# Patient Record
Sex: MALE | Race: BLACK OR AFRICAN AMERICAN | NOT HISPANIC OR LATINO | Employment: OTHER | URBAN - METROPOLITAN AREA
[De-identification: names, ages, dates, MRNs, and addresses within clinical notes are randomized per-mention and may not be internally consistent; named-entity substitution may affect disease eponyms.]

---

## 2017-07-17 ENCOUNTER — OFFICE VISIT (OUTPATIENT)
Dept: NEUROLOGY | Facility: CLINIC | Age: 67
End: 2017-07-17
Payer: MEDICARE

## 2017-07-17 VITALS — DIASTOLIC BLOOD PRESSURE: 85 MMHG | SYSTOLIC BLOOD PRESSURE: 146 MMHG | WEIGHT: 186.06 LBS | HEART RATE: 51 BPM

## 2017-07-17 DIAGNOSIS — I69.951 HEMIPLEGIA AFFECTING RIGHT SIDE IN RIGHT-DOMINANT PATIENT AS LATE EFFECT OF CEREBROVASCULAR DISEASE: ICD-10-CM

## 2017-07-17 DIAGNOSIS — I10 ESSENTIAL HYPERTENSION: ICD-10-CM

## 2017-07-17 DIAGNOSIS — E78.5 HYPERLIPIDEMIA, UNSPECIFIED HYPERLIPIDEMIA TYPE: ICD-10-CM

## 2017-07-17 DIAGNOSIS — I69.320 APHASIA AS LATE EFFECT OF CEREBROVASCULAR ACCIDENT: ICD-10-CM

## 2017-07-17 PROCEDURE — 1126F AMNT PAIN NOTED NONE PRSNT: CPT | Mod: ,,, | Performed by: PSYCHIATRY & NEUROLOGY

## 2017-07-17 PROCEDURE — 99204 OFFICE O/P NEW MOD 45 MIN: CPT | Mod: PBBFAC | Performed by: PSYCHIATRY & NEUROLOGY

## 2017-07-17 PROCEDURE — 99204 OFFICE O/P NEW MOD 45 MIN: CPT | Mod: S$PBB,,, | Performed by: PSYCHIATRY & NEUROLOGY

## 2017-07-17 PROCEDURE — 1159F MED LIST DOCD IN RCRD: CPT | Mod: ,,, | Performed by: PSYCHIATRY & NEUROLOGY

## 2017-07-17 PROCEDURE — 99999 PR PBB SHADOW E&M-NEW PATIENT-LVL IV: CPT | Mod: PBBFAC,,, | Performed by: PSYCHIATRY & NEUROLOGY

## 2017-07-17 RX ORDER — ROSUVASTATIN CALCIUM 20 MG/1
20 TABLET, COATED ORAL NIGHTLY
Status: ON HOLD | COMMUNITY
Start: 2017-04-22 | End: 2019-08-07 | Stop reason: SDUPTHER

## 2017-07-17 RX ORDER — METOPROLOL TARTRATE 50 MG/1
25 TABLET ORAL DAILY
Status: ON HOLD | COMMUNITY
Start: 2017-05-04 | End: 2019-08-07 | Stop reason: SDUPTHER

## 2017-07-17 RX ORDER — FINASTERIDE 5 MG/1
5 TABLET, FILM COATED ORAL DAILY
COMMUNITY
Start: 2017-05-30

## 2017-07-17 NOTE — PROGRESS NOTES
Subjective:       Patient ID: Alexsander Tafoya is a 66 y.o. male.    Chief Complaint:  Stroke      History of Present Illness  HPI   This is a 66-year-old -American male who is self-referred for evaluation of stroke related symptoms.  He is accompanied by his family who collaborated with the history, as the patient has an expressive aphasia with limited speech output.  He had a stroke in 2007 at which time he had profound right-sided weakness and speech difficulty.  He has residual right spastic hemiparesis and an expressive aphasia and has difficulty ambulating.  He uses a walker.  He subsequently had another stroke in 2011 with worsening of his prior deficits and increase in clumsiness.  The patient has been stable since then.  He lives alone in his house however has been having increasing difficulty with taking care off his day-to-day needs and rarely gets out of the house.  His family now helps with transportation, keeping appointments and meals.  He is able to take his medications on his own and is able to take care of his personal hygiene.  He has had no recent falls.  Vascular risk factors include hypertension and hyperlipidemia.  The family reports that he has been otherwise healthy in the past and prior to the stroke was quite athletic taking part in races.       Review of Systems  Review of Systems   HENT: Negative.  Negative for hearing loss.    Eyes: Negative.  Negative for visual disturbance.   Respiratory: Negative.  Negative for shortness of breath.    Cardiovascular: Negative.  Negative for chest pain and palpitations.   Gastrointestinal: Negative.    Endocrine: Negative.    Genitourinary: Negative.    Musculoskeletal: Positive for gait problem. Negative for back pain.   Skin: Negative.    Allergic/Immunologic: Negative.    Neurological: Positive for speech difficulty and weakness. Negative for dizziness, tremors, seizures, syncope, numbness and headaches.   Hematological: Negative.     Psychiatric/Behavioral: Negative.  Negative for decreased concentration and sleep disturbance.       Objective:      Neurologic Exam     Mental Status   Oriented to person, place, and time.   Oriented to person.   Oriented to time. Oriented to year, month, day and season.   Registration: recalls 3 of 3 objects. Follows 3 step commands.   Attention: normal. Concentration: normal.   Speech: slurred   Level of consciousness: alert  Knowledge: good. Able to perform simple calculations.   Able to name object. Able to repeat. Unable to write. Normal comprehension.   Patient has a significant expressive aphasia noted difficulty with comprehension.     Cranial Nerves     CN II   Visual fields full to confrontation.     CN III, IV, VI   Pupils are equal, round, and reactive to light.  Extraocular motions are normal.     CN V   Facial sensation intact.   Right corneal reflex: normal  Left corneal reflex: normal    CN VII   Right facial weakness: central (Minimal weakness right face)  Left facial weakness: none  Right taste: normal  Left taste: normal    CN VIII   CN VIII normal.     CN IX, X   CN IX normal.   CN X normal.     CN XI   CN XI normal.     CN XII   CN XII normal.     Motor Exam   Muscle bulk: normal  Overall muscle tone: increased  Right arm tone: spastic  Left arm tone: normal  Right leg tone: spastic  Left leg tone: normal    Strength   Strength 5/5 throughout. Right spastic hemiparesis with power being 2-3/5 in the right upper extremity and 3-4//5 in the right lower extremity.  Normal power on the left.  Grossly symmetrical     Sensory Exam   Light touch normal.   Proprioception normal.   Pinprick normal.   Grossly symmetrical primary sensations     Gait, Coordination, and Reflexes     Gait  Gait: spastic    Coordination   Finger to nose coordination: normal    Tremor   Resting tremor: absent  Intention tremor: absent  Action tremor: absent    Reflexes   Right brachioradialis: 1+  Left brachioradialis:  1+  Right biceps: 1+  Left biceps: 1+  Right triceps: 1+  Left triceps: 1+  Right patellar: 1+  Left patellar: 1+  Right achilles: 1+  Left achilles: 1+  Right plantar: normal  Left plantar: normalDTRs 1+ but slightly more prominent on the right.       Physical Exam   Constitutional: He is oriented to person, place, and time. He appears well-developed and well-nourished.   HENT:   Head: Normocephalic and atraumatic.   Eyes: EOM are normal. Pupils are equal, round, and reactive to light.   Neck: Normal range of motion. Neck supple. Carotid bruit is not present.   Neurological: He is oriented to person, place, and time. He has normal strength. He has a normal Finger-Nose-Finger Test.   Reflex Scores:       Tricep reflexes are 1+ on the right side and 1+ on the left side.       Bicep reflexes are 1+ on the right side and 1+ on the left side.       Brachioradialis reflexes are 1+ on the right side and 1+ on the left side.       Patellar reflexes are 1+ on the right side and 1+ on the left side.       Achilles reflexes are 1+ on the right side and 1+ on the left side.  Psychiatric: His speech is slurred.   Vitals reviewed.        Assessment:        1. Hemiplegia affecting right side in right-dominant patient as late effect of cerebrovascular disease    2. Aphasia as late effect of cerebrovascular accident    3. Essential hypertension    4. Hyperlipidemia, unspecified hyperlipidemia type           Plan:       Discussed with family and patient.  Because of increasing difficulty with ambulation we will get an MRI scan of the brain and a carotid ultrasound done to rule out the possibility of any new ischemic infarcts.  In addition will get home health services to initiate PT/OT and speech therapy.  Discussed with family regarding possibility of getting him into a more supervised environment such as assisted caret facility.  He does have a fall risk.  They will discuss with home health regarding need for any other equipment  including a wheelchair.  We will contact him with results of the testing and will follow-up in 6 months if stable.

## 2017-07-17 NOTE — PATIENT INSTRUCTIONS
Discussed with family and patient.  Because of increasing difficulty with ambulation we will get an MRI scan of the brain and a carotid ultrasound done to rule out the possibility of any new ischemic infarcts.  In addition will get home health services to initiate PT/OT and speech therapy.  Discussed with family regarding possibility of getting him into a more supervised environment such as assisted caret facility.  He does have a fall risk.  They will discuss with home health regarding need for any other equipment including a wheelchair.  We will contact him with results of the testing and will follow-up in 6 months if stable.

## 2017-07-18 ENCOUNTER — HOSPITAL ENCOUNTER (OUTPATIENT)
Dept: RADIOLOGY | Facility: OTHER | Age: 67
Discharge: HOME | End: 2017-07-18
Attending: PSYCHIATRY & NEUROLOGY
Payer: MEDICARE

## 2017-07-18 DIAGNOSIS — I10 ESSENTIAL HYPERTENSION: ICD-10-CM

## 2017-07-18 DIAGNOSIS — I69.320 APHASIA AS LATE EFFECT OF CEREBROVASCULAR ACCIDENT: ICD-10-CM

## 2017-07-18 DIAGNOSIS — I69.951 HEMIPLEGIA AFFECTING RIGHT SIDE IN RIGHT-DOMINANT PATIENT AS LATE EFFECT OF CEREBROVASCULAR DISEASE: ICD-10-CM

## 2017-07-18 DIAGNOSIS — E78.5 HYPERLIPIDEMIA, UNSPECIFIED HYPERLIPIDEMIA TYPE: ICD-10-CM

## 2017-07-18 PROCEDURE — 93880 EXTRACRANIAL BILAT STUDY: CPT | Mod: TC

## 2017-07-18 PROCEDURE — 70551 MRI BRAIN STEM W/O DYE: CPT | Mod: TC

## 2017-07-18 PROCEDURE — 70551 MRI BRAIN STEM W/O DYE: CPT | Mod: 26,,, | Performed by: RADIOLOGY

## 2017-07-18 PROCEDURE — 93880 EXTRACRANIAL BILAT STUDY: CPT | Mod: 26,,, | Performed by: RADIOLOGY

## 2017-07-20 ENCOUNTER — TELEPHONE (OUTPATIENT)
Dept: NEUROLOGY | Facility: CLINIC | Age: 67
End: 2017-07-20

## 2017-07-20 NOTE — TELEPHONE ENCOUNTER
----- Message from Addie Mtz sent at 7/20/2017  8:46 AM CDT -----  Contact: Smitha from [facility]   x_  1st Request  _  2nd Request  _  3rd Request        Who: Smitha from [facility]     Why: Smitha would like verbal order for  for the pt . Please call    What Number to Call Back: call Jasmin [phone]    When to Expect a call back: (With in 24 hours)

## 2017-07-21 ENCOUNTER — TELEPHONE (OUTPATIENT)
Dept: ADMINISTRATIVE | Facility: CLINIC | Age: 67
End: 2017-07-21

## 2017-08-07 ENCOUNTER — TELEPHONE (OUTPATIENT)
Dept: NEUROLOGY | Facility: CLINIC | Age: 67
End: 2017-08-07

## 2017-08-07 NOTE — TELEPHONE ENCOUNTER
----- Message from Clara Mac sent at 8/7/2017  1:32 PM CDT -----  Contact: Kelsea, [facility]  Smitha is calling to inform Dr Ramsey that she will be discharging pt from home health nursing.  Home health OT and PT will continue.    Smitha can be reached at [phone] if you have further questions.

## 2017-12-28 ENCOUNTER — OFFICE VISIT (OUTPATIENT)
Dept: NEUROLOGY | Facility: CLINIC | Age: 67
End: 2017-12-28
Payer: MEDICARE

## 2017-12-28 VITALS
BODY MASS INDEX: 29.57 KG/M2 | DIASTOLIC BLOOD PRESSURE: 66 MMHG | SYSTOLIC BLOOD PRESSURE: 108 MMHG | HEIGHT: 66 IN | HEART RATE: 57 BPM | WEIGHT: 184 LBS

## 2017-12-28 DIAGNOSIS — I69.951 HEMIPLEGIA AFFECTING RIGHT SIDE IN RIGHT-DOMINANT PATIENT AS LATE EFFECT OF CEREBROVASCULAR DISEASE: ICD-10-CM

## 2017-12-28 DIAGNOSIS — I10 ESSENTIAL HYPERTENSION: ICD-10-CM

## 2017-12-28 DIAGNOSIS — I69.320 APHASIA AS LATE EFFECT OF CEREBROVASCULAR ACCIDENT: Primary | ICD-10-CM

## 2017-12-28 DIAGNOSIS — E78.5 HYPERLIPIDEMIA, UNSPECIFIED HYPERLIPIDEMIA TYPE: ICD-10-CM

## 2017-12-28 PROCEDURE — 99214 OFFICE O/P EST MOD 30 MIN: CPT | Mod: S$PBB,,, | Performed by: PSYCHIATRY & NEUROLOGY

## 2017-12-28 PROCEDURE — 99999 PR PBB SHADOW E&M-EST. PATIENT-LVL III: CPT | Mod: PBBFAC,,, | Performed by: PSYCHIATRY & NEUROLOGY

## 2017-12-28 PROCEDURE — 99213 OFFICE O/P EST LOW 20 MIN: CPT | Mod: PBBFAC | Performed by: PSYCHIATRY & NEUROLOGY

## 2017-12-28 NOTE — PATIENT INSTRUCTIONS
Discussed with family and patient.  Continue with present management.  We will consider restarting speech therapy once patient is moved into the assisted living facility.  His daughter was asking about getting a scooter for mobility.  Advised her to wait until he is in the assisted living facility to find out if it is okay for him to have a scooter in that facility and I will initiate the request.

## 2017-12-28 NOTE — PROGRESS NOTES
Subjective:       Patient ID: Alexsander Tafoya is a 67 y.o. male.    Chief Complaint:  Stroke      History of Present Illness  HPI  This is a 67-year-old -American male who is self-referred for evaluation of stroke related symptoms.  He is accompanied by his family who collaborated with the history, as the patient has an expressive aphasia with limited speech output.  He had a stroke in 2007 at which time he had profound right-sided weakness and speech difficulty.  He has residual right spastic hemiparesis and an expressive aphasia and has difficulty ambulating.  He uses a walker.  He subsequently had another stroke in 2011 with worsening of his prior deficits and increase in clumsiness.  The patient has been stable since then.      He lives alone in his house however his family has planned placement into an assisted living center.  His family now helps with transportation, keeping appointments and meals.  He is able to take his medications on his own and is able to take care of his personal hygiene.  He has had no recent falls.  Vascular risk factors include hypertension and hyperlipidemia.  He is on aspirin 325 mg daily.       Review of Systems  Review of Systems   HENT: Negative.  Negative for hearing loss.    Eyes: Negative.  Negative for visual disturbance.   Respiratory: Negative.  Negative for shortness of breath.    Cardiovascular: Negative.  Negative for chest pain and palpitations.   Gastrointestinal: Negative.    Endocrine: Negative.    Genitourinary: Negative.    Musculoskeletal: Positive for gait problem. Negative for back pain.   Skin: Negative.    Allergic/Immunologic: Negative.    Neurological: Positive for speech difficulty and weakness. Negative for dizziness, tremors, seizures, syncope, numbness and headaches.   Hematological: Negative.    Psychiatric/Behavioral: Negative.  Negative for decreased concentration and sleep disturbance.       Objective:      Neurologic Exam     Mental Status    Oriented to person, place, and time.   Oriented to person.   Oriented to time. Oriented to year, month, day and season.   Registration: recalls 3 of 3 objects. Follows 3 step commands.   Attention: normal. Concentration: normal.   Speech: slurred   Level of consciousness: alert  Knowledge: good. Able to perform simple calculations.   Able to name object. Able to repeat. Unable to write. Normal comprehension.   Patient has a significant expressive aphasia noted difficulty with comprehension.     Cranial Nerves     CN II   Visual fields full to confrontation.     CN III, IV, VI   Pupils are equal, round, and reactive to light.  Extraocular motions are normal.     CN V   Facial sensation intact.   Right corneal reflex: normal  Left corneal reflex: normal    CN VII   Right facial weakness: central (Minimal weakness right face)  Left facial weakness: none  Right taste: normal  Left taste: normal    CN VIII   CN VIII normal.     CN IX, X   CN IX normal.   CN X normal.     CN XI   CN XI normal.     CN XII   CN XII normal.     Motor Exam   Muscle bulk: normal  Overall muscle tone: increased  Right arm tone: spastic  Left arm tone: normal  Right leg tone: spastic  Left leg tone: normal    Strength   Strength 5/5 throughout. Right spastic hemiparesis with power being 2-3/5 in the right upper extremity and 3-4//5 in the right lower extremity.  Normal power on the left.  Grossly symmetrical     Sensory Exam   Light touch normal.   Proprioception normal.   Pinprick normal.   Grossly symmetrical primary sensations     Gait, Coordination, and Reflexes     Gait  Gait: spastic    Coordination   Finger to nose coordination: normal    Tremor   Resting tremor: absent  Intention tremor: absent  Action tremor: absent    Reflexes   Right brachioradialis: 1+  Left brachioradialis: 1+  Right biceps: 1+  Left biceps: 1+  Right triceps: 1+  Left triceps: 1+  Right patellar: 1+  Left patellar: 1+  Right achilles: 1+  Left achilles: 1+  Right  plantar: normal  Left plantar: normalDTRs 1+ but slightly more prominent on the right.       Physical Exam   Constitutional: He is oriented to person, place, and time. He appears well-developed and well-nourished.   HENT:   Head: Normocephalic and atraumatic.   Eyes: EOM are normal. Pupils are equal, round, and reactive to light.   Neck: Normal range of motion. Neck supple. Carotid bruit is not present.   Neurological: He is oriented to person, place, and time. He has normal strength. He has a normal Finger-Nose-Finger Test.   Reflex Scores:       Tricep reflexes are 1+ on the right side and 1+ on the left side.       Bicep reflexes are 1+ on the right side and 1+ on the left side.       Brachioradialis reflexes are 1+ on the right side and 1+ on the left side.       Patellar reflexes are 1+ on the right side and 1+ on the left side.       Achilles reflexes are 1+ on the right side and 1+ on the left side.  Psychiatric: His speech is slurred.   Vitals reviewed.        Assessment:        1. Aphasia as late effect of cerebrovascular accident    2. Hemiplegia affecting right side in right-dominant patient as late effect of cerebrovascular disease    3. Essential hypertension    4. Hyperlipidemia, unspecified hyperlipidemia type           Plan:       Discussed with family and patient.  Continue with present management.  We will consider restarting speech therapy once patient is moved into the assisted living facility.  His daughter was asking about getting a scooter for mobility.  Advised her to wait until he is in the assisted living facility to find out if it is okay for him to have a scooter in that facility and I will initiate the request.  If stable he will follow-up in 6 months.

## 2018-03-06 ENCOUNTER — TELEPHONE (OUTPATIENT)
Dept: NEUROLOGY | Facility: CLINIC | Age: 68
End: 2018-03-06

## 2018-03-06 NOTE — TELEPHONE ENCOUNTER
----- Message from Kamar Chanel sent at 3/6/2018 11:04 AM CST -----  Contact:   _marlee  1st Request  _  2nd Request  _  3rd Request        Who: Idalia    Why: Requesting a call back in regards to patient getting prescription for power scooter for coverage purposes.  Please return the call at earliest convenience. Thanks!    What Number to Call Back: [phone]      When to Expect a call back: (Within 24 hours)

## 2018-03-14 ENCOUNTER — OFFICE VISIT (OUTPATIENT)
Dept: NEUROLOGY | Facility: CLINIC | Age: 68
End: 2018-03-14
Payer: MEDICARE

## 2018-03-14 VITALS
HEIGHT: 66 IN | DIASTOLIC BLOOD PRESSURE: 71 MMHG | WEIGHT: 188.5 LBS | HEART RATE: 56 BPM | SYSTOLIC BLOOD PRESSURE: 117 MMHG | BODY MASS INDEX: 30.29 KG/M2

## 2018-03-14 DIAGNOSIS — E78.5 HYPERLIPIDEMIA, UNSPECIFIED HYPERLIPIDEMIA TYPE: ICD-10-CM

## 2018-03-14 DIAGNOSIS — I10 ESSENTIAL HYPERTENSION: ICD-10-CM

## 2018-03-14 DIAGNOSIS — I69.951 HEMIPLEGIA AFFECTING RIGHT SIDE IN RIGHT-DOMINANT PATIENT AS LATE EFFECT OF CEREBROVASCULAR DISEASE: Primary | ICD-10-CM

## 2018-03-14 DIAGNOSIS — I69.320 APHASIA AS LATE EFFECT OF CEREBROVASCULAR ACCIDENT: ICD-10-CM

## 2018-03-14 PROCEDURE — 99214 OFFICE O/P EST MOD 30 MIN: CPT | Mod: S$PBB,,, | Performed by: PSYCHIATRY & NEUROLOGY

## 2018-03-14 PROCEDURE — 99213 OFFICE O/P EST LOW 20 MIN: CPT | Mod: PBBFAC | Performed by: PSYCHIATRY & NEUROLOGY

## 2018-03-14 PROCEDURE — 99999 PR PBB SHADOW E&M-EST. PATIENT-LVL III: CPT | Mod: PBBFAC,,, | Performed by: PSYCHIATRY & NEUROLOGY

## 2018-03-14 NOTE — PROGRESS NOTES
Subjective:       Patient ID: Alexsander Tafoya is a 67 y.o. male.    Chief Complaint:  Stroke      History of Present Illness  HPI  This is a 67-year-old -American male who had been seen by me for evaluation of stroke related symptoms.  He is accompanied by his family who collaborated with the history, as the patient has an expressive aphasia with limited speech output.  He had a stroke in 2007 at which time he had profound right-sided weakness and speech difficulty.  He has residual right spastic hemiparesis and an expressive aphasia and has difficulty ambulating.  He uses a walker.  He subsequently had another stroke in 2011 with worsening of his prior deficits and increase in clumsiness.  The patient has been stable since then.      The patient was last seen by me in December 2017 and subsequently has been placed into a supervised environment/assisted living center.  He is slowly getting adjusted but would like to have a scooter so that he would have increased stability within the assisted living center as he has been using a manual wheelchair on his own.  Vascular risk factors include hypertension and hyperlipidemia.  He is on aspirin 325 mg daily.       Review of Systems  Review of Systems   HENT: Negative.  Negative for hearing loss.    Eyes: Negative.  Negative for visual disturbance.   Respiratory: Negative.  Negative for shortness of breath.    Cardiovascular: Negative.  Negative for chest pain and palpitations.   Gastrointestinal: Negative.    Endocrine: Negative.    Genitourinary: Negative.    Musculoskeletal: Positive for gait problem. Negative for back pain.   Skin: Negative.    Allergic/Immunologic: Negative.    Neurological: Positive for speech difficulty and weakness. Negative for dizziness, tremors, seizures, syncope, numbness and headaches.   Hematological: Negative.    Psychiatric/Behavioral: Negative.  Negative for decreased concentration and sleep disturbance.       Objective:       Neurologic Exam     Mental Status   Oriented to person, place, and time.   Oriented to person.   Oriented to time. Oriented to year, month, day and season.   Registration: recalls 3 of 3 objects. Follows 3 step commands.   Attention: normal. Concentration: normal.   Speech: slurred   Level of consciousness: alert  Knowledge: good. Able to perform simple calculations.   Able to name object. Able to repeat. Unable to write. Normal comprehension.   Patient has a significant expressive aphasia noted difficulty with comprehension.     Cranial Nerves     CN II   Visual fields full to confrontation.     CN III, IV, VI   Pupils are equal, round, and reactive to light.  Extraocular motions are normal.     CN V   Facial sensation intact.   Right corneal reflex: normal  Left corneal reflex: normal    CN VII   Right facial weakness: central (Minimal weakness right face)  Left facial weakness: none  Right taste: normal  Left taste: normal    CN VIII   CN VIII normal.     CN IX, X   CN IX normal.   CN X normal.     CN XI   CN XI normal.     CN XII   CN XII normal.     Motor Exam   Muscle bulk: normal  Overall muscle tone: increased  Right arm tone: spastic  Left arm tone: normal  Right leg tone: spastic  Left leg tone: normal    Strength   Strength 5/5 throughout. Right spastic hemiparesis with power being 2-3/5 in the right upper extremity and 3-4//5 in the right lower extremity.  Normal power on the left.  Grossly symmetrical     Sensory Exam   Light touch normal.   Proprioception normal.   Pinprick normal.   Grossly symmetrical primary sensations     Gait, Coordination, and Reflexes     Gait  Gait: spastic    Coordination   Finger to nose coordination: normal    Tremor   Resting tremor: absent  Intention tremor: absent  Action tremor: absent    Reflexes   Right brachioradialis: 1+  Left brachioradialis: 1+  Right biceps: 1+  Left biceps: 1+  Right triceps: 1+  Left triceps: 1+  Right patellar: 1+  Left patellar: 1+  Right  achilles: 1+  Left achilles: 1+  Right plantar: normal  Left plantar: normalDTRs 1+ but slightly more prominent on the right.       Physical Exam   Constitutional: He is oriented to person, place, and time. He appears well-developed and well-nourished.   HENT:   Head: Normocephalic and atraumatic.   Eyes: EOM are normal. Pupils are equal, round, and reactive to light.   Neck: Normal range of motion. Neck supple. Carotid bruit is not present.   Neurological: He is oriented to person, place, and time. He has normal strength. He has a normal Finger-Nose-Finger Test.   Reflex Scores:       Tricep reflexes are 1+ on the right side and 1+ on the left side.       Bicep reflexes are 1+ on the right side and 1+ on the left side.       Brachioradialis reflexes are 1+ on the right side and 1+ on the left side.       Patellar reflexes are 1+ on the right side and 1+ on the left side.       Achilles reflexes are 1+ on the right side and 1+ on the left side.  Psychiatric: His speech is slurred.   Vitals reviewed.        Assessment:        1. Hemiplegia affecting right side in right-dominant patient as late effect of cerebrovascular disease    2. Aphasia as late effect of cerebrovascular accident    3. Hyperlipidemia, unspecified hyperlipidemia type    4. Essential hypertension           Plan:       Discussed with family and patient.  Continue with present management.   have given them a prescription for a scooter for mobility.   If stable will follow-up in one year.

## 2018-03-14 NOTE — PATIENT INSTRUCTIONS
Discussed with family and patient.  Continue with present management.   have given them a prescription for a scooter for mobility.   If stable will follow-up in one year.

## 2018-05-21 ENCOUNTER — TELEPHONE (OUTPATIENT)
Dept: NEUROLOGY | Facility: CLINIC | Age: 68
End: 2018-05-21

## 2018-05-21 NOTE — TELEPHONE ENCOUNTER
----- Message from Valery Khanna sent at 5/21/2018 12:25 PM CDT -----            Name of Who is Calling: Idalia      What is the request in detail: Patient's sister called she is requesting a call from the nurse regarding the request for the motorized scooter.      Can the clinic reply by [facility]: No      What Number to Call Back if not in [facility]: [phone]

## 2018-05-23 ENCOUNTER — TELEPHONE (OUTPATIENT)
Dept: NEUROLOGY | Facility: CLINIC | Age: 68
End: 2018-05-23

## 2018-05-23 NOTE — TELEPHONE ENCOUNTER
Discussed with sister.  Patient needed some forms to be signed and filled out for the motorized scooter/ wheelchair.  She is advised to fax it to me and I have fill it and send it to the DME company

## 2018-05-30 ENCOUNTER — TELEPHONE (OUTPATIENT)
Dept: NEUROLOGY | Facility: CLINIC | Age: 68
End: 2018-05-30

## 2018-05-30 NOTE — TELEPHONE ENCOUNTER
----- Message from Hannah Clarke sent at 5/30/2018 12:26 PM CDT -----  Contact: Idalia Kraig (Sister)            Name of Who is Calling: Idalia Kraig (Sister)      What is the request in detail: Pt's sister faxed paper on last Thursday and wants to know if the forms have been received.    Can the clinic reply by [facility]: N      What Number to Call Back if not in [facility]: [phone]

## 2018-06-19 ENCOUNTER — TELEPHONE (OUTPATIENT)
Dept: SLEEP MEDICINE | Facility: CLINIC | Age: 68
End: 2018-06-19

## 2018-06-19 NOTE — TELEPHONE ENCOUNTER
----- Message from Rhea Gongora sent at 6/19/2018  2:26 PM CDT -----  Contact: Kraig,Idalia Armstrong            Name of Who is Calling: Idalia Cornell       What is the request in detail:Pt is calling to check the status of pt getting home health and therapy set up as discussed during pt last visit.  Please contact pt sister to further discuss and advise.      Can the clinic reply by [facility]: No      What Number to Call Back if not in [facility]: [phone]

## 2018-06-22 ENCOUNTER — TELEPHONE (OUTPATIENT)
Dept: NEUROLOGY | Facility: CLINIC | Age: 68
End: 2018-06-22

## 2018-06-28 ENCOUNTER — TELEPHONE (OUTPATIENT)
Dept: NEUROLOGY | Facility: CLINIC | Age: 68
End: 2018-06-28

## 2018-06-28 DIAGNOSIS — I69.951 HEMIPLEGIA AFFECTING RIGHT SIDE IN RIGHT-DOMINANT PATIENT AS LATE EFFECT OF CEREBROVASCULAR DISEASE: Primary | ICD-10-CM

## 2018-06-28 DIAGNOSIS — I10 ESSENTIAL HYPERTENSION: ICD-10-CM

## 2018-06-28 DIAGNOSIS — I69.320 APHASIA AS LATE EFFECT OF CEREBROVASCULAR ACCIDENT: ICD-10-CM

## 2018-06-28 NOTE — TELEPHONE ENCOUNTER
----- Message from Rhea Gongora sent at 6/28/2018 12:57 PM CDT -----  Contact: Idalia Cornell             Name of Who is Calling: Idalia Cornell       What is the request in detail: Pt sister is calling to check the status of pt getting physical and occupational therapy set up as discussed during pt last visit.  Please contact pt sister to further discuss and advise.      Can the clinic reply by [facility]: No      What Number to Call Back if not in [facility]: [phone]

## 2018-07-11 ENCOUNTER — TELEPHONE (OUTPATIENT)
Dept: NEUROLOGY | Facility: CLINIC | Age: 68
End: 2018-07-11

## 2018-07-11 NOTE — TELEPHONE ENCOUNTER
Spoke to sister who states that [facility] did not receive office notes from . Spoke with Cecilia Mathis who confirmed this, and advised her that I will take care of this. Fax # [phone] [facility]. Rehab Dept.

## 2018-07-11 NOTE — TELEPHONE ENCOUNTER
----- Message from Randee Church sent at 7/11/2018 10:25 AM CDT -----  Contact: sister fabiola [phone]            Name of Who is Calling: sister      What is the request in detail: needs to speak to nurse regarding power wheel chair. Call sister      Can the clinic reply by [facility]: no      [facility]: [phone] or [phone]

## 2018-07-12 ENCOUNTER — TELEPHONE (OUTPATIENT)
Dept: NEUROLOGY | Facility: CLINIC | Age: 68
End: 2018-07-12

## 2018-07-12 NOTE — TELEPHONE ENCOUNTER
----- Message from Warren Real sent at 7/12/2018  9:58 AM CDT -----  Contact: Idalia, patient's sister            Name of Who is Calling: Idalia, patient's sister    What is the request in detail: Patient's sister called and stated patient needs to be seen before end of July. The reason is the patient will be discharged from seeing physical therapy if he is not seen before the end of July    Can the clinic reply by [facility]: No      What Number to Call Back if not in [facility]: [phone]

## 2018-07-13 ENCOUNTER — TELEPHONE (OUTPATIENT)
Dept: ADMINISTRATIVE | Facility: CLINIC | Age: 68
End: 2018-07-13

## 2018-11-14 ENCOUNTER — OFFICE VISIT (OUTPATIENT)
Dept: PHYSICAL MEDICINE AND REHAB | Facility: CLINIC | Age: 68
End: 2018-11-14
Payer: MEDICARE

## 2018-11-14 VITALS
HEIGHT: 66 IN | HEART RATE: 52 BPM | SYSTOLIC BLOOD PRESSURE: 134 MMHG | DIASTOLIC BLOOD PRESSURE: 91 MMHG | WEIGHT: 188 LBS | BODY MASS INDEX: 30.22 KG/M2

## 2018-11-14 DIAGNOSIS — R25.2 SPASTICITY: ICD-10-CM

## 2018-11-14 DIAGNOSIS — I69.320 APHASIA AS LATE EFFECT OF CEREBROVASCULAR ACCIDENT: ICD-10-CM

## 2018-11-14 DIAGNOSIS — Z78.9 IMPAIRED MOBILITY AND ADLS: ICD-10-CM

## 2018-11-14 DIAGNOSIS — R26.9 GAIT DISORDER: Primary | ICD-10-CM

## 2018-11-14 DIAGNOSIS — I69.951 HEMIPLEGIA AFFECTING RIGHT SIDE IN RIGHT-DOMINANT PATIENT AS LATE EFFECT OF CEREBROVASCULAR DISEASE: ICD-10-CM

## 2018-11-14 DIAGNOSIS — R29.6 FALLS FREQUENTLY: ICD-10-CM

## 2018-11-14 DIAGNOSIS — Z74.09 IMPAIRED MOBILITY AND ADLS: ICD-10-CM

## 2018-11-14 PROCEDURE — 99999 PR PBB SHADOW E&M-EST. PATIENT-LVL II: CPT | Mod: PBBFAC,,, | Performed by: PHYSICAL MEDICINE & REHABILITATION

## 2018-11-14 PROCEDURE — 99204 OFFICE O/P NEW MOD 45 MIN: CPT | Mod: S$PBB,,, | Performed by: PHYSICAL MEDICINE & REHABILITATION

## 2018-11-14 PROCEDURE — 99212 OFFICE O/P EST SF 10 MIN: CPT | Mod: PBBFAC | Performed by: PHYSICAL MEDICINE & REHABILITATION

## 2018-11-14 NOTE — PROGRESS NOTES
Subjective:       Patient ID: Alexsander Tafoya is a 68 y.o. male.    Chief Complaint: No chief complaint on file.    HPI     HISTORY OF PRESENT ILLNESS:  Mr. Tafoya is a 68-year-old -American male,   right-handed, who is presenting to the Physical Medicine Clinic for evaluation   for a power mobility device.  His past medical history is significant for a   stroke with right-sided weakness and aphasia.    The patient currently lives at [facility]   [email or web address]  He resides in a handicapped accessible apartment.  He is independent   with feeding himself after setup.  He requires assistance for dressing and   toileting.  He requires assistance for bathing.  He can ambulate a few steps   with a walker or holding on to walls and furniture.  He is restricted by   right-sided weakness and impaired balance.  He and his family report history of   occasional falls, the last one about month ago, but so far without any serious   sequelae.  The patient has a manual wheelchair, but can only propel it for 20-25   feet, mostly using his left upper and left lower extremity.  He is restricted by   weakness on the right side and by fatigue.      MS/HN  dd: 11/14/2018 14:56:16 (CST)  td: 11/15/2018 09:03:17 (CST)  Doc ID   #[id number]  Job ID #[id number]          Review of Systems   Constitutional: Negative for fatigue.   Eyes: Negative for visual disturbance.   Respiratory: Positive for shortness of breath.    Cardiovascular: Negative for chest pain.   Gastrointestinal: Negative for nausea and vomiting.   Genitourinary: Positive for frequency. Negative for difficulty urinating.   Musculoskeletal: Positive for arthralgias and gait problem. Negative for back pain and neck pain.   Neurological: Negative for dizziness and headaches.   Psychiatric/Behavioral: Negative for behavioral problems.       Objective:      Physical Exam   Constitutional: He appears well-developed and well-nourished. No distress.   Coming to  the clinic in a manual wheelchair propelled by family.     HENT:   Head: Normocephalic and atraumatic.   Eyes: EOM are normal.   Neck:   Decreased ROM.  -ve tenderness.   Cardiovascular: Normal rate, regular rhythm and normal heart sounds.   Pulmonary/Chest: Breath sounds normal.   Abdominal: Soft.   Musculoskeletal:   BUE:  ROM:   RUE: decreased ROM at shoulder. Increased tone.   LUE: full.  Strength:    RUE: 3-/5 at shoulder abduction, 4 elbow flexion, 4 elbow extension, 4 hand .   LUE: 5/5 at shoulder abduction, 5 elbow flexion, 5 elbow extension, 5 hand .  Sensation to pinprick:   RUE: intact.   LUE: intact.  DTR:    RUE: +2 biceps, +2 triceps.   LUE:  +1 biceps, +1 triceps.      BLE:  ROM:   RLE: decreased at knee, increased tone.   LLE: full.  Strength:    RLE: 3+/5 at hip flexion, 4 knee extension, 4 ankle DF, 4 ankle PF.   LLE: 5/5 at hip flexion, 5 knee extension, 5 ankle DF, 5 ankle PF.  Sensation to pinprick:     RLE: intact.      LLE: intact.   DTR:     RLE: +1 knee, +1 ankle.    LLE: +1 knee, +1 ankle.    -ve tenderness over lumbar spine.     Neurological: He is alert. He exhibits abnormal muscle tone.   Severe dysarthria.  Some word finding difficulty.  Follows simple commands well.  Oriented to name, , year, current residence.   Skin: No rash noted.   Psychiatric: He has a normal mood and affect. His behavior is normal.   Vitals reviewed.      Assessment:       1. Gait disorder    2. Hemiplegia affecting right side in right-dominant patient as late effect of cerebrovascular disease    3. Aphasia as late effect of cerebrovascular accident        Summary/Plan:       - The patient was seen today for mobility evaluation for a power mobility device due to significant impairment at home.  - The patient has multifactorial gait impairment.  - The patient is not able to ambulate safely to the kitchen or living room.  - The patient is unable to use a walker functional distances due to Rt hemiparesis,  impaired balance.  - The patient is unable to use an optimally-configured manual wheelchair in the home in order to perform Mobility Related Activities of Daily Living, due to Rt hemiparesis, increase RUE tone, fatigue.  - The patient has history of falls, which could be detrimental to is health due to stroke and antiplatelet therapy.  - The patient has functional cognition. He is oriented to time, place, situation. He should be able to use a power mobility device well at home.  - The patient was given a prescription for a power wheelchair.  - A scooter would not be appropriate due the patient's trouble clearing the ledge, difficulty controlling the scooter tiller due to RUE weakness and to maneuverability restrictions at his apartment.   - This will allow the patient to go safely to the kitchen, common dining room or living room at the assisted living facility for feeding & socialization.  - The patient is to return the Physical Medicine/Mobility clinic prn.

## 2019-01-29 ENCOUNTER — TELEPHONE (OUTPATIENT)
Dept: NEUROLOGY | Facility: CLINIC | Age: 69
End: 2019-01-29

## 2019-01-29 NOTE — TELEPHONE ENCOUNTER
----- Message from Merna Tam sent at 1/29/2019  3:36 PM CST -----  Contact: Pt's Sister Idalia  Name of Who is Calling: Pt's Sister Idalia    What is the request in detail: Pt's Sister Idalia called to schedule Mr. Beltre for a follow up. Pt's sister states he is a stroke patient and he keeps leaning towards his side and she is unsure what is wrong. Advised first available is 2/27 and she states that is too far out. Please call to further discuss and advise.     Can the clinic reply by [facility]:     What Number to Call Back if not in [facility]: Pt's Sister Idalia [phone]

## 2019-01-29 NOTE — TELEPHONE ENCOUNTER
Idalia advised to bring patient to ER, but would rather have patient seen in office by you. Please advise.

## 2019-01-30 ENCOUNTER — OFFICE VISIT (OUTPATIENT)
Dept: NEUROLOGY | Facility: CLINIC | Age: 69
End: 2019-01-30
Payer: MEDICARE

## 2019-01-30 VITALS
HEART RATE: 64 BPM | DIASTOLIC BLOOD PRESSURE: 85 MMHG | BODY MASS INDEX: 30.34 KG/M2 | HEIGHT: 66 IN | SYSTOLIC BLOOD PRESSURE: 132 MMHG

## 2019-01-30 DIAGNOSIS — I69.320 APHASIA AS LATE EFFECT OF CEREBROVASCULAR ACCIDENT: Primary | ICD-10-CM

## 2019-01-30 DIAGNOSIS — E78.5 HYPERLIPIDEMIA, UNSPECIFIED HYPERLIPIDEMIA TYPE: ICD-10-CM

## 2019-01-30 DIAGNOSIS — I10 ESSENTIAL HYPERTENSION: ICD-10-CM

## 2019-01-30 DIAGNOSIS — I69.951 HEMIPLEGIA AFFECTING RIGHT SIDE IN RIGHT-DOMINANT PATIENT AS LATE EFFECT OF CEREBROVASCULAR DISEASE: ICD-10-CM

## 2019-01-30 PROCEDURE — 99999 PR PBB SHADOW E&M-EST. PATIENT-LVL III: ICD-10-PCS | Mod: PBBFAC,,, | Performed by: PSYCHIATRY & NEUROLOGY

## 2019-01-30 PROCEDURE — 99214 OFFICE O/P EST MOD 30 MIN: CPT | Mod: S$PBB,,, | Performed by: PSYCHIATRY & NEUROLOGY

## 2019-01-30 PROCEDURE — 99213 OFFICE O/P EST LOW 20 MIN: CPT | Mod: PBBFAC | Performed by: PSYCHIATRY & NEUROLOGY

## 2019-01-30 PROCEDURE — 99999 PR PBB SHADOW E&M-EST. PATIENT-LVL III: CPT | Mod: PBBFAC,,, | Performed by: PSYCHIATRY & NEUROLOGY

## 2019-01-30 PROCEDURE — 99214 PR OFFICE/OUTPT VISIT, EST, LEVL IV, 30-39 MIN: ICD-10-PCS | Mod: S$PBB,,, | Performed by: PSYCHIATRY & NEUROLOGY

## 2019-01-30 RX ORDER — AMOXICILLIN 500 MG
1 CAPSULE ORAL DAILY
COMMUNITY

## 2019-01-30 RX ORDER — ASPIRIN 325 MG
325 TABLET ORAL DAILY
Status: ON HOLD | COMMUNITY
End: 2019-09-02 | Stop reason: HOSPADM

## 2019-01-30 RX ORDER — DOXAZOSIN 2 MG/1
2 TABLET ORAL NIGHTLY
Status: ON HOLD | COMMUNITY
End: 2019-08-07 | Stop reason: SDUPTHER

## 2019-01-30 NOTE — PROGRESS NOTES
Subjective:       Patient ID: Alexsander Tafoya is a 68 y.o. male.    Chief Complaint:  Stroke      History of Present Illness  HPI  This is a 68-year-old -American male who had been seen by me for evaluation of stroke related symptoms.  He is accompanied by his family who collaborated with the history, as the patient has an expressive aphasia with limited speech output.  He had a stroke in 2007 at which time he had profound right-sided weakness and speech difficulty.  He has residual right spastic hemiparesis and an expressive aphasia and has difficulty ambulating.  He uses a walker.  He subsequently had another stroke in 2011 with worsening of his prior deficits and increase in clumsiness.  The patient has been stable since then.      The patient was last seen by me in December 2017 and subsequently has been placed into a supervised environment/assisted living center.  He did get adjusted to his new living environment and has been using a motorized scooter there.  However over the past couple of weeks family noted that was somewhat more confused and leaning to 1 side and had a couple of falls when he tried to get onto his bed which has never happened before.  The patient cannot give any adequate history because of his aphasia and some cognitive difficulties.  Vascular risk factors include hypertension and hyperlipidemia.  He is on aspirin 325 mg daily.       Review of Systems  Review of Systems   HENT: Negative.  Negative for hearing loss.    Eyes: Negative.  Negative for visual disturbance.   Respiratory: Negative.  Negative for shortness of breath.    Cardiovascular: Negative.  Negative for chest pain and palpitations.   Gastrointestinal: Negative.    Endocrine: Negative.    Genitourinary: Negative.    Musculoskeletal: Positive for gait problem. Negative for back pain.   Skin: Negative.    Allergic/Immunologic: Negative.    Neurological: Positive for speech difficulty and weakness. Negative for dizziness,  tremors, seizures, syncope, numbness and headaches.   Hematological: Negative.    Psychiatric/Behavioral: Negative.  Negative for decreased concentration and sleep disturbance.       Objective:      Neurologic Exam     Mental Status   Oriented to person, place, and time.   Oriented to person.   Oriented to time. Oriented to year, month, day and season.   Registration: recalls 3 of 3 objects. Follows 3 step commands.   Attention: normal. Concentration: normal.   Speech: slurred   Level of consciousness: alert  Knowledge: good. Able to perform simple calculations.   Able to name object. Able to repeat. Unable to write. Normal comprehension.   Patient has a significant expressive aphasia noted difficulty with comprehension.     Cranial Nerves     CN II   Visual fields full to confrontation.     CN III, IV, VI   Pupils are equal, round, and reactive to light.  Extraocular motions are normal.     CN V   Facial sensation intact.   Right corneal reflex: normal  Left corneal reflex: normal    CN VII   Right facial weakness: central (Minimal weakness right face)  Left facial weakness: none  Right taste: normal  Left taste: normal    CN VIII   CN VIII normal.     CN IX, X   CN IX normal.   CN X normal.     CN XI   CN XI normal.     CN XII   CN XII normal.     Motor Exam   Muscle bulk: normal  Overall muscle tone: increased  Right arm tone: spastic  Left arm tone: normal  Right leg tone: spastic  Left leg tone: normal    Strength   Strength 5/5 throughout. Right spastic hemiparesis with power being 2-3/5 in the right upper extremity and 3-4//5 in the right lower extremity.  Normal power on the left.  Grossly symmetrical     Sensory Exam   Light touch normal.   Proprioception normal.   Pinprick normal.   Grossly symmetrical primary sensations     Gait, Coordination, and Reflexes     Gait  Gait: spastic    Coordination   Finger to nose coordination: normal    Tremor   Resting tremor: absent  Intention tremor: absent  Action  tremor: absent    Reflexes   Right brachioradialis: 1+  Left brachioradialis: 1+  Right biceps: 1+  Left biceps: 1+  Right triceps: 1+  Left triceps: 1+  Right patellar: 1+  Left patellar: 1+  Right achilles: 1+  Left achilles: 1+  Right plantar: normal  Left plantar: normalDTRs 1+ but slightly more prominent on the right.       Physical Exam   Constitutional: He is oriented to person, place, and time. He appears well-developed and well-nourished.   HENT:   Head: Normocephalic and atraumatic.   Eyes: EOM are normal. Pupils are equal, round, and reactive to light.   Neck: Normal range of motion. Neck supple. Carotid bruit is not present.   Neurological: He is oriented to person, place, and time. He has normal strength. He has a normal Finger-Nose-Finger Test.   Reflex Scores:       Tricep reflexes are 1+ on the right side and 1+ on the left side.       Bicep reflexes are 1+ on the right side and 1+ on the left side.       Brachioradialis reflexes are 1+ on the right side and 1+ on the left side.       Patellar reflexes are 1+ on the right side and 1+ on the left side.       Achilles reflexes are 1+ on the right side and 1+ on the left side.  Psychiatric: His speech is slurred.   Vitals reviewed.        Assessment:        1. Aphasia as late effect of cerebrovascular accident    2. Hemiplegia affecting right side in right-dominant patient as late effect of cerebrovascular disease    3. Essential hypertension    4. Hyperlipidemia, unspecified hyperlipidemia type           Plan:       Discussed with family and patient.  His slight increase in his motor dysfunction may be related to his progressive decline related to his age.  However the possibility of a new ischemic event needs to be considered.  Will get a CT scan of the head, noncontrast and a carotid ultrasound done.  Will contact them with results.  If no new abnormality to continue his present medical management and supervised care and follow-up in 1 year.

## 2019-01-30 NOTE — PATIENT INSTRUCTIONS
Discussed with family and patient.  His slight increase in his motor dysfunction may be related to his progressive decline related to his age.  However the possibility of a new ischemic event needs to be considered.  Will get a CT scan of the head, noncontrast and a carotid ultrasound done.  Will contact them with results.  If no new abnormality to continue his present medical management and supervised care and follow-up in 1 year.

## 2019-02-05 ENCOUNTER — HOSPITAL ENCOUNTER (OUTPATIENT)
Dept: RADIOLOGY | Facility: HOSPITAL | Age: 69
Discharge: HOME | End: 2019-02-05
Attending: PSYCHIATRY & NEUROLOGY
Payer: MEDICARE

## 2019-02-05 DIAGNOSIS — E78.5 HYPERLIPIDEMIA, UNSPECIFIED HYPERLIPIDEMIA TYPE: ICD-10-CM

## 2019-02-05 DIAGNOSIS — I69.320 APHASIA AS LATE EFFECT OF CEREBROVASCULAR ACCIDENT: ICD-10-CM

## 2019-02-05 DIAGNOSIS — I69.951 HEMIPLEGIA AFFECTING RIGHT SIDE IN RIGHT-DOMINANT PATIENT AS LATE EFFECT OF CEREBROVASCULAR DISEASE: ICD-10-CM

## 2019-02-05 DIAGNOSIS — I10 ESSENTIAL HYPERTENSION: ICD-10-CM

## 2019-02-05 PROCEDURE — 70450 CT HEAD/BRAIN W/O DYE: CPT | Mod: 26,,, | Performed by: RADIOLOGY

## 2019-02-05 PROCEDURE — 93880 EXTRACRANIAL BILAT STUDY: CPT | Mod: TC

## 2019-02-05 PROCEDURE — 93880 US CAROTID BILATERAL: ICD-10-PCS | Mod: 26,,, | Performed by: RADIOLOGY

## 2019-02-05 PROCEDURE — 93880 EXTRACRANIAL BILAT STUDY: CPT | Mod: 26,,, | Performed by: RADIOLOGY

## 2019-02-05 PROCEDURE — 70450 CT HEAD WITHOUT CONTRAST: ICD-10-PCS | Mod: 26,,, | Performed by: RADIOLOGY

## 2019-02-05 PROCEDURE — 70450 CT HEAD/BRAIN W/O DYE: CPT | Mod: TC

## 2019-08-05 ENCOUNTER — HOSPITAL ENCOUNTER (INPATIENT)
Facility: HOSPITAL | Age: 69
LOS: 2 days | Discharge: HOME HEALTH | DRG: 178 | End: 2019-08-07
Attending: EMERGENCY MEDICINE | Admitting: EMERGENCY MEDICINE
Payer: MEDICARE

## 2019-08-05 DIAGNOSIS — R74.01 TRANSAMINITIS: ICD-10-CM

## 2019-08-05 DIAGNOSIS — M25.511 RIGHT SHOULDER PAIN: ICD-10-CM

## 2019-08-05 DIAGNOSIS — R50.9 FEVER: ICD-10-CM

## 2019-08-05 PROBLEM — D64.9 ANEMIA: Status: ACTIVE | Noted: 2019-08-05

## 2019-08-05 PROBLEM — R31.21 ASYMPTOMATIC MICROSCOPIC HEMATURIA: Status: ACTIVE | Noted: 2019-08-05

## 2019-08-05 PROBLEM — N40.0 BPH (BENIGN PROSTATIC HYPERPLASIA): Status: ACTIVE | Noted: 2019-08-05

## 2019-08-05 PROBLEM — Z91.81 HISTORY OF RECENT FALL: Status: ACTIVE | Noted: 2019-08-05

## 2019-08-05 PROBLEM — E83.42 HYPOMAGNESEMIA: Status: ACTIVE | Noted: 2019-08-05

## 2019-08-05 LAB
ALBUMIN SERPL BCP-MCNC: 3.2 G/DL (ref 3.5–5.2)
ALP SERPL-CCNC: 75 U/L (ref 55–135)
ALT SERPL W/O P-5'-P-CCNC: 61 U/L (ref 10–44)
ANION GAP SERPL CALC-SCNC: 11 MMOL/L (ref 8–16)
AST SERPL-CCNC: 163 U/L (ref 10–40)
BACTERIA #/AREA URNS AUTO: ABNORMAL /HPF
BASOPHILS # BLD AUTO: 0.03 K/UL (ref 0–0.2)
BASOPHILS NFR BLD: 0.4 % (ref 0–1.9)
BILIRUB SERPL-MCNC: 0.7 MG/DL (ref 0.1–1)
BILIRUB UR QL STRIP: NEGATIVE
BUN SERPL-MCNC: 19 MG/DL (ref 8–23)
CALCIUM SERPL-MCNC: 8.5 MG/DL (ref 8.7–10.5)
CHLORIDE SERPL-SCNC: 110 MMOL/L (ref 95–110)
CLARITY UR REFRACT.AUTO: CLEAR
CO2 SERPL-SCNC: 18 MMOL/L (ref 23–29)
COLOR UR AUTO: YELLOW
CREAT SERPL-MCNC: 1.3 MG/DL (ref 0.5–1.4)
DIFFERENTIAL METHOD: ABNORMAL
EOSINOPHIL # BLD AUTO: 0 K/UL (ref 0–0.5)
EOSINOPHIL NFR BLD: 0 % (ref 0–8)
ERYTHROCYTE [DISTWIDTH] IN BLOOD BY AUTOMATED COUNT: 15.6 % (ref 11.5–14.5)
EST. GFR  (AFRICAN AMERICAN): >60 ML/MIN/1.73 M^2
EST. GFR  (NON AFRICAN AMERICAN): 56.1 ML/MIN/1.73 M^2
FERRITIN SERPL-MCNC: 91 NG/ML (ref 20–300)
GGT SERPL-CCNC: 42 U/L (ref 8–55)
GLUCOSE SERPL-MCNC: 114 MG/DL (ref 70–110)
GLUCOSE UR QL STRIP: NEGATIVE
HCT VFR BLD AUTO: 30.2 % (ref 40–54)
HGB BLD-MCNC: 9.3 G/DL (ref 14–18)
HGB UR QL STRIP: ABNORMAL
HYALINE CASTS UR QL AUTO: 0 /LPF
IMM GRANULOCYTES # BLD AUTO: 0.06 K/UL (ref 0–0.04)
IMM GRANULOCYTES NFR BLD AUTO: 0.7 % (ref 0–0.5)
INR PPP: 1.1 (ref 0.8–1.2)
IRON SERPL-MCNC: 14 UG/DL (ref 45–160)
KETONES UR QL STRIP: NEGATIVE
LACTATE SERPL-SCNC: 1 MMOL/L (ref 0.5–2.2)
LACTATE SERPL-SCNC: 1.7 MMOL/L (ref 0.5–2.2)
LEUKOCYTE ESTERASE UR QL STRIP: NEGATIVE
LYMPHOCYTES # BLD AUTO: 0.6 K/UL (ref 1–4.8)
LYMPHOCYTES NFR BLD: 7.5 % (ref 18–48)
MAGNESIUM SERPL-MCNC: 1.4 MG/DL (ref 1.6–2.6)
MAGNESIUM SERPL-MCNC: 1.9 MG/DL (ref 1.6–2.6)
MCH RBC QN AUTO: 23.9 PG (ref 27–31)
MCHC RBC AUTO-ENTMCNC: 30.8 G/DL (ref 32–36)
MCV RBC AUTO: 78 FL (ref 82–98)
MICROSCOPIC COMMENT: ABNORMAL
MONOCYTES # BLD AUTO: 0.2 K/UL (ref 0.3–1)
MONOCYTES NFR BLD: 2.8 % (ref 4–15)
NEUTROPHILS # BLD AUTO: 7.2 K/UL (ref 1.8–7.7)
NEUTROPHILS NFR BLD: 88.6 % (ref 38–73)
NITRITE UR QL STRIP: NEGATIVE
NRBC BLD-RTO: 0 /100 WBC
PH UR STRIP: 5 [PH] (ref 5–8)
PHOSPHATE SERPL-MCNC: 2.8 MG/DL (ref 2.7–4.5)
PLATELET # BLD AUTO: 160 K/UL (ref 150–350)
PMV BLD AUTO: 10.8 FL (ref 9.2–12.9)
POTASSIUM SERPL-SCNC: 4 MMOL/L (ref 3.5–5.1)
PROCALCITONIN SERPL IA-MCNC: 69.06 NG/ML
PROT SERPL-MCNC: 6.6 G/DL (ref 6–8.4)
PROT UR QL STRIP: ABNORMAL
PROTHROMBIN TIME: 11.3 SEC (ref 9–12.5)
RBC # BLD AUTO: 3.89 M/UL (ref 4.6–6.2)
RBC #/AREA URNS AUTO: 3 /HPF (ref 0–4)
SATURATED IRON: 5 % (ref 20–50)
SODIUM SERPL-SCNC: 139 MMOL/L (ref 136–145)
SP GR UR STRIP: 1.02 (ref 1–1.03)
SQUAMOUS #/AREA URNS AUTO: 0 /HPF
TOTAL IRON BINDING CAPACITY: 309 UG/DL (ref 250–450)
TRANSFERRIN SERPL-MCNC: 209 MG/DL (ref 200–375)
URN SPEC COLLECT METH UR: ABNORMAL
WBC # BLD AUTO: 8.16 K/UL (ref 3.9–12.7)
WBC #/AREA URNS AUTO: 1 /HPF (ref 0–5)

## 2019-08-05 PROCEDURE — 96366 THER/PROPH/DIAG IV INF ADDON: CPT

## 2019-08-05 PROCEDURE — 81001 URINALYSIS AUTO W/SCOPE: CPT

## 2019-08-05 PROCEDURE — 83540 ASSAY OF IRON: CPT

## 2019-08-05 PROCEDURE — 83605 ASSAY OF LACTIC ACID: CPT

## 2019-08-05 PROCEDURE — 93005 ELECTROCARDIOGRAM TRACING: CPT

## 2019-08-05 PROCEDURE — 63600175 PHARM REV CODE 636 W HCPCS: Performed by: EMERGENCY MEDICINE

## 2019-08-05 PROCEDURE — 87040 BLOOD CULTURE FOR BACTERIA: CPT | Mod: 59

## 2019-08-05 PROCEDURE — 99285 PR EMERGENCY DEPT VISIT,LEVEL V: ICD-10-PCS | Mod: ,,, | Performed by: EMERGENCY MEDICINE

## 2019-08-05 PROCEDURE — 96365 THER/PROPH/DIAG IV INF INIT: CPT

## 2019-08-05 PROCEDURE — 85025 COMPLETE CBC W/AUTO DIFF WBC: CPT

## 2019-08-05 PROCEDURE — 96367 TX/PROPH/DG ADDL SEQ IV INF: CPT

## 2019-08-05 PROCEDURE — 25000003 PHARM REV CODE 250: Performed by: EMERGENCY MEDICINE

## 2019-08-05 PROCEDURE — 84145 PROCALCITONIN (PCT): CPT

## 2019-08-05 PROCEDURE — 83735 ASSAY OF MAGNESIUM: CPT | Mod: 91

## 2019-08-05 PROCEDURE — 83735 ASSAY OF MAGNESIUM: CPT

## 2019-08-05 PROCEDURE — 82728 ASSAY OF FERRITIN: CPT

## 2019-08-05 PROCEDURE — 80053 COMPREHEN METABOLIC PANEL: CPT

## 2019-08-05 PROCEDURE — 82977 ASSAY OF GGT: CPT

## 2019-08-05 PROCEDURE — 99285 EMERGENCY DEPT VISIT HI MDM: CPT | Mod: 25

## 2019-08-05 PROCEDURE — 96361 HYDRATE IV INFUSION ADD-ON: CPT

## 2019-08-05 PROCEDURE — 11000001 HC ACUTE MED/SURG PRIVATE ROOM

## 2019-08-05 PROCEDURE — 93010 ELECTROCARDIOGRAM REPORT: CPT | Mod: ,,, | Performed by: INTERNAL MEDICINE

## 2019-08-05 PROCEDURE — 93010 EKG 12-LEAD: ICD-10-PCS | Mod: ,,, | Performed by: INTERNAL MEDICINE

## 2019-08-05 PROCEDURE — 85610 PROTHROMBIN TIME: CPT

## 2019-08-05 PROCEDURE — 99285 EMERGENCY DEPT VISIT HI MDM: CPT | Mod: ,,, | Performed by: EMERGENCY MEDICINE

## 2019-08-05 PROCEDURE — 84100 ASSAY OF PHOSPHORUS: CPT

## 2019-08-05 RX ORDER — SODIUM CHLORIDE 0.9 % (FLUSH) 0.9 %
10 SYRINGE (ML) INJECTION
Status: DISCONTINUED | OUTPATIENT
Start: 2019-08-05 | End: 2019-08-07 | Stop reason: HOSPADM

## 2019-08-05 RX ORDER — MAGNESIUM SULFATE HEPTAHYDRATE 40 MG/ML
2 INJECTION, SOLUTION INTRAVENOUS
Status: COMPLETED | OUTPATIENT
Start: 2019-08-05 | End: 2019-08-05

## 2019-08-05 RX ORDER — ASPIRIN 325 MG
325 TABLET ORAL DAILY
Status: DISCONTINUED | OUTPATIENT
Start: 2019-08-06 | End: 2019-08-07 | Stop reason: HOSPADM

## 2019-08-05 RX ORDER — IBUPROFEN 200 MG
600 TABLET ORAL EVERY 6 HOURS PRN
Status: DISCONTINUED | OUTPATIENT
Start: 2019-08-05 | End: 2019-08-07 | Stop reason: HOSPADM

## 2019-08-05 RX ORDER — VANCOMYCIN 1.75 GRAM/500 ML IN 0.9 % SODIUM CHLORIDE INTRAVENOUS
20
Status: DISCONTINUED | OUTPATIENT
Start: 2019-08-06 | End: 2019-08-06

## 2019-08-05 RX ORDER — VANCOMYCIN 1.75 GRAM/500 ML IN 0.9 % SODIUM CHLORIDE INTRAVENOUS
20
Status: DISCONTINUED | OUTPATIENT
Start: 2019-08-05 | End: 2019-08-05

## 2019-08-05 RX ORDER — VANCOMYCIN 2 GRAM/500 ML IN 0.9 % SODIUM CHLORIDE INTRAVENOUS
2000
Status: COMPLETED | OUTPATIENT
Start: 2019-08-05 | End: 2019-08-05

## 2019-08-05 RX ORDER — ACETAMINOPHEN 500 MG
1000 TABLET ORAL
Status: COMPLETED | OUTPATIENT
Start: 2019-08-05 | End: 2019-08-05

## 2019-08-05 RX ORDER — IBUPROFEN 200 MG
400 TABLET ORAL EVERY 6 HOURS PRN
Status: DISCONTINUED | OUTPATIENT
Start: 2019-08-05 | End: 2019-08-07 | Stop reason: HOSPADM

## 2019-08-05 RX ADMIN — ACETAMINOPHEN 1000 MG: 500 TABLET ORAL at 11:08

## 2019-08-05 RX ADMIN — VANCOMYCIN HYDROCHLORIDE 2000 MG: 100 INJECTION, POWDER, LYOPHILIZED, FOR SOLUTION INTRAVENOUS at 03:08

## 2019-08-05 RX ADMIN — PIPERACILLIN AND TAZOBACTAM 4.5 G: 4; .5 INJECTION, POWDER, LYOPHILIZED, FOR SOLUTION INTRAVENOUS; PARENTERAL at 01:08

## 2019-08-05 RX ADMIN — MAGNESIUM SULFATE IN WATER 2 G: 40 INJECTION, SOLUTION INTRAVENOUS at 06:08

## 2019-08-05 RX ADMIN — SODIUM CHLORIDE 2448 ML: 0.9 INJECTION, SOLUTION INTRAVENOUS at 11:08

## 2019-08-05 NOTE — ED TRIAGE NOTES
Pt with Hx of a stroke in 2017 with right sided residual arrived to ED with CC of left sided weakness last know normal was yesterday 12pm per nurse from [facility] and today around 7am nurse reports pt had a fever of 101.9.   Patient identifiers verified and correct for Alexsander Tafoya.    LOC: The patient is awake, alert and oriented x 3. Pt is has slurred speech from a previous stroke.  APPEARANCE: Patient resting comfortably and in no acute distress. Pt is clean and well groomed. No JVD visible. Pt reports pain level of 4/10.  SKIN: Skin is warm dry and intact, and color is consistent with ethnicity. No tenting observed and capillary refill <3 seconds. No clubbing noted to nail beds. No breakdown or brusing visible and mucus membranes moist and acyanotic.  MUSCULOSKELETAL: generalized weakness  RESPIRATORY: Airway is open and patent. Respirations-unlabored, regular rate, equal bilaterally on inspiration and expiration. No accessory muscle use noted. Lungs clear to auscultation in all fields bilaterally anterior and posterior.   CARDIAC: Patient has regular heart rate and rhythm.  No peripheral edema noted, and patient has no c/o chest pain.  ABDOMEN: Soft and non-tender to palpation with no distention noted. Normoactive bowel sounds X4 quadrants. Pt has no complaints of abnormal bowel movements. Pt reports normal appetite.   NEUROLOGIC: Eyes open spontaneously and facial expression symmetrical. Pt behavior appropriate to situation, and pt follows commands.  Pt reports sensation present in all extremities when touched with a finger. PERRLA  : pt is incontinence, diaper clean dry and intact upon arrival.No complaints of frequency, burning, urgency or blood in the urine.

## 2019-08-05 NOTE — ED PROVIDER NOTES
Encounter Date: 8/5/2019       History     Chief Complaint   Patient presents with    Weakness     Pt started having weakness around 12 pm yesterday. Pt has right sided residual weakness and aphasia from previous stroke.      Mr. Tafoya is a 68 M hx of CVA 2007 with right sided residual weakness, hypertension, BPH here today with EMS for evaluation generalized weakness.  Patient resides in nursing facility, family at bedside states they visited yesterday and noted that he appeared short of breath and was complaining of weakness.  He reports continued right-sided weakness from previous stroke, denies new neuro changes or worsening aphasia.  He does complain of shortness of breath, denies cough.  No urinary symptoms, no skin break down, no vomiting/diarrhea.         Review of patient's allergies indicates:  No Known Allergies  Past Medical History:   Diagnosis Date    BPH (benign prostatic hyperplasia)     Hypertension     Other and unspecified hyperlipidemia     Stroke      Past Surgical History:   Procedure Laterality Date    CATARACT EXTRACTION W/ INTRAOCULAR LENS  IMPLANT, BILATERAL       Family History   Problem Relation Age of Onset    Hypertension Mother     Seizures Mother     Stroke Mother     Hypertension Father     Dementia Father      Social History     Tobacco Use    Smoking status: Former Smoker    Smokeless tobacco: Never Used   Substance Use Topics    Alcohol use: Yes     Comment: rare, once a week or less    Drug use: No     Review of Systems   Constitutional: Positive for chills and fever.   HENT: Negative for sore throat and trouble swallowing.    Cardiovascular: Negative for chest pain, palpitations and leg swelling.   Gastrointestinal: Negative for constipation, diarrhea, nausea and vomiting.   Genitourinary: Negative for dysuria, frequency and urgency.   Musculoskeletal: Positive for gait problem (baseline right sided weakness). Negative for neck pain and neck stiffness.    Neurological: Positive for speech difficulty (basline aphasia) and weakness (general). Negative for dizziness and facial asymmetry.       Physical Exam     Initial Vitals [08/05/19 1105]   BP Pulse Resp Temp SpO2   117/74 95 18 (!) 101.2 °F (38.4 °C) 99 %      MAP       --         Physical Exam    Nursing note and vitals reviewed.  Constitutional: He appears well-developed. No distress.   HENT:   Dry MM   Eyes: Conjunctivae and EOM are normal. Pupils are equal, round, and reactive to light. No scleral icterus.   Cardiovascular: Normal rate, regular rhythm and intact distal pulses.   Pulmonary/Chest: Breath sounds normal. No stridor. He has no wheezes. He has no rales.   Abdominal: Soft. Bowel sounds are normal. There is no tenderness. There is no rebound and no guarding.   Musculoskeletal: He exhibits no edema or tenderness.   Lymphadenopathy:     He has no cervical adenopathy.   Neurological: He is alert and oriented to person, place, and time. No cranial nerve deficit. GCS eye subscore is 4. GCS verbal subscore is 5. GCS motor subscore is 6.   4/5 strength of RUE, RLE. + RU/LE drift  5/5 LUE, LLE  Visual fields intact     Skin: Skin is warm. Capillary refill takes 2 to 3 seconds. No rash noted.   superficial abrasion to left forehead, bilateral knees   Psychiatric:   tearful         ED Course   Procedures  Labs Reviewed   CBC W/ AUTO DIFFERENTIAL - Abnormal; Notable for the following components:       Result Value    RBC 3.89 (*)     Hemoglobin 9.3 (*)     Hematocrit 30.2 (*)     Mean Corpuscular Volume 78 (*)     Mean Corpuscular Hemoglobin 23.9 (*)     Mean Corpuscular Hemoglobin Conc 30.8 (*)     RDW 15.6 (*)     Immature Granulocytes 0.7 (*)     Immature Grans (Abs) 0.06 (*)     Lymph # 0.6 (*)     Mono # 0.2 (*)     Gran% 88.6 (*)     Lymph% 7.5 (*)     Mono% 2.8 (*)     All other components within normal limits   COMPREHENSIVE METABOLIC PANEL - Abnormal; Notable for the following components:    CO2 18 (*)      Glucose 114 (*)     Calcium 8.5 (*)     Albumin 3.2 (*)      (*)     ALT 61 (*)     eGFR if non  56.1 (*)     All other components within normal limits   PROCALCITONIN - Abnormal; Notable for the following components:    Procalcitonin 69.06 (*)     All other components within normal limits   MAGNESIUM - Abnormal; Notable for the following components:    Magnesium 1.4 (*)     All other components within normal limits   CULTURE, BLOOD   CULTURE, BLOOD   LACTIC ACID, PLASMA   PROTIME-INR   PHOSPHORUS   LACTIC ACID, PLASMA   URINALYSIS, REFLEX TO URINE CULTURE          Imaging Results          X-Ray Chest AP Portable (Final result)  Result time 08/05/19 12:07:11    Final result by Alexsander Keith MD (08/05/19 12:07:11)                 Impression:      Apparent metallic artifacts projected over the right lower thorax/upper abdomen.  Mild accentuation of the interstitial markings bilaterally.      Electronically signed by: Alexsander Keith MD  Date:    08/05/2019  Time:    12:07             Narrative:    EXAMINATION:  XR CHEST AP PORTABLE    CLINICAL HISTORY:  Sepsis;    TECHNIQUE:  Single frontal portable view of the chest was performed.    COMPARISON:  None    FINDINGS:  Metallic artifacts are projected over the right side of the cardiac silhouette and right upper quadrant of the abdomen.  These may represent external artifacts.  Cardiac silhouette is magnified by portable AP technique but does not appear enlarged.  Tortuous thoracic aorta and brachiocephalic vessels.  Pulmonary vascularity is mildly prominent.  Lungs are satisfactorily expanded.  Mild accentuation of the interstitial markings bilaterally.  No airspace consolidation or pleural fluid.  No pneumothorax.  Skeletal structures appear intact.                              X-Rays:   Independently Interpreted Readings:   Head CT: CT head:  No intracranial hemorrhage or skull fracture.  There is soft tissue swelling of the left frontal scalp    Other Readings:  Right shoulder x-ray:  No acute fracture dislocation    Medical Decision Making:   History:   I obtained history from: someone other than patient and EMS provider.       <> Summary of History: Sister at bedside  Old Medical Records: I decided to obtain old medical records.  Old Records Summarized: records from previous admission(s).  Initial Assessment:   Emergent evaluation 68-year-old male transferred from nursing for weakness, noted to be febrile 101.2 on arrival.  Differential Diagnosis:   Sepsis, UTI, pneumonia, bronchitis, URI, intra-abdominal infection  Independently Interpreted Test(s):   I have ordered and independently interpreted X-rays - see prior notes.  I have ordered and independently interpreted EKG Reading(s) - see prior notes  Clinical Tests:   Lab Tests: Ordered and Reviewed  Radiological Study: Ordered and Reviewed  Medical Tests: Reviewed and Ordered  ED Management:  - labs  - IVF  - tylenol  - CXR  - EKG    Labs reviewed with no leukocytosis.  Hemoglobin 9.3.  CMP reviewed, concerning for transaminitis.  Chest x-ray with interstitial markings bilaterally, no focal consolidation.  UA currently pending.    procalcitonin is elevated to 69- I have concerns for early sepsis ( 2/4 SIRS criteria), will give broad-spectrum antibiotics vanc and Zosyn a source is unclear at this time.    Patient re-evaluated, complains of right shoulder pain- reports fall yesterday.  There is superficial abrasion on the left forehead and bilateral knees.  Head CT ordered for intracranial hemorrhage, right shoulder x-ray to evaluate for acute fracture.    2:00 PM  Patient signed out to Dr. Bell in stable condition pending imaging and final disposition.  I anticipate admission      Other:   I have discussed this case with another health care provider.       <> Summary of the Discussion:                       Clinical Impression:       ICD-10-CM ICD-9-CM   1. Fever R50.9 780.60   2. Transaminitis  R74.0 790.4   3. Right shoulder pain M25.511 719.41         Disposition:   Disposition: Admitted  Condition: Kush Barnard MD  08/06/19 0755

## 2019-08-05 NOTE — PROVIDER PROGRESS NOTES - EMERGENCY DEPT.
Encounter Date: 8/5/2019    ED Physician Progress Notes        Physician Note:   S/o Dr. Barnard: 68-year-old male with past medical history of CVA with residual  right-sided weakness and aphasia, presenting with fever to 101.2, acknowledges falling 2 days ago as well, treated with broad-spectrum antibiotics.  Head CT and right upper quadrant ultrasound pending, planned admission to Medicine.

## 2019-08-06 LAB
ALBUMIN SERPL BCP-MCNC: 3 G/DL (ref 3.5–5.2)
ALP SERPL-CCNC: 82 U/L (ref 55–135)
ALT SERPL W/O P-5'-P-CCNC: 76 U/L (ref 10–44)
ANION GAP SERPL CALC-SCNC: 8 MMOL/L (ref 8–16)
APTT BLDCRRT: 25.2 SEC (ref 21–32)
AST SERPL-CCNC: 171 U/L (ref 10–40)
BASOPHILS # BLD AUTO: 0.01 K/UL (ref 0–0.2)
BASOPHILS NFR BLD: 0.2 % (ref 0–1.9)
BILIRUB SERPL-MCNC: 0.8 MG/DL (ref 0.1–1)
BUN SERPL-MCNC: 12 MG/DL (ref 8–23)
CALCIUM SERPL-MCNC: 8.5 MG/DL (ref 8.7–10.5)
CHLORIDE SERPL-SCNC: 108 MMOL/L (ref 95–110)
CO2 SERPL-SCNC: 20 MMOL/L (ref 23–29)
CREAT SERPL-MCNC: 1.1 MG/DL (ref 0.5–1.4)
DIFFERENTIAL METHOD: ABNORMAL
EOSINOPHIL # BLD AUTO: 0 K/UL (ref 0–0.5)
EOSINOPHIL NFR BLD: 0.5 % (ref 0–8)
ERYTHROCYTE [DISTWIDTH] IN BLOOD BY AUTOMATED COUNT: 15.4 % (ref 11.5–14.5)
EST. GFR  (AFRICAN AMERICAN): >60 ML/MIN/1.73 M^2
EST. GFR  (NON AFRICAN AMERICAN): >60 ML/MIN/1.73 M^2
GLUCOSE SERPL-MCNC: 108 MG/DL (ref 70–110)
HCT VFR BLD AUTO: 33 % (ref 40–54)
HGB BLD-MCNC: 9.9 G/DL (ref 14–18)
IMM GRANULOCYTES # BLD AUTO: 0.01 K/UL (ref 0–0.04)
IMM GRANULOCYTES NFR BLD AUTO: 0.2 % (ref 0–0.5)
INR PPP: 1 (ref 0.8–1.2)
LYMPHOCYTES # BLD AUTO: 0.6 K/UL (ref 1–4.8)
LYMPHOCYTES NFR BLD: 13.7 % (ref 18–48)
MAGNESIUM SERPL-MCNC: 1.9 MG/DL (ref 1.6–2.6)
MCH RBC QN AUTO: 24 PG (ref 27–31)
MCHC RBC AUTO-ENTMCNC: 30 G/DL (ref 32–36)
MCV RBC AUTO: 80 FL (ref 82–98)
MONOCYTES # BLD AUTO: 0.3 K/UL (ref 0.3–1)
MONOCYTES NFR BLD: 7 % (ref 4–15)
NEUTROPHILS # BLD AUTO: 3.5 K/UL (ref 1.8–7.7)
NEUTROPHILS NFR BLD: 78.4 % (ref 38–73)
NRBC BLD-RTO: 0 /100 WBC
PHOSPHATE SERPL-MCNC: 2.6 MG/DL (ref 2.7–4.5)
PLATELET # BLD AUTO: 156 K/UL (ref 150–350)
PMV BLD AUTO: 10.9 FL (ref 9.2–12.9)
POTASSIUM SERPL-SCNC: 3.7 MMOL/L (ref 3.5–5.1)
PROT SERPL-MCNC: 6.2 G/DL (ref 6–8.4)
PROTHROMBIN TIME: 10.2 SEC (ref 9–12.5)
RBC # BLD AUTO: 4.13 M/UL (ref 4.6–6.2)
SODIUM SERPL-SCNC: 136 MMOL/L (ref 136–145)
WBC # BLD AUTO: 4.44 K/UL (ref 3.9–12.7)

## 2019-08-06 PROCEDURE — 25000003 PHARM REV CODE 250: Performed by: STUDENT IN AN ORGANIZED HEALTH CARE EDUCATION/TRAINING PROGRAM

## 2019-08-06 PROCEDURE — 92610 EVALUATE SWALLOWING FUNCTION: CPT

## 2019-08-06 PROCEDURE — 80053 COMPREHEN METABOLIC PANEL: CPT

## 2019-08-06 PROCEDURE — 85610 PROTHROMBIN TIME: CPT

## 2019-08-06 PROCEDURE — 99223 PR INITIAL HOSPITAL CARE,LEVL III: ICD-10-PCS | Mod: AI,GC,, | Performed by: HOSPITALIST

## 2019-08-06 PROCEDURE — 85730 THROMBOPLASTIN TIME PARTIAL: CPT

## 2019-08-06 PROCEDURE — 83735 ASSAY OF MAGNESIUM: CPT

## 2019-08-06 PROCEDURE — 36415 COLL VENOUS BLD VENIPUNCTURE: CPT

## 2019-08-06 PROCEDURE — 63600175 PHARM REV CODE 636 W HCPCS: Performed by: STUDENT IN AN ORGANIZED HEALTH CARE EDUCATION/TRAINING PROGRAM

## 2019-08-06 PROCEDURE — 84100 ASSAY OF PHOSPHORUS: CPT

## 2019-08-06 PROCEDURE — 85025 COMPLETE CBC W/AUTO DIFF WBC: CPT

## 2019-08-06 PROCEDURE — 99223 1ST HOSP IP/OBS HIGH 75: CPT | Mod: AI,GC,, | Performed by: HOSPITALIST

## 2019-08-06 PROCEDURE — 97166 OT EVAL MOD COMPLEX 45 MIN: CPT

## 2019-08-06 PROCEDURE — 11000001 HC ACUTE MED/SURG PRIVATE ROOM

## 2019-08-06 RX ORDER — ENOXAPARIN SODIUM 100 MG/ML
40 INJECTION SUBCUTANEOUS EVERY 24 HOURS
Status: DISCONTINUED | OUTPATIENT
Start: 2019-08-06 | End: 2019-08-07 | Stop reason: HOSPADM

## 2019-08-06 RX ORDER — AMOXICILLIN AND CLAVULANATE POTASSIUM 875; 125 MG/1; MG/1
1 TABLET, FILM COATED ORAL EVERY 12 HOURS
Status: DISCONTINUED | OUTPATIENT
Start: 2019-08-06 | End: 2019-08-07 | Stop reason: HOSPADM

## 2019-08-06 RX ORDER — MULTIVITAMIN
1 TABLET ORAL DAILY
COMMUNITY

## 2019-08-06 RX ADMIN — PIPERACILLIN AND TAZOBACTAM 4.5 G: 4; .5 INJECTION, POWDER, LYOPHILIZED, FOR SOLUTION INTRAVENOUS; PARENTERAL at 02:08

## 2019-08-06 RX ADMIN — ASPIRIN 325 MG ORAL TABLET 325 MG: 325 PILL ORAL at 09:08

## 2019-08-06 RX ADMIN — AMOXICILLIN AND CLAVULANATE POTASSIUM 1 TABLET: 875; 125 TABLET, FILM COATED ORAL at 09:08

## 2019-08-06 RX ADMIN — ENOXAPARIN SODIUM 40 MG: 100 INJECTION SUBCUTANEOUS at 06:08

## 2019-08-06 NOTE — CONSULTS
"  [facility]  Adult Nutrition  Consult Note    SUMMARY     Recommendations    Recommendation:   1. Continue cardiac diet as tolerated. Suggest dysphagia mechanical soft texture - meats and large vegetables will be chopped/fork-ready. Pt's family member reports that pt cannot cut meat on his own 2/2 can only use one hand s/p stroke.     If regular consistency is continued, please provide assistance with cutting meat during meals as necessary.    2. RD to monitor & follow-up.    Goals: PO intake > 50%  Nutrition Goal Status: new  Communication of RD Recs: other (comment)(POC)    Reason for Assessment    Reason For Assessment: consult  Diagnosis: (fever)  Relevant Medical History: stroke, HTN, HLD  Interdisciplinary Rounds: did not attend  General Information Comments: Pt reports good appetite and good PO intake PTA. Pt denies wt changes. Family member states pt's UBW is 195 lbs. Per chart, pt's wt has been stable between 180-188 lbs x 9 months. Current wt appears to be an error - indicates a 53 lbs wt gain from previous wt of 180 lbs taken on the same day. Pt appears nourished, NFPE not indicated at this time. Pt's family member requests for meats to be chopped - pt can only use 1 hand s/p stroke and cannot cut meats on his own.   Nutrition Discharge Planning: Adequate PO intake to meet needs    Nutrition Risk Screen    Nutrition Risk Screen: dysphagia or difficulty swallowing    Nutrition/Diet History    Spiritual, Cultural Beliefs, Mosque Practices, Values that Affect Care: no    Anthropometrics    Temp: 97.1 °F (36.2 °C)  Height Method: Stated  Height: 5' 9" (175.3 cm)  Height (inches): 69 in  Weight Method: Bed Scale  Weight: 105.7 kg (233 lb 0.4 oz)  Weight (lb): 233.03 lb  Ideal Body Weight (IBW), Male: 160 lb  % Ideal Body Weight, Male (lb): 145.64 lb  BMI (Calculated): 34.5       Lab/Procedures/Meds    Pertinent Labs Reviewed: reviewed  Pertinent Labs Comments: Ca 8.5, phos 2.6, Alb 3.0, " , ALT 76  Pertinent Medications Reviewed: reviewed  Pertinent Medications Comments: noted    Estimated/Assessed Needs    Weight Used For Calorie Calculations: 81.6 kg (179 lb 14.3 oz)  Energy Calorie Requirements (kcal): 1892 kcal/day  Energy Need Method: [place]-St Jeor  Protein Requirements: 82-98 kg/day  Weight Used For Protein Calculations: 81.6 kg (179 lb 14.3 oz)  Fluid Requirements (mL): per MD or 1 mL/kcal     RDA Method (mL): 1892       Nutrition Prescription Ordered    Current Diet Order: Cardiac  Nutrition Order Comments: Thin liquids    Evaluation of Received Nutrient/Fluid Intake    I/O: +2.6L since admit  Comments: LBM 8/3  % Intake of Estimated Energy Needs: 75 - 100 %  % Meal Intake: 75 - 100 %    Nutrition Risk    Level of Risk/Frequency of Follow-up: low     Assessment and Plan    Nutrition Problem  Self-feeding difficulty    Related to (etiology):   Unable to use 1 hand s/p stroke    Signs and Symptoms (as evidenced by):   Needs meats to be pre-chopped    Interventions (treatment strategy):  Collaboration of care with other providers    Nutrition Diagnosis Status:   New     Monitor and Evaluation    Food and Nutrient Intake: energy intake, food and beverage intake  Food and Nutrient Adminstration: diet order  Physical Activity and Function: nutrition-related ADLs and IADLs  Anthropometric Measurements: weight, weight change, body mass index  Biochemical Data, Medical Tests and Procedures: electrolyte and renal panel, gastrointestinal profile, glucose/endocrine profile, inflammatory profile, lipid profile  Nutrition-Focused Physical Findings: overall appearance     Malnutrition Assessment  Pt does not meet criteria for malnutrition at this time.    Nutrition Follow-Up    RD Follow-up?: Yes

## 2019-08-06 NOTE — PLAN OF CARE
Problem: Adult Inpatient Plan of Care  Goal: Plan of Care Review  PT AOX4, residual right sided weakness from previous CVA, speech appropriate but delayed and slightly garbled. Avasys at beside due to recent HX of fall. Multiple abrasions to BL Elbows, BL Knees and left head skin free from pressure injury protective dressing in place on sacrum from previous facility. Sister and Brother in law present at bedside for historian. Pt remained afebrile this shift. Bed low and locked alarm in place and audible. Q2 hour turns for pressure prevention.  Pt and family oriented to room, bedside Ipad activated and visi in place. Will continue to monitor reamined free from acute incidents this shift.

## 2019-08-06 NOTE — PLAN OF CARE
Problem: Adult Inpatient Plan of Care  Goal: Plan of Care Review  Outcome: Ongoing (interventions implemented as appropriate)  Pt free of fall this shift. Pain denied pain. Waffle mattress applied on for pt; pt was turned q2h. Antibiotic administered for infection. Pt aaox4, follow commands. Afebrile. Vss. Will continue to monitor pt.

## 2019-08-06 NOTE — ASSESSMENT & PLAN NOTE
Increase in baseline weakness s/p fall on Sunday (8/4)  Per Pt and family, seems returned to baseline in ED  Continue to monitor  PT/OT

## 2019-08-06 NOTE — ASSESSMENT & PLAN NOTE
Technically, as 3 RBCs on UA w/o significant findings of UTI  Potentially related to acute infection, potentially glomerular in origin given 1+ protein   Can repeat to check for resolution, no prior UAs on file  If remains s/p acute infection, can consider further work-up I.e. CT urography

## 2019-08-06 NOTE — PT/OT/SLP EVAL
Occupational Therapy   Evaluation    Name: Alexsander Tafoya  MRN: [id number]  Admitting Diagnosis:  Fever      Recommendations:     Discharge Recommendations: assisted living facility, home health OT  Discharge Equipment Recommendations:  none  Barriers to discharge:  None    Assessment:     Alexsander Tafoya is a 68 y.o. male with a medical diagnosis of Fever.  He presents with the following performance deficits affecting function: weakness, impaired endurance, decreased upper extremity function, decreased lower extremity function, decreased safety awareness, decreased ROM, impaired balance, impaired self care skills, decreased coordination, impaired fine motor.      OT POC implemented. Pt functioning near baseline with weakness being his main limiting factor this date. Pt with residual R sided weakness from a reported CVA several years ago. PTA, pt required assistance for all ADLs/IADLs. Due to pt's increased level assistance for bed mobility and sitting balance along with his decreased BUE strength, OT will continue to follow up with patient to reduce caregiver burden and increase self care independence. OT recommending pt return to assisted living where 24 hr care will provided with additional home health OT services to maximize his functional independence.       Rehab Prognosis: Fair; patient would benefit from acute skilled OT services to address these deficits and reach maximum level of function.       Plan:     Patient to be seen 3 x/week to address the above listed problems via therapeutic activities, therapeutic exercises, self-care/home management, neuromuscular re-education  · Plan of Care Expires: 09/04/19  · Plan of Care Reviewed with: patient, sibling    Subjective     Chief Complaint: Buttock and R shoulder soreness from recent fall  Patient/Family Comments/goals: To return to assisted living facility; pt and family agreeable to OT POC.     Occupational Profile:  Living Environment: Pt lives in an Assisted  Living Facility with 24 hr assistance available if needed. Bathroom set up: Walk in shower with grab bars and built in seat   - Pt has been in assisted living since 2018.   Previous level of function: PTA, pt required assistance for ADLs/IADLs. Pt was using power chair as his main source of mobility. Pt reported that he was standing to transfer to bedside chair with staff.    Falls: 1 recently resulting in current admission   Roles and Routines: Not active   Equipment Used at Home:  bath bench, feeding device, grab bar, hospital bed, power chair, lift device  Assistance upon Discharge: Pt will have assistance from family and staff upon discharge.     Pain/Comfort:  · Pain Rating 1: 0/10(just c/o tailbone and R shoulder soreness)  · Pain Addressed 1: Distraction, Reposition  · Pain Rating Post-Intervention 1: (remained)  · Pain Addressed 2: Nurse notified, Distraction    Patients cultural, spiritual, Gnosticist conflicts given the current situation: no    Objective:     Communicated with: RN prior to session.  Patient found HOB elevated with Condom Catheter, bed alarm, telemetry, peripheral IV(telesitter) upon OT entry to room.    General Precautions: Standard, aphasia, fall, seizure   Orthopedic Precautions:N/A   Braces: N/A     Occupational Performance:    Bed Mobility:    · Patient completed Rolling/Turning to Left with  moderate assistance  · Patient completed Rolling/Turning to Right with moderate assistance  · Patient completed Scooting/Bridging with moderate assistance for posterior positioning in bed   · Patient completed Supine to Sit with minimum assistance for trunk control to reach upright sitting   · Patient completed Sit to Supine with minimum assistance for BLE guidance into bed    Functional Mobility/Transfers:  · NT this date due to weakness and increased level of assistance required for static sitting    Activities of Daily Living:  · None completed this date     Cognitive/Visual  Perceptual:  Cognitive/Psychosocial Skills:     -       Oriented to: Person, Place, Time and Situation   -       Follows Commands/attention:Follows one-step commands  -       Communication: expressive aphasia  -       Memory: No Deficits noted  -       Safety awareness/insight to disability: impaired   -       Mood/Affect/Coping skills/emotional control: Labile, Tearful and Cooperative  Visual/Perceptual:      -Intact no defs noted    Physical Exam:  Balance:    -       Sitting: Max A  Postural examination/scapula alignment:    -       Rounded shoulders  -       Forward head  Skin integrity: Visible skin intact  Edema:  Mild RUE  Sensation:    -       Intact  Upper Extremity Range of Motion:     -       Right Upper Extremity: Deficits: In all ranges 2/2 prior CVA  -       Left Upper Extremity: WFL  Upper Extremity Strength:    -       Right Upper Extremity: WFL 2+/5  -       Left Upper Extremity: WFL   Strength:    -       Right Upper Extremity: 2+/5  -       Left Upper Extremity: WFL  Fine Motor Coordination:    -       Impaired  Right hand thumb/finger opposition skills     AMPAC 6 Click ADL:  AMPAC Total Score: 9    Treatment & Education:  - Role of OT/ OT POC  - Self care safety/ independence  - Functional transfer/ mobility safety  - Bed mobility safety  - Importance of sitting UIC  - Importance of positioning for pressure relief  Education:    Patient left right sidelying with all lines intact, call button in reach, bed alarm on, RN notified and Family present    GOALS:   Multidisciplinary Problems     Occupational Therapy Goals        Problem: Occupational Therapy Goal    Goal Priority Disciplines Outcome Interventions   Occupational Therapy Goal     OT, PT/OT Ongoing (interventions implemented as appropriate)    Description:  Goals to be met by: 8/19/19    Patient will increase functional independence with ADLs by performing:    Grooming while EOB with Minimal Assistance.  Sitting at edge of bed x10  minutes with Minimal Assistance while completing a functional activity.  Rolling to Bilateral with Contact Guard Assistance for increased bed mobility independence.   Supine to sit with Contact Guard Assistance to prepare for EOB activities.  Squat pivot transfers with Minimal Assistance to reach stable surface.  Toilet transfer to bedside commode with Minimal Assistance.                      History:     Past Medical History:   Diagnosis Date    BPH (benign prostatic hyperplasia)     HLD (hyperlipidemia)     Hypertension     Other and unspecified hyperlipidemia     Stroke        Past Surgical History:   Procedure Laterality Date    CATARACT EXTRACTION W/ INTRAOCULAR LENS  IMPLANT, BILATERAL         Time Tracking:     OT Date of Treatment: 08/06/19  OT Start Time: 1036  OT Stop Time: 1056  OT Total Time (min): 20 min    Billable Minutes:Evaluation 20    Jenna Roque, OT  8/6/2019

## 2019-08-06 NOTE — ASSESSMENT & PLAN NOTE
Upon admit, Pt w/ fever (101.2F) and tachycardia   Procal 69.06, WCC wnl  S/p 1x vanc, 1x zosyn, and IVF 30cc/kg resuscitation in ED  No immediately apparent source of infection  CXR w/o acute process, UA w/ few bacterial no notable increase in WBCs and negative nitrite  Skin w/ superficial scrapes - healing  Mentation at baseline, no abdominal pain, no dysuria, no low back pain  Recent fall, fever and procal potentially reactive/inflammatory?    F/u blood cultures  Consider further sources, etiology (?DVT, prostatitis)   Aspiration PNA vs pneumonitis concern ?dyspahgia -  Nursing swallow and SLP eval   Aspiration precautions   Will continue BSABx (vanc, zosyn, latter w/ anaerobic coverage)  If cultures remain negative and pulm asympotmatic, can consider abx discontinuation or narrowing accordingly

## 2019-08-06 NOTE — PT/OT/SLP EVAL
"Speech Language Pathology Evaluation  Bedside Swallow/ Discharge Summary    Patient Name:  Alexsander Tafoya   MRN:  [id number]   1111/1111 A    Admitting Diagnosis: Fever    Recommendations:                 General Recommendations:  Follow-up not indicated  Diet recommendations:  Regular, Thin   Aspiration Precautions:   · Fully awake and alert for PO intake  · Fully upright position for PO intake  · Small bites/ sips  · Slow rate of eating/ drinking  · 1 bite/ sip @ a time  · Refrain from talking prior to swallow completion   · Discontinue PO upon: coughing, throat clearing, choking, wet vocal quality, wet breath sounds, watery eyes, reddened facial features  General Precautions: Standard, fall  Communication strategies:  go to room if call light pushed    History:     Past Medical History:   Diagnosis Date    BPH (benign prostatic hyperplasia)     HLD (hyperlipidemia)     Hypertension     Other and unspecified hyperlipidemia     Stroke      Past Surgical History:   Procedure Laterality Date    CATARACT EXTRACTION W/ INTRAOCULAR LENS  IMPLANT, BILATERAL       Chest X-Rays: Per review of pt's medical chart, results of chest xray taken yesterday unremarkable for concern for aspiration-related illness.     Prior diet: Per pt's sister, regular consistency solids and thin liquids. Review of pt's medical chart remarkable for regular consistency solid diet and thin liquids ordered by medical prior to initiation of this evaluation. Per NSG prior to initiation of this session, he received report from pt's night shift NSG that pt on thickened liquids prior to transfer for this admit. This clinician reviewed NSG's report with pt's sister who reported pt has never been on thickened liquids.     Subjective     "All at once with a straw sip." Pt reported taking multiple PO meds at once with a straw sip thin liquid prior to admit.      Pain/Comfort:  · Pain Rating 1: 0/10  · Pain Rating Post-Intervention 1: 0/10    Objective: "     Oral Musculature Evaluation  · Oral Musculature: WFL  · Dentition: present and adequate  · Secretion Management: adequate  · Mucosal Quality: good  · Mandibular Strength and Mobility: WFL  · Oral Labial Strength and Mobility: WFL  · Lingual Strength and Mobility: WFL  · Buccal Strength and Mobility: WFL  · Volitional Cough: WFL  · Volitional Swallow: MULU appearing 2/2 dec'd comprehension   · Voice Prior to PO Intake: Clear, dry    Bedside Swallow Eval:   Consistencies Assessed:  · Thin water- ~6oz via cup sip x1 in isolation and multiple straw sips in isolation and as regular consistency solid liquid wash  · Regular consistency solid alfred cracker- cracker x1 via bite x4    Oral Phase:   · Dec'd labial seal around cup edge characterized by mild anterior loss of thin liquid  · Appeared WFL with thin liquid via straw sips and with regular consistency solid liquid wash    Pharyngeal Phase:   · Overt clinical signs aspiration unappreciated    Treatment:   Pt asleep upon entry with sister and brother-in-law present at bedside. Easily awakened with gentle verbal stimulation. HOB raised. 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 Education provided re: role of SLP, strict and consistent implementation of all aspiration precautions listed above, and should pt wish to address hx of dysarthria of speech s/p CVA incurred in 2011, he may benefit from HH SLP consult to determine candidacy for therapy. However additional acute SLP services unwarranted at this time. Encouragement provided to request SLP re-consult should pt experience swallowing and/ or cognitive-linguistic changes. Pt and family verbalized understanding of education provided and agreement with SLP POC. No further questions.     Results discussed with NSG, including pt's sister's report that pt never on thickened liquids. NSG verbalized understanding of all information provided.     Assessment:     Alexsander  SHARRI Tafoya is a 68 y.o. male who appears to have met max potential with acute SLP services at this time. Please re-consult should changes occur.     Goals:   Multidisciplinary Problems     SLP Goals     Not on file          Multidisciplinary Problems (Resolved)        Problem: SLP Goal    Goal Priority Disciplines Outcome   SLP Goal   (Resolved)     SLP Outcome(s) achieved                   Plan:     · Plan of Care reviewed with:  patient, sibling, family   · SLP Follow-Up:  No       Discharge recommendations:  assisted living facility, home health speech therapy     Time Tracking:     SLP Treatment Date:   08/06/19  Speech Start Time:  1216  Speech Stop Time:  1233     Speech Total Time (min):  17 min    Billable Minutes: Eval Swallow and Oral Function 17    ODILIA Nam, CCC-SLP  [phone]  8/6/2019

## 2019-08-06 NOTE — PLAN OF CARE
Problem: Occupational Therapy Goal  Goal: Occupational Therapy Goal  Goals to be met by: 8/19/19    Patient will increase functional independence with ADLs by performing:    Grooming while EOB with Minimal Assistance.  Sitting at edge of bed x10 minutes with Minimal Assistance while completing a functional activity.  Rolling to Bilateral with Contact Guard Assistance for increased bed mobility independence.   Supine to sit with Contact Guard Assistance to prepare for EOB activities.  Squat pivot transfers with Minimal Assistance to reach stable surface.  Toilet transfer to bedside commode with Minimal Assistance.    Outcome: Ongoing (interventions implemented as appropriate)  OT POC implemented. Pt functioning near baseline with weakness being his main limiting factor this date. Pt with residual R sided weakness from a reported CVA several years ago. PTA, pt required assistance for all ADLs/IADLs. Due to pt's increased level assistance for bed mobility and sitting balanc along with his decreased BUE strength, OT will continue to follow up with patient to reduce caregiver burden and increase self care independence. OT recommending pt return to assisted living where 24 hr care will provided with additional home health OT services to maximize his functional independence.     Comments: Jenna Roque OTR/L

## 2019-08-06 NOTE — ASSESSMENT & PLAN NOTE
Per history, seems due mechanical 2/2 weakness  Imaging w/o fracture, hemorrhage (+localized soft tissue swelling/contusion)  PT/OT  Fall precautions

## 2019-08-06 NOTE — PROGRESS NOTES
Pharmacokinetic Initial Assessment: IV Vancomycin    Assessment/Plan:    Vancomycin 2000 mg IVPB x 1 given in ED.  Continue with vancomycin 1750 mg IVPB every 24 hours.  Desired empiric serum trough concentration is 10 to 20 mcg/mL.  Draw vancomycin trough level prior to 3rd dose on 08/07/2019 at 1500.  Pharmacy will continue to follow and monitor vancomycin.      Please contact pharmacy at extension [phone] with any questions regarding this assessment.    Thank you for the consult,   Ronda Lopez     Patient brief summary:  Alexsander Tafoya is a 68 y.o. male initiated on antimicrobial therapy with IV Vancomycin for treatment of suspected bacteremia.     Drug Allergies:   Review of patient's allergies indicates:  No Known Allergies    Actual Body Weight:   81.6 kg    Renal Function:   Estimated Creatinine Clearance: 54.4 mL/min (based on SCr of 1.3 mg/dL).    CBC (last 72 hours):  Recent Labs   Lab Result Units 08/05/19  1132   WBC K/uL 8.16   Hemoglobin g/dL 9.3*   Hematocrit % 30.2*   Platelets K/uL 160   Gran% % 88.6*   Lymph% % 7.5*   Mono% % 2.8*   Eosinophil% % 0.0   Basophil% % 0.4   Differential Method  Automated       Metabolic Panel (last 72 hours):  Recent Labs   Lab Result Units 08/05/19  1132 08/05/19  1247   Sodium mmol/L 139  --    Potassium mmol/L 4.0  --    Chloride mmol/L 110  --    CO2 mmol/L 18*  --    Glucose mg/dL 114*  --    Glucose, UA   --  Negative   BUN, Bld mg/dL 19  --    Creatinine mg/dL 1.3  --    Albumin g/dL 3.2*  --    Total Bilirubin mg/dL 0.7  --    Alkaline Phosphatase U/L 75  --    AST U/L 163*  --    ALT U/L 61*  --    Magnesium mg/dL 1.4*  --    Phosphorus mg/dL 2.8  --        Drug levels (last 3 results):  No results for input(s): VANCOMYCINRA, VANCOMYCINPE, VANCOMYCINTR in the last 72 hours.    Microbiologic Results:  Microbiology Results (last 7 days)     Procedure Component Value Units Date/Time    Blood culture x two cultures. Draw prior to antibiotics. [[id number]]  Collected:  08/05/19 1158    Order Status:  Completed Specimen:  Blood from Peripheral, Hand, Right Updated:  08/05/19 1945     Blood Culture, Routine No Growth to date    Narrative:       Aerobic and anaerobic    Blood culture x two cultures. Draw prior to antibiotics. [[id number]] Collected:  08/05/19 1143    Order Status:  Completed Specimen:  Blood from Peripheral, Antecubital, Right Updated:  08/05/19 1945     Blood Culture, Routine No Growth to date    Narrative:       Aerobic and anaerobic         Primary  section  2017    Active  NADER

## 2019-08-06 NOTE — PLAN OF CARE
Problem: Adult Inpatient Plan of Care  Goal: Plan of Care Review  Recommendations     Recommendation:   1. Continue cardiac diet as tolerated. Suggest dysphagia mechanical soft texture - meats and large vegetables will be chopped/fork-ready. Pt's family member reports that pt cannot cut meat on his own 2/2 can only use one hand s/p stroke.      If regular consistency is continued, please provide assistance with cutting meat during meals as necessary.     2. RD to monitor & follow-up.     Goals: PO intake > 50%  Nutrition Goal Status: new  Communication of RD Recs: other (comment)(POC)    Assessment and Plan     Nutrition Problem  Self-feeding difficulty     Related to (etiology):   Unable to use 1 hand s/p stroke     Signs and Symptoms (as evidenced by):   Needs meats to be pre-chopped     Interventions (treatment strategy):  Collaboration of care with other providers     Nutrition Diagnosis Status:   New

## 2019-08-06 NOTE — CONSULTS
Therapy with IV Vancomycin complete and/or consult discontinued by provider.  Pharmacy will sign off, please re-consult as needed.    Nini Tracy, PharmD, BCPS  [id number]

## 2019-08-06 NOTE — ASSESSMENT & PLAN NOTE
1.4 on admission, unclear timeline, no prior Mg on file  Unclear etiology, no apparent GI or renal losses  EKG NSR  Pt w/ weakness, spasticity at baseline; per Pt and family at baseline; mentation at baseline  Corrected Ca, K, Phos, Na wnl  Cr 1.3, anticipate decrease s/p IVF resuscitation  S/p repletion in ED  Continue to monitor and replete prn

## 2019-08-06 NOTE — ED NOTES
Pt care assumed. Report received from Mary ACOSTA. Family members X2 at bedside. Pt on continuous cardiac monitoring, continuous pulse oximetry, and automatic BP cuff. Side rails up X2. Bed low and locked. Call light within reach. Pt turned to right side on wedge. Pt denies any needs at this time. Per report, pt had stroke in 2017 with residual right sided weakness and slurred speech.

## 2019-08-06 NOTE — PLAN OF CARE
Problem: SLP Goal  Goal: SLP Goal  Outcome: Outcome(s) achieved Date Met: 08/06/19    Clinical evaluation of swallow complete. Recommend ongoing regular consistency solid diet and thin liquids. Should pt wish to address hx of dysarthria of speech s/p CVA incurred in 2011, per review of his medical chart, he may benefit from  SLP consult to determine candidacy for therapy. However additional acute SLP services unwarranted at this time. Please re-consult should changes occur.     ODILIA Nam, CCC-SLP  [phone]  8/6/2019

## 2019-08-06 NOTE — ASSESSMENT & PLAN NOTE
9.3, microcytic, asymptomatic  11.3 in June  Recent fall, imaging thus far not notable for hematoma  Family notes recent c-scope w/o findings; not on file  Fe studies pending  Continue to monitor

## 2019-08-06 NOTE — CARE UPDATE
Rapid Response Respiratory Therapy Proactive Rounding Note      Time of visit: 1136    Code Status: Full Code   : 1950  Age: 68 y.o.  Weight:   Wt Readings from Last 1 Encounters:   19 105.7 kg (233 lb 0.4 oz)     Sex: male  Race: Black or    Bed: 1111/1111 A:   MRN: [id number]    SITUATION     Evaluated patient for: MEWS Score    BACKGROUND     Patient has a past medical history of BPH (benign prostatic hyperplasia), HLD (hyperlipidemia), Hypertension, Other and unspecified hyperlipidemia, and Stroke.  Pulmonary Hx: None  Clinically Significant Surgical Hx: None    ASSESSMENT     Pulse: Pulse: 88 Respiratory rate: Resp: (!) 25 Temperature: Temp: 97.8 °F (36.6 °C) BP: BP: (!) 141/90 SpO2:SpO2: 99 %   Level of Consciousness: Level of Consciousness (AVPU): alert  Respiratory Effort: Respiratory Effort: Normal, Unlabored  Expansion/Accessory Muscle Usage: Expansion/Accessory Muscles/Retractions: expansion symmetric  All Lung Field Breath Sounds: All Lung Fields Breath Sounds: Anterior:, Lateral:, diminished  Mobility at time of assessment: General Mobility: significantly impaired  O2 Device/Concentration: O2 Device (Oxygen Therapy): room air        Most recent blood gas: No results for input(s): PH, PCO2, PO2, HCO3, POCSATURATED, BE in the last 72 hours.  Current Respiratory Care Orders:   19  Pulse Oximetry Q4H Every 4 hours (8 of 37 released)    Release          NIPPV: No  Surgical airway: No  Ambu at bedside: Ambu bag with the patient?: Yes, Adult Ambu    INTERVENTIONS/RECOMMENDATIONS   ?Pt assessed due to MEWS Score. Pt found on room air with an SpO2 of 99% and a pulse of 88. Although pt is tachypneic at this time, the pt does not present signs of respiratory distress. Will continue to monitor.    Discussed plan of care with primary RTJonn.     FOLLOW-UP     Please call back the Rapid Response RT, Jessica Chanel RRT at x [phone] for any questions or concerns.

## 2019-08-06 NOTE — ED NOTES
Telemetry Verification   Patient placed on Telemetry Box  Verified with War Room  Box # [id number]   Monitor Tech Yudy   Rate 107   Rhythm Sinus Tach

## 2019-08-06 NOTE — HPI
Mr. Tafoya is a 69yo M w/ PMH of HTN, HLD, BPH, stroke (2007, 2011) w/ residual right-sided, spastic weakness and expressive aphasia who presents from an assisted living facility s/p recent fall with complaint of weakness and found to be febrile upon arrival to ED. Sister and brother-in-law at bedside to help provide history. Per sister, Pt was doing well up until Sunday morning, when he attempted to walk by himself and had a fall. She notes he was SOB, tachycardic, and had a low BP at the time, but improved by noon and was fed lunch w/o issues. He was also fine that night. On Monday, family got a call that Pt seemed weaker than usual and Pt was brought to the ED. Pt denies any lightheadedness, dizziness, CP, palpitations, SOB prior to the fall. He further denies any recent cough, sore throat, wheezing, myalgias, dysuria, HA, abdominal pain, N/V, diarrhea, visual changes. He denies changes in weight, night sweats, changes in appetite, chills. He denies choking, coughing w/ meal intake. He has L shoulder pain from fall and felt weaker than baseline earlier but feels that he is at his baseline now. He has scraped and bruised knees and L forehead but denies any other skin wounds. Family feels he is at baseline in terms of mental status; Pt denies recent confusion.Pt denies recent changes in medications, herbal supplement use, recreational drug use. Denies recent alcohol use. Previous smoker. No recent sick contacts.

## 2019-08-06 NOTE — PLAN OF CARE
CM met with patient and sister for discharge planning.  Patient's sister states he lives alone at the [facility].  Patient is wheelchair bound in his power chair during the day.  She states that [facility] was supposed to be starting PT with him but then he was admitted to the hospital.  Plan is to discharge home with [facility].    Pharmacy:    [id number] - [place], [place] - [address] AT [facility] [address]  [address]  [place] [address]  Phone: [phone] Fax: [phone]    PCP:  Helio Farr MD    Payor: MEDICARE / Plan: MEDICARE PART A & B / Product Type: [facility] /      08/06/19 1413   Discharge Assessment   Assessment Type Discharge Planning Assessment   Confirmed/corrected address and phone number on facesheet? Yes   Assessment information obtained from? Patient;Caregiver   Expected Length of Stay (days) 3   Communicated expected length of stay with patient/caregiver yes   Prior to hospitilization cognitive status: Alert/Oriented   Prior to hospitalization functional status: Wheelchair Bound   Current cognitive status: Alert/Oriented   Current Functional Status: Wheelchair Bound   Facility Arrived From: emergency room   Lives With alone   Able to Return to Prior Arrangements yes   Is patient able to care for self after discharge? Unable to determine at this time (comments)   Who are your caregiver(s) and their phone number(s)? Idalia Cornell - sister [phone]   Patient's perception of discharge disposition home health   Readmission Within the Last 30 Days no previous admission in last 30 days   Patient currently being followed by outpatient case management? No   Patient currently receives any other outside agency services? No   Equipment Currently Used at Home power chair;walker, rolling;lift device;hospital bed;bath bench;grab bar   Do you have any problems affording any of your prescribed medications?  No   Is the patient taking medications as prescribed? yes   Does the patient have transportation home? Yes   Transportation Anticipated family or friend will provide   Does the patient receive services at the [facility]? No   Discharge Plan A Home Health   Discharge Plan B Skilled Nursing Facility   DME Needed Upon Discharge  none   Patient/Family in Agreement with Plan yes

## 2019-08-06 NOTE — ASSESSMENT & PLAN NOTE
New, last labs in June transaminases wnl   ALT 61, GGT wnl, T bili wnl, Alk Phos wnl  Pt denies recent med changes including abx use, herbal supplements, rec drug use, etoh use  Potentially DILI, culprits include: statin, finasteride, doxazosin, fish oil    Though usually ALT predominant injury  U/S w/ cholelithiasis without evidence of acute cholecystitis  Will hold medications and monitor

## 2019-08-06 NOTE — H&P
Staff Attestation    I have examined the patient and concur with the resident's history, physical, assessment and plan    68 y o M w/ PMH of HTN, HLD, BPH, stroke (2007, 2011) w/ residual right-sided, spastic weakness and expressive aphasia who presents from an assisted living facility s/p recent fall with complaint of weakness and found to be febrile (101.2) upon presentation.  Wbc / U/A / CXR unremarkable. US > cholelithiasis w/o  Cholecystitis. Started on vanc / zosyn. Also, hypomagnesemia (1.4), transaminitis ( ALT 61 GGT wnl T bili wnl ) possibly DILI (statin / finasteride  doxazosin/ fish oil). Improved overnight w Tmax 101 > 98 and wbc 8 > 4. Likely discharge in AM    Parker Ramos MD, Staff Physician, Hospital Medicine,       [facility] Hospital Medicine  History & Physical    Patient Name: Alexsander Tafoya  MRN: [id number]  Admission Date: 8/5/2019  Attending Physician: Parker Ramos MD   Primary Care Provider: Helio Farr MD    [facility] Medicine Team: [facility] HOSP MED 1 Eliana Bui MD     Patient information was obtained from patient, caregiver / friend, past medical records and ER records.     Subjective:     Principal Problem:Fever    Chief Complaint:   Chief Complaint   Patient presents with    Weakness     Pt started having weakness around 12 pm yesterday. Pt has right sided residual weakness and aphasia from previous stroke.         HPI: Mr. Tafoya is a 67yo M w/ PMH of HTN, HLD, BPH, stroke (2007, 2011) w/ residual right-sided, spastic weakness and expressive aphasia who presents from an assisted living facility s/p recent fall with complaint of weakness and found to be febrile upon arrival to ED. Sister and brother-in-law at bedside to help provide history. Per sister, Pt was doing well up until Sunday morning, when he attempted to walk by himself and had a fall. She notes he was SOB, tachycardic, and had a low BP at the time, but improved by noon and was  fed lunch w/o issues. He was also fine that night. On Monday, family got a call that Pt seemed weaker than usual and Pt was brought to the ED. Pt denies any lightheadedness, dizziness, CP, palpitations, SOB prior to the fall. He further denies any recent cough, sore throat, wheezing, myalgias, dysuria, HA, abdominal pain, N/V, diarrhea, visual changes. He denies changes in weight, night sweats, changes in appetite, chills. He denies choking, coughing w/ meal intake. He has L shoulder pain from fall and felt weaker than baseline earlier but feels that he is at his baseline now. He has scraped and bruised knees and L forehead but denies any other skin wounds. Family feels he is at baseline in terms of mental status; Pt denies recent confusion.Pt denies recent changes in medications, herbal supplement use, recreational drug use. Denies recent alcohol use. Previous smoker. No recent sick contacts.     Past Medical History:   Diagnosis Date    BPH (benign prostatic hyperplasia)     HLD (hyperlipidemia)     Hypertension     Other and unspecified hyperlipidemia     Stroke        Past Surgical History:   Procedure Laterality Date    CATARACT EXTRACTION W/ INTRAOCULAR LENS  IMPLANT, BILATERAL         Review of patient's allergies indicates:  No Known Allergies    No current facility-administered medications on file prior to encounter.      Current Outpatient Medications on File Prior to Encounter   Medication Sig    aspirin 325 MG tablet Take 325 mg by mouth once daily.    doxazosin (CARDURA) 2 MG tablet Take 2 mg by mouth every evening.    finasteride (PROSCAR) 5 mg tablet     fish oil-omega-3 fatty acids 300-1,000 mg capsule Take 1 capsule by mouth once daily.    metoprolol tartrate (LOPRESSOR) 50 MG tablet     rosuvastatin (CRESTOR) 20 MG tablet      Family History     Problem Relation (Age of Onset)    Dementia Father    Hypertension Mother, Father    Seizures Mother    Stroke Mother        Tobacco Use     Smoking status: Former Smoker    Smokeless tobacco: Never Used   Substance and Sexual Activity    Alcohol use: Yes     Comment: rare, once a week or less    Drug use: No    Sexual activity: Never     Review of Systems   Constitutional: Negative for appetite change, chills, diaphoresis, fatigue, fever and unexpected weight change.   HENT: Negative for congestion, rhinorrhea, sneezing, sore throat and trouble swallowing.         Dysarthria Hx   Eyes: Negative for visual disturbance.   Respiratory: Negative for cough, choking, chest tightness, shortness of breath and wheezing.    Cardiovascular: Negative for chest pain, palpitations and leg swelling.   Gastrointestinal: Negative for abdominal pain, blood in stool, constipation, diarrhea, nausea and vomiting.   Genitourinary: Negative for difficulty urinating, dysuria, frequency, hematuria and urgency.   Musculoskeletal: Negative for myalgias.        L shoulder pain s/p fall   Skin: Positive for wound (s/p fall; BL knees, head).   Neurological: Positive for weakness (increased more than ususal this AM, on history reports back to baseline). Negative for dizziness, light-headedness and headaches.     Objective:     Vital Signs (Most Recent):  Temp: 99.2 °F (37.3 °C) (08/05/19 1800)  Pulse: 95 (08/05/19 1815)  Resp: 18 (08/05/19 1815)  BP: (!) 140/61 (08/05/19 1815)  SpO2: 100 % (08/05/19 1815) Vital Signs (24h Range):  Temp:  [99.2 °F (37.3 °C)-101.2 °F (38.4 °C)] 99.2 °F (37.3 °C)  Pulse:  [] 95  Resp:  [14-23] 18  SpO2:  [96 %-100 %] 100 %  BP: (117-151)/(60-93) 140/61     Weight: 81.6 kg (180 lb)  Body mass index is 26.58 kg/m².    Physical Exam   Constitutional: He is oriented to person, place, and time. He appears well-developed and well-nourished. No distress.   HENT:   Head: Normocephalic.   Right Ear: External ear normal.   Left Ear: External ear normal.   Mouth/Throat: Oropharynx is clear and moist. No oropharyngeal exudate.   Eyes: Conjunctivae are  normal. No scleral icterus.   Neck: Neck supple. No JVD present.   Cardiovascular: Regular rhythm, S1 normal, S2 normal, normal heart sounds and intact distal pulses. Tachycardia present. Exam reveals no gallop and no friction rub.   No murmur heard.  Pulmonary/Chest: Effort normal and breath sounds normal. No respiratory distress. He has no wheezes. He has no rales. He exhibits no tenderness.   Abdominal: Soft. Bowel sounds are normal. There is no tenderness. There is no guarding.   Musculoskeletal: Normal range of motion. He exhibits no edema.   Lymphadenopathy:     He has no cervical adenopathy.   Neurological: He is alert and oriented to person, place, and time.   4/5 strength of RUE, RLE. + RU/LE drift  5/5 LUE, LLE   Skin: Skin is warm and dry. He is not diaphoretic.   superficial abrasion to left forehead, bilateral knees    Nursing note and vitals reviewed.          Significant Labs:     Recent Results (from the past 24 hour(s))   CBC auto differential    Collection Time: 08/05/19 11:32 AM   Result Value Ref Range    WBC 8.16 3.90 - 12.70 K/uL    RBC 3.89 (L) 4.60 - 6.20 M/uL    Hemoglobin 9.3 (L) 14.0 - 18.0 g/dL    Hematocrit 30.2 (L) 40.0 - 54.0 %    Mean Corpuscular Volume 78 (L) 82 - 98 fL    Mean Corpuscular Hemoglobin 23.9 (L) 27.0 - 31.0 pg    Mean Corpuscular Hemoglobin Conc 30.8 (L) 32.0 - 36.0 g/dL    RDW 15.6 (H) 11.5 - 14.5 %    Platelets 160 150 - 350 K/uL    MPV 10.8 9.2 - 12.9 fL    Immature Granulocytes 0.7 (H) 0.0 - 0.5 %    Gran # (ANC) 7.2 1.8 - 7.7 K/uL    Immature Grans (Abs) 0.06 (H) 0.00 - 0.04 K/uL    Lymph # 0.6 (L) 1.0 - 4.8 K/uL    Mono # 0.2 (L) 0.3 - 1.0 K/uL    Eos # 0.0 0.0 - 0.5 K/uL    Baso # 0.03 0.00 - 0.20 K/uL    nRBC 0 0 /100 WBC    Gran% 88.6 (H) 38.0 - 73.0 %    Lymph% 7.5 (L) 18.0 - 48.0 %    Mono% 2.8 (L) 4.0 - 15.0 %    Eosinophil% 0.0 0.0 - 8.0 %    Basophil% 0.4 0.0 - 1.9 %    Differential Method Automated    Comprehensive metabolic panel    Collection Time:  08/05/19 11:32 AM   Result Value Ref Range    Sodium 139 136 - 145 mmol/L    Potassium 4.0 3.5 - 5.1 mmol/L    Chloride 110 95 - 110 mmol/L    CO2 18 (L) 23 - 29 mmol/L    Glucose 114 (H) 70 - 110 mg/dL    BUN, Bld 19 8 - 23 mg/dL    Creatinine 1.3 0.5 - 1.4 mg/dL    Calcium 8.5 (L) 8.7 - 10.5 mg/dL    Total Protein 6.6 6.0 - 8.4 g/dL    Albumin 3.2 (L) 3.5 - 5.2 g/dL    Total Bilirubin 0.7 0.1 - 1.0 mg/dL    Alkaline Phosphatase 75 55 - 135 U/L     (H) 10 - 40 U/L    ALT 61 (H) 10 - 44 U/L    Anion Gap 11 8 - 16 mmol/L    eGFR if African American >60.0 >60 mL/min/1.73 m^2    eGFR if non  56.1 (A) >60 mL/min/1.73 m^2   Lactic acid, plasma #1    Collection Time: 08/05/19 11:32 AM   Result Value Ref Range    Lactate (Lactic Acid) 1.7 0.5 - 2.2 mmol/L   Procalcitonin    Collection Time: 08/05/19 11:32 AM   Result Value Ref Range    Procalcitonin 69.06 (H) <0.25 ng/mL   Protime-INR    Collection Time: 08/05/19 11:32 AM   Result Value Ref Range    Prothrombin Time 11.3 9.0 - 12.5 sec    INR 1.1 0.8 - 1.2   Magnesium    Collection Time: 08/05/19 11:32 AM   Result Value Ref Range    Magnesium 1.4 (L) 1.6 - 2.6 mg/dL   Phosphorus    Collection Time: 08/05/19 11:32 AM   Result Value Ref Range    Phosphorus 2.8 2.7 - 4.5 mg/dL   Blood culture x two cultures. Draw prior to antibiotics.    Collection Time: 08/05/19 11:43 AM   Result Value Ref Range    Blood Culture, Routine No Growth to date    Blood culture x two cultures. Draw prior to antibiotics.    Collection Time: 08/05/19 11:58 AM   Result Value Ref Range    Blood Culture, Routine No Growth to date    Urinalysis, Reflex to Urine Culture Urine, Clean Catch    Collection Time: 08/05/19 12:47 PM   Result Value Ref Range    Specimen UA Urine, Catheterized     Color, UA Yellow Yellow, Straw, Lynda    Appearance, UA Clear Clear    pH, UA 5.0 5.0 - 8.0    Specific Gravity, UA 1.020 1.005 - 1.030    Protein, UA 1+ (A) Negative    Glucose, UA Negative  Negative    Ketones, UA Negative Negative    Bilirubin (UA) Negative Negative    Occult Blood UA 3+ (A) Negative    Nitrite, UA Negative Negative    Leukocytes, UA Negative Negative   Urinalysis Microscopic    Collection Time: 08/05/19 12:47 PM   Result Value Ref Range    RBC, UA 3 0 - 4 /hpf    WBC, UA 1 0 - 5 /hpf    Bacteria Few (A) None-Occ /hpf    Squam Epithel, UA 0 /hpf    Hyaline Casts, UA 0 0-1/lpf /lpf    Microscopic Comment SEE COMMENT    Lactic acid, plasma #2    Collection Time: 08/05/19  6:14 PM   Result Value Ref Range    Lactate (Lactic Acid) 1.0 0.5 - 2.2 mmol/L     All pertinent labs within the past 24 hours have been reviewed.    Significant Imaging: I have reviewed all pertinent imaging results/findings within the past 24 hours.   Imaging Results          CT Head Without Contrast (Final result)  Result time 08/05/19 17:43:27    Final result by Lc Martin MD (08/05/19 17:43:27)                 Impression:      Right frontal scalp suspected localized soft tissue swelling/contusion, without displaced skull fracture or acute large vascular territory infarct or intracranial hemorrhage identified.    Generalized cerebral volume loss and grossly stable distribution periventricular white suspected chronic small vessel ischemic change.    Chronic confluent calcification in distribution as described above, which is nonspecific but could be seen with fahr disease or alternative toxic or metabolic processes or sequela of prior hypoxic injury.      Electronically signed by: Lc Martin MD  Date:    08/05/2019  Time:    17:43             Narrative:    EXAMINATION:  CT HEAD WITHOUT CONTRAST    CLINICAL HISTORY:  Head trauma, minor, GCS>=13, NOC/NEXUS/CCR positive, first study;    TECHNIQUE:  Low dose axial CT images obtained throughout the head without intravenous contrast. Sagittal and coronal reconstructions were performed.    COMPARISON:  Head CT 02/05/2019    FINDINGS:  Intracranial  compartment:    Generalized cerebral volume loss.  Ventricles are midline and stable in size configuration without distortion by mass effect or acute hydrocephalus.  No extra-axial blood or fluid collections.    Brain parenchyma appears stable noting relatively symmetric dense calcification involving the bilateral centrum semiovale, corona radiata, thalami, basal ganglia and dentate nuclei.  Grossly stable distribution periventricular white matter hypoattenuation.  No definite new focal areas of abnormal parenchymal attenuation.  No parenchymal mass, hemorrhage, edema or major vascular distribution infarct.    Skull/extracranial contents (limited evaluation): Right frontal scalp suspected localized soft tissue swelling/contusion.  No fracture. Mastoid air cells and paranasal sinuses are essentially clear.                               US Abdomen Limited (Gallbladder) (Final result)  Result time 08/05/19 16:54:03    Final result by Eloy Phillips MD (08/05/19 16:54:03)                 Impression:      1. Cholelithiasis without evidence of acute cholecystitis.    Electronically signed by resident: Pierre Grajeda  Date:    08/05/2019  Time:    15:06    Electronically signed by: Eloy Phillips  Date:    08/05/2019  Time:    16:54             Narrative:    EXAMINATION:  US ABDOMEN LIMITED    CLINICAL HISTORY:  Nonspecific elevation of levels of transaminase and lactic acid dehydrogenase (ldh)    TECHNIQUE:  Limited ultrasound of the right upper quadrant of the abdomen (including pancreas, liver, gallbladder, common bile duct, and spleen) was performed.    COMPARISON:  None.    FINDINGS:  Liver: Homogeneous echotexture. No focal hepatic lesions.    Gallbladder: 2.1 cm gallstone as well as biliary sludge.  However, limited exam as gallbladder is contracted, likely secondary to patient eating a meal shortly before examination.  Prominent gallbladder wall measuring 3 mm, no pericholecystic fluid or hyperemia.  No sonographic  Mtz's sign.    Biliary system: The common duct is not dilated, measuring 5 mm.  No intrahepatic ductal dilatation.    Miscellaneous: No upper abdominal ascites.                               X-Ray Shoulder Trauma Right (Final result)  Result time 08/05/19 14:01:15    Final result by Mayco Thomas III, MD (08/05/19 14:01:15)                 Narrative:    EXAMINATION:  XR SHOULDER TRAUMA 3 VIEW RIGHT    CLINICAL HISTORY:  Pain in right shoulder    FINDINGS:  No fracture dislocation bone destruction seen.  Three views.      Electronically signed by: Mayco Thomas MD  Date:    08/05/2019  Time:    14:01                             X-Ray Chest AP Portable (Final result)  Result time 08/05/19 12:07:11    Final result by Alexsander Keith MD (08/05/19 12:07:11)                 Impression:      Apparent metallic artifacts projected over the right lower thorax/upper abdomen.  Mild accentuation of the interstitial markings bilaterally.      Electronically signed by: Alexsander Keith MD  Date:    08/05/2019  Time:    12:07             Narrative:    EXAMINATION:  XR CHEST AP PORTABLE    CLINICAL HISTORY:  Sepsis;    TECHNIQUE:  Single frontal portable view of the chest was performed.    COMPARISON:  None    FINDINGS:  Metallic artifacts are projected over the right side of the cardiac silhouette and right upper quadrant of the abdomen.  These may represent external artifacts.  Cardiac silhouette is magnified by portable AP technique but does not appear enlarged.  Tortuous thoracic aorta and brachiocephalic vessels.  Pulmonary vascularity is mildly prominent.  Lungs are satisfactorily expanded.  Mild accentuation of the interstitial markings bilaterally.  No airspace consolidation or pleural fluid.  No pneumothorax.  Skeletal structures appear intact.                            \    Assessment/Plan:     * Fever  Upon admit, Pt w/ fever (101.2F) and tachycardia   Procal 69.06, WCC wnl  S/p 1x vanc, 1x zosyn, and IVF 30cc/kg  resuscitation in ED  No immediately apparent source of infection  CXR w/o acute process, UA w/ few bacterial no notable increase in WBCs and negative nitrite  Skin w/ superficial scrapes - healing  Mentation at baseline, no abdominal pain, no dysuria, no low back pain  Recent fall, fever and procal potentially reactive/inflammatory?    F/u blood cultures  Consider further sources, etiology (?DVT, prostatitis)   Aspiration PNA vs pneumonitis concern ?dyspahgia -  Nursing swallow and SLP eval   Aspiration precautions   Will continue BSABx (vanc, zosyn, latter w/ anaerobic coverage)  If cultures remain negative and pulm asympotmatic, can consider abx discontinuation or narrowing accordingly     Hypomagnesemia  1.4 on admission, unclear timeline, no prior Mg on file  Unclear etiology, no apparent GI or renal losses  EKG NSR  Pt w/ weakness, spasticity at baseline; per Pt and family at baseline; mentation at baseline  Corrected Ca, K, Phos, Na wnl  Cr 1.3, anticipate decrease s/p IVF resuscitation  S/p repletion in ED  Continue to monitor and replete prn      Transaminitis  New, last labs in June transaminases wnl   ALT 61, GGT wnl, T bili wnl, Alk Phos wnl  Pt denies recent med changes including abx use, herbal supplements, rec drug use, etoh use  Potentially DILI, culprits include: statin, finasteride, doxazosin, fish oil    Though usually ALT predominant injury  U/S w/ cholelithiasis without evidence of acute cholecystitis  Will hold medications and monitor        Asymptomatic microscopic hematuria  Technically, as 3 RBCs on UA w/o significant findings of UTI  Potentially related to acute infection, potentially glomerular in origin given 1+ protein   Can repeat to check for resolution, no prior UAs on file  If remains s/p acute infection, can consider further work-up I.e. CT urography      Anemia  9.3, microcytic, asymptomatic  11.3 in June  Recent fall, imaging thus far not notable for hematoma  Family notes  recent c-scope w/o findings; not on file  Fe studies pending  Continue to monitor    Aphasia as late effect of cerebrovascular accident  Pt w/ expressive aphasia s/p stroke  Eval for concurrent dysphagia       History of recent fall  Per history, seems due mechanical 2/2 weakness  Imaging w/o fracture, hemorrhage (+localized soft tissue swelling/contusion)  PT/OT  Fall precautions       Hemiplegia affecting right side in right-dominant patient as late effect of cerebrovascular disease  Increase in baseline weakness s/p fall on Sunday (8/4)  Per Pt and family, seems returned to baseline in ED  Continue to monitor  PT/OT      BPH (benign prostatic hyperplasia)  Hold finasteride and doxazosin w/ transaminitis   Resume as appropriate       Hyperlipidemia  Hold home statin w/ transaminitis   Resume as appropriate (vascular risk factor)      Essential hypertension  Hold home metoprolol (25mg daily) given concern for infection  Resume as appropriate       VTE Risk Mitigation (From admission, onward)        Ordered     Place MAGGIE hose  Until discontinued      08/05/19 1851     IP VTE LOW RISK PATIENT  Once      08/05/19 1851             Eliana Bui MD  Department of Hospital Medicine   [facility]

## 2019-08-07 VITALS
HEIGHT: 69 IN | WEIGHT: 233 LBS | BODY MASS INDEX: 34.51 KG/M2 | OXYGEN SATURATION: 97 % | DIASTOLIC BLOOD PRESSURE: 72 MMHG | SYSTOLIC BLOOD PRESSURE: 106 MMHG | HEART RATE: 89 BPM | RESPIRATION RATE: 28 BRPM | TEMPERATURE: 98 F

## 2019-08-07 LAB
ALBUMIN SERPL BCP-MCNC: 2.8 G/DL (ref 3.5–5.2)
ALP SERPL-CCNC: 106 U/L (ref 55–135)
ALT SERPL W/O P-5'-P-CCNC: 68 U/L (ref 10–44)
ANION GAP SERPL CALC-SCNC: 10 MMOL/L (ref 8–16)
APTT BLDCRRT: 22.8 SEC (ref 21–32)
AST SERPL-CCNC: 100 U/L (ref 10–40)
BASOPHILS # BLD AUTO: 0.02 K/UL (ref 0–0.2)
BASOPHILS NFR BLD: 0.6 % (ref 0–1.9)
BILIRUB SERPL-MCNC: 0.5 MG/DL (ref 0.1–1)
BUN SERPL-MCNC: 9 MG/DL (ref 8–23)
CALCIUM SERPL-MCNC: 8.9 MG/DL (ref 8.7–10.5)
CHLORIDE SERPL-SCNC: 111 MMOL/L (ref 95–110)
CO2 SERPL-SCNC: 21 MMOL/L (ref 23–29)
CREAT SERPL-MCNC: 0.9 MG/DL (ref 0.5–1.4)
DIFFERENTIAL METHOD: ABNORMAL
EOSINOPHIL # BLD AUTO: 0 K/UL (ref 0–0.5)
EOSINOPHIL NFR BLD: 0.6 % (ref 0–8)
ERYTHROCYTE [DISTWIDTH] IN BLOOD BY AUTOMATED COUNT: 15.3 % (ref 11.5–14.5)
EST. GFR  (AFRICAN AMERICAN): >60 ML/MIN/1.73 M^2
EST. GFR  (NON AFRICAN AMERICAN): >60 ML/MIN/1.73 M^2
GLUCOSE SERPL-MCNC: 102 MG/DL (ref 70–110)
HCT VFR BLD AUTO: 33.9 % (ref 40–54)
HGB BLD-MCNC: 10.1 G/DL (ref 14–18)
IMM GRANULOCYTES # BLD AUTO: 0.01 K/UL (ref 0–0.04)
IMM GRANULOCYTES NFR BLD AUTO: 0.3 % (ref 0–0.5)
INR PPP: 0.9 (ref 0.8–1.2)
LYMPHOCYTES # BLD AUTO: 0.8 K/UL (ref 1–4.8)
LYMPHOCYTES NFR BLD: 23.1 % (ref 18–48)
MAGNESIUM SERPL-MCNC: 1.8 MG/DL (ref 1.6–2.6)
MCH RBC QN AUTO: 23.8 PG (ref 27–31)
MCHC RBC AUTO-ENTMCNC: 29.8 G/DL (ref 32–36)
MCV RBC AUTO: 80 FL (ref 82–98)
MONOCYTES # BLD AUTO: 0.4 K/UL (ref 0.3–1)
MONOCYTES NFR BLD: 10.2 % (ref 4–15)
NEUTROPHILS # BLD AUTO: 2.4 K/UL (ref 1.8–7.7)
NEUTROPHILS NFR BLD: 65.2 % (ref 38–73)
NRBC BLD-RTO: 0 /100 WBC
PHOSPHATE SERPL-MCNC: 2.8 MG/DL (ref 2.7–4.5)
PLATELET # BLD AUTO: 154 K/UL (ref 150–350)
PMV BLD AUTO: 10.8 FL (ref 9.2–12.9)
POTASSIUM SERPL-SCNC: 3.6 MMOL/L (ref 3.5–5.1)
PROT SERPL-MCNC: 6.1 G/DL (ref 6–8.4)
PROTHROMBIN TIME: 9.8 SEC (ref 9–12.5)
RBC # BLD AUTO: 4.25 M/UL (ref 4.6–6.2)
SODIUM SERPL-SCNC: 142 MMOL/L (ref 136–145)
WBC # BLD AUTO: 3.63 K/UL (ref 3.9–12.7)

## 2019-08-07 PROCEDURE — 25000003 PHARM REV CODE 250: Performed by: STUDENT IN AN ORGANIZED HEALTH CARE EDUCATION/TRAINING PROGRAM

## 2019-08-07 PROCEDURE — 83735 ASSAY OF MAGNESIUM: CPT

## 2019-08-07 PROCEDURE — 85025 COMPLETE CBC W/AUTO DIFF WBC: CPT

## 2019-08-07 PROCEDURE — 36415 COLL VENOUS BLD VENIPUNCTURE: CPT

## 2019-08-07 PROCEDURE — 84100 ASSAY OF PHOSPHORUS: CPT

## 2019-08-07 PROCEDURE — 99238 PR HOSPITAL DISCHARGE DAY,<30 MIN: ICD-10-PCS | Mod: GC,,, | Performed by: HOSPITALIST

## 2019-08-07 PROCEDURE — 85730 THROMBOPLASTIN TIME PARTIAL: CPT

## 2019-08-07 PROCEDURE — 97530 THERAPEUTIC ACTIVITIES: CPT

## 2019-08-07 PROCEDURE — 99238 HOSP IP/OBS DSCHRG MGMT 30/<: CPT | Mod: GC,,, | Performed by: HOSPITALIST

## 2019-08-07 PROCEDURE — 85610 PROTHROMBIN TIME: CPT

## 2019-08-07 PROCEDURE — 97161 PT EVAL LOW COMPLEX 20 MIN: CPT

## 2019-08-07 PROCEDURE — 80053 COMPREHEN METABOLIC PANEL: CPT

## 2019-08-07 RX ORDER — AMOXICILLIN AND CLAVULANATE POTASSIUM 875; 125 MG/1; MG/1
1 TABLET, FILM COATED ORAL EVERY 12 HOURS
Qty: 10 TABLET | Refills: 0 | Status: SHIPPED | OUTPATIENT
Start: 2019-08-07 | End: 2019-08-12

## 2019-08-07 RX ORDER — METOPROLOL TARTRATE 50 MG/1
25 TABLET ORAL DAILY
Status: ON HOLD
Start: 2019-08-07 | End: 2024-01-02 | Stop reason: HOSPADM

## 2019-08-07 RX ORDER — DOXAZOSIN 2 MG/1
2 TABLET ORAL NIGHTLY
Start: 2019-08-07 | End: 2022-05-12

## 2019-08-07 RX ORDER — ROSUVASTATIN CALCIUM 20 MG/1
20 TABLET, COATED ORAL NIGHTLY
Status: ON HOLD
Start: 2019-08-07 | End: 2024-01-02 | Stop reason: HOSPADM

## 2019-08-07 RX ADMIN — AMOXICILLIN AND CLAVULANATE POTASSIUM 1 TABLET: 875; 125 TABLET, FILM COATED ORAL at 09:08

## 2019-08-07 RX ADMIN — ASPIRIN 325 MG ORAL TABLET 325 MG: 325 PILL ORAL at 09:08

## 2019-08-07 NOTE — HOSPITAL COURSE
Admitted with fever, septic work up was unremarkable, started empirically on broad spectrum antibiotic and de-escalated to Augmentin after 24 hours of fever free, BCx negatve for 48 hrs, patient has been doing well through out his admission, fever presumed due to possible aspiration PNA, discharged with oral abx for 5 days and home PT/OT, SLP cleared him for regular diet.

## 2019-08-07 NOTE — PLAN OF CARE
[facility]    HOME HEALTH ORDERS  FACE TO FACE ENCOUNTER    Patient Name: Alexsander Tafoya  YOB: 1950    PCP: Helio Farr MD   PCP Address: [address] [place] [address] [address] / [place] [address]  PCP Phone Number: [phone]  PCP Fax: [phone]    Encounter Date: 08/07/2019    Admit to Home Health    Diagnoses:  Active Hospital Problems    Diagnosis  POA    *Fever [R50.9]  Yes    BPH (benign prostatic hyperplasia) [N40.0]  Yes    Anemia [D64.9]  Yes    Transaminitis [R74.0]  Yes    Hypomagnesemia [E83.42]  Yes    Asymptomatic microscopic hematuria [R31.21]  Unknown    History of recent fall [Z91.81]  Not Applicable    Hemiplegia affecting right side in right-dominant patient as late effect of cerebrovascular disease [I69.951]  Not Applicable    Aphasia as late effect of cerebrovascular accident [I69.320]  Not Applicable    Essential hypertension [I10]  Yes    Hyperlipidemia [E78.5]  Yes      Resolved Hospital Problems   No resolved problems to display.       No future appointments.  Follow-up Information     Helio Farr MD On 8/12/2019.    Specialty:  Internal Medicine  Why:  at 1:15 PM, please arrive at 12:45 PM.  Contact information:  [address] [place] [address]  [address]  [place] [address]  [phone]                     I have seen and examined this patient face to face today. My clinical findings that support the need for the home health skilled services and home bound status are the following:  Weakness/numbness causing balance and gait disturbance due to Stroke making it taxing to leave home.    Allergies:Review of patient's allergies indicates:  No Known Allergies    Diet: 2 gram sodium diet    Activities: As tolerated    Nursing:   SN to complete comprehensive assessment including routine vital signs. Instruct on disease process and s/s of complications to report to MD. Review/verify medication list sent home with the patient at time of discharge   and instruct patient/caregiver as needed. Frequency may be adjusted depending on start of care date.    Notify MD if SBP > 160 or < 90; DBP > 90 or < 50; HR > 120 or < 50; Temp > 101;       CONSULTS:    Physical Therapy to evaluate and treat. Evaluate for home safety and equipment needs; Establish/upgrade home exercise program. Perform / instruct on therapeutic exercises, gait training, transfer training, and Range of Motion.  Occupational Therapy to evaluate and treat. Evaluate home environment for safety and equipment needs. Perform/Instruct on transfers, ADL training, ROM, and therapeutic exercises.          Medications: Review discharge medications with patient and family and provide education.      Current Discharge Medication List      START taking these medications    Details   amoxicillin-clavulanate 875-125mg (AUGMENTIN) 875-125 mg per tablet Take 1 tablet by mouth every 12 (twelve) hours. for 5 days  Qty: 10 tablet, Refills: 0         CONTINUE these medications which have CHANGED    Details   doxazosin (CARDURA) 2 MG tablet Take 1 tablet (2 mg total) by mouth every evening. HOLD UNTIL TOLD TO RESUME BY PHYSICIAN      metoprolol tartrate (LOPRESSOR) 50 MG tablet Take 0.5 tablets (25 mg total) by mouth once daily. HOLD UNTIL TOLD TO RESUME BY PHYSICIAN      rosuvastatin (CRESTOR) 20 MG tablet Take 1 tablet (20 mg total) by mouth every evening. HOLD UNTIL TOLD TO RESUME BY PHYSICIAN         CONTINUE these medications which have NOT CHANGED    Details   aspirin 325 MG tablet Take 325 mg by mouth once daily.      finasteride (PROSCAR) 5 mg tablet Take 5 mg by mouth once daily.       fish oil-omega-3 fatty acids 300-1,000 mg capsule Take 1 capsule by mouth once daily.      multivitamin (THERAGRAN) per tablet Take 1 tablet by mouth once daily.             I certify that this patient is confined to his home and needs physical therapy and occupational therapy.

## 2019-08-07 NOTE — ASSESSMENT & PLAN NOTE
Upon admit, Pt w/ fever (101.2F) and tachycardia   Procal 69.06, WCC wnl  S/p 1x vanc, 1x zosyn, and IVF 30cc/kg resuscitation in ED  No immediately apparent source of infection  CXR w/o acute process, UA w/ few bacterial no notable increase in WBCs and negative nitrite  Skin w/ superficial scrapes - healing  Mentation at baseline, no abdominal pain, no dysuria, no low back pain  Recent fall, fever and procal potentially reactive/inflammatory?    BCx -ve  Consider further sources, etiology (?DVT, prostatitis)   Aspiration PNA vs pneumonitis concern ?dyspahgia -     Aspiration precautions   - continue Augmentin to complete 5 days of Abx

## 2019-08-07 NOTE — PLAN OF CARE
Problem: Physical Therapy Goal  Goal: Physical Therapy Goal  Outcome: Outcome(s) achieved Date Met: 08/07/19  No goals set, pt does not require additional acute PT services at this time.     Pt educated on asking medical staff for PT consult if changes in functional status occurs.     - Bull Snigh, PT, DPT  8/7/2019

## 2019-08-07 NOTE — PLAN OF CARE
Patient discharged home with [facility].    Follow up appointment made with Dr. Farr for 8/12/19 at 1:15 PM.  Arrive at 12:45 PM.       08/07/19 1348   Final Note   Assessment Type Final Discharge Note   Anticipated Discharge Disposition Home-Health   Right Care Referral Info   Post Acute Recommendation Home-care   Referral Type Home Health   Facility Name [facility]

## 2019-08-07 NOTE — PROGRESS NOTES
Pt is getting discharge. D/c instruction explained and given to pt's sister and she verbalized understanding of d/c instruction. Pt denied pain or discomfort. Vss. Escort is ordered for pt.

## 2019-08-07 NOTE — PLAN OF CARE
Problem: Adult Inpatient Plan of Care  Goal: Plan of Care Review  Outcome: Ongoing (interventions implemented as appropriate)  Patient A+O x4, speech slurred with right sided weakness s/p CVA. Turned and repositioned q2h and PRN. Continues with nonblanchable redness to coccyx, protective cream applied. Scattered abrasions to bilateral knees, elbows, hands, and left forehead LOTA. Patient pain and safety monitored q 1-2 hrs this shift. Bedrest maintained. Bed locked and in lowest position. Bed side rails elevated x 3. Call light and personal belongings in reach. Continues on ABT, no a/e noted. Afebrile this shift. Will cont. to monitor.   08/07/19 0650   Plan of Care Review   Plan of Care Reviewed With patient

## 2019-08-07 NOTE — DISCHARGE SUMMARY
[facility] Hospital Medicine  Discharge Summary      Patient Name: Alexsander Tafoya  MRN: [id number]  Admission Date: 8/5/2019  Hospital Length of Stay: 2 days  Discharge Date and Time:  08/07/2019 12:49 PM  Attending Physician: No att. providers found   Discharging Provider: NATALIIA Holden  Primary Care Provider: Helio Farr MD  [facility] Medicine Team: [facility] 1 NATALIIA Holden    HPI:   Mr. Tafoya is a 69yo M w/ PMH of HTN, HLD, BPH, stroke (2007, 2011) w/ residual right-sided, spastic weakness and expressive aphasia who presents from an assisted living facility s/p recent fall with complaint of weakness and found to be febrile upon arrival to ED. Sister and brother-in-law at bedside to help provide history. Per sister, Pt was doing well up until Sunday morning, when he attempted to walk by himself and had a fall. She notes he was SOB, tachycardic, and had a low BP at the time, but improved by noon and was fed lunch w/o issues. He was also fine that night. On Monday, family got a call that Pt seemed weaker than usual and Pt was brought to the ED. Pt denies any lightheadedness, dizziness, CP, palpitations, SOB prior to the fall. He further denies any recent cough, sore throat, wheezing, myalgias, dysuria, HA, abdominal pain, N/V, diarrhea, visual changes. He denies changes in weight, night sweats, changes in appetite, chills. He denies choking, coughing w/ meal intake. He has L shoulder pain from fall and felt weaker than baseline earlier but feels that he is at his baseline now. He has scraped and bruised knees and L forehead but denies any other skin wounds. Family feels he is at baseline in terms of mental status; Pt denies recent confusion.Pt denies recent changes in medications, herbal supplement use, recreational drug use. Denies recent alcohol use. Previous smoker. No recent sick contacts.     * No surgery found *      Hospital Course:   Admitted with fever, septic work up was  unremarkable, started empirically on broad spectrum antibiotic and de-escalated to Augmentin after 24 hours of fever free, BCx negatve for 48 hrs, patient has been doing well through out his admission, fever presumed due to possible aspiration PNA, discharged with oral abx for 5 days and home PT/OT, SLP cleared him for regular diet.    Physical Exam   Constitutional: He is oriented to person, place, and time. He appears well-developed and well-nourished. No distress.   HENT:   Head: Normocephalic.   Right Ear: External ear normal.   Left Ear: External ear normal.   Mouth/Throat: Oropharynx is clear and moist. No oropharyngeal exudate.   Eyes: Conjunctivae are normal. No scleral icterus.   Neck: Neck supple. No JVD present.   Cardiovascular: Regular rhythm, S1 normal, S2 normal, normal heart sounds and intact distal pulses. Tachycardia present. Exam reveals no gallop and no friction rub.   No murmur heard.  Pulmonary/Chest: Effort normal and breath sounds normal. No respiratory distress. He has no wheezes. He has no rales. He exhibits no tenderness.   Abdominal: Soft. Bowel sounds are normal. There is no tenderness. There is no guarding.   Musculoskeletal: Normal range of motion. He exhibits no edema.   Lymphadenopathy:     He has no cervical adenopathy.   Neurological: He is alert and oriented to person, place, and time.   4/5 strength of RUE, RLE. + RU/LE drift  5/5 LUE, LLE   Skin: Skin is warm and dry. He is not diaphoretic.   superficial abrasion to left forehead, bilateral knees    Nursing note and vitals reviewed.  Consults:   Consults (From admission, onward)        Status Ordering Provider     Inpatient consult to Registered Dietitian/Nutritionist  Once     Provider:  (Not yet assigned)    Completed JETT WILLIAMSON     IP consult to case management  Once     Provider:  (Not yet assigned)    Acknowledged JETT WILLIAMSON     Pharmacy to dose Vancomycin consult  Once     Provider:  (Not yet assigned)     Completed DEEPA BRICEÑO          * Fever  Upon admit, Pt w/ fever (101.2F) and tachycardia   Procal 69.06, WCC wnl  S/p 1x vanc, 1x zosyn, and IVF 30cc/kg resuscitation in ED  No immediately apparent source of infection  CXR w/o acute process, UA w/ few bacterial no notable increase in WBCs and negative nitrite  Skin w/ superficial scrapes - healing  Mentation at baseline, no abdominal pain, no dysuria, no low back pain  Recent fall, fever and procal potentially reactive/inflammatory?    BCx -ve  Consider further sources, etiology (?DVT, prostatitis)   Aspiration PNA vs pneumonitis concern ?dyspahgia -     Aspiration precautions   - continue Augmentin to complete 5 days of Abx        Final Active Diagnoses:    Diagnosis Date Noted POA    PRINCIPAL PROBLEM:  Fever [R50.9] 08/05/2019 Yes    BPH (benign prostatic hyperplasia) [N40.0] 08/05/2019 Yes    Anemia [D64.9] 08/05/2019 Yes    Transaminitis [R74.0] 08/05/2019 Yes    Hypomagnesemia [E83.42] 08/05/2019 Yes    Asymptomatic microscopic hematuria [R31.21] 08/05/2019 Unknown    History of recent fall [Z91.81] 08/05/2019 Not Applicable    Hemiplegia affecting right side in right-dominant patient as late effect of cerebrovascular disease [I69.951] 07/17/2017 Not Applicable    Aphasia as late effect of cerebrovascular accident [I69.320] 07/17/2017 Not Applicable    Essential hypertension [I10] 07/17/2017 Yes    Hyperlipidemia [E78.5] 07/17/2017 Yes      Problems Resolved During this Admission:       Discharged Condition: good    [place] [facility]    Follow Up:  Follow-up Information     Helio Farr MD On 8/12/2019.    Specialty:  Internal Medicine  Why:  at 1:15 PM, please arrive at 12:45 PM.  Contact information:  [address] [place] [address]  [id number]  [place] [address] [phone]                 Patient Instructions:   No discharge procedures on file.    Significant Diagnostic Studies: Labs:   [facility]   Recent Labs   Lab 08/06/19  0713  08/07/19  0702    142   K 3.7 3.6    111*   CO2 20* 21*    102   BUN 12 9   CREATININE 1.1 0.9   CALCIUM 8.5* 8.9   PROT 6.2 6.1   ALBUMIN 3.0* 2.8*   BILITOT 0.8 0.5   ALKPHOS 82 106   * 100*   ALT 76* 68*   ANIONGAP 8 10   ESTGFRAFRICA >60.0 >60.0   EGFRNONAA >60.0 >60.0    and CBC   Recent Labs   Lab 08/06/19  0713 08/07/19  0701   WBC 4.44 3.63*   HGB 9.9* 10.1*   HCT 33.0* 33.9*    154     Microbiology:   Blood Culture   Lab Results   Component Value Date    LABBLOO No Growth to date 08/05/2019    LABBLOO No Growth to date 08/05/2019       Pending Diagnostic Studies:     None         Medications:  Reconciled Home Medications:      Medication List      START taking these medications    amoxicillin-clavulanate 875-125mg 875-125 mg per tablet  Commonly known as:  AUGMENTIN  Take 1 tablet by mouth every 12 (twelve) hours. for 5 days        CHANGE how you take these medications    doxazosin 2 MG tablet  Commonly known as:  CARDURA  Take 1 tablet (2 mg total) by mouth every evening. HOLD UNTIL TOLD TO RESUME BY PHYSICIAN  What changed:  additional instructions     metoprolol tartrate 50 MG tablet  Commonly known as:  LOPRESSOR  Take 0.5 tablets (25 mg total) by mouth once daily. HOLD UNTIL TOLD TO RESUME BY PHYSICIAN  What changed:  additional instructions     rosuvastatin 20 MG tablet  Commonly known as:  CRESTOR  Take 1 tablet (20 mg total) by mouth every evening. HOLD UNTIL TOLD TO RESUME BY PHYSICIAN  What changed:  additional instructions        CONTINUE taking these medications    aspirin 325 MG tablet  Take 325 mg by mouth once daily.     finasteride 5 mg tablet  Commonly known as:  PROSCAR  Take 5 mg by mouth once daily.     fish oil-omega-3 fatty acids 300-1,000 mg capsule  Take 1 capsule by mouth once daily.     multivitamin per tablet  Commonly known as:  THERAGRAN  Take 1 tablet by mouth once daily.            Indwelling Lines/Drains at time of discharge:    Lines/Drains/Airways          None          Time spent on the discharge of patient: 30 minutes  Patient was seen and examined on the date of discharge and determined to be suitable for discharge.         NATALIIA Holden  Department of Hospital Medicine  [facility]

## 2019-08-07 NOTE — PT/OT/SLP EVAL
Physical Therapy Evaluation and Discharge Note    Patient Name:  Alexsander Tafoya   MRN:  [id number]    Recommendations:     Discharge Recommendations:  assisted living facility, home health PT   Discharge Equipment Recommendations: none   Barriers to discharge: None    Assessment:     Alexsander Tafoya is a 68 y.o. male admitted with a medical diagnosis of Fever. At this time, patient is functioning at their prior level of function and does not require further acute PT services. Family present able to provide accurate PLOF and living environment with assist from pt. pt resides in [facility] with 24/7 care. Pt requires assist for all ADLs, is primarily w/c bound with all mobility performed using his power w/c, and required Mod/Max A standing pivot transfers. Pt very close to baseline PLOF but desires to have HHPT following d/c for gait training.      Recent Surgery: * No surgery found *      Plan:     During this hospitalization, patient does not require further acute PT services.  Please re-consult if situation changes.      Subjective     Chief Complaint: none noted   Patient/Family Comments/goals: Pt pleasant and willing to participate with therapy on this date.    Pain/Comfort:  · Pain Rating 1: 0/10    Patients cultural, spiritual, Protestant conflicts given the current situation: no    Living Environment:  pt resides in [facility] with 24/7 care. Pt requires assist for all ADLs, is primarily w/c bound with all mobility performed using his power w/c, and required Mod/Max A standing pivot transfers.  Equipment used at home: bath bench, grab bar, hospital bed, power chair, lift device.  DME owned (not currently used): none.  Upon discharge, patient will have assistance from [facility] staff.    Objective:     Communicated with RN prior to session.  Patient found HOB elevated with Condom Catheter, bed alarm, telemetry, peripheral IV upon PT entry to room.    General Precautions: Standard, fall   Orthopedic Precautions:N/A   Braces: N/A      Exams:  · RLE ROM: WFL  · RLE Strength: 3+/5  · LLE ROM: WFL  · LLE Strength: WFL    Functional Mobility:  · Bed Mobility:     · Supine to Sit: moderate assistance  · Sit to Supine: moderate assistance  · Transfers:     · Sit to Stand:  minimum assistance with no AD  · Gait: ~5 side steps Mod A demonstrating ataxia and difficulty clearing BLE with steps  · Balance: sitting: CGA    Standing: Mod A    AM-PAC 6 CLICK MOBILITY  Total Score:11       Therapeutic Activities and Exercises:   - Pt educated on:   -PT roles, expectations, and POC    -Safety with mobility   -Benefits of OOB activities to increase strength and functional mobility    -Performing ther ex for increasing LE ROM and strength   -Discharge recommendations     AM-PAC 6 CLICK MOBILITY  Total Score:11     Patient left HOB elevated with call button in reach.    GOALS:   Multidisciplinary Problems     Physical Therapy Goals     Not on file          Multidisciplinary Problems (Resolved)        Problem: Physical Therapy Goal    Goal Priority Disciplines Outcome Goal Variances Interventions   Physical Therapy Goal   (Resolved)     PT, PT/OT Outcome(s) achieved                     History:     Past Medical History:   Diagnosis Date    BPH (benign prostatic hyperplasia)     HLD (hyperlipidemia)     Hypertension     Other and unspecified hyperlipidemia     Stroke        Past Surgical History:   Procedure Laterality Date    CATARACT EXTRACTION W/ INTRAOCULAR LENS  IMPLANT, BILATERAL         Time Tracking:     PT Received On: 08/07/19  PT Start Time: 1025     PT Stop Time: 1045  PT Total Time (min): 20 min     Billable Minutes: Evaluation 10 and Therapeutic Activity 10      Alexsander Singh, PT  08/07/2019

## 2019-08-07 NOTE — CONSULTS
Wound care consult received from nursing for multiple abrasions and redness to coccyx.  Pt with history of HTN, HLD, BPH, stroke (2007, 2011) w/ residual right-sided, spastic weakness and expressive aphasia who presents from an assisted living facility s/p recent fall with complaint of weakness admitted with fever.    Pt with family at bedside during skin assessment noting healing abrasions over UE / LE and forehead with blanchable redness to sacrum present on admit.  HAPI bundle in place.  Family reports pt plans for discharge today.  Provided family with chair cushion and [employer] for continued use for prevention protocol.   Wound care team to sign off.  [id number]    Forehead    L elbow    L knee    R knee    Blanchable redness to sacrum

## 2019-08-09 ENCOUNTER — PATIENT OUTREACH (OUTPATIENT)
Dept: ADMINISTRATIVE | Facility: CLINIC | Age: 69
End: 2019-08-09

## 2019-08-09 LAB
BACTERIA BLD CULT: ABNORMAL

## 2019-08-09 NOTE — PROGRESS NOTES
Called pt's son Saurabh back in 30 mins as requested, Son states that he handles more of the billing side for patient and his aunt, Idalia Jordan handles the hands on things like medicines. Msg was left for Ms Jordan @ 1091. Saurabh states he will also give aunt the message to return call.

## 2019-08-09 NOTE — PATIENT INSTRUCTIONS
Fever Control (Adult)  A fever is a normal reaction of your body to an illness. The temperature itself usually isnt harmful.  It actually helps your body fight infections. You dont need to treat a fever unless you feel very uncomfortable.   Home care  Follow these tips to take care of yourself at home:  · If you feel warm, check your temperature.  · Dress in light clothing. This will help you lose extra body heat through your skin. The fever will go up if you wear extra layers or wrap in blankets.  · Fever causes your body to lose water through evaporation. Drink plenty of fluids. These include water, juice, clear sodas, ginger ale, or lemonade.  Fever medicines  You can take acetaminophen every 4 to 6 hours if:  · You feel very uncomfortable  · Your oral temperature is 100.4ºF (38ºC) or higher  If you cant take or keep down oral medicine, ask your pharmacist for acetaminophen suppositories. You dont need a prescription for these.  If the fever doesnt get better within 1 hour after you take acetaminophen, take ibuprofen. If this works, keep taking the ibuprofen every 6 to 8 hours.  Note: If you have chronic liver or kidney disease, talk with your healthcare provider before taking these medicines. Also talk with your provider if you ever had a stomach ulcer or GI (gastrointestinal) bleeding.  If either medicine alone doesnt keep the fever down, you may switch off between the 2 medicines every 3 to 4 hours. But do this only if your healthcare provider has told you to. For example, take ibuprofen. Wait 3 hours. Then take acetaminophen. Wait 3 hours. Take ibuprofen, and so on. Follow your providers instructions exactly.  Note: Do not give aspirin to anyone younger than age 19 who is ill with a fever. Aspirin can cause serious side effects such as liver damage and Reye syndrome. Although rare, Reye syndrome is a very serious illness usually found in children younger than age 15. The syndrome is closely linked to  the use of aspirin or aspirin-containing medicine during viral infection.  Follow-up care  Follow up with your healthcare provider if you don't get better after 48 hours.  When to seek medical advice  Call your healthcare provider right away if any of these occur:  · Fever, as directed by your healthcare provider, or:  ¨ Fever of 100.4°F (38°C) or above lasting for 24 to 48 hours  ¨ Fever lasting more than 3 days, even without other symptoms  ¨ Fever that happens after visiting a foreign country  ¨ Fever that happens within a month after visiting a country with malaria. Malaria is a serious illness. A fever can still be malaria even if you took medicine to prevent it. The medicine does not work in all cases.  · Confusion or trouble thinking  · Headache or stiff neck  · Flat, small, purplish red spots on your skin  · Low blood pressure  · Fast heart rate  · Fast (rapid) breathing  · You are pregnant  · You just had surgery, another medical procedure, or were just discharged from the hospital  · Use of medicines that suppress the immune system (immunosuppressants). These include Prednisone, cancer medicines, and organ transplant rejection medicines. If you are not sure about whether your medicines suppress your immune system, ask your healthcare provider.  Call 911  Someone should call 911 if you:  · Are having trouble breathing or shortness of breath  · Are unresponsive  Important reminder  Call your healthcare provider if you get a fever after visiting a place where infectious diseases are common. Many people  a cold or other virus while traveling. This usually goes away without a problem. But, some places have more serious diseases. Fever with certain other symptoms may mean you have a serious illness. Symptoms to watch for include diarrhea, skin rashes, insect bites, and skin boils, or infections. Your provider may ask you:  · What you did on your trip  · How long you were there  · Where you stayed (hotel,  native house, tent)  · What you ate and drank  · If you were bitten by insects or other bugs  · If you swam in freshwater  · If you had sex or got a tattoo or piercing while you were there  Check the Centers for Disease Control and Prevention to get more information about specific infectious diseases in the areas you have traveled.  Date Last Reviewed: 1/1/2017  © 7264-7871 [employer]. [address] [place] [address], [place] [id number]. All rights reserved. This information is not intended as a substitute for professional medical care. Always follow your healthcare professional's instructions.

## 2019-08-09 NOTE — PROGRESS NOTES
C3 nurse attempted to contact patient. The following occurred:   C3 nurse attempted to contact Alexsander Tafoya for a TCC post hospital discharge follow up call. The patient is unable to conduct the call @ this time. The patient's son, Saurabh, requested a callback.    The patient does not have a scheduled HOSFU appointment within 7-14 days post hospital discharge date 08/07/19.Non-[facility] PCP.

## 2019-08-10 LAB — BACTERIA BLD CULT: NORMAL

## 2019-08-31 ENCOUNTER — HOSPITAL ENCOUNTER (INPATIENT)
Facility: HOSPITAL | Age: 69
LOS: 1 days | Discharge: HOME | DRG: 872 | End: 2019-09-02
Attending: EMERGENCY MEDICINE | Admitting: HOSPITALIST
Payer: MEDICARE

## 2019-08-31 DIAGNOSIS — D64.9 ANEMIA, UNSPECIFIED TYPE: ICD-10-CM

## 2019-08-31 DIAGNOSIS — A41.9 SEPSIS: Primary | ICD-10-CM

## 2019-08-31 DIAGNOSIS — R79.89 ELEVATED TROPONIN: ICD-10-CM

## 2019-08-31 DIAGNOSIS — E83.42 HYPOMAGNESEMIA: ICD-10-CM

## 2019-08-31 DIAGNOSIS — N30.01 ACUTE CYSTITIS WITH HEMATURIA: ICD-10-CM

## 2019-08-31 DIAGNOSIS — I21.4 NSTEMI (NON-ST ELEVATED MYOCARDIAL INFARCTION): ICD-10-CM

## 2019-08-31 LAB
ALBUMIN SERPL BCP-MCNC: 3.3 G/DL (ref 3.5–5.2)
ALP SERPL-CCNC: 79 U/L (ref 55–135)
ALT SERPL W/O P-5'-P-CCNC: 18 U/L (ref 10–44)
ANION GAP SERPL CALC-SCNC: 9 MMOL/L (ref 8–16)
AST SERPL-CCNC: 22 U/L (ref 10–40)
BACTERIA #/AREA URNS HPF: ABNORMAL /HPF
BASOPHILS # BLD AUTO: 0.02 K/UL (ref 0–0.2)
BASOPHILS NFR BLD: 0.3 % (ref 0–1.9)
BILIRUB SERPL-MCNC: 0.6 MG/DL (ref 0.1–1)
BILIRUB UR QL STRIP: NEGATIVE
BNP SERPL-MCNC: 92 PG/ML (ref 0–99)
BUN SERPL-MCNC: 15 MG/DL (ref 8–23)
CALCIUM SERPL-MCNC: 9 MG/DL (ref 8.7–10.5)
CHLORIDE SERPL-SCNC: 104 MMOL/L (ref 95–110)
CLARITY UR: ABNORMAL
CO2 SERPL-SCNC: 23 MMOL/L (ref 23–29)
COLOR UR: YELLOW
CREAT SERPL-MCNC: 1.5 MG/DL (ref 0.5–1.4)
DIFFERENTIAL METHOD: ABNORMAL
EOSINOPHIL # BLD AUTO: 0 K/UL (ref 0–0.5)
EOSINOPHIL NFR BLD: 0.2 % (ref 0–8)
ERYTHROCYTE [DISTWIDTH] IN BLOOD BY AUTOMATED COUNT: 15.3 % (ref 11.5–14.5)
EST. GFR  (AFRICAN AMERICAN): 54 ML/MIN/1.73 M^2
EST. GFR  (NON AFRICAN AMERICAN): 47 ML/MIN/1.73 M^2
GLUCOSE SERPL-MCNC: 139 MG/DL (ref 70–110)
GLUCOSE UR QL STRIP: NEGATIVE
HCT VFR BLD AUTO: 31.6 % (ref 40–54)
HGB BLD-MCNC: 9.9 G/DL (ref 14–18)
HGB UR QL STRIP: ABNORMAL
HYALINE CASTS #/AREA URNS LPF: ABNORMAL /LPF
KETONES UR QL STRIP: NEGATIVE
LACTATE SERPL-SCNC: 0.9 MMOL/L (ref 0.5–2.2)
LEUKOCYTE ESTERASE UR QL STRIP: ABNORMAL
LYMPHOCYTES # BLD AUTO: 1.2 K/UL (ref 1–4.8)
LYMPHOCYTES NFR BLD: 19 % (ref 18–48)
MAGNESIUM SERPL-MCNC: 1.5 MG/DL (ref 1.6–2.6)
MCH RBC QN AUTO: 24.1 PG (ref 27–31)
MCHC RBC AUTO-ENTMCNC: 31.3 G/DL (ref 32–36)
MCV RBC AUTO: 77 FL (ref 82–98)
MICROSCOPIC COMMENT: ABNORMAL
MONOCYTES # BLD AUTO: 0.8 K/UL (ref 0.3–1)
MONOCYTES NFR BLD: 12.8 % (ref 4–15)
NEUTROPHILS # BLD AUTO: 4.3 K/UL (ref 1.8–7.7)
NEUTROPHILS NFR BLD: 68 % (ref 38–73)
NITRITE UR QL STRIP: POSITIVE
PH UR STRIP: 5 [PH] (ref 5–8)
PHOSPHATE SERPL-MCNC: 3.2 MG/DL (ref 2.7–4.5)
PLATELET # BLD AUTO: 213 K/UL (ref 150–350)
PMV BLD AUTO: 9.9 FL (ref 9.2–12.9)
POTASSIUM SERPL-SCNC: 3.7 MMOL/L (ref 3.5–5.1)
PROT SERPL-MCNC: 7.4 G/DL (ref 6–8.4)
PROT UR QL STRIP: ABNORMAL
RBC # BLD AUTO: 4.11 M/UL (ref 4.6–6.2)
RBC #/AREA URNS HPF: 2 /HPF (ref 0–4)
SODIUM SERPL-SCNC: 136 MMOL/L (ref 136–145)
SP GR UR STRIP: 1.01 (ref 1–1.03)
SQUAMOUS #/AREA URNS HPF: 2 /HPF
TROPONIN I SERPL DL<=0.01 NG/ML-MCNC: 0.32 NG/ML (ref 0–0.03)
URN SPEC COLLECT METH UR: ABNORMAL
UROBILINOGEN UR STRIP-ACNC: NEGATIVE EU/DL
WBC # BLD AUTO: 6.27 K/UL (ref 3.9–12.7)
WBC #/AREA URNS HPF: >100 /HPF (ref 0–5)
WBC CLUMPS URNS QL MICRO: ABNORMAL

## 2019-08-31 PROCEDURE — 83605 ASSAY OF LACTIC ACID: CPT

## 2019-08-31 PROCEDURE — 93010 ELECTROCARDIOGRAM REPORT: CPT | Mod: ,,, | Performed by: INTERNAL MEDICINE

## 2019-08-31 PROCEDURE — 81000 URINALYSIS NONAUTO W/SCOPE: CPT

## 2019-08-31 PROCEDURE — 93010 EKG 12-LEAD: ICD-10-PCS | Mod: ,,, | Performed by: INTERNAL MEDICINE

## 2019-08-31 PROCEDURE — 93005 ELECTROCARDIOGRAM TRACING: CPT

## 2019-08-31 PROCEDURE — 96361 HYDRATE IV INFUSION ADD-ON: CPT

## 2019-08-31 PROCEDURE — 96375 TX/PRO/DX INJ NEW DRUG ADDON: CPT

## 2019-08-31 PROCEDURE — 80053 COMPREHEN METABOLIC PANEL: CPT

## 2019-08-31 PROCEDURE — 87086 URINE CULTURE/COLONY COUNT: CPT

## 2019-08-31 PROCEDURE — 99285 EMERGENCY DEPT VISIT HI MDM: CPT | Mod: 25

## 2019-08-31 PROCEDURE — 83880 ASSAY OF NATRIURETIC PEPTIDE: CPT

## 2019-08-31 PROCEDURE — 83735 ASSAY OF MAGNESIUM: CPT

## 2019-08-31 PROCEDURE — 84484 ASSAY OF TROPONIN QUANT: CPT

## 2019-08-31 PROCEDURE — 87088 URINE BACTERIA CULTURE: CPT

## 2019-08-31 PROCEDURE — 96365 THER/PROPH/DIAG IV INF INIT: CPT

## 2019-08-31 PROCEDURE — 96372 THER/PROPH/DIAG INJ SC/IM: CPT

## 2019-08-31 PROCEDURE — 87040 BLOOD CULTURE FOR BACTERIA: CPT

## 2019-08-31 PROCEDURE — 87077 CULTURE AEROBIC IDENTIFY: CPT

## 2019-08-31 PROCEDURE — 63600175 PHARM REV CODE 636 W HCPCS: Performed by: EMERGENCY MEDICINE

## 2019-08-31 PROCEDURE — 87502 INFLUENZA DNA AMP PROBE: CPT

## 2019-08-31 PROCEDURE — 87186 SC STD MICRODIL/AGAR DIL: CPT

## 2019-08-31 PROCEDURE — 85025 COMPLETE CBC W/AUTO DIFF WBC: CPT

## 2019-08-31 PROCEDURE — 84100 ASSAY OF PHOSPHORUS: CPT

## 2019-08-31 RX ORDER — MAGNESIUM SULFATE HEPTAHYDRATE 40 MG/ML
2 INJECTION, SOLUTION INTRAVENOUS
Status: COMPLETED | OUTPATIENT
Start: 2019-08-31 | End: 2019-09-01

## 2019-08-31 RX ADMIN — SODIUM CHLORIDE 2625 ML: 0.9 INJECTION, SOLUTION INTRAVENOUS at 10:08

## 2019-09-01 PROBLEM — G93.49 INFECTIOUS ENCEPHALOPATHY: Status: ACTIVE | Noted: 2019-09-01

## 2019-09-01 PROBLEM — N39.0 SEPSIS SECONDARY TO UTI: Status: ACTIVE | Noted: 2019-09-01

## 2019-09-01 PROBLEM — B99.9 INFECTIOUS ENCEPHALOPATHY: Status: ACTIVE | Noted: 2019-09-01

## 2019-09-01 PROBLEM — R79.89 ELEVATED TROPONIN: Status: ACTIVE | Noted: 2019-09-01

## 2019-09-01 PROBLEM — A41.9 SEPSIS SECONDARY TO UTI: Status: ACTIVE | Noted: 2019-09-01

## 2019-09-01 LAB
ANION GAP SERPL CALC-SCNC: 11 MMOL/L (ref 8–16)
AORTIC ROOT ANNULUS: 3.25 CM
AORTIC VALVE CUSP SEPERATION: 1.88 CM
ASCENDING AORTA: 3.05 CM
AV INDEX (PROSTH): 0.88
AV MEAN GRADIENT: 6 MMHG
AV PEAK GRADIENT: 10 MMHG
AV VALVE AREA: 3.02 CM2
AV VELOCITY RATIO: 0.85
BASOPHILS # BLD AUTO: 0.01 K/UL (ref 0–0.2)
BASOPHILS NFR BLD: 0.2 % (ref 0–1.9)
BSA FOR ECHO PROCEDURE: 2.04 M2
BUN SERPL-MCNC: 14 MG/DL (ref 8–23)
CALCIUM SERPL-MCNC: 8.7 MG/DL (ref 8.7–10.5)
CHLORIDE SERPL-SCNC: 108 MMOL/L (ref 95–110)
CO2 SERPL-SCNC: 20 MMOL/L (ref 23–29)
CREAT SERPL-MCNC: 1.2 MG/DL (ref 0.5–1.4)
CTP QC/QA: YES
CV ECHO LV RWT: 0.69 CM
DIFFERENTIAL METHOD: ABNORMAL
DOP CALC AO PEAK VEL: 1.55 M/S
DOP CALC AO VTI: 29.67 CM
DOP CALC LVOT AREA: 3.4 CM2
DOP CALC LVOT DIAMETER: 2.09 CM
DOP CALC LVOT PEAK VEL: 1.31 M/S
DOP CALC LVOT STROKE VOLUME: 89.53 CM3
DOP CALCLVOT PEAK VEL VTI: 26.11 CM
E WAVE DECELERATION TIME: 126.03 MSEC
E/A RATIO: 0.92
E/E' RATIO: 14.18 M/S
ECHO LV POSTERIOR WALL: 1.67 CM (ref 0.6–1.1)
EOSINOPHIL # BLD AUTO: 0 K/UL (ref 0–0.5)
EOSINOPHIL NFR BLD: 0.5 % (ref 0–8)
ERYTHROCYTE [DISTWIDTH] IN BLOOD BY AUTOMATED COUNT: 15.4 % (ref 11.5–14.5)
EST. GFR  (AFRICAN AMERICAN): >60 ML/MIN/1.73 M^2
EST. GFR  (NON AFRICAN AMERICAN): >60 ML/MIN/1.73 M^2
FRACTIONAL SHORTENING: 33 % (ref 28–44)
GLUCOSE SERPL-MCNC: 123 MG/DL (ref 70–110)
HCT VFR BLD AUTO: 32.9 % (ref 40–54)
HGB BLD-MCNC: 10 G/DL (ref 14–18)
INTERVENTRICULAR SEPTUM: 1.66 CM (ref 0.6–1.1)
IVRT: 0.1 MSEC
LA MAJOR: 6.4 CM
LA MINOR: 4.83 CM
LA WIDTH: 4.58 CM
LACTATE SERPL-SCNC: 0.9 MMOL/L (ref 0.5–2.2)
LEFT ATRIUM SIZE: 3.4 CM
LEFT ATRIUM VOLUME INDEX: 36.7 ML/M2
LEFT ATRIUM VOLUME: 72.87 CM3
LEFT INTERNAL DIMENSION IN SYSTOLE: 3.25 CM (ref 2.1–4)
LEFT VENTRICLE DIASTOLIC VOLUME INDEX: 55.18 ML/M2
LEFT VENTRICLE DIASTOLIC VOLUME: 109.66 ML
LEFT VENTRICLE MASS INDEX: 181 G/M2
LEFT VENTRICLE SYSTOLIC VOLUME INDEX: 21.4 ML/M2
LEFT VENTRICLE SYSTOLIC VOLUME: 42.56 ML
LEFT VENTRICULAR INTERNAL DIMENSION IN DIASTOLE: 4.84 CM (ref 3.5–6)
LEFT VENTRICULAR MASS: 359.99 G
LV LATERAL E/E' RATIO: 11.14 M/S
LV SEPTAL E/E' RATIO: 19.5 M/S
LYMPHOCYTES # BLD AUTO: 1.1 K/UL (ref 1–4.8)
LYMPHOCYTES NFR BLD: 19.3 % (ref 18–48)
MAGNESIUM SERPL-MCNC: 1.9 MG/DL (ref 1.6–2.6)
MCH RBC QN AUTO: 23.6 PG (ref 27–31)
MCHC RBC AUTO-ENTMCNC: 30.4 G/DL (ref 32–36)
MCV RBC AUTO: 78 FL (ref 82–98)
MONOCYTES # BLD AUTO: 1.2 K/UL (ref 0.3–1)
MONOCYTES NFR BLD: 20.3 % (ref 4–15)
MV PEAK A VEL: 0.85 M/S
MV PEAK E VEL: 0.78 M/S
NEUTROPHILS # BLD AUTO: 3.4 K/UL (ref 1.8–7.7)
NEUTROPHILS NFR BLD: 59.9 % (ref 38–73)
PHOSPHATE SERPL-MCNC: 2.7 MG/DL (ref 2.7–4.5)
PLATELET # BLD AUTO: 205 K/UL (ref 150–350)
PMV BLD AUTO: 10.7 FL (ref 9.2–12.9)
POC MOLECULAR INFLUENZA A AGN: NEGATIVE
POC MOLECULAR INFLUENZA B AGN: NEGATIVE
POTASSIUM SERPL-SCNC: 3.8 MMOL/L (ref 3.5–5.1)
PULM VEIN S/D RATIO: 1.52
PV PEAK D VEL: 0.48 M/S
PV PEAK S VEL: 0.73 M/S
PV PEAK VELOCITY: 0.76 CM/S
RA MAJOR: 5.55 CM
RA PRESSURE: 3 MMHG
RA WIDTH: 4.09 CM
RBC # BLD AUTO: 4.23 M/UL (ref 4.6–6.2)
RIGHT VENTRICULAR END-DIASTOLIC DIMENSION: 4.25 CM
RV TISSUE DOPPLER FREE WALL SYSTOLIC VELOCITY 1 (APICAL 4 CHAMBER VIEW): 10.93 CM/S
SINUS: 3.35 CM
SODIUM SERPL-SCNC: 139 MMOL/L (ref 136–145)
STJ: 3.12 CM
TDI LATERAL: 0.07 M/S
TDI SEPTAL: 0.04 M/S
TDI: 0.06 M/S
TRICUSPID ANNULAR PLANE SYSTOLIC EXCURSION: 1.81 CM
TROPONIN I SERPL DL<=0.01 NG/ML-MCNC: 0.19 NG/ML (ref 0–0.03)
TROPONIN I SERPL DL<=0.01 NG/ML-MCNC: 0.23 NG/ML (ref 0–0.03)
WBC # BLD AUTO: 5.71 K/UL (ref 3.9–12.7)

## 2019-09-01 PROCEDURE — 80048 BASIC METABOLIC PNL TOTAL CA: CPT

## 2019-09-01 PROCEDURE — 25000003 PHARM REV CODE 250: Performed by: INTERNAL MEDICINE

## 2019-09-01 PROCEDURE — 25000003 PHARM REV CODE 250: Performed by: EMERGENCY MEDICINE

## 2019-09-01 PROCEDURE — 63600175 PHARM REV CODE 636 W HCPCS: Performed by: EMERGENCY MEDICINE

## 2019-09-01 PROCEDURE — 84484 ASSAY OF TROPONIN QUANT: CPT | Mod: 91

## 2019-09-01 PROCEDURE — 99223 PR INITIAL HOSPITAL CARE,LEVL III: ICD-10-PCS | Mod: 25,,, | Performed by: INTERNAL MEDICINE

## 2019-09-01 PROCEDURE — 36415 COLL VENOUS BLD VENIPUNCTURE: CPT

## 2019-09-01 PROCEDURE — 99223 1ST HOSP IP/OBS HIGH 75: CPT | Mod: 25,,, | Performed by: INTERNAL MEDICINE

## 2019-09-01 PROCEDURE — 63600175 PHARM REV CODE 636 W HCPCS: Performed by: HOSPITALIST

## 2019-09-01 PROCEDURE — 85025 COMPLETE CBC W/AUTO DIFF WBC: CPT

## 2019-09-01 PROCEDURE — 25000003 PHARM REV CODE 250: Performed by: HOSPITALIST

## 2019-09-01 PROCEDURE — 21400001 HC TELEMETRY ROOM

## 2019-09-01 PROCEDURE — 83735 ASSAY OF MAGNESIUM: CPT

## 2019-09-01 PROCEDURE — 84100 ASSAY OF PHOSPHORUS: CPT

## 2019-09-01 PROCEDURE — 83605 ASSAY OF LACTIC ACID: CPT

## 2019-09-01 RX ORDER — ENOXAPARIN SODIUM 100 MG/ML
1 INJECTION SUBCUTANEOUS
Status: COMPLETED | OUTPATIENT
Start: 2019-09-01 | End: 2019-09-01

## 2019-09-01 RX ORDER — SODIUM CHLORIDE 0.9 % (FLUSH) 0.9 %
10 SYRINGE (ML) INJECTION
Status: DISCONTINUED | OUTPATIENT
Start: 2019-09-01 | End: 2019-09-02 | Stop reason: HOSPADM

## 2019-09-01 RX ORDER — ACETAMINOPHEN 325 MG/1
650 TABLET ORAL EVERY 8 HOURS PRN
Status: DISCONTINUED | OUTPATIENT
Start: 2019-09-01 | End: 2019-09-02 | Stop reason: HOSPADM

## 2019-09-01 RX ORDER — FAMOTIDINE 20 MG/1
20 TABLET, FILM COATED ORAL DAILY
Status: DISCONTINUED | OUTPATIENT
Start: 2019-09-01 | End: 2019-09-02 | Stop reason: HOSPADM

## 2019-09-01 RX ORDER — ASPIRIN 325 MG
325 TABLET ORAL DAILY
Status: DISCONTINUED | OUTPATIENT
Start: 2019-09-01 | End: 2019-09-01

## 2019-09-01 RX ORDER — SODIUM CHLORIDE 9 MG/ML
INJECTION, SOLUTION INTRAVENOUS CONTINUOUS
Status: DISCONTINUED | OUTPATIENT
Start: 2019-09-01 | End: 2019-09-02 | Stop reason: HOSPADM

## 2019-09-01 RX ORDER — AMOXICILLIN 250 MG
1 CAPSULE ORAL 2 TIMES DAILY
Status: DISCONTINUED | OUTPATIENT
Start: 2019-09-01 | End: 2019-09-02 | Stop reason: HOSPADM

## 2019-09-01 RX ORDER — METOPROLOL TARTRATE 25 MG/1
25 TABLET, FILM COATED ORAL 2 TIMES DAILY
Status: DISCONTINUED | OUTPATIENT
Start: 2019-09-01 | End: 2019-09-02 | Stop reason: HOSPADM

## 2019-09-01 RX ORDER — CLOPIDOGREL 300 MG/1
300 TABLET, FILM COATED ORAL
Status: COMPLETED | OUTPATIENT
Start: 2019-09-01 | End: 2019-09-01

## 2019-09-01 RX ORDER — ONDANSETRON 2 MG/ML
4 INJECTION INTRAMUSCULAR; INTRAVENOUS EVERY 4 HOURS PRN
Status: DISCONTINUED | OUTPATIENT
Start: 2019-09-01 | End: 2019-09-01

## 2019-09-01 RX ORDER — ENOXAPARIN SODIUM 100 MG/ML
40 INJECTION SUBCUTANEOUS EVERY 24 HOURS
Status: DISCONTINUED | OUTPATIENT
Start: 2019-09-02 | End: 2019-09-02 | Stop reason: HOSPADM

## 2019-09-01 RX ORDER — FINASTERIDE 5 MG/1
5 TABLET, FILM COATED ORAL DAILY
Status: DISCONTINUED | OUTPATIENT
Start: 2019-09-01 | End: 2019-09-02 | Stop reason: HOSPADM

## 2019-09-01 RX ORDER — POLYETHYLENE GLYCOL 3350 17 G/17G
17 POWDER, FOR SOLUTION ORAL DAILY
Status: DISCONTINUED | OUTPATIENT
Start: 2019-09-01 | End: 2019-09-02 | Stop reason: HOSPADM

## 2019-09-01 RX ORDER — CLOPIDOGREL BISULFATE 75 MG/1
75 TABLET ORAL DAILY
Status: DISCONTINUED | OUTPATIENT
Start: 2019-09-01 | End: 2019-09-02 | Stop reason: HOSPADM

## 2019-09-01 RX ORDER — ONDANSETRON 2 MG/ML
8 INJECTION INTRAMUSCULAR; INTRAVENOUS EVERY 8 HOURS PRN
Status: DISCONTINUED | OUTPATIENT
Start: 2019-09-01 | End: 2019-09-02 | Stop reason: HOSPADM

## 2019-09-01 RX ORDER — ASPIRIN 325 MG
325 TABLET ORAL
Status: COMPLETED | OUTPATIENT
Start: 2019-09-01 | End: 2019-09-01

## 2019-09-01 RX ORDER — ROSUVASTATIN CALCIUM 10 MG/1
20 TABLET, COATED ORAL NIGHTLY
Status: DISCONTINUED | OUTPATIENT
Start: 2019-09-01 | End: 2019-09-02 | Stop reason: HOSPADM

## 2019-09-01 RX ORDER — NAPROXEN SODIUM 220 MG/1
81 TABLET, FILM COATED ORAL DAILY
Status: DISCONTINUED | OUTPATIENT
Start: 2019-09-02 | End: 2019-09-02 | Stop reason: HOSPADM

## 2019-09-01 RX ORDER — ENOXAPARIN SODIUM 100 MG/ML
1 INJECTION SUBCUTANEOUS
Status: DISCONTINUED | OUTPATIENT
Start: 2019-09-01 | End: 2019-09-01

## 2019-09-01 RX ADMIN — ROSUVASTATIN CALCIUM 20 MG: 10 TABLET, COATED ORAL at 09:09

## 2019-09-01 RX ADMIN — CLOPIDOGREL BISULFATE 75 MG: 75 TABLET ORAL at 10:09

## 2019-09-01 RX ADMIN — METOPROLOL TARTRATE 25 MG: 25 TABLET ORAL at 09:09

## 2019-09-01 RX ADMIN — THERA TABS 1 TABLET: TAB at 10:09

## 2019-09-01 RX ADMIN — METOPROLOL TARTRATE 25 MG: 25 TABLET ORAL at 12:09

## 2019-09-01 RX ADMIN — CEFTRIAXONE 2 G: 2 INJECTION, SOLUTION INTRAVENOUS at 12:09

## 2019-09-01 RX ADMIN — SENNOSIDES, DOCUSATE SODIUM 1 TABLET: 50; 8.6 TABLET, FILM COATED ORAL at 10:09

## 2019-09-01 RX ADMIN — CEFTRIAXONE 1 G: 1 INJECTION, SOLUTION INTRAVENOUS at 12:09

## 2019-09-01 RX ADMIN — MAGNESIUM SULFATE 2 G: 2 INJECTION INTRAVENOUS at 01:09

## 2019-09-01 RX ADMIN — POLYETHYLENE GLYCOL 3350 17 G: 17 POWDER, FOR SOLUTION ORAL at 10:09

## 2019-09-01 RX ADMIN — FAMOTIDINE 20 MG: 20 TABLET ORAL at 10:09

## 2019-09-01 RX ADMIN — ASPIRIN 325 MG ORAL TABLET 325 MG: 325 PILL ORAL at 01:09

## 2019-09-01 RX ADMIN — FINASTERIDE 5 MG: 5 TABLET, FILM COATED ORAL at 10:09

## 2019-09-01 RX ADMIN — SODIUM CHLORIDE: 0.9 INJECTION, SOLUTION INTRAVENOUS at 03:09

## 2019-09-01 RX ADMIN — ENOXAPARIN SODIUM 90 MG: 100 INJECTION SUBCUTANEOUS at 01:09

## 2019-09-01 RX ADMIN — ASPIRIN 325 MG ORAL TABLET 325 MG: 325 PILL ORAL at 10:09

## 2019-09-01 RX ADMIN — CLOPIDOGREL BISULFATE 300 MG: 300 TABLET, FILM COATED ORAL at 01:09

## 2019-09-01 RX ADMIN — ROSUVASTATIN CALCIUM 20 MG: 10 TABLET, COATED ORAL at 03:09

## 2019-09-01 NOTE — ASSESSMENT & PLAN NOTE
Troponin 0.3 trending down. Echo with good EF. Unclear if this is demand ischemia versus small NSTEMI. Discussed with family at length - they request conservative medical Rx only for possible CAD. Would continue with plavix for 6 months if tolerated

## 2019-09-01 NOTE — H&P
[facility] Hospital Medicine  History & Physical    Patient Name: Alexsander Tafoya  MRN: [id number]  Admission Date: 09/01/2019  Attending Physician: Nj Tompkins MD, MPH      PCP:     Helio Farr MD    CC:     Chief Complaint   Patient presents with    Fever     + fever 101.8, weakness, respiratory infection last week from [facility] denies n/v/d       HISTORY OF PRESENT ILLNESS:     Alexsander Tafoya is a 69 y.o. male that (in part)  has a past medical history of BPH (benign prostatic hyperplasia), Dysarthria, HLD (hyperlipidemia), Hypertension, Other and unspecified hyperlipidemia, and Stroke.  has a past surgical history that includes Cataract extraction w/ intraocular lens  implant, bilateral. Presents to [facility] Emergency Department by the EMS from nursing home with report of fever 101.8° F.  Patient has history of previous stroke and as aphasia.  History primarily taken from EMS notes, triage, and ED physician.  No report of open wounds, skin breakdown, cough, cold, congestion, abdominal pain, nausea, vomiting, or diarrhea.  History is limited due to the current condition of the patient.    In the emergency department routine laboratory studies, chest x-ray, EKG, cardiac enzymes obtained.  Urinalysis revealed evidence of a urinary tract infection.  Vital signs consistent with sepsis.  Also had elevated troponin level without evidence of acute ischemia on EKG.  Treated for UTI and infectious encephalopathy he is in ceftriaxone after cultures were obtained.  Sepsis protocol initiated.    Hospital medicine has been asked to admit for further evaluation and treatment.       REVIEW OF SYSTEMS:     Limited history due to  the current condition of the patient.    PAST MEDICAL / SURGICAL HISTORY:     Past Medical History:   Diagnosis Date    BPH (benign prostatic hyperplasia)     Dysarthria     HLD (hyperlipidemia)     Hypertension     Other and  unspecified hyperlipidemia     Stroke      Past Surgical History:   Procedure Laterality Date    CATARACT EXTRACTION W/ INTRAOCULAR LENS  IMPLANT, BILATERAL           FAMILY HISTORY:     Family History   Problem Relation Age of Onset    Hypertension Mother     Seizures Mother     Stroke Mother     Hypertension Father     Dementia Father          SOCIAL HISTORY:     Social History     Socioeconomic History    Marital status:      Spouse name: Not on file    Number of children: Not on file    Years of education: Not on file    Highest education level: Not on file   Occupational History    Not on file   Social Needs    Financial resource strain: Not on file    Food insecurity:     Worry: Not on file     Inability: Not on file    Transportation needs:     Medical: Not on file     Non-medical: Not on file   Tobacco Use    Smoking status: Former Smoker    Smokeless tobacco: Never Used   Substance and Sexual Activity    Alcohol use: Yes     Comment: rare, once a week or less    Drug use: No    Sexual activity: Never   Lifestyle    Physical activity:     Days per week: Not on file     Minutes per session: Not on file    Stress: Not on file   Relationships    Social connections:     Talks on phone: Not on file     Gets together: Not on file     Attends Zoroastrian service: Not on file     Active member of club or organization: Not on file     Attends meetings of clubs or organizations: Not on file     Relationship status: Not on file   Other Topics Concern    Not on file   Social History Narrative    Not on file         ALLERGIES:       Review of patient's allergies indicates:  No Known Allergies        HOME MEDICATIONS:     Prior to Admission medications    Medication Sig Start Date End Date Taking? Authorizing Provider   aspirin 325 MG tablet Take 325 mg by mouth once daily.    Historical Provider, MD   doxazosin (CARDURA) 2 MG tablet Take 1 tablet (2 mg total) by mouth every evening. HOLD  UNTIL TOLD TO RESUME BY PHYSICIAN 8/7/19   NATALIIA Bowen   finasteride (PROSCAR) 5 mg tablet Take 5 mg by mouth once daily.  5/30/17   Historical Provider, MD   fish oil-omega-3 fatty acids 300-1,000 mg capsule Take 1 capsule by mouth once daily.    Historical Provider, MD   metoprolol tartrate (LOPRESSOR) 50 MG tablet Take 0.5 tablets (25 mg total) by mouth once daily. HOLD UNTIL TOLD TO RESUME BY PHYSICIAN 8/7/19   NATALIIA Bowen   multivitamin (THERAGRAN) per tablet Take 1 tablet by mouth once daily.    Historical Provider, MD   rosuvastatin (CRESTOR) 20 MG tablet Take 1 tablet (20 mg total) by mouth every evening. HOLD UNTIL TOLD TO RESUME BY PHYSICIAN 8/7/19   NATALIIA Bowen          HOSPITAL MEDICATIONS:     Scheduled Meds:    aspirin  325 mg Oral Daily    cefTRIAXone (ROCEPHIN) IVPB  1 g Intravenous Q12H    clopidogrel  75 mg Oral Daily    enoxaparin  1 mg/kg Subcutaneous Q12H    famotidine  20 mg Oral Daily    finasteride  5 mg Oral Daily    multivitamin  1 tablet Oral Daily    polyethylene glycol  17 g Oral Daily    rosuvastatin  20 mg Oral QHS    senna-docusate 8.6-50 mg  1 tablet Oral BID     Continuous Infusions:    sodium chloride 0.9% 125 mL/hr at 09/01/19 0304     PRN Meds: acetaminophen, acetaminophen, ondansetron, promethazine (PHENERGAN) IVPB, sodium chloride 0.9%      PHYSICAL EXAM:     Wt Readings from Last 1 Encounters:   09/01/19 0252 89.4 kg (197 lb 1.5 oz)   08/31/19 2156 87.5 kg (193 lb)     Body mass index is 31.81 kg/m².  Vitals:    09/01/19 0102 09/01/19 0131 09/01/19 0137 09/01/19 0252   BP:  108/68  136/85   Pulse: 85 89 86 110   Resp: 19  20 20   Temp:    98.6 °F (37 °C)   TempSrc:       SpO2: (!) 94% 96% 96% 97%   Weight:    89.4 kg (197 lb 1.5 oz)   Height:              -- General appearance: well developed. appears stated age   -- Head: normocephalic, atraumatic   -- Eyes: conjunctivae clear. Extraocular muscles intact  -- Nose: Nares normal. Septum  midline.   -- Mouth/Throat:  FH a.  lips, mucosa, and tongue normal. no throat erythema.   -- Neck: supple, symmetrical, trachea midline, no JVD and thyroid not grossly enlarged, appears symmetric  -- Lungs: clear to auscultation bilaterally. normal respiratory effort. No use of accessory muscles.   -- Chest wall: no tenderness. equal bilateral chest rise   -- Heart:  Rapid rate and regular rhythm. S1, S2 normal.  no click, rub or gallop   -- Abdomen: soft, non-tender, non-distended, non-tympanic; bowel sounds normal; no masses  -- Extremities:  Hemiplegia.  no cyanosis, clubbing or edema.   -- Pulses: 2+ and symmetric   -- Skin:  Decreased skin turgor. Color normal. Texture normal. No rashes or lesions.   -- Neurologic:  Chronic Unilateral weakness.  Aphasic.      LABORATORY STUDIES:     Recent Results (from the past 36 hour(s))   CBC auto differential    Collection Time: 08/31/19 10:05 PM   Result Value Ref Range    WBC 6.27 3.90 - 12.70 K/uL    RBC 4.11 (L) 4.60 - 6.20 M/uL    Hemoglobin 9.9 (L) 14.0 - 18.0 g/dL    Hematocrit 31.6 (L) 40.0 - 54.0 %    Mean Corpuscular Volume 77 (L) 82 - 98 fL    Mean Corpuscular Hemoglobin 24.1 (L) 27.0 - 31.0 pg    Mean Corpuscular Hemoglobin Conc 31.3 (L) 32.0 - 36.0 g/dL    RDW 15.3 (H) 11.5 - 14.5 %    Platelets 213 150 - 350 K/uL    MPV 9.9 9.2 - 12.9 fL    Gran # (ANC) 4.3 1.8 - 7.7 K/uL    Lymph # 1.2 1.0 - 4.8 K/uL    Mono # 0.8 0.3 - 1.0 K/uL    Eos # 0.0 0.0 - 0.5 K/uL    Baso # 0.02 0.00 - 0.20 K/uL    Gran% 68.0 38.0 - 73.0 %    Lymph% 19.0 18.0 - 48.0 %    Mono% 12.8 4.0 - 15.0 %    Eosinophil% 0.2 0.0 - 8.0 %    Basophil% 0.3 0.0 - 1.9 %    Differential Method Automated    Comprehensive metabolic panel    Collection Time: 08/31/19 10:05 PM   Result Value Ref Range    Sodium 136 136 - 145 mmol/L    Potassium 3.7 3.5 - 5.1 mmol/L    Chloride 104 95 - 110 mmol/L    CO2 23 23 - 29 mmol/L    Glucose 139 (H) 70 - 110 mg/dL    BUN, Bld 15 8 - 23 mg/dL    Creatinine 1.5 (H)  0.5 - 1.4 mg/dL    Calcium 9.0 8.7 - 10.5 mg/dL    Total Protein 7.4 6.0 - 8.4 g/dL    Albumin 3.3 (L) 3.5 - 5.2 g/dL    Total Bilirubin 0.6 0.1 - 1.0 mg/dL    Alkaline Phosphatase 79 55 - 135 U/L    AST 22 10 - 40 U/L    ALT 18 10 - 44 U/L    Anion Gap 9 8 - 16 mmol/L    eGFR if African American 54 (A) >60 mL/min/1.73 m^2    eGFR if non African American 47 (A) >60 mL/min/1.73 m^2   Lactic acid, plasma #1    Collection Time: 08/31/19 10:05 PM   Result Value Ref Range    Lactate (Lactic Acid) 0.9 0.5 - 2.2 mmol/L   Magnesium    Collection Time: 08/31/19 10:05 PM   Result Value Ref Range    Magnesium 1.5 (L) 1.6 - 2.6 mg/dL   Phosphorus    Collection Time: 08/31/19 10:05 PM   Result Value Ref Range    Phosphorus 3.2 2.7 - 4.5 mg/dL   Brain natriuretic peptide    Collection Time: 08/31/19 10:05 PM   Result Value Ref Range    BNP 92 0 - 99 pg/mL   Troponin I    Collection Time: 08/31/19 10:05 PM   Result Value Ref Range    Troponin I 0.317 (H) 0.000 - 0.026 ng/mL   Urinalysis, Reflex to Urine Culture Urine, Catheterized    Collection Time: 08/31/19 10:13 PM   Result Value Ref Range    Specimen UA Urine, Catheterized     Color, UA Yellow Yellow, Straw, Lynda    Appearance, UA Hazy (A) Clear    pH, UA 5.0 5.0 - 8.0    Specific Gravity, UA 1.010 1.005 - 1.030    Protein, UA 1+ (A) Negative    Glucose, UA Negative Negative    Ketones, UA Negative Negative    Bilirubin (UA) Negative Negative    Occult Blood UA 1+ (A) Negative    Nitrite, UA Positive (A) Negative    Urobilinogen, UA Negative <2.0 EU/dL    Leukocytes, UA 3+ (A) Negative   Urinalysis Microscopic    Collection Time: 08/31/19 10:13 PM   Result Value Ref Range    RBC, UA 2 0 - 4 /hpf    WBC, UA >100 (H) 0 - 5 /hpf    WBC Clumps, UA Few (A) None-Rare    Bacteria Many (A) None-Occ /hpf    Squam Epithel, UA 2 /hpf    Hyaline Casts, UA none 0-1/lpf /lpf    Microscopic Comment SEE COMMENT    Blood culture x two cultures. Draw prior to antibiotics.    Collection Time:  08/31/19 10:30 PM   Result Value Ref Range    Blood Culture, Routine No Growth to date    Blood culture x two cultures. Draw prior to antibiotics.    Collection Time: 08/31/19 10:36 PM   Result Value Ref Range    Blood Culture, Routine No Growth to date    POCT Influenza A/B Molecular    Collection Time: 09/01/19 12:42 AM   Result Value Ref Range    POC Molecular Influenza A Ag Negative Negative, Not Reported    POC Molecular Influenza B Ag Negative Negative, Not Reported     Acceptable Yes    Lactic acid, plasma #2    Collection Time: 09/01/19  1:58 AM   Result Value Ref Range    Lactate (Lactic Acid) 0.9 0.5 - 2.2 mmol/L   Troponin I    Collection Time: 09/01/19  4:11 AM   Result Value Ref Range    Troponin I 0.234 (H) 0.000 - 0.026 ng/mL       Lab Results   Component Value Date    INR 0.9 08/07/2019    INR 1.0 08/06/2019    INR 1.1 08/05/2019     No results found for: HGBA1C  No results for input(s): POCTGLUCOSE in the last 72 hours.        MICROBIOLOGY DATA:     No results found for: LABGENI, LABREFE, LABUPPE, LABURIN, LABAFB    Microbiology x 7d:   Microbiology Results (last 7 days)     Procedure Component Value Units Date/Time    Blood culture x two cultures. Draw prior to antibiotics. [[id number]] Collected:  08/31/19 2230    Order Status:  Completed Specimen:  Blood from Peripheral, Hand, Left Updated:  09/01/19 0512     Blood Culture, Routine No Growth to date    Narrative:       Aerobic and anaerobic    Blood culture x two cultures. Draw prior to antibiotics. [[id number]] Collected:  08/31/19 2236    Order Status:  Completed Specimen:  Blood from Peripheral, Antecubital, Right Updated:  09/01/19 0512     Blood Culture, Routine No Growth to date    Narrative:       Aerobic and anaerobic    Urine Culture - High Risk **CANNOT BE ORDERED STAT** [[id number]] Collected:  09/01/19 0128    Order Status:  Sent Specimen:  Urine, Catheterized Updated:  09/01/19 0129    Urine culture [[id number]] Collected:   08/31/19 2213    Order Status:  No result Specimen:  Urine Updated:  08/31/19 2239            IMAGING:     Imaging Results          X-Ray Chest AP Portable (Final result)  Result time 08/31/19 22:30:04    Final result by Carson Ortega MD (08/31/19 22:30:04)                 Impression:      Minimal atelectasis.  No edema or pneumothorax.      Electronically signed by: Carson Ortega  Date:    08/31/2019  Time:    22:30             Narrative:    EXAMINATION:  XR CHEST AP PORTABLE    CLINICAL HISTORY:  Sepsis;    TECHNIQUE:  Single frontal view of the chest was performed.    COMPARISON:  08/05/2019 chest    FINDINGS:  Single AP portable view at 22:20.    Small inspiratory exam.  The heart is mildly enlarged unchanged.  There is minimal streaky opacities at the bases likely subsegmental atelectasis.  No pneumothorax or pleural effusion or interstitial edema or lobar consolidation or nodule.  No abdominal free air.  There is overlying leads.                                  CONSULTS:     IP CONSULT TO CARDIOLOGY       ASSESSMENT & PLAN:     Primary Diagnosis:  Sepsis secondary to UTI    Active Hospital Problems    Diagnosis  POA    *Sepsis secondary to UTI [A41.9, N39.0]  Yes     Priority: 1 - High    Infectious encephalopathy [G93.49, B99.9]  Yes     Priority: 2     Elevated troponin [R74.8]  Yes    BPH (benign prostatic hyperplasia) [N40.0]  Yes    Aphasia as late effect of cerebrovascular accident [I69.320]  Not Applicable    Hemiplegia affecting right side in right-dominant patient as late effect of cerebrovascular disease [I69.951]  Not Applicable    Essential hypertension [I10]  Yes    Hyperlipidemia [E78.5]  Yes      Resolved Hospital Problems   No resolved problems to display.       Sepsis secondary to Urinary tract infection, bacterial  · As evidenced by History, physical exam, and urinalysis  · Obtain Urine culture and Gram stain prior to antibiotics  · Given empiric antibiotics w/  Ceftriaxone  · Will tailor antibiotic regimen according to culture & sensitivity results  · Maintain euvolemic status with IV fluids    Infectious encephalopathy  · Secondary to acute infectious process as outlined above  · Treatment as above  · Supportive care  · Fall precautions    Elevated troponin; NSTEMI?  · No evidence of acute ST elevation MI   · Monitoring on telemetry  · Likely due to cardiac strain  · Aspirin  and Plavix  · Lovenox  · Supplemental oxygen  · Trend troponins    Hypomagnesemia  · Due to GI losses or poor oral intake  · Replace magnesium IV  · Check serial magnesium    Cerebral vascular disease with aphasia and hemiplegia as late effect of CVA  · No acute issues.  Supportive care.  · No evidence of acute stroke at this time  · Maintain adequate blood pressure control  · Heart healthy diet  · Aspirin  · Statin    Hypertension  · Goal while inpatient is a systolic blood pressure less than 160mmHg  · BP in acceptable range at this time  · avoid beta-blockers in sepsis.  Otherwise Continue current home regimen with hold parameters  · PRN antihypertensives available      Hyperlipidemia   · Lipid panel - as an outpatient  · Cardiac diet  · Continue statin    Benign Prostate Hypertrophy  · Without acute issues  · Continue home medications          VTE Risk Mitigation (From admission, onward)        Ordered     enoxaparin injection 90 mg  Every 12 hours (non-standard times)      09/01/19 0252            Adult PRN medications available   DVT prophylaxis given       DISPOSITION:     Will admit to the Hospital Medicine service for further evaluation and treatment.    Chart reviewed and updated where applicable.    High Risk Conditions:  Patient has a condition that poses threat to life and bodily function:  Sepsis UTI      ===============================================================    Nj Tompkins MD, MPH  Department of Hospital Medicine   [facility]  [phone]  (7pm  - 6am)          This note is dictated using [employer] voice recognition software.  There are word recognition mistakes that are occasionally missed on review.

## 2019-09-01 NOTE — ED NOTES
Idalia Cornell- [phone] or [phone], PLEASE call reguarding PO meds and feeding when he gets to room

## 2019-09-01 NOTE — SUBJECTIVE & OBJECTIVE
Past Medical History:   Diagnosis Date    BPH (benign prostatic hyperplasia)     Dysarthria     HLD (hyperlipidemia)     Hypertension     Other and unspecified hyperlipidemia     Stroke        Past Surgical History:   Procedure Laterality Date    CATARACT EXTRACTION W/ INTRAOCULAR LENS  IMPLANT, BILATERAL         Review of patient's allergies indicates:  No Known Allergies    No current facility-administered medications on file prior to encounter.      Current Outpatient Medications on File Prior to Encounter   Medication Sig    aspirin 325 MG tablet Take 325 mg by mouth once daily.    doxazosin (CARDURA) 2 MG tablet Take 1 tablet (2 mg total) by mouth every evening. HOLD UNTIL TOLD TO RESUME BY PHYSICIAN    finasteride (PROSCAR) 5 mg tablet Take 5 mg by mouth once daily.     fish oil-omega-3 fatty acids 300-1,000 mg capsule Take 1 capsule by mouth once daily.    metoprolol tartrate (LOPRESSOR) 50 MG tablet Take 0.5 tablets (25 mg total) by mouth once daily. HOLD UNTIL TOLD TO RESUME BY PHYSICIAN    multivitamin (THERAGRAN) per tablet Take 1 tablet by mouth once daily.    rosuvastatin (CRESTOR) 20 MG tablet Take 1 tablet (20 mg total) by mouth every evening. HOLD UNTIL TOLD TO RESUME BY PHYSICIAN     Family History     Problem Relation (Age of Onset)    Dementia Father    Hypertension Mother, Father    Seizures Mother    Stroke Mother        Tobacco Use    Smoking status: Former Smoker    Smokeless tobacco: Never Used   Substance and Sexual Activity    Alcohol use: Yes     Comment: rare, once a week or less    Drug use: No    Sexual activity: Never     Review of Systems   Unable to perform ROS: patient nonverbal     Objective:     Vital Signs (Most Recent):  Temp: 98.7 °F (37.1 °C) (09/01/19 0724)  Pulse: 97 (09/01/19 0724)  Resp: 17 (09/01/19 0724)  BP: 130/76 (09/01/19 0724)  SpO2: 95 % (09/01/19 0724) Vital Signs (24h Range):  Temp:  [98.6 °F (37 °C)-101.8 °F (38.8 °C)] 98.7 °F (37.1  °C)  Pulse:  [] 97  Resp:  [17-28] 17  SpO2:  [93 %-97 %] 95 %  BP: (108-136)/(65-85) 130/76     Weight: 89.4 kg (197 lb)  Body mass index is 31.8 kg/m².    SpO2: 95 %  O2 Device (Oxygen Therapy): room air      Intake/Output Summary (Last 24 hours) at 9/1/2019 1126  Last data filed at 9/1/2019 0252  Gross per 24 hour   Intake 1161 ml   Output --   Net 1161 ml       Lines/Drains/Airways     Peripheral Intravenous Line                 Peripheral IV - Single Lumen 08/31/19 20 G Left Antecubital 1 day                Physical Exam   Constitutional: He is oriented to person, place, and time. He appears well-developed and well-nourished.   HENT:   Head: Normocephalic and atraumatic.   Eyes: Pupils are equal, round, and reactive to light. Conjunctivae are normal.   Neck: Normal range of motion. Neck supple.   Cardiovascular: Normal rate, normal heart sounds and intact distal pulses.   Pulmonary/Chest: Effort normal and breath sounds normal.   Abdominal: Soft. Bowel sounds are normal.   Musculoskeletal: Normal range of motion.   Neurological: He is alert and oriented to person, place, and time.   Skin: Skin is warm and dry.       Significant Labs: All pertinent lab results from the last 24 hours have been reviewed.    Significant Imaging: Echocardiogram: 2D echo with color flow doppler: No results found for this or any previous visit.

## 2019-09-01 NOTE — PLAN OF CARE
09/01/19 1108   Discharge Assessment   Assessment Type Discharge Planning Assessment   Confirmed/corrected address and phone number on facesheet? Yes   Assessment information obtained from? Patient  (Idalia-sister confirmed information)   Communicated expected length of stay with patient/caregiver yes   Prior to hospitilization cognitive status: Alert/Oriented   Prior to hospitalization functional status: Assistive Equipment;Needs Assistance  (wheelchair use; NH staff assist)   Current Functional Status: Assistive Equipment;Needs Assistance  (wheelchair use; NH staff assists)   Facility Arrived From: [facility]    Lives With facility resident   Able to Return to Prior Arrangements yes   Is patient able to care for self after discharge? No   Who are your caregiver(s) and their phone number(s)? NH Staff; Idalia-sister   Patient's perception of discharge disposition nursing home  (Return to Suites at [facility])   Readmission Within the Last 30 Days no previous admission in last 30 days   Patient currently being followed by outpatient case management? No   Patient currently receives any other outside agency services? No   Equipment Currently Used at Home wheelchair   Do you have any problems affording any of your prescribed medications? No   Is the patient taking medications as prescribed? yes   Does the patient have transportation home? Yes   Transportation Anticipated agency   Does the patient receive services at the [facility]? No   Discharge Plan A Return to nursing home   Discharge Plan B Other  (TBD)   DME Needed Upon Discharge  other (see comments)  (TBD)   Patient/Family in Agreement with Plan yes   SW Role explained to patient; two patient identifiers recognized; SW contact information placed on Communication board. Discussed patient managing health care at home; determined who would be helping patient at home with recovery: NH staff will help at NH    PCP:  Helio Farr MD   Extended Emergency Contact Information  Primary Emergency Contact: Idalia Cornell  Address: [address] [place] [address] [facility]  Home Phone: [phone]  Mobile Phone: [phone]  Relation: Sister  Secondary Emergency Contact: Saurabh Tafoya [place] [address] [facility]  Home Phone: [phone]  Work Phone: [phone]  Mobile Phone: [phone]  Relation: [facility]     [id number] - [place] - [address] AT [facility] [address]  [address]  [place] [address]  Phone: [phone] Fax: [phone]    Payor: MEDICARE / Plan: MEDICARE PART A & B / Product Type: Government /

## 2019-09-01 NOTE — CARE UPDATE
Pt seen and examined by Dr. Tompkins this am. No further intervention for elevated troponin. Conservative management. Urine culture growing presumptive E.coli. Continue rocephin until final cultures result. Blood cultures negative to date.

## 2019-09-01 NOTE — ED PROVIDER NOTES
Encounter Date: 8/31/2019       History     Chief Complaint   Patient presents with    Fever     + fever 101.8, weakness, respiratory infection last week from [facility] denies n/v/d     70yo male presents via EMS from nursing home with fever and tachycardia. Sister reports she last saw patient 2 days ago (Thursday 8/29/19) and he was in his usual state of health. Today she was visiting and patient was found to be febrile and tachycardic. Sister gave patient APAP 1000mg around 7pm but fever continued so EMS transported to ED. Patient denies pain.    Vitals per NH report: T101.5, , /77.     Patient was admitted to [facility] about 2.5 weeks ago (8/5/19-8/7/19) for fever of unknown origin. He was initially tx'ed with broad-spectrum abx but this was deescalated as cultures were negative. He was d/c'ed home on Augmentin.     Nursing home: [facility].     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         Review of patient's allergies indicates:  No Known Allergies  Past Medical History:   Diagnosis Date    BPH (benign prostatic hyperplasia)     Dysarthria     HLD (hyperlipidemia)     Hypertension     Other and unspecified hyperlipidemia     Stroke      Past Surgical History:   Procedure Laterality Date    CATARACT EXTRACTION W/ INTRAOCULAR LENS  IMPLANT, BILATERAL       Family History   Problem Relation Age of Onset    Hypertension Mother     Seizures Mother     Stroke Mother     Hypertension Father     Dementia Father      Social History     Tobacco Use    Smoking status: Former Smoker    Smokeless tobacco: Never Used   Substance Use Topics    Alcohol use: Yes     Comment: rare, once a week or less    Drug use: No     Review of Systems   Unable  to perform ROS: Other (expressive aphasia)   Constitutional: Positive for fever.   Eyes: Negative for discharge.   Respiratory: Negative for cough.    Gastrointestinal: Negative for vomiting.   Genitourinary: Positive for difficulty urinating (diapered / wears depends).   Musculoskeletal: Positive for gait problem (chronic due to prior CVA).   Skin: Negative for rash.   Neurological: Positive for speech difficulty (chronic) and weakness (chronic, R sided).   Hematological: Does not bruise/bleed easily.       Physical Exam     Initial Vitals [08/31/19 2137]   BP Pulse Resp Temp SpO2   120/82 105 (!) 25 (!) 101.8 °F (38.8 °C) 96 %      MAP       --         Physical Exam    Nursing note and vitals reviewed.  Constitutional: He appears well-developed and well-nourished. He is not diaphoretic.   Patient is lying quietly on ED stretcher. He rouses to exam and answers some questions. No acute distress.   HENT:   Head: Normocephalic and atraumatic.   Eyes: Conjunctivae and EOM are normal. Pupils are equal, round, and reactive to light.   Neck: Normal range of motion. Neck supple.   Cardiovascular: Regular rhythm and intact distal pulses.   Borderline tachycardic.   Pulmonary/Chest: Breath sounds normal. No respiratory distress. He has no wheezes. He has no rhonchi. He has no rales.   Abdominal: Soft. There is no tenderness. There is no rebound and no guarding.   Genitourinary: Rectal exam shows guaiac negative stool. Guaiac negative stool.   Genitourinary Comments: Light brown soft stool, hemeoccult negative. Chaperoned by DAVE Queen and DAVE Madden.   Musculoskeletal: He exhibits edema (1+ bilat LE). He exhibits no tenderness.   Neurological: He is alert.   RUE / RLE strength 4/5 vs 5/5 on LUE/LLE. Expressive aphasia.    Skin: Skin is warm and dry. There is pallor.   Psychiatric: His behavior is normal.         ED Course   Procedures  Labs Reviewed   CBC W/ AUTO DIFFERENTIAL - Abnormal; Notable for the following  components:       Result Value    RBC 4.11 (*)     Hemoglobin 9.9 (*)     Hematocrit 31.6 (*)     Mean Corpuscular Volume 77 (*)     Mean Corpuscular Hemoglobin 24.1 (*)     Mean Corpuscular Hemoglobin Conc 31.3 (*)     RDW 15.3 (*)     All other components within normal limits   COMPREHENSIVE METABOLIC PANEL - Abnormal; Notable for the following components:    Glucose 139 (*)     Creatinine 1.5 (*)     Albumin 3.3 (*)     eGFR if  54 (*)     eGFR if non  47 (*)     All other components within normal limits   URINALYSIS, REFLEX TO URINE CULTURE - Abnormal; Notable for the following components:    Appearance, UA Hazy (*)     Protein, UA 1+ (*)     Occult Blood UA 1+ (*)     Nitrite, UA Positive (*)     Leukocytes, UA 3+ (*)     All other components within normal limits    Narrative:     Preferred Collection Type->Urine, Catheterized   MAGNESIUM - Abnormal; Notable for the following components:    Magnesium 1.5 (*)     All other components within normal limits   TROPONIN I - Abnormal; Notable for the following components:    Troponin I 0.317 (*)     All other components within normal limits   URINALYSIS MICROSCOPIC - Abnormal; Notable for the following components:    WBC, UA >100 (*)     WBC Clumps, UA Few (*)     Bacteria Many (*)     All other components within normal limits    Narrative:     Preferred Collection Type->Urine, Catheterized   POCT INFLUENZA A/B MOLECULAR - Normal   LACTIC ACID, PLASMA   PHOSPHORUS   B-TYPE NATRIURETIC PEPTIDE   LACTIC ACID, PLASMA     EKG Readings: (Independently Interpreted)   EKG 22:01: Sinus tach, . Normal axis. No STEMI. Morphology c/w 8/5/19.       Imaging Results          X-Ray Chest AP Portable (Final result)  Result time 08/31/19 22:30:04    Final result by Carson Ortega MD (08/31/19 22:30:04)                 Impression:      Minimal atelectasis.  No edema or pneumothorax.      Electronically signed by: Carson  Jordan  Date:    08/31/2019  Time:    22:30             Narrative:    EXAMINATION:  XR CHEST AP PORTABLE    CLINICAL HISTORY:  Sepsis;    TECHNIQUE:  Single frontal view of the chest was performed.    COMPARISON:  08/05/2019 chest    FINDINGS:  Single AP portable view at 22:20.    Small inspiratory exam.  The heart is mildly enlarged unchanged.  There is minimal streaky opacities at the bases likely subsegmental atelectasis.  No pneumothorax or pleural effusion or interstitial edema or lobar consolidation or nodule.  No abdominal free air.  There is overlying leads.                              X-Rays:   Independently Interpreted Readings:   Other Readings:  CXR NAD    Medical Decision Making:   History:   Old Medical Records: I decided to obtain old medical records.  Old Records Summarized: records from previous admission(s).  Initial Assessment:   69 y.o. Male nursing home resident with fever.  Differential Diagnosis:   Ddx includes sepsis, SHAYY, dehydration, UTI, PNA, other.  Independently Interpreted Test(s):   I have ordered and independently interpreted X-rays - see prior notes.  I have ordered and independently interpreted EKG Reading(s) - see prior notes  Clinical Tests:   Lab Tests: Ordered and Reviewed  Radiological Study: Ordered and Reviewed  Medical Tests: Ordered and Reviewed  Sepsis Perfusion Assessment: I attest, a sepsis perfusion exam was performed within 6 hours of Septic Shock presentation, following fluid resuscitation.  ED Management:  EKG no STEMI. Morphology c/w prior.     CXR NAD.    Labs: Anemia c/w prior. BUN/creatinine stable. Troponin elevated. BNP normal. UA with leuks, nitrites.    Heme negative per rectum so okay for anticoagulation at this time. Tx'ing as NSTEMI with lovenox, plavix, ASA.    Tx'ing for UTI / sepsis with fluids, rocephin. I did not do sepsis dose fluids given elevated troponin -- patient received about NS 1100mL bolus in total before I d/c'ed.     Medicine admit.  Discussed with nocturnist Dr. Tompkins. I have written courtesy orders.   Other:   I have discussed this case with another health care provider.                      Clinical Impression:       ICD-10-CM ICD-9-CM   1. Sepsis A41.9 038.9     995.91   2. Elevated troponin R74.8 790.6   3. Acute cystitis with hematuria N30.01 595.0   4. Anemia, unspecified type D64.9 285.9   5. Hypomagnesemia E83.42 275.2   6. NSTEMI (non-ST elevated myocardial infarction) I21.4 410.70                                Petra De Souza MD  09/01/19 1214

## 2019-09-01 NOTE — HPI
Alexsander Tafoya is a 69 y.o. male that (in part)  has a past medical history of BPH (benign prostatic hyperplasia), Dysarthria, HLD (hyperlipidemia), Hypertension, Other and unspecified hyperlipidemia, and Stroke.  has a past surgical history that includes Cataract extraction w/ intraocular lens  implant, bilateral. Presents to [facility] Emergency Department by the EMS from nursing home with report of fever 101.8° F.  Patient has history of previous stroke and as aphasia.  History primarily taken from EMS notes, triage, and ED physician.  No report of open wounds, skin breakdown, cough, cold, congestion, abdominal pain, nausea, vomiting, or diarrhea.  History is limited due to the current condition of the patient.    In the emergency department routine laboratory studies, chest x-ray, EKG, cardiac enzymes obtained.  Urinalysis revealed evidence of a urinary tract infection.  Vital signs consistent with sepsis.  Also had elevated troponin level without evidence of acute ischemia on EKG.  Treated for UTI and infectious encephalopathy he is in ceftriaxone after cultures were obtained.  Sepsis protocol initiated.    Hospital medicine has been asked to admit for further evaluation and treatment.

## 2019-09-01 NOTE — CONSULTS
[facility]  Cardiology  Consult Note    Patient Name: Alexsander Tafoya  MRN: [id number]  Admission Date: 8/31/2019  Hospital Length of Stay: 0 days  Code Status: Full Code   Attending Provider: Breanna Gann MD   Consulting Provider: Hilario Morgan MD  Primary Care Physician: Helio Farr MD  Principal Problem:Sepsis secondary to UTI    Patient information was obtained from patient and ER records.     Consults  Subjective:     Chief Complaint:  Elevated troponin     HPI:   Alexsander Tafoya is a 69 y.o. male that (in part)  has a past medical history of BPH (benign prostatic hyperplasia), Dysarthria, HLD (hyperlipidemia), Hypertension, Other and unspecified hyperlipidemia, and Stroke.  has a past surgical history that includes Cataract extraction w/ intraocular lens  implant, bilateral. Presents to [facility] Emergency Department by the EMS from nursing home with report of fever 101.8° F.  Patient has history of previous stroke and as aphasia.  History primarily taken from EMS notes, triage, and ED physician.  No report of open wounds, skin breakdown, cough, cold, congestion, abdominal pain, nausea, vomiting, or diarrhea.  History is limited due to the current condition of the patient.     In the emergency department routine laboratory studies, chest x-ray, EKG, cardiac enzymes obtained.  Urinalysis revealed evidence of a urinary tract infection.  Vital signs consistent with sepsis.  Also had elevated troponin level without evidence of acute ischemia on EKG.  Treated for UTI and infectious encephalopathy he is in ceftriaxone after cultures were obtained.  Sepsis protocol initiated.    Aphasic from CVA  EKG sinus tachycardia NSSTT changes  Troponin 0.3 trending down    Echo 9/1/19  · Normal left ventricular systolic function. The estimated ejection fraction is 55%  · Concentric left ventricular hypertrophy.  · Grade II (moderate) left ventricular diastolic dysfunction  consistent with pseudonormalization.  · Mild left atrial enlargement.  Normal right ventricular systolic function.    Past Medical History:   Diagnosis Date    BPH (benign prostatic hyperplasia)     Dysarthria     HLD (hyperlipidemia)     Hypertension     Other and unspecified hyperlipidemia     Stroke        Past Surgical History:   Procedure Laterality Date    CATARACT EXTRACTION W/ INTRAOCULAR LENS  IMPLANT, BILATERAL         Review of patient's allergies indicates:  No Known Allergies    No current facility-administered medications on file prior to encounter.      Current Outpatient Medications on File Prior to Encounter   Medication Sig    aspirin 325 MG tablet Take 325 mg by mouth once daily.    doxazosin (CARDURA) 2 MG tablet Take 1 tablet (2 mg total) by mouth every evening. HOLD UNTIL TOLD TO RESUME BY PHYSICIAN    finasteride (PROSCAR) 5 mg tablet Take 5 mg by mouth once daily.     fish oil-omega-3 fatty acids 300-1,000 mg capsule Take 1 capsule by mouth once daily.    metoprolol tartrate (LOPRESSOR) 50 MG tablet Take 0.5 tablets (25 mg total) by mouth once daily. HOLD UNTIL TOLD TO RESUME BY PHYSICIAN    multivitamin (THERAGRAN) per tablet Take 1 tablet by mouth once daily.    rosuvastatin (CRESTOR) 20 MG tablet Take 1 tablet (20 mg total) by mouth every evening. HOLD UNTIL TOLD TO RESUME BY PHYSICIAN     Family History     Problem Relation (Age of Onset)    Dementia Father    Hypertension Mother, Father    Seizures Mother    Stroke Mother        Tobacco Use    Smoking status: Former Smoker    Smokeless tobacco: Never Used   Substance and Sexual Activity    Alcohol use: Yes     Comment: rare, once a week or less    Drug use: No    Sexual activity: Never     Review of Systems   Unable to perform ROS: patient nonverbal     Objective:     Vital Signs (Most Recent):  Temp: 98.7 °F (37.1 °C) (09/01/19 0724)  Pulse: 97 (09/01/19 0724)  Resp: 17 (09/01/19 0724)  BP: 130/76 (09/01/19  0724)  SpO2: 95 % (09/01/19 0724) Vital Signs (24h Range):  Temp:  [98.6 °F (37 °C)-101.8 °F (38.8 °C)] 98.7 °F (37.1 °C)  Pulse:  [] 97  Resp:  [17-28] 17  SpO2:  [93 %-97 %] 95 %  BP: (108-136)/(65-85) 130/76     Weight: 89.4 kg (197 lb)  Body mass index is 31.8 kg/m².    SpO2: 95 %  O2 Device (Oxygen Therapy): room air      Intake/Output Summary (Last 24 hours) at 9/1/2019 1126  Last data filed at 9/1/2019 0252  Gross per 24 hour   Intake 1161 ml   Output --   Net 1161 ml       Lines/Drains/Airways     Peripheral Intravenous Line                 Peripheral IV - Single Lumen 08/31/19 20 G Left Antecubital 1 day                Physical Exam   Constitutional: He is oriented to person, place, and time. He appears well-developed and well-nourished.   HENT:   Head: Normocephalic and atraumatic.   Eyes: Pupils are equal, round, and reactive to light. Conjunctivae are normal.   Neck: Normal range of motion. Neck supple.   Cardiovascular: Normal rate, normal heart sounds and intact distal pulses.   Pulmonary/Chest: Effort normal and breath sounds normal.   Abdominal: Soft. Bowel sounds are normal.   Musculoskeletal: Normal range of motion.   Neurological: He is alert and oriented to person, place, and time.   Skin: Skin is warm and dry.       Significant Labs: All pertinent lab results from the last 24 hours have been reviewed.    Significant Imaging: Echocardiogram: 2D echo with color flow doppler: No results found for this or any previous visit.    Assessment and Plan:     * Sepsis secondary to UTI  Per primary    Elevated troponin  Troponin 0.3 trending down. Echo with good EF. Unclear if this is demand ischemia versus small NSTEMI. Discussed with family at length - they request conservative medical Rx only for possible CAD. Would continue with plavix for 6 months if tolerated    Hyperlipidemia  On statin    Aphasia as late effect of cerebrovascular accident  Per primary        VTE Risk Mitigation (From  admission, onward)        Ordered     enoxaparin injection 90 mg  Every 12 hours (non-standard times)      09/01/19 0252          Thank you for your consult. I will sign off. Please contact us if you have any additional questions.    Hilario Morgan MD  Cardiology   [facility]

## 2019-09-01 NOTE — HPI
Alexsander Tafoya is a 69 y.o. male that (in part)  has a past medical history of BPH (benign prostatic hyperplasia), Dysarthria, HLD (hyperlipidemia), Hypertension, Other and unspecified hyperlipidemia, and Stroke.  has a past surgical history that includes Cataract extraction w/ intraocular lens  implant, bilateral. Presents to [facility] Emergency Department by the EMS from nursing home with report of fever 101.8° F.  Patient has history of previous stroke and as aphasia.  History primarily taken from EMS notes, triage, and ED physician.  No report of open wounds, skin breakdown, cough, cold, congestion, abdominal pain, nausea, vomiting, or diarrhea.  History is limited due to the current condition of the patient.     In the emergency department routine laboratory studies, chest x-ray, EKG, cardiac enzymes obtained.  Urinalysis revealed evidence of a urinary tract infection.  Vital signs consistent with sepsis.  Also had elevated troponin level without evidence of acute ischemia on EKG.  Treated for UTI and infectious encephalopathy he is in ceftriaxone after cultures were obtained.  Sepsis protocol initiated.    Aphasic from CVA  EKG sinus tachycardia NSSTT changes  Troponin 0.3 trending down    Echo 9/1/19  · Normal left ventricular systolic function. The estimated ejection fraction is 55%  · Concentric left ventricular hypertrophy.  · Grade II (moderate) left ventricular diastolic dysfunction consistent with pseudonormalization.  · Mild left atrial enlargement.  · Normal right ventricular systolic function.

## 2019-09-02 VITALS
TEMPERATURE: 98 F | RESPIRATION RATE: 19 BRPM | SYSTOLIC BLOOD PRESSURE: 107 MMHG | OXYGEN SATURATION: 97 % | HEART RATE: 74 BPM | HEIGHT: 66 IN | DIASTOLIC BLOOD PRESSURE: 68 MMHG | WEIGHT: 197 LBS | BODY MASS INDEX: 31.66 KG/M2

## 2019-09-02 PROBLEM — N39.0 SEPSIS SECONDARY TO UTI: Status: RESOLVED | Noted: 2019-09-01 | Resolved: 2019-09-02

## 2019-09-02 PROBLEM — G93.49 INFECTIOUS ENCEPHALOPATHY: Status: RESOLVED | Noted: 2019-09-01 | Resolved: 2019-09-02

## 2019-09-02 PROBLEM — B99.9 INFECTIOUS ENCEPHALOPATHY: Status: RESOLVED | Noted: 2019-09-01 | Resolved: 2019-09-02

## 2019-09-02 PROBLEM — A41.9 SEPSIS SECONDARY TO UTI: Status: RESOLVED | Noted: 2019-09-01 | Resolved: 2019-09-02

## 2019-09-02 LAB
ANION GAP SERPL CALC-SCNC: 8 MMOL/L (ref 8–16)
BACTERIA UR CULT: ABNORMAL
BUN SERPL-MCNC: 10 MG/DL (ref 8–23)
CALCIUM SERPL-MCNC: 8.1 MG/DL (ref 8.7–10.5)
CHLORIDE SERPL-SCNC: 108 MMOL/L (ref 95–110)
CO2 SERPL-SCNC: 20 MMOL/L (ref 23–29)
CREAT SERPL-MCNC: 1.1 MG/DL (ref 0.5–1.4)
EST. GFR  (AFRICAN AMERICAN): >60 ML/MIN/1.73 M^2
EST. GFR  (NON AFRICAN AMERICAN): >60 ML/MIN/1.73 M^2
GLUCOSE SERPL-MCNC: 107 MG/DL (ref 70–110)
POTASSIUM SERPL-SCNC: 3.7 MMOL/L (ref 3.5–5.1)
SODIUM SERPL-SCNC: 136 MMOL/L (ref 136–145)

## 2019-09-02 PROCEDURE — G0009 ADMIN PNEUMOCOCCAL VACCINE: HCPCS | Performed by: HOSPITALIST

## 2019-09-02 PROCEDURE — 25000003 PHARM REV CODE 250: Performed by: INTERNAL MEDICINE

## 2019-09-02 PROCEDURE — 80048 BASIC METABOLIC PNL TOTAL CA: CPT

## 2019-09-02 PROCEDURE — 63600175 PHARM REV CODE 636 W HCPCS: Performed by: HOSPITALIST

## 2019-09-02 PROCEDURE — 90670 PCV13 VACCINE IM: CPT | Performed by: HOSPITALIST

## 2019-09-02 PROCEDURE — G0008 ADMIN INFLUENZA VIRUS VAC: HCPCS | Performed by: HOSPITALIST

## 2019-09-02 PROCEDURE — 90472 IMMUNIZATION ADMIN EACH ADD: CPT | Performed by: HOSPITALIST

## 2019-09-02 PROCEDURE — 36415 COLL VENOUS BLD VENIPUNCTURE: CPT

## 2019-09-02 PROCEDURE — 90471 IMMUNIZATION ADMIN: CPT | Performed by: HOSPITALIST

## 2019-09-02 PROCEDURE — 90662 IIV NO PRSV INCREASED AG IM: CPT | Performed by: HOSPITALIST

## 2019-09-02 PROCEDURE — 25000003 PHARM REV CODE 250: Performed by: HOSPITALIST

## 2019-09-02 RX ORDER — CLOPIDOGREL BISULFATE 75 MG/1
75 TABLET ORAL DAILY
Qty: 30 TABLET | Refills: 11 | Status: SHIPPED | OUTPATIENT
Start: 2019-09-03 | End: 2023-12-23

## 2019-09-02 RX ORDER — CIPROFLOXACIN 500 MG/1
500 TABLET ORAL 2 TIMES DAILY
Qty: 14 TABLET | Refills: 0 | Status: SHIPPED | OUTPATIENT
Start: 2019-09-02 | End: 2019-09-09

## 2019-09-02 RX ORDER — NAPROXEN SODIUM 220 MG/1
81 TABLET, FILM COATED ORAL DAILY
Refills: 0 | COMMUNITY
Start: 2019-09-03 | End: 2023-12-23

## 2019-09-02 RX ADMIN — THERA TABS 1 TABLET: TAB at 09:09

## 2019-09-02 RX ADMIN — FINASTERIDE 5 MG: 5 TABLET, FILM COATED ORAL at 09:09

## 2019-09-02 RX ADMIN — PNEUMOCOCCAL 13-VALENT CONJUGATE VACCINE 0.5 ML: 2.2; 2.2; 2.2; 2.2; 2.2; 4.4; 2.2; 2.2; 2.2; 2.2; 2.2; 2.2; 2.2 INJECTION, SUSPENSION INTRAMUSCULAR at 02:09

## 2019-09-02 RX ADMIN — INFLUENZA A VIRUS A/MICHIGAN/45/2015 X-275 (H1N1) ANTIGEN (FORMALDEHYDE INACTIVATED), INFLUENZA A VIRUS A/SINGAPORE/INFIMH-16-0019/2016 IVR-186 (H3N2) ANTIGEN (FORMALDEHYDE INACTIVATED), AND INFLUENZA B VIRUS B/MARYLAND/15/2016 BX-69A (A B/COLORADO/6/2017-LIKE VIRUS) ANTIGEN (FORMALDEHYDE INACTIVATED) 0.5 ML: 60; 60; 60 INJECTION, SUSPENSION INTRAMUSCULAR at 02:09

## 2019-09-02 RX ADMIN — CEFTRIAXONE 1 G: 1 INJECTION, SOLUTION INTRAVENOUS at 01:09

## 2019-09-02 RX ADMIN — FAMOTIDINE 20 MG: 20 TABLET ORAL at 09:09

## 2019-09-02 RX ADMIN — CLOPIDOGREL BISULFATE 75 MG: 75 TABLET ORAL at 09:09

## 2019-09-02 RX ADMIN — METOPROLOL TARTRATE 25 MG: 25 TABLET ORAL at 09:09

## 2019-09-02 RX ADMIN — CEFTRIAXONE 1 G: 1 INJECTION, SOLUTION INTRAVENOUS at 12:09

## 2019-09-02 RX ADMIN — ASPIRIN 81 MG 81 MG: 81 TABLET ORAL at 09:09

## 2019-09-02 RX ADMIN — SENNOSIDES, DOCUSATE SODIUM 1 TABLET: 50; 8.6 TABLET, FILM COATED ORAL at 09:09

## 2019-09-02 RX ADMIN — POLYETHYLENE GLYCOL 3350 17 G: 17 POWDER, FOR SOLUTION ORAL at 09:09

## 2019-09-02 NOTE — HOSPITAL COURSE
Pt admitted with sepsis secondary to UTI. Urine culture grew E. Coli. Blood cultures negative. Pt back to baseline. VS stable. Ok to dc back to assisted living facility[facility].     Continue cipro x 7 days.

## 2019-09-02 NOTE — PROGRESS NOTES
WRITTEN HEALTHCARE DISCHARGE INFORMATION      Things that YOU are RESPONSIBLE for to Manage Your Care At Home:     1. Getting your prescriptions filled.  2. Taking you medications as directed. DO NOT MISS ANY DOSES!  3. Going to your follow-up doctor appointments. This is important because it allows the doctor to monitor your progress and to determine if any changes need to be made to your treatment plan.     If you are unable to make your follow up appointments, please call the number listed and reschedule this appointment.      ____________HELP AT HOME____________________     Experiencing any SIGNS or SYMPTOMS: YOU CAN     Schedule a same day appopintment with your Primary Care Doctor or  you can call [facility] Line for 24/7 assistance at [phone]     If you are experience any signs or symptoms that have become severe, Call 911 and come to your nearest Emergency Room.     Thank you for choosing [facility] and allowing us to care for you.   From your care management team:      You should receive a call from [facility] within 48-72 hours to help manage your care after discharge. Please try to make sure that you answer your phone for this important phone call.    Follow-up Information     Helio Farr MD In 1 week.    Specialty:  Internal Medicine  Why:  please call on Tuesday September 3rd to schedule an appointment when office opens.  Contact information:  [address] [place] [address]  [id number]  [place] [address]  [phone]             [facility].    Specialty:  Assisted Living  Why:  Assisted Living  Contact information:  [address]  [place] [address]  [phone]

## 2019-09-02 NOTE — PLAN OF CARE
09/02/19 1337   Final Note   Assessment Type Final Discharge Note   Anticipated Discharge Disposition   ([facility])   Hospital Follow Up  Appt(s) scheduled? No  (unable to do so due to being a holiday)   Discharge plans and expectations educations in teach back method with documentation complete? Yes   Right Care Referral Info   Post Acute Recommendation No Care

## 2019-09-02 NOTE — NURSING
Patient has been discharged. IV and cardiac monitor has been removed. I educated patient on discharge instructions (diet, activity, medications, the importance of attending follow up appointments, and if any complications occur to call 911 or go to the nearest Er). Patient stated they understood discharge instructions. Await transport and ride.

## 2019-09-02 NOTE — PROGRESS NOTES
Call placed to [facility]  At [phone], spoke to Sarahi, to inquire if pt would be able to return to facility on today.  TN advised that they are an assisted living facility and that it is not a problem for pt to return. Saraih to check and see if they are able to get transportation for the pt to return to facility.  TN provided name and contact information and will await call back.    1312- call back received from Sarahi at [facility] stating that they have a van to transport the pt back to facility, TN advised that the pts family just came to desk to advise that they will be transporting the pt back to facility and will be going home to get the pts w/c and clothes and when return will be ready for d/c.  [facility] reports that this is fine and they are ready for pt to return.    1317- pts nurse Idalia advised that once family returns with pts clothes and w/c he can d/c from CM standpoint.

## 2019-09-02 NOTE — PLAN OF CARE
09/02/19 1337   Post-Acute Status   Post-Acute Authorization Other  (Assisted Living)   Other Status No Post-Acute Service Needs

## 2019-09-02 NOTE — PROGRESS NOTES
Received report from Idalia JOHANSEN RN. Patient awake & alert, resting quietly in bed, telemetry monitoring in place IV infusing-site intact, no distress noted. Safety maintained, call light in reach.

## 2019-09-02 NOTE — DISCHARGE SUMMARY
[facility] Hospital Medicine  Discharge Summary      Patient Name: Alexsander Tafoya  MRN: [id number]  Admission Date: 8/31/2019  Hospital Length of Stay: 1 days  Discharge Date and Time:  09/02/2019 12:56 PM  Attending Physician: Breanna Gann MD   Discharging Provider: Breanna Gann MD  Primary Care Provider: Helio Farr MD      HPI:     Alexsander Tafoya is a 69 y.o. male that (in part)  has a past medical history of BPH (benign prostatic hyperplasia), Dysarthria, HLD (hyperlipidemia), Hypertension, Other and unspecified hyperlipidemia, and Stroke.  has a past surgical history that includes Cataract extraction w/ intraocular lens  implant, bilateral. Presents to [facility] Emergency Department by the EMS from nursing home with report of fever 101.8° F.  Patient has history of previous stroke and as aphasia.  History primarily taken from EMS notes, triage, and ED physician.  No report of open wounds, skin breakdown, cough, cold, congestion, abdominal pain, nausea, vomiting, or diarrhea.  History is limited due to the current condition of the patient.    In the emergency department routine laboratory studies, chest x-ray, EKG, cardiac enzymes obtained.  Urinalysis revealed evidence of a urinary tract infection.  Vital signs consistent with sepsis.  Also had elevated troponin level without evidence of acute ischemia on EKG.  Treated for UTI and infectious encephalopathy he is in ceftriaxone after cultures were obtained.  Sepsis protocol initiated.    Hospital medicine has been asked to admit for further evaluation and treatment.     * No surgery found *      Hospital Course:   Pt admitted with sepsis secondary to UTI. Urine culture grew E. Coli. Blood cultures negative. Pt back to baseline. VS stable. Ok to dc back to assisted living facility, [facility].     Continue cipro x 7 days.      Consults:   Consults (From admission, onward)        Status Ordering  Provider     Inpatient consult to Cardiology  Once     Provider:  Hilario Morgan MD    Acknowledged SASCHA DERAS          No new Assessment & Plan notes have been filed under this hospital service since the last note was generated.  Service: Hospital Medicine    Final Active Diagnoses:    Diagnosis Date Noted POA    Elevated troponin [R74.8] 09/01/2019 Yes    BPH (benign prostatic hyperplasia) [N40.0] 08/05/2019 Yes    Aphasia as late effect of cerebrovascular accident [I69.320] 07/17/2017 Not Applicable    Hemiplegia affecting right side in right-dominant patient as late effect of cerebrovascular disease [I69.951] 07/17/2017 Not Applicable    Essential hypertension [I10] 07/17/2017 Yes    Hyperlipidemia [E78.5] 07/17/2017 Yes      Problems Resolved During this Admission:    Diagnosis Date Noted Date Resolved POA    PRINCIPAL PROBLEM:  Sepsis secondary to UTI [A41.9, N39.0] 09/01/2019 09/02/2019 Yes    Infectious encephalopathy [G93.49, B99.9] 09/01/2019 09/02/2019 Yes       Discharged Condition: good    Disposition: Long Term Care    Follow Up:  Follow-up Information     Helio Farr MD In 1 week.    Specialty:  Internal Medicine  Contact information:  [address] [place] [address]  [address] [place], [place] [address] [address] [phone]                 Patient Instructions:   No discharge procedures on file.      Pending Diagnostic Studies:     None         Medications:  Reconciled Home Medications:      Medication List      START taking these medications    aspirin 81 MG Chew  Take 1 tablet (81 mg total) by mouth once daily.  Start taking on:  9/3/2019  Replaces:  aspirin 325 MG tablet     ciprofloxacin HCl 500 MG tablet  Commonly known as:  CIPRO  Take 1 tablet (500 mg total) by mouth 2 (two) times daily. for 7 days     clopidogrel 75 mg tablet  Commonly known as:  PLAVIX  Take 1 tablet (75 mg total) by mouth once daily.  Start taking on:  9/3/2019        CONTINUE taking these medications    doxazosin 2 MG  tablet  Commonly known as:  CARDURA  Take 1 tablet (2 mg total) by mouth every evening. HOLD UNTIL TOLD TO RESUME BY PHYSICIAN     finasteride 5 mg tablet  Commonly known as:  PROSCAR  Take 5 mg by mouth once daily.     fish oil-omega-3 fatty acids 300-1,000 mg capsule  Take 1 capsule by mouth once daily.     metoprolol tartrate 50 MG tablet  Commonly known as:  LOPRESSOR  Take 0.5 tablets (25 mg total) by mouth once daily. HOLD UNTIL TOLD TO RESUME BY PHYSICIAN     multivitamin per tablet  Commonly known as:  THERAGRAN  Take 1 tablet by mouth once daily.     rosuvastatin 20 MG tablet  Commonly known as:  CRESTOR  Take 1 tablet (20 mg total) by mouth every evening. HOLD UNTIL TOLD TO RESUME BY PHYSICIAN        STOP taking these medications    aspirin 325 MG tablet  Replaced by:  aspirin 81 MG Chew            Indwelling Lines/Drains at time of discharge:   Lines/Drains/Airways          None          Time spent on the discharge of patient: 30 minutes  Patient was seen and examined on the date of discharge and determined to be suitable for discharge.         Breanna Gann MD  Department of Hospital Medicine  [facility]

## 2019-09-05 LAB
BACTERIA BLD CULT: NORMAL
BACTERIA BLD CULT: NORMAL

## 2021-03-10 NOTE — ASSESSMENT & PLAN NOTE
Hold home metoprolol (25mg daily) given concern for infection  Resume as appropriate    Patient hr on monitor noted to bbe 150s. Pt denies chest pain but does admit to a racing feeling in chest.  cardizem gtt at 15. Dr. Melinda Crane notified. 0900 pt pulse manually checked 65 monitor in picking up aflutter beats reading hr at 130s. Diltiazem off. Noted some PVCs ekg obtained. Abnormal results and current heart rate versus monitor rate and frequent PVCs notified to Dr. Melinda Crane who reported EKG is ok. Will monitor. Pt reports feeling better denies chest pain or shortness of breath. Alert and oriented. BP stable.

## 2022-05-10 ENCOUNTER — HOSPITAL ENCOUNTER (INPATIENT)
Facility: HOSPITAL | Age: 72
LOS: 1 days | Discharge: HOME | DRG: 683 | End: 2022-05-12
Attending: EMERGENCY MEDICINE | Admitting: STUDENT IN AN ORGANIZED HEALTH CARE EDUCATION/TRAINING PROGRAM
Payer: MEDICARE

## 2022-05-10 DIAGNOSIS — N17.9 AKI (ACUTE KIDNEY INJURY): Primary | ICD-10-CM

## 2022-05-10 DIAGNOSIS — R07.9 CHEST PAIN: ICD-10-CM

## 2022-05-10 DIAGNOSIS — G81.91 RIGHT HEMIPARESIS: ICD-10-CM

## 2022-05-10 DIAGNOSIS — E86.0 DEHYDRATION: ICD-10-CM

## 2022-05-10 DIAGNOSIS — R79.89 ELEVATED TROPONIN: ICD-10-CM

## 2022-05-10 DIAGNOSIS — I69.320 APHASIA AS LATE EFFECT OF STROKE: ICD-10-CM

## 2022-05-10 LAB
ALBUMIN SERPL BCP-MCNC: 3.8 G/DL (ref 3.5–5.2)
ALP SERPL-CCNC: 76 U/L (ref 55–135)
ALT SERPL W/O P-5'-P-CCNC: 24 U/L (ref 10–44)
ANION GAP SERPL CALC-SCNC: 12 MMOL/L (ref 8–16)
AST SERPL-CCNC: 21 U/L (ref 10–40)
BASOPHILS # BLD AUTO: 0.03 K/UL (ref 0–0.2)
BASOPHILS NFR BLD: 0.5 % (ref 0–1.9)
BILIRUB SERPL-MCNC: 0.4 MG/DL (ref 0.1–1)
BNP SERPL-MCNC: 116 PG/ML (ref 0–99)
BUN SERPL-MCNC: 71 MG/DL (ref 8–23)
CALCIUM SERPL-MCNC: 9.7 MG/DL (ref 8.7–10.5)
CHLORIDE SERPL-SCNC: 102 MMOL/L (ref 95–110)
CO2 SERPL-SCNC: 23 MMOL/L (ref 23–29)
CREAT SERPL-MCNC: 2.7 MG/DL (ref 0.5–1.4)
DIFFERENTIAL METHOD: ABNORMAL
EOSINOPHIL # BLD AUTO: 0.1 K/UL (ref 0–0.5)
EOSINOPHIL NFR BLD: 1.8 % (ref 0–8)
ERYTHROCYTE [DISTWIDTH] IN BLOOD BY AUTOMATED COUNT: 16.1 % (ref 11.5–14.5)
EST. GFR  (AFRICAN AMERICAN): 26 ML/MIN/1.73 M^2
EST. GFR  (NON AFRICAN AMERICAN): 23 ML/MIN/1.73 M^2
GLUCOSE SERPL-MCNC: 128 MG/DL (ref 70–110)
HCT VFR BLD AUTO: 38.4 % (ref 40–54)
HGB BLD-MCNC: 12 G/DL (ref 14–18)
IMM GRANULOCYTES # BLD AUTO: 0.01 K/UL (ref 0–0.04)
IMM GRANULOCYTES NFR BLD AUTO: 0.2 % (ref 0–0.5)
LYMPHOCYTES # BLD AUTO: 2 K/UL (ref 1–4.8)
LYMPHOCYTES NFR BLD: 31.5 % (ref 18–48)
MCH RBC QN AUTO: 24.5 PG (ref 27–31)
MCHC RBC AUTO-ENTMCNC: 31.3 G/DL (ref 32–36)
MCV RBC AUTO: 79 FL (ref 82–98)
MONOCYTES # BLD AUTO: 0.5 K/UL (ref 0.3–1)
MONOCYTES NFR BLD: 7.7 % (ref 4–15)
NEUTROPHILS # BLD AUTO: 3.7 K/UL (ref 1.8–7.7)
NEUTROPHILS NFR BLD: 58.3 % (ref 38–73)
NRBC BLD-RTO: 0 /100 WBC
PLATELET # BLD AUTO: 211 K/UL (ref 150–450)
PMV BLD AUTO: 10.7 FL (ref 9.2–12.9)
POTASSIUM SERPL-SCNC: 4.8 MMOL/L (ref 3.5–5.1)
PROT SERPL-MCNC: 7.9 G/DL (ref 6–8.4)
RBC # BLD AUTO: 4.89 M/UL (ref 4.6–6.2)
SODIUM SERPL-SCNC: 137 MMOL/L (ref 136–145)
WBC # BLD AUTO: 6.26 K/UL (ref 3.9–12.7)

## 2022-05-10 PROCEDURE — 80053 COMPREHEN METABOLIC PANEL: CPT | Performed by: EMERGENCY MEDICINE

## 2022-05-10 PROCEDURE — 99285 EMERGENCY DEPT VISIT HI MDM: CPT | Mod: 25

## 2022-05-10 PROCEDURE — G0378 HOSPITAL OBSERVATION PER HR: HCPCS

## 2022-05-10 PROCEDURE — 93005 ELECTROCARDIOGRAM TRACING: CPT

## 2022-05-10 PROCEDURE — 83880 ASSAY OF NATRIURETIC PEPTIDE: CPT | Performed by: EMERGENCY MEDICINE

## 2022-05-10 PROCEDURE — 93010 ELECTROCARDIOGRAM REPORT: CPT | Mod: ,,, | Performed by: INTERNAL MEDICINE

## 2022-05-10 PROCEDURE — U0002 COVID-19 LAB TEST NON-CDC: HCPCS | Performed by: EMERGENCY MEDICINE

## 2022-05-10 PROCEDURE — 85025 COMPLETE CBC W/AUTO DIFF WBC: CPT | Performed by: EMERGENCY MEDICINE

## 2022-05-10 PROCEDURE — 93010 EKG 12-LEAD: ICD-10-PCS | Mod: ,,, | Performed by: INTERNAL MEDICINE

## 2022-05-10 PROCEDURE — 25000003 PHARM REV CODE 250: Performed by: EMERGENCY MEDICINE

## 2022-05-10 PROCEDURE — 96360 HYDRATION IV INFUSION INIT: CPT

## 2022-05-10 RX ORDER — HEPARIN SODIUM 5000 [USP'U]/ML
5000 INJECTION, SOLUTION INTRAVENOUS; SUBCUTANEOUS EVERY 12 HOURS
Status: DISCONTINUED | OUTPATIENT
Start: 2022-05-11 | End: 2022-05-11

## 2022-05-10 RX ORDER — FINASTERIDE 5 MG/1
5 TABLET, FILM COATED ORAL DAILY
Status: DISCONTINUED | OUTPATIENT
Start: 2022-05-11 | End: 2022-05-12 | Stop reason: HOSPADM

## 2022-05-10 RX ORDER — FUROSEMIDE 40 MG/1
40 TABLET ORAL 2 TIMES DAILY
Status: ON HOLD | COMMUNITY
End: 2022-05-12 | Stop reason: SDUPTHER

## 2022-05-10 RX ORDER — DOXAZOSIN 1 MG/1
2 TABLET ORAL NIGHTLY
Status: DISCONTINUED | OUTPATIENT
Start: 2022-05-11 | End: 2022-05-10

## 2022-05-10 RX ORDER — ATORVASTATIN CALCIUM 40 MG/1
80 TABLET, FILM COATED ORAL NIGHTLY
Status: DISCONTINUED | OUTPATIENT
Start: 2022-05-11 | End: 2022-05-12 | Stop reason: HOSPADM

## 2022-05-10 RX ORDER — SODIUM CHLORIDE 0.9 % (FLUSH) 0.9 %
10 SYRINGE (ML) INJECTION EVERY 12 HOURS PRN
Status: DISCONTINUED | OUTPATIENT
Start: 2022-05-11 | End: 2022-05-12 | Stop reason: HOSPADM

## 2022-05-10 RX ORDER — ENALAPRIL MALEATE 5 MG/1
5 TABLET ORAL DAILY
Status: ON HOLD | COMMUNITY
End: 2024-01-02 | Stop reason: HOSPADM

## 2022-05-10 RX ORDER — METOPROLOL TARTRATE 25 MG/1
25 TABLET, FILM COATED ORAL DAILY
Status: DISCONTINUED | OUTPATIENT
Start: 2022-05-11 | End: 2022-05-12 | Stop reason: HOSPADM

## 2022-05-10 RX ORDER — POTASSIUM CHLORIDE 750 MG/1
10 CAPSULE, EXTENDED RELEASE ORAL DAILY
Status: ON HOLD | COMMUNITY
End: 2024-01-02 | Stop reason: HOSPADM

## 2022-05-10 RX ORDER — OMEGA-3/DHA/EPA/FISH OIL 300-1000MG
1 CAPSULE,DELAYED RELEASE (ENTERIC COATED) ORAL DAILY
Status: DISCONTINUED | OUTPATIENT
Start: 2022-05-11 | End: 2022-05-12 | Stop reason: HOSPADM

## 2022-05-10 RX ORDER — SODIUM CHLORIDE 9 MG/ML
INJECTION, SOLUTION INTRAVENOUS CONTINUOUS
Status: DISCONTINUED | OUTPATIENT
Start: 2022-05-11 | End: 2022-05-12 | Stop reason: HOSPADM

## 2022-05-10 RX ORDER — CLOPIDOGREL BISULFATE 75 MG/1
75 TABLET ORAL DAILY
Status: DISCONTINUED | OUTPATIENT
Start: 2022-05-11 | End: 2022-05-12 | Stop reason: HOSPADM

## 2022-05-10 RX ORDER — NAPROXEN SODIUM 220 MG/1
81 TABLET, FILM COATED ORAL DAILY
Status: DISCONTINUED | OUTPATIENT
Start: 2022-05-11 | End: 2022-05-12 | Stop reason: HOSPADM

## 2022-05-10 RX ADMIN — SODIUM CHLORIDE 500 ML: 9 INJECTION, SOLUTION INTRAVENOUS at 09:05

## 2022-05-10 NOTE — Clinical Note
Diagnosis: SHAYY (acute kidney injury) [386713]   Future Attending Provider: BOO MATA [[id number]]   Admitting Provider:: BOO MATA [[id number]]   Special Needs:: Speech Impaired [4]

## 2022-05-11 PROBLEM — I21.4 NSTEMI (NON-ST ELEVATED MYOCARDIAL INFARCTION): Status: ACTIVE | Noted: 2022-05-11

## 2022-05-11 PROBLEM — R82.90 ABNORMAL URINALYSIS: Status: ACTIVE | Noted: 2022-05-11

## 2022-05-11 PROBLEM — N39.0 UTI (URINARY TRACT INFECTION): Status: ACTIVE | Noted: 2022-05-11

## 2022-05-11 LAB
ANION GAP SERPL CALC-SCNC: 7 MMOL/L (ref 8–16)
APTT BLDCRRT: 22.9 SEC (ref 21–32)
APTT BLDCRRT: 37.8 SEC (ref 21–32)
AV INDEX (PROSTH): 0.7
AV MEAN GRADIENT: 2 MMHG
AV PEAK GRADIENT: 3 MMHG
AV VELOCITY RATIO: 0.68
BACTERIA #/AREA URNS HPF: ABNORMAL /HPF
BASOPHILS # BLD AUTO: 0.01 K/UL (ref 0–0.2)
BASOPHILS # BLD AUTO: 0.02 K/UL (ref 0–0.2)
BASOPHILS NFR BLD: 0.3 % (ref 0–1.9)
BASOPHILS NFR BLD: 0.4 % (ref 0–1.9)
BILIRUB UR QL STRIP: NEGATIVE
BSA FOR ECHO PROCEDURE: 2.08 M2
BUN SERPL-MCNC: 53 MG/DL (ref 8–23)
CALCIUM SERPL-MCNC: 6.6 MG/DL (ref 8.7–10.5)
CHLORIDE SERPL-SCNC: 116 MMOL/L (ref 95–110)
CHOLEST SERPL-MCNC: 71 MG/DL (ref 120–199)
CHOLEST/HDLC SERPL: 6.5 {RATIO} (ref 2–5)
CLARITY UR: ABNORMAL
CO2 SERPL-SCNC: 18 MMOL/L (ref 23–29)
COLOR UR: YELLOW
CREAT SERPL-MCNC: 1.4 MG/DL (ref 0.5–1.4)
CTP QC/QA: YES
DIFFERENTIAL METHOD: ABNORMAL
DIFFERENTIAL METHOD: ABNORMAL
DOP CALC AO PEAK VEL: 0.9 M/S
DOP CALC AO VTI: 15.52 CM
DOP CALC LVOT PEAK VEL: 0.61 M/S
DOP CALCLVOT PEAK VEL VTI: 10.83 CM
E WAVE DECELERATION TIME: 145.88 MSEC
E/A RATIO: 0.9
E/E' RATIO: 14 M/S
EJECTION FRACTION: 55 %
EOSINOPHIL # BLD AUTO: 0.1 K/UL (ref 0–0.5)
EOSINOPHIL # BLD AUTO: 0.1 K/UL (ref 0–0.5)
EOSINOPHIL NFR BLD: 1.9 % (ref 0–8)
EOSINOPHIL NFR BLD: 2 % (ref 0–8)
ERYTHROCYTE [DISTWIDTH] IN BLOOD BY AUTOMATED COUNT: 15.8 % (ref 11.5–14.5)
ERYTHROCYTE [DISTWIDTH] IN BLOOD BY AUTOMATED COUNT: 15.9 % (ref 11.5–14.5)
EST. GFR  (AFRICAN AMERICAN): 58 ML/MIN/1.73 M^2
EST. GFR  (NON AFRICAN AMERICAN): 50 ML/MIN/1.73 M^2
GLUCOSE SERPL-MCNC: 118 MG/DL (ref 70–110)
GLUCOSE UR QL STRIP: NEGATIVE
HCT VFR BLD AUTO: 27.9 % (ref 40–54)
HCT VFR BLD AUTO: 35.9 % (ref 40–54)
HDLC SERPL-MCNC: 11 MG/DL (ref 40–75)
HDLC SERPL: 15.5 % (ref 20–50)
HGB BLD-MCNC: 11 G/DL (ref 14–18)
HGB BLD-MCNC: 8.7 G/DL (ref 14–18)
HGB UR QL STRIP: NEGATIVE
HYALINE CASTS #/AREA URNS LPF: 4 /LPF
IMM GRANULOCYTES # BLD AUTO: 0.01 K/UL (ref 0–0.04)
IMM GRANULOCYTES # BLD AUTO: 0.02 K/UL (ref 0–0.04)
IMM GRANULOCYTES NFR BLD AUTO: 0.3 % (ref 0–0.5)
IMM GRANULOCYTES NFR BLD AUTO: 0.4 % (ref 0–0.5)
INR PPP: 1 (ref 0.8–1.2)
IVRT: 137.01 MSEC
KETONES UR QL STRIP: NEGATIVE
LA MAJOR: 5.56 CM
LA MINOR: 4.83 CM
LA WIDTH: 3.8 CM
LDLC SERPL CALC-MCNC: 19.4 MG/DL (ref 63–159)
LEUKOCYTE ESTERASE UR QL STRIP: ABNORMAL
LV LATERAL E/E' RATIO: 14 M/S
LV SEPTAL E/E' RATIO: 14 M/S
LYMPHOCYTES # BLD AUTO: 1.3 K/UL (ref 1–4.8)
LYMPHOCYTES # BLD AUTO: 1.8 K/UL (ref 1–4.8)
LYMPHOCYTES NFR BLD: 32.6 % (ref 18–48)
LYMPHOCYTES NFR BLD: 34.4 % (ref 18–48)
MAGNESIUM SERPL-MCNC: 1.4 MG/DL (ref 1.6–2.6)
MCH RBC QN AUTO: 23.9 PG (ref 27–31)
MCH RBC QN AUTO: 24.7 PG (ref 27–31)
MCHC RBC AUTO-ENTMCNC: 30.6 G/DL (ref 32–36)
MCHC RBC AUTO-ENTMCNC: 31.2 G/DL (ref 32–36)
MCV RBC AUTO: 78 FL (ref 82–98)
MCV RBC AUTO: 79 FL (ref 82–98)
MICROSCOPIC COMMENT: ABNORMAL
MONOCYTES # BLD AUTO: 0.3 K/UL (ref 0.3–1)
MONOCYTES # BLD AUTO: 0.4 K/UL (ref 0.3–1)
MONOCYTES NFR BLD: 7.8 % (ref 4–15)
MONOCYTES NFR BLD: 8.1 % (ref 4–15)
MV PEAK A VEL: 0.62 M/S
MV PEAK E VEL: 0.56 M/S
MV STENOSIS PRESSURE HALF TIME: 42.31 MS
MV VALVE AREA P 1/2 METHOD: 5.2 CM2
NEUTROPHILS # BLD AUTO: 2.2 K/UL (ref 1.8–7.7)
NEUTROPHILS # BLD AUTO: 2.9 K/UL (ref 1.8–7.7)
NEUTROPHILS NFR BLD: 55.1 % (ref 38–73)
NEUTROPHILS NFR BLD: 56.7 % (ref 38–73)
NITRITE UR QL STRIP: NEGATIVE
NONHDLC SERPL-MCNC: 60 MG/DL
NRBC BLD-RTO: 0 /100 WBC
NRBC BLD-RTO: 0 /100 WBC
PH UR STRIP: 7 [PH] (ref 5–8)
PLATELET # BLD AUTO: 147 K/UL (ref 150–450)
PLATELET # BLD AUTO: 205 K/UL (ref 150–450)
PMV BLD AUTO: 10.7 FL (ref 9.2–12.9)
PMV BLD AUTO: 10.8 FL (ref 9.2–12.9)
POTASSIUM SERPL-SCNC: 3 MMOL/L (ref 3.5–5.1)
PROT UR QL STRIP: NEGATIVE
PROTHROMBIN TIME: 10.3 SEC (ref 9–12.5)
RA PRESSURE: 3 MMHG
RBC # BLD AUTO: 3.52 M/UL (ref 4.6–6.2)
RBC # BLD AUTO: 4.61 M/UL (ref 4.6–6.2)
RBC #/AREA URNS HPF: 13 /HPF (ref 0–4)
SARS-COV-2 RDRP RESP QL NAA+PROBE: NEGATIVE
SODIUM SERPL-SCNC: 141 MMOL/L (ref 136–145)
SP GR UR STRIP: 1.01 (ref 1–1.03)
TDI LATERAL: 0.04 M/S
TDI SEPTAL: 0.04 M/S
TDI: 0.04 M/S
TRIGL SERPL-MCNC: 203 MG/DL (ref 30–150)
TROPONIN I SERPL DL<=0.01 NG/ML-MCNC: 0.32 NG/ML (ref 0–0.03)
TROPONIN I SERPL DL<=0.01 NG/ML-MCNC: 0.33 NG/ML (ref 0–0.03)
TROPONIN I SERPL DL<=0.01 NG/ML-MCNC: 0.43 NG/ML (ref 0–0.03)
URN SPEC COLLECT METH UR: ABNORMAL
UROBILINOGEN UR STRIP-ACNC: NEGATIVE EU/DL
WBC # BLD AUTO: 3.93 K/UL (ref 3.9–12.7)
WBC # BLD AUTO: 5.29 K/UL (ref 3.9–12.7)
WBC #/AREA URNS HPF: 50 /HPF (ref 0–5)

## 2022-05-11 PROCEDURE — 99222 PR INITIAL HOSPITAL CARE,LEVL II: ICD-10-PCS | Mod: ,,, | Performed by: INTERNAL MEDICINE

## 2022-05-11 PROCEDURE — 84484 ASSAY OF TROPONIN QUANT: CPT | Performed by: NURSE PRACTITIONER

## 2022-05-11 PROCEDURE — 87077 CULTURE AEROBIC IDENTIFY: CPT | Performed by: NURSE PRACTITIONER

## 2022-05-11 PROCEDURE — 87088 URINE BACTERIA CULTURE: CPT | Performed by: NURSE PRACTITIONER

## 2022-05-11 PROCEDURE — 83735 ASSAY OF MAGNESIUM: CPT | Performed by: NURSE PRACTITIONER

## 2022-05-11 PROCEDURE — 25000003 PHARM REV CODE 250: Performed by: NURSE PRACTITIONER

## 2022-05-11 PROCEDURE — 80061 LIPID PANEL: CPT | Performed by: NURSE PRACTITIONER

## 2022-05-11 PROCEDURE — 85730 THROMBOPLASTIN TIME PARTIAL: CPT | Mod: 91 | Performed by: STUDENT IN AN ORGANIZED HEALTH CARE EDUCATION/TRAINING PROGRAM

## 2022-05-11 PROCEDURE — 63600175 PHARM REV CODE 636 W HCPCS: Performed by: NURSE PRACTITIONER

## 2022-05-11 PROCEDURE — 36415 COLL VENOUS BLD VENIPUNCTURE: CPT | Performed by: STUDENT IN AN ORGANIZED HEALTH CARE EDUCATION/TRAINING PROGRAM

## 2022-05-11 PROCEDURE — 85610 PROTHROMBIN TIME: CPT | Performed by: NURSE PRACTITIONER

## 2022-05-11 PROCEDURE — 81000 URINALYSIS NONAUTO W/SCOPE: CPT | Performed by: NURSE PRACTITIONER

## 2022-05-11 PROCEDURE — 87086 URINE CULTURE/COLONY COUNT: CPT | Performed by: NURSE PRACTITIONER

## 2022-05-11 PROCEDURE — 84484 ASSAY OF TROPONIN QUANT: CPT | Mod: 91 | Performed by: NURSE PRACTITIONER

## 2022-05-11 PROCEDURE — 84484 ASSAY OF TROPONIN QUANT: CPT | Mod: 91 | Performed by: STUDENT IN AN ORGANIZED HEALTH CARE EDUCATION/TRAINING PROGRAM

## 2022-05-11 PROCEDURE — 85025 COMPLETE CBC W/AUTO DIFF WBC: CPT | Performed by: NURSE PRACTITIONER

## 2022-05-11 PROCEDURE — 96361 HYDRATE IV INFUSION ADD-ON: CPT

## 2022-05-11 PROCEDURE — 87186 SC STD MICRODIL/AGAR DIL: CPT | Performed by: NURSE PRACTITIONER

## 2022-05-11 PROCEDURE — 63600175 PHARM REV CODE 636 W HCPCS: Performed by: STUDENT IN AN ORGANIZED HEALTH CARE EDUCATION/TRAINING PROGRAM

## 2022-05-11 PROCEDURE — 85730 THROMBOPLASTIN TIME PARTIAL: CPT | Performed by: NURSE PRACTITIONER

## 2022-05-11 PROCEDURE — 99222 1ST HOSP IP/OBS MODERATE 55: CPT | Mod: ,,, | Performed by: INTERNAL MEDICINE

## 2022-05-11 PROCEDURE — 21400001 HC TELEMETRY ROOM

## 2022-05-11 PROCEDURE — 25000003 PHARM REV CODE 250: Performed by: STUDENT IN AN ORGANIZED HEALTH CARE EDUCATION/TRAINING PROGRAM

## 2022-05-11 PROCEDURE — 80048 BASIC METABOLIC PNL TOTAL CA: CPT | Performed by: NURSE PRACTITIONER

## 2022-05-11 RX ORDER — CALCIUM CARBONATE 200(500)MG
1000 TABLET,CHEWABLE ORAL 2 TIMES DAILY
Status: DISCONTINUED | OUTPATIENT
Start: 2022-05-11 | End: 2022-05-12 | Stop reason: HOSPADM

## 2022-05-11 RX ORDER — HEPARIN SODIUM 10000 [USP'U]/100ML
17 INJECTION, SOLUTION INTRAVENOUS CONTINUOUS
Status: DISCONTINUED | OUTPATIENT
Start: 2022-05-11 | End: 2022-05-11

## 2022-05-11 RX ORDER — MAGNESIUM SULFATE HEPTAHYDRATE 40 MG/ML
2 INJECTION, SOLUTION INTRAVENOUS ONCE
Status: COMPLETED | OUTPATIENT
Start: 2022-05-11 | End: 2022-05-11

## 2022-05-11 RX ORDER — HEPARIN SODIUM,PORCINE/D5W 25000/250
0-40 INTRAVENOUS SOLUTION INTRAVENOUS CONTINUOUS
Status: DISCONTINUED | OUTPATIENT
Start: 2022-05-11 | End: 2022-05-11

## 2022-05-11 RX ADMIN — POTASSIUM PHOSPHATE, MONOBASIC 500 MG: 500 TABLET, SOLUBLE ORAL at 09:05

## 2022-05-11 RX ADMIN — CLOPIDOGREL 75 MG: 75 TABLET, FILM COATED ORAL at 09:05

## 2022-05-11 RX ADMIN — HEPARIN SODIUM 12 UNITS/KG/HR: 5000 INJECTION, SOLUTION INTRAVENOUS; SUBCUTANEOUS at 04:05

## 2022-05-11 RX ADMIN — SODIUM CHLORIDE: 0.9 INJECTION, SOLUTION INTRAVENOUS at 11:05

## 2022-05-11 RX ADMIN — ASPIRIN 81 MG CHEWABLE TABLET 81 MG: 81 TABLET CHEWABLE at 09:05

## 2022-05-11 RX ADMIN — ATORVASTATIN CALCIUM 80 MG: 40 TABLET, FILM COATED ORAL at 08:05

## 2022-05-11 RX ADMIN — CALCIUM CARBONATE (ANTACID) CHEW TAB 500 MG 1000 MG: 500 CHEW TAB at 09:05

## 2022-05-11 RX ADMIN — FINASTERIDE 5 MG: 5 TABLET, FILM COATED ORAL at 09:05

## 2022-05-11 RX ADMIN — CALCIUM CARBONATE (ANTACID) CHEW TAB 500 MG 1000 MG: 500 CHEW TAB at 08:05

## 2022-05-11 RX ADMIN — MAGNESIUM SULFATE 2 G: 2 INJECTION INTRAVENOUS at 09:05

## 2022-05-11 RX ADMIN — SODIUM CHLORIDE: 0.9 INJECTION, SOLUTION INTRAVENOUS at 01:05

## 2022-05-11 RX ADMIN — CEFTRIAXONE 1 G: 1 INJECTION, SOLUTION INTRAVENOUS at 04:05

## 2022-05-11 NOTE — ED PROVIDER NOTES
Encounter Date: 5/10/2022       History     Chief Complaint   Patient presents with    Shoulder Pain     Pt. Arrived via EMS from nursing facility with c/o left shoulder pain, he has previous Hx of CVA with Right sided hemiparesis.  Pt. Denies any other complaint at time of triage and does speak with slurred speech.  Is at his baseline per EMS by way of facility.      HPI   This 71-year-old male presents to the emergency room with a history of a stroke with dense right-sided hemiparesis and a severe speech deficit.  Patient is maintained on a baby aspirin.  The patient complains of chronic pain in his right shoulder.  This is a chronic ongoing problem there is no history of new trauma or fever.  The patient is paralyzed in the right upper extremity.  The patient also complains that when he is in his chair he is starting to slouch to the left.  He identifies that this has been going on for a couple of weeks off and on.  The family has concern for TIA.  There is been no complaint of weakness in the left arm or left leg or any new speech deficit.  Review of patient's allergies indicates:  No Known Allergies  Past Medical History:   Diagnosis Date    BPH (benign prostatic hyperplasia)     Dysarthria     HLD (hyperlipidemia)     Hypertension     Other and unspecified hyperlipidemia     Stroke      Past Surgical History:   Procedure Laterality Date    CATARACT EXTRACTION W/ INTRAOCULAR LENS  IMPLANT, BILATERAL       Family History   Problem Relation Age of Onset    Hypertension Mother     Seizures Mother     Stroke Mother     Hypertension Father     Dementia Father      Social History     Tobacco Use    Smoking status: Former Smoker    Smokeless tobacco: Never Used   Substance Use Topics    Alcohol use: Yes     Comment: rare, once a week or less    Drug use: No     Review of Systems  The patient was questioned specifically with regard to the following.  General: Fever, chills, sweats. Neuro: Headache.  Eyes: eye problems. ENT: Ear pain, sore throat. Cardiovascular: Chest pain. Respiratory: Cough, shortness of breath. Gastrointestinal: Abdominal pain, vomiting, diarrhea. Genitourinary: Painful urination.  Musculoskeletal: Arm and leg problems. Skin: Rash.  The review of systems was negative except for the following:  Chronic right shoulder pain, slumping to the left in his chair  Physical Exam     Initial Vitals [05/10/22 2033]   BP Pulse Resp Temp SpO2   94/60 95 20 97.9 °F (36.6 °C) 98 %      MAP       --         Physical Exam  The patient was examined specifically for the following:   General:No significant distress, Good color, Warm and dry. Head and neck:Scalp atraumatic, Neck supple. Neurological:Appropriate conversation, Gross motor deficits. Eyes:Conjugate gaze, Clear corneas. ENT: No epistaxis. Cardiac: Regular rate and rhythm, Grossly normal heart tones. Pulmonary: Wheezing, Rales. Gastrointestinal: Abdominal tenderness, Abdominal distention. Musculoskeletal: Extremity deformity, Apparent pain with range of motion of the joints. Skin: Rash.   The findings on examination were normal except for the following:  The patient has a profound speech deficit.  He has severe aphasia.  He has right-sided hemiparesis.  He has good motor function in the left arm left leg.  He is attentive with a conjugate gaze.  He participates in conversation.  The patient's blood pressure is 94/60 the heart rate is 95.  Lungs are clear and free of wheezing rales rubs or rhonchi.  Heart tones are normal.  The patient has regular rate and rhythm.  ED Course   Critical Care    Date/Time: 5/24/2022 4:20 PM  Performed by: Bernardo Wild MD  Authorized by: Isaac Aguilar MD   Direct patient critical care time: 22 minutes  Additional history critical care time: 11 minutes  Ordering / reviewing critical care time: 11 minutes  Documentation critical care time: 11 minutes  Consulting other physicians critical care time: 11 minutes  Total  critical care time (exclusive of procedural time) : 66 minutes  Critical care time was exclusive of separately billable procedures and treating other patients and teaching time.  Critical care was necessary to treat or prevent imminent or life-threatening deterioration of the following conditions: renal failure.  Critical care was time spent personally by me on the following activities: evaluation of patient's response to treatment, obtaining history from patient or surrogate, ordering and review of laboratory studies, review of old charts, pulse oximetry, examination of patient, discussions with primary provider, development of treatment plan with patient or surrogate, ordering and performing treatments and interventions, ordering and review of radiographic studies and re-evaluation of patient's condition.        Labs Reviewed   COMPREHENSIVE METABOLIC PANEL - Abnormal; Notable for the following components:       Result Value    Glucose 128 (*)     BUN 71 (*)     Creatinine 2.7 (*)     eGFR if  26 (*)     eGFR if non  23 (*)     All other components within normal limits   CBC W/ AUTO DIFFERENTIAL - Abnormal; Notable for the following components:    Hemoglobin 12.0 (*)     Hematocrit 38.4 (*)     MCV 79 (*)     MCH 24.5 (*)     MCHC 31.3 (*)     RDW 16.1 (*)     All other components within normal limits   URINALYSIS, REFLEX TO URINE CULTURE - Abnormal; Notable for the following components:    Appearance, UA Hazy (*)     Leukocytes, UA 3+ (*)     All other components within normal limits    Narrative:     Specimen Source->Urine   TROPONIN I - Abnormal; Notable for the following components:    Troponin I 0.326 (*)     All other components within normal limits   B-TYPE NATRIURETIC PEPTIDE - Abnormal; Notable for the following components:     (*)     All other components within normal limits   CBC W/ AUTO DIFFERENTIAL - Abnormal; Notable for the following components:    RBC 3.52  (*)     Hemoglobin 8.7 (*)     Hematocrit 27.9 (*)     MCV 79 (*)     MCH 24.7 (*)     MCHC 31.2 (*)     RDW 15.8 (*)     Platelets 147 (*)     All other components within normal limits   URINALYSIS MICROSCOPIC - Abnormal; Notable for the following components:    RBC, UA 13 (*)     WBC, UA 50 (*)     Bacteria Few (*)     Hyaline Casts, UA 4 (*)     All other components within normal limits    Narrative:     Specimen Source->Urine   CBC W/ AUTO DIFFERENTIAL - Abnormal; Notable for the following components:    Hemoglobin 11.0 (*)     Hematocrit 35.9 (*)     MCV 78 (*)     MCH 23.9 (*)     MCHC 30.6 (*)     RDW 15.9 (*)     All other components within normal limits    Narrative:     (if patient is on warfarin prior to heparin therapy)   SARS-COV-2 RDRP GENE - Normal   B-TYPE NATRIURETIC PEPTIDE   APTT    Narrative:     (if patient is on warfarin prior to heparin therapy)   PROTIME-INR    Narrative:     (if patient is on warfarin prior to heparin therapy)     EKG Readings: (Independently Interpreted)   This patient is in a sinus rhythm with a heart rate of 99. There are nonspecific T-wave changes.  There are low voltage QRS complexes.  There is no definite evidence of acute myocardial infarction or malignant arrhythmia.     ECG Results          EKG 12-lead (Final result)  Result time 05/12/22 15:52:54    Final result by Interface, Lab In [facility] (05/12/22 15:52:54)                 Narrative:    Test Reason : R77.8,    Vent. Rate : 099 BPM     Atrial Rate : 100 BPM     P-R Int : 000 ms          QRS Dur : 074 ms      QT Int : 350 ms       P-R-T Axes : 000 042 -19 degrees     QTc Int : 449 ms    Sinus tachcyardia  Low voltage QRS  Nonspecific T wave abnormality  Abnormal ECG  When compared with ECG of 31-AUG-2019 22:01,  No significant change was found    Confirmed by Deepak Roberto MD ([id number]) on 5/12/2022 3:52:42 PM    Referred By: SIDNEYERR   SELF           Confirmed By:Deepak Roberto MD                            Imaging  Results    None       Medical decision making:  Given the above this patient basically complains of chronic right shoulder pain not worse or different than usual.  There is no clinical evidence of trauma or infection.  The patient also complains that he has slumping to the side in his chair.  The family asked for laboratory evaluation patient is discovered to have a new BUN of 71 with a creatinine of 2.7 up from a baseline of 1.1.  The patient denies painful urination vomiting or diarrhea I will admit him for gentle rehydration he has a history of congestive heart failure with an ejection fraction of 55% and grade 2 diastolic dysfunction.  I find no clinical evidence of infection and I doubt sepsis the patient does have a borderline low blood pressure 94/60 with a heart rate of 95. No new neurologic deficits are identified on the physical exam.  There is no clinical evidence of infection or trauma.       Medications   sodium chloride 0.9% bolus 500 mL (0 mLs Intravenous Stopped 5/10/22 2241)   heparin 25,000 units in dextrose 5% (100 units/ml) IV bolus from bag INITIAL BOLUS (max bolus 4000 units) (4,000 Units Intravenous Bolus from Bag 5/11/22 9616)   magnesium sulfate 2g in water 50mL IVPB (premix) (0 g Intravenous Stopped 5/11/22 1135)                          Clinical Impression:   Final diagnoses:  [N17.9] SHAYY (acute kidney injury) (Primary)  [E86.0] Dehydration  [G81.91] Right hemiparesis  [I69.320] Aphasia as late effect of stroke          ED Disposition Condition    Admit               Bernardo Wild MD  05/10/22 2242       Bernardo Wild MD  05/10/22 2244       Bernardo Wild MD  05/24/22 7353

## 2022-05-11 NOTE — HPI
Per ED:   71-year-old male presents to the emergency room with a history of a stroke with dense right-sided hemiparesis and a severe speech deficit.  Patient is maintained on a baby aspirin.  The patient complains of chronic pain in his right shoulder.  This is a chronic ongoing problem there is no history of new trauma or fever.  The patient is paralyzed in the right upper extremity.  The patient also complains that when he is in his chair he is starting to slouch to the left.  He identifies that this has been going on for a couple of weeks off and on.  The family has concern for TIA.         Patient seen and evaluated.... unable to give any history and family member not at bedside.

## 2022-05-11 NOTE — ASSESSMENT & PLAN NOTE
- unclear if truly infection, denies any urinary symptoms  - started on rocephin but can de-escalate if no growth  Awaiting urine culture

## 2022-05-11 NOTE — ASSESSMENT & PLAN NOTE
Consult cardiologist  Started on heparin drip  Trend troponin's 0.326  Echo in am  continuous tele monitoring  EKG as needed   Already on ASA and plavix  Oxygen as needed

## 2022-05-11 NOTE — PLAN OF CARE
[facility] - Telemetry  Initial Discharge Assessment       Primary Care Provider: Helio Farr MD    Admission Diagnosis: Dehydration [E86.0]  Elevated troponin [R77.8]  Right hemiparesis [G81.91]  Aphasia as late effect of stroke [I69.320]  SHAYY (acute kidney injury) [N17.9]  Chest pain [R07.9]    Admission Date: 5/10/2022  Expected Discharge Date:     Discharge Barriers Identified: None    Payor: [facility] MCARE / Plan: [employer] MEDICARE ADVANTAGE / Product Type: Medicare Advantage /     Extended Emergency Contact Information  Primary Emergency Contact: Idalia Cornell  Address: [address]           [place] [address] [facility]  Home Phone: [phone]  Mobile Phone: [phone]  Relation: Sister  Secondary Emergency Contact: [place]aurab[place] [address] [facility]  Home Phone: [phone]  Work Phone: [phone]  Mobile Phone: [phone]  Relation: Son    Discharge Plan A: Independent living facility ([facility])  Discharge Plan B: Other (TBD)      [id number] - [place], [place] - [address] AT [facility] [address]  [address]  [facility] [address]  Phone: [phone] Fax: [phone]      Initial Assessment (most recent)       Adult Discharge Assessment - 05/11/22 1245          Discharge Assessment    Assessment Type Discharge Planning Assessment     Confirmed/corrected address, phone number and insurance Yes     Confirmed Demographics Correct on Facesheet     Source of Information family     If unable to respond/provide information was family/caregiver contacted? No Contact Information Available     When was your last doctors appointment? --   Patient's sister stated March of this year.    Communicated THAIS with patient/caregiver Date not available/Unable to determine     Reason For Admission Dehydration     Lives With alone     Facility Arrived From: [facility]     Do you expect to return to your current living situation? Yes     Do you have help at home or someone to help you manage your care at home? Yes     Who are your caregiver(s) and their phone number(s)? [facility]     Equipment Currently Used at Home wheelchair;other (see comments)   Power wheelchair    Readmission within 30 days? No     Patient currently being followed by outpatient case management? No     Do you currently have service(s) that help you manage your care at home? No     Do you take prescription medications? Yes     Do you have prescription coverage? Yes     Coverage [employer] Medicare Advantage Plan     Do you have any problems affording any of your prescribed medications? No     Is the patient taking medications as prescribed? yes     Who is going to help you get home at discharge? Idalia Cornell (sister) [phone]     How do you get to doctors appointments? family or friend will provide     Are you on dialysis? No     Do you take coumadin? No     Discharge Plan A Independent living facility   [facility]    Discharge Plan B Other   TBD    DME Needed Upon Discharge  other (see comments)   TBD    Discharge Plan discussed with: Sibling;Patient     Name(s) and Number(s) Iadlia Cornell (sister) [phone]     Discharge Barriers Identified None                        Patient prefers morning appointments. Patient gets assistance with ADLs from living assistance medical assistance, and living assistance nurse gives out patient's medication.

## 2022-05-11 NOTE — NURSING
Pt received from ER via bed with 1 Transport . Heparin Drip hanging infusing at 12cc/hr . NS hanging infusing at 50ml/hr. Pt is awake and alert . Speech is slurred . Pt says his weak side is his right side .Telemetry Monitor placed on pt . Condom Cath intact to [facility] . Pt denies chest pain or SOB . All safety measures in use BP 92/58 ( P) 88 ( R ) 18 SATS 99% ( T) 98.1

## 2022-05-11 NOTE — PROGRESS NOTES
[facility] Medicine  Progress Note    Patient Name: Alexsander Tafoya  MRN: [id number]  Patient Class: IP- Inpatient   Admission Date: 5/10/2022  Length of Stay: 0 days  Attending Physician: Isaac Aguilar MD  Primary Care Provider: Helio Farr MD        Subjective:     Principal Problem:NSTEMI (non-ST elevated myocardial infarction)        HPI:  Per ED:   71-year-old male presents to the emergency room with a history of a stroke with dense right-sided hemiparesis and a severe speech deficit.  Patient is maintained on a baby aspirin.  The patient complains of chronic pain in his right shoulder.  This is a chronic ongoing problem there is no history of new trauma or fever.  The patient is paralyzed in the right upper extremity.  The patient also complains that when he is in his chair he is starting to slouch to the left.  He identifies that this has been going on for a couple of weeks off and on.  The family has concern for TIA.         Patient seen and evaluated.... unable to give any history and family member not at bedside.       Overview/Hospital Course:  Admitted for concerns for NSTEMI and dehydration. Found to have SHAYY, started on heparin and IVF. SHAYY resolved, also noted multiple electrolyte abnormalities now being replaced. Cardiology following.      Interval History: feeling okay today, family at bedside and updated. Still having right shoulder pain that is chronic. No shortness of breath.    Review of Systems   Constitutional:  Negative for chills and fever.   HENT:  Negative for rhinorrhea.    Cardiovascular:  Negative for chest pain and leg swelling.   Gastrointestinal:  Negative for abdominal pain.   Musculoskeletal:  Positive for arthralgias.   Psychiatric/Behavioral:  Negative for agitation.    Objective:     Vital Signs (Most Recent):  Temp: 97.6 °F (36.4 °C) (05/11/22 1058)  Pulse: 85 (05/11/22 1058)  Resp: 18 (05/11/22 1058)  BP: 95/63 (05/11/22 1058)  SpO2: 98 % (05/11/22  1058) Vital Signs (24h Range):  Temp:  [97.6 °F (36.4 °C)-98.1 °F (36.7 °C)] 97.6 °F (36.4 °C)  Pulse:  [] 85  Resp:  [15-21] 18  SpO2:  [95 %-99 %] 98 %  BP: ()/(53-75) 95/63     Weight: 90 kg (198 lb 6.6 oz)  Body mass index is 30.17 kg/m².    Intake/Output Summary (Last 24 hours) at 5/11/2022 1428  Last data filed at 5/11/2022 0935  Gross per 24 hour   Intake --   Output 1150 ml   Net -1150 ml      Physical Exam  Vitals and nursing note reviewed.   Constitutional:       General: He is not in acute distress.     Appearance: He is ill-appearing.   HENT:      Head: Normocephalic.   Cardiovascular:      Rate and Rhythm: Normal rate and regular rhythm.      Pulses: Normal pulses.      Heart sounds: Normal heart sounds.   Pulmonary:      Effort: Pulmonary effort is normal. No respiratory distress.      Breath sounds: Normal breath sounds. No wheezing.   Abdominal:      General: Bowel sounds are normal. There is no distension.      Tenderness: There is no abdominal tenderness.   Musculoskeletal:         General: No swelling.   Skin:     General: Skin is warm and dry.   Neurological:      Mental Status: He is alert. Mental status is at baseline.      Comments: Slurred speech and with some aphasia but appears baseline, also with right side hemiparesis       Significant Labs: All pertinent labs within the past 24 hours have been reviewed.    Significant Imaging: I have reviewed all pertinent imaging results/findings within the past 24 hours.      Assessment/Plan:      * NSTEMI (non-ST elevated myocardial infarction)  - Presents with right shoulder pain, denies any chest pain  - troponin found to be elevated and trending up  - started on heparin drip  - Cardiology consulted - ok to stop heparin   - pending echo - known hx of EF 15%, has not had ischemic work up yet  - continue on telemetry  - continue home asa/plavix    Abnormal urinalysis  - unclear if truly infection, denies any urinary symptoms  - started on  rocephin but can de-escalate if no growth  Awaiting urine culture        SHAYY (acute kidney injury)  Labs reviewed- Renal function/electrolytes with Estimated Creatinine Clearance: 52.7 mL/min (based on SCr of 1.4 mg/dL). according to latest data. Monitor urine output and serial BMP and adjust therapy as needed. Avoid nephrotoxins and renally dose meds for GFR listed above.   Gentle IVF hydration  Held Ace inhibitor and diuretics  - now resolved        BPH (benign prostatic hyperplasia)  Resume home meds  Held cardura      Hyperlipidemia  Resume home statin      Essential hypertension  BP on lower side...Held cardura   Beta blocker with hold parameters   Monitor       Aphasia as late effect of cerebrovascular accident  See cva     Hemiplegia affecting right side in right-dominant patient as late effect of cerebrovascular disease  No acute issues.  Supportive care.  No evidence of acute stroke at this time  Resume home ASA and plavix           VTE Risk Mitigation (From admission, onward)         Ordered     IP VTE HIGH RISK PATIENT  Once         05/10/22 2305     Place sequential compression device  Until discontinued         05/10/22 2305                Discharge Planning   THAIS:      Code Status: Full Code   Is the patient medically ready for discharge?:     Reason for patient still in hospital (select all that apply): Treatment  Discharge Plan A: Independent living facility ([facility])                  Isaac Aguilar MD  Department of Hospital Medicine   [facility]

## 2022-05-11 NOTE — SUBJECTIVE & OBJECTIVE
Interval History: feeling okay today, family at bedside and updated. Still having right shoulder pain that is chronic. No shortness of breath.    Review of Systems   Constitutional:  Negative for chills and fever.   HENT:  Negative for rhinorrhea.    Cardiovascular:  Negative for chest pain and leg swelling.   Gastrointestinal:  Negative for abdominal pain.   Musculoskeletal:  Positive for arthralgias.   Psychiatric/Behavioral:  Negative for agitation.    Objective:     Vital Signs (Most Recent):  Temp: 97.6 °F (36.4 °C) (05/11/22 1058)  Pulse: 85 (05/11/22 1058)  Resp: 18 (05/11/22 1058)  BP: 95/63 (05/11/22 1058)  SpO2: 98 % (05/11/22 1058) Vital Signs (24h Range):  Temp:  [97.6 °F (36.4 °C)-98.1 °F (36.7 °C)] 97.6 °F (36.4 °C)  Pulse:  [] 85  Resp:  [15-21] 18  SpO2:  [95 %-99 %] 98 %  BP: ()/(53-75) 95/63     Weight: 90 kg (198 lb 6.6 oz)  Body mass index is 30.17 kg/m².    Intake/Output Summary (Last 24 hours) at 5/11/2022 1428  Last data filed at 5/11/2022 0935  Gross per 24 hour   Intake --   Output 1150 ml   Net -1150 ml      Physical Exam  Vitals and nursing note reviewed.   Constitutional:       General: He is not in acute distress.     Appearance: He is ill-appearing.   HENT:      Head: Normocephalic.   Cardiovascular:      Rate and Rhythm: Normal rate and regular rhythm.      Pulses: Normal pulses.      Heart sounds: Normal heart sounds.   Pulmonary:      Effort: Pulmonary effort is normal. No respiratory distress.      Breath sounds: Normal breath sounds. No wheezing.   Abdominal:      General: Bowel sounds are normal. There is no distension.      Tenderness: There is no abdominal tenderness.   Musculoskeletal:         General: No swelling.   Skin:     General: Skin is warm and dry.   Neurological:      Mental Status: He is alert. Mental status is at baseline.      Comments: Slurred speech and with some aphasia but appears baseline, also with right side hemiparesis       Significant Labs:  All pertinent labs within the past 24 hours have been reviewed.    Significant Imaging: I have reviewed all pertinent imaging results/findings within the past 24 hours.

## 2022-05-11 NOTE — ASSESSMENT & PLAN NOTE
Important Message From Medicare ([facility])- 2nd copy of pages 1 & 2, explained/reviewed with patient prior to discharge.  Patient/representative verbalized understanding and signature obtained.  Copies placed in file for medical records.    See cva

## 2022-05-11 NOTE — HPI
Alexsander Tafoya is a 70yo male who presents with complaints of right shoulder pain according to notes. We are asked to see patient secondary to an abnormal troponin. Cr 2.7 now 1.4. Recent echocardiogram from  showed an EF of 15%. Previous echo that we have is from 2019 showed an EF of 55% at that time. During that admission he had a troponin of 0.3. No desire for invasive procedures at that time. Opted for medical management. He has R hemiparesis. Prior stroke. EKG shows sinus rhythm. Low voltage. Non specific t wave abnormality.    Echocardiogram 3/28/22:      CONCLUSIONS     Normal left ventricular cavity size.     Severely decreased left ventricular systolic function.     Left ventricular ejection fraction is estimated at 15%.     Grade II/IV diastolic dysfunction, moderately elevated filling pressures.     Normal right ventricular systolic function.     Mildly increased left atrial size.     Structurally normal trileaflet aortic valve.     Echocardiogram 9/1/19:    Normal left ventricular systolic function. The estimated ejection fraction is 55%  Concentric left ventricular hypertrophy.  Grade II (moderate) left ventricular diastolic dysfunction consistent with pseudonormalization.  Mild left atrial enlargement.  Normal right ventricular systolic function.

## 2022-05-11 NOTE — HOSPITAL COURSE
Admitted for concerns for NSTEMI and dehydration. Found to have SHAYY, started on heparin and IVF. SHAYY resolved, also noted multiple electrolyte abnormalities now being replaced. Cardiology following. Troponin trend was flat and patient had no chest pain. Repeat echocardiogram actually showed significant improvement in EF to 55%. Electrolytes returned to baseline. He was resumed on his home medications. Lasix was decreased to 40 mg daily. Discussed with patient and family, stable for discharge home. Patient has close follow up with his Cardiologist the following week. He was discharged home in stable condition to continue meds and make sure he stays hydrated.

## 2022-05-11 NOTE — ASSESSMENT & PLAN NOTE
- Presents with right shoulder pain, denies any chest pain  - troponin found to be elevated and trending up  - started on heparin drip  - Cardiology consulted - ok to stop heparin   - pending echo - known hx of EF 15%, has not had ischemic work up yet  - continue on telemetry  - continue home asa/plavix

## 2022-05-11 NOTE — ASSESSMENT & PLAN NOTE
Labs reviewed- Renal function/electrolytes with Estimated Creatinine Clearance: 52.7 mL/min (based on SCr of 1.4 mg/dL). according to latest data. Monitor urine output and serial BMP and adjust therapy as needed. Avoid nephrotoxins and renally dose meds for GFR listed above.   Gentle IVF hydration  Held Ace inhibitor and diuretics  - now resolved

## 2022-05-11 NOTE — ASSESSMENT & PLAN NOTE
05/11/2022:  Flat troponins in light of acute kidney injury.  EKG shows no acute ischemic changes.  Follow-up echocardiogram today.  Most recent echocardiogram showed ejection fraction of 15%.  Prior echocardiogram from 2019 showed ejection fraction 55%.  No known ischemic evaluation.  Is on aspirin/Plavix secondary to history of stroke.  Continue.

## 2022-05-11 NOTE — SUBJECTIVE & OBJECTIVE
Past Medical History:   Diagnosis Date    BPH (benign prostatic hyperplasia)     Dysarthria     HLD (hyperlipidemia)     Hypertension     Other and unspecified hyperlipidemia     Stroke        Past Surgical History:   Procedure Laterality Date    CATARACT EXTRACTION W/ INTRAOCULAR LENS  IMPLANT, BILATERAL         Review of patient's allergies indicates:  No Known Allergies    No current facility-administered medications on file prior to encounter.     Current Outpatient Medications on File Prior to Encounter   Medication Sig    aspirin 81 MG Chew Take 1 tablet (81 mg total) by mouth once daily.    clopidogrel (PLAVIX) 75 mg tablet Take 1 tablet (75 mg total) by mouth once daily.    doxazosin (CARDURA) 2 MG tablet Take 1 tablet (2 mg total) by mouth every evening. HOLD UNTIL TOLD TO RESUME BY PHYSICIAN    enalapril (VASOTEC) 5 MG tablet Take 5 mg by mouth once daily.    finasteride (PROSCAR) 5 mg tablet Take 5 mg by mouth once daily.     fish oil-omega-3 fatty acids 300-1,000 mg capsule Take 1 capsule by mouth once daily.    furosemide (LASIX) 40 MG tablet Take 40 mg by mouth 2 (two) times daily.    metoprolol tartrate (LOPRESSOR) 50 MG tablet Take 0.5 tablets (25 mg total) by mouth once daily. HOLD UNTIL TOLD TO RESUME BY PHYSICIAN    multivitamin (THERAGRAN) per tablet Take 1 tablet by mouth once daily.    potassium chloride (MICRO-K) 10 MEQ CpSR Take 10 mEq by mouth once daily.    rosuvastatin (CRESTOR) 20 MG tablet Take 1 tablet (20 mg total) by mouth every evening. HOLD UNTIL TOLD TO RESUME BY PHYSICIAN     Family History       Problem Relation (Age of Onset)    Dementia Father    Hypertension Mother, Father    Seizures Mother    Stroke Mother          Tobacco Use    Smoking status: Former Smoker    Smokeless tobacco: Never Used   Substance and Sexual Activity    Alcohol use: Yes     Comment: rare, once a week or less    Drug use: No    Sexual activity: Never     Review of Systems   Constitutional: Negative for  diaphoresis and malaise/fatigue.   Cardiovascular:  Negative for chest pain, dyspnea on exertion, irregular heartbeat, leg swelling, near-syncope, orthopnea, palpitations, paroxysmal nocturnal dyspnea and syncope.   Respiratory:  Negative for shortness of breath, sputum production and wheezing.    Musculoskeletal:  Positive for joint pain.   Gastrointestinal:  Negative for abdominal pain, heartburn, hematemesis and melena.   Neurological:  Positive for disturbances in coordination and focal weakness. Negative for dizziness, light-headedness, numbness, paresthesias, seizures and weakness.   Objective:     Vital Signs (Most Recent):  Temp: 98.1 °F (36.7 °C) (05/11/22 0752)  Pulse: 88 (05/11/22 0752)  Resp: 18 (05/11/22 0752)  BP: (!) 92/54 (05/11/22 0752)  SpO2: 99 % (05/11/22 0752)   Vital Signs (24h Range):  Temp:  [97.9 °F (36.6 °C)-98.1 °F (36.7 °C)] 98.1 °F (36.7 °C)  Pulse:  [] 88  Resp:  [15-21] 18  SpO2:  [95 %-99 %] 99 %  BP: ()/(53-75) 92/54     Weight: 90 kg (198 lb 6.6 oz)  Body mass index is 30.17 kg/m².    SpO2: 99 %  O2 Device (Oxygen Therapy): room air      Intake/Output Summary (Last 24 hours) at 5/11/2022 1050  Last data filed at 5/11/2022 0935  Gross per 24 hour   Intake --   Output 1150 ml   Net -1150 ml       Lines/Drains/Airways       Peripheral Intravenous Line  Duration                  Peripheral IV - Single Lumen 05/10/22 2020 22 G Anterior;Left Hand <1 day         Peripheral IV - Single Lumen 05/10/22 2108 20 G Left Antecubital <1 day                    Physical Exam  Vitals reviewed.   Constitutional:       General: He is not in acute distress.     Appearance: He is not diaphoretic.   Neck:      Vascular: No carotid bruit or JVD.   Cardiovascular:      Rate and Rhythm: Normal rate and regular rhythm.      Pulses: Normal pulses.   Pulmonary:      Effort: Pulmonary effort is normal.      Breath sounds: Normal breath sounds.   Abdominal:      General: Bowel sounds are normal.       Palpations: Abdomen is soft.      Tenderness: There is no abdominal tenderness.   Musculoskeletal:      Right lower leg: No edema.      Left lower leg: No edema.   Neurological:      Mental Status: He is alert.      Motor: Weakness present.   Psychiatric:         Speech: Speech is delayed and slurred.         Behavior: Behavior is slowed.       Significant Labs: CMP   Recent Labs   Lab 05/10/22  2139 05/11/22  0607    141   K 4.8 3.0*    116*   CO2 23 18*   * 118*   BUN 71* 53*   CREATININE 2.7* 1.4   CALCIUM 9.7 6.6*   PROT 7.9  --    ALBUMIN 3.8  --    BILITOT 0.4  --    ALKPHOS 76  --    AST 21  --    ALT 24  --    ANIONGAP 12 7*   ESTGFRAFRICA 26* 58*   EGFRNONAA 23* 50*   , CBC   Recent Labs   Lab 05/10/22  2139 05/11/22  0415 05/11/22  0607   WBC 6.26 5.29 3.93   HGB 12.0* 11.0* 8.7*   HCT 38.4* 35.9* 27.9*    205 147*   , Lipid Panel   Recent Labs   Lab 05/11/22  0607   CHOL 71*   HDL 11*   LDLCALC 19.4*   TRIG 203*   CHOLHDL 15.5*   , and Troponin   Recent Labs   Lab 05/11/22  0116 05/11/22  0607 05/11/22  0835   TROPONINI 0.326* 0.319* 0.432*       Significant Imaging: EKG: NSR, low voltage, non specific t wave abnormality

## 2022-05-11 NOTE — CONSULTS
[facility] - Telemetry  Cardiology  Consult Note    Patient Name: Alexsander Tafoya  MRN: [id number]  Admission Date: 5/10/2022  Hospital Length of Stay: 0 days  Code Status: Full Code   Attending Provider: Isaac Aguilar MD   Consulting Provider: Deepak Roberto MD  Primary Care Physician: Helio Farr MD  Principal Problem:NSTEMI (non-ST elevated myocardial infarction)    Patient information was obtained from patient, past medical records and ER records.     Inpatient consult to Cardiology  Consult performed by: Deepak Roberto MD  Consult ordered by: Lucien Trivedi NP        Subjective:     Chief Complaint:  Abnormal troponins     HPI:   Alexsander Tafoya is a 72yo male who presents with complaints of right shoulder pain according to notes. We are asked to see patient secondary to an abnormal troponin. Cr 2.7 now 1.4. Recent echocardiogram from  showed an EF of 15%. Previous echo that we have is from 2019 showed an EF of 55% at that time. During that admission he had a troponin of 0.3. No desire for invasive procedures at that time. Opted for medical management. He has R hemiparesis. Prior stroke. EKG shows sinus rhythm. Low voltage. Non specific t wave abnormality.    Echocardiogram 3/28/22:      CONCLUSIONS     Normal left ventricular cavity size.     Severely decreased left ventricular systolic function.     Left ventricular ejection fraction is estimated at 15%.     Grade II/IV diastolic dysfunction, moderately elevated filling pressures.     Normal right ventricular systolic function.     Mildly increased left atrial size.     Structurally normal trileaflet aortic valve.     Echocardiogram 9/1/19:    · Normal left ventricular systolic function. The estimated ejection fraction is 55%  · Concentric left ventricular hypertrophy.  · Grade II (moderate) left ventricular diastolic dysfunction consistent with pseudonormalization.  · Mild left atrial enlargement.  · Normal right ventricular systolic  function.        Past Medical History:   Diagnosis Date    BPH (benign prostatic hyperplasia)     Dysarthria     HLD (hyperlipidemia)     Hypertension     Other and unspecified hyperlipidemia     Stroke        Past Surgical History:   Procedure Laterality Date    CATARACT EXTRACTION W/ INTRAOCULAR LENS  IMPLANT, BILATERAL         Review of patient's allergies indicates:  No Known Allergies    No current facility-administered medications on file prior to encounter.     Current Outpatient Medications on File Prior to Encounter   Medication Sig    aspirin 81 MG Chew Take 1 tablet (81 mg total) by mouth once daily.    clopidogrel (PLAVIX) 75 mg tablet Take 1 tablet (75 mg total) by mouth once daily.    doxazosin (CARDURA) 2 MG tablet Take 1 tablet (2 mg total) by mouth every evening. HOLD UNTIL TOLD TO RESUME BY PHYSICIAN    enalapril (VASOTEC) 5 MG tablet Take 5 mg by mouth once daily.    finasteride (PROSCAR) 5 mg tablet Take 5 mg by mouth once daily.     fish oil-omega-3 fatty acids 300-1,000 mg capsule Take 1 capsule by mouth once daily.    furosemide (LASIX) 40 MG tablet Take 40 mg by mouth 2 (two) times daily.    metoprolol tartrate (LOPRESSOR) 50 MG tablet Take 0.5 tablets (25 mg total) by mouth once daily. HOLD UNTIL TOLD TO RESUME BY PHYSICIAN    multivitamin (THERAGRAN) per tablet Take 1 tablet by mouth once daily.    potassium chloride (MICRO-K) 10 MEQ CpSR Take 10 mEq by mouth once daily.    rosuvastatin (CRESTOR) 20 MG tablet Take 1 tablet (20 mg total) by mouth every evening. HOLD UNTIL TOLD TO RESUME BY PHYSICIAN     Family History       Problem Relation (Age of Onset)    Dementia Father    Hypertension Mother, Father    Seizures Mother    Stroke Mother          Tobacco Use    Smoking status: Former Smoker    Smokeless tobacco: Never Used   Substance and Sexual Activity    Alcohol use: Yes     Comment: rare, once a week or less    Drug use: No    Sexual activity: Never     Review of  Systems   Constitutional: Negative for diaphoresis and malaise/fatigue.   Cardiovascular:  Negative for chest pain, dyspnea on exertion, irregular heartbeat, leg swelling, near-syncope, orthopnea, palpitations, paroxysmal nocturnal dyspnea and syncope.   Respiratory:  Negative for shortness of breath, sputum production and wheezing.    Musculoskeletal:  Positive for joint pain.   Gastrointestinal:  Negative for abdominal pain, heartburn, hematemesis and melena.   Neurological:  Positive for disturbances in coordination and focal weakness. Negative for dizziness, light-headedness, numbness, paresthesias, seizures and weakness.   Objective:     Vital Signs (Most Recent):  Temp: 98.1 °F (36.7 °C) (05/11/22 0752)  Pulse: 88 (05/11/22 0752)  Resp: 18 (05/11/22 0752)  BP: (!) 92/54 (05/11/22 0752)  SpO2: 99 % (05/11/22 0752)   Vital Signs (24h Range):  Temp:  [97.9 °F (36.6 °C)-98.1 °F (36.7 °C)] 98.1 °F (36.7 °C)  Pulse:  [] 88  Resp:  [15-21] 18  SpO2:  [95 %-99 %] 99 %  BP: ()/(53-75) 92/54     Weight: 90 kg (198 lb 6.6 oz)  Body mass index is 30.17 kg/m².    SpO2: 99 %  O2 Device (Oxygen Therapy): room air      Intake/Output Summary (Last 24 hours) at 5/11/2022 1050  Last data filed at 5/11/2022 0935  Gross per 24 hour   Intake --   Output 1150 ml   Net -1150 ml       Lines/Drains/Airways       Peripheral Intravenous Line  Duration                  Peripheral IV - Single Lumen 05/10/22 2020 22 G Anterior;Left Hand <1 day         Peripheral IV - Single Lumen 05/10/22 2108 20 G Left Antecubital <1 day                    Physical Exam  Vitals reviewed.   Constitutional:       General: He is not in acute distress.     Appearance: He is not diaphoretic.   Neck:      Vascular: No carotid bruit or JVD.   Cardiovascular:      Rate and Rhythm: Normal rate and regular rhythm.      Pulses: Normal pulses.   Pulmonary:      Effort: Pulmonary effort is normal.      Breath sounds: Normal breath sounds.   Abdominal:       General: Bowel sounds are normal.      Palpations: Abdomen is soft.      Tenderness: There is no abdominal tenderness.   Musculoskeletal:      Right lower leg: No edema.      Left lower leg: No edema.   Neurological:      Mental Status: He is alert.      Motor: Weakness present.   Psychiatric:         Speech: Speech is delayed and slurred.         Behavior: Behavior is slowed.       Significant Labs: CMP   Recent Labs   Lab 05/10/22  2139 05/11/22  0607    141   K 4.8 3.0*    116*   CO2 23 18*   * 118*   BUN 71* 53*   CREATININE 2.7* 1.4   CALCIUM 9.7 6.6*   PROT 7.9  --    ALBUMIN 3.8  --    BILITOT 0.4  --    ALKPHOS 76  --    AST 21  --    ALT 24  --    ANIONGAP 12 7*   ESTGFRAFRICA 26* 58*   EGFRNONAA 23* 50*   , CBC   Recent Labs   Lab 05/10/22  2139 05/11/22  0415 05/11/22  0607   WBC 6.26 5.29 3.93   HGB 12.0* 11.0* 8.7*   HCT 38.4* 35.9* 27.9*    205 147*   , Lipid Panel   Recent Labs   Lab 05/11/22  0607   CHOL 71*   HDL 11*   LDLCALC 19.4*   TRIG 203*   CHOLHDL 15.5*   , and Troponin   Recent Labs   Lab 05/11/22  0116 05/11/22  0607 05/11/22  0835   TROPONINI 0.326* 0.319* 0.432*       Significant Imaging: EKG: NSR, low voltage, non specific t wave abnormality    Assessment and Plan:     * NSTEMI (non-ST elevated myocardial infarction)  05/11/2022:  Flat troponins in light of acute kidney injury.  EKG shows no acute ischemic changes.  Follow-up echocardiogram today.  Most recent echocardiogram showed ejection fraction of 15%.  Prior echocardiogram from 2019 showed ejection fraction 55%.  No known ischemic evaluation.  Is on aspirin/Plavix secondary to history of stroke.  Continue.    Hyperlipidemia  05/11/2022:  Continue statin.    Essential hypertension  05/11/2022:  Monitor.  ACE-inhibitor held in light of acute kidney injury.        VTE Risk Mitigation (From admission, onward)         Ordered     heparin 25,000 units in dextrose 5% 250 mL (100 units/mL) infusion LOW INTENSITY  nomogram - [facility]  Continuous        Question Answer Comment   Heparin Infusion Adjustment (DO NOT MODIFY ANSWER) \\[email or web address]\epic\Images\Pharmacy\HeparinInfusions\heparin LOW INTENSITY nomogram for [facility] [id number].pdf    Begin at (in units/kg/hr) 12        05/11/22 0325     heparin 25,000 units in dextrose 5% (100 units/ml) IV bolus from bag - ADDITIONAL PRN BOLUS - 60 units/kg (max bolus 4000 units)  As needed (PRN)        Question:  Heparin Infusion Adjustment (DO NOT MODIFY ANSWER)  Answer:  \\[email or web address]\epic\Images\Pharmacy\HeparinInfusions\heparin LOW INTENSITY nomogram for [facility] [id number].pdf    05/11/22 0226     heparin 25,000 units in dextrose 5% (100 units/ml) IV bolus from bag - ADDITIONAL PRN BOLUS - 30 units/kg (max bolus 4000 units)  As needed (PRN)        Question:  Heparin Infusion Adjustment (DO NOT MODIFY ANSWER)  Answer:  \\[email or web address]\epic\Images\Pharmacy\HeparinInfusions\heparin LOW INTENSITY nomogram for [facility] [id number].pdf    05/11/22 0226     IP VTE HIGH RISK PATIENT  Once         05/10/22 2305     Place sequential compression device  Until discontinued         05/10/22 2305                Thank you for your consult. I will follow-up with patient. Please contact us if you have any additional questions.    Deepak Roberto MD  Cardiology   [facility] [place] - Telemetry

## 2022-05-11 NOTE — PT/OT/SLP PROGRESS
Physical Therapy      Patient Name:  Alexsander Tafoya   MRN:  [id number]    Patient not seen today secondary to  (Elevated troponin and Heparin drip). Will follow-up

## 2022-05-11 NOTE — ASSESSMENT & PLAN NOTE
Labs reviewed- Renal function/electrolytes with Estimated Creatinine Clearance: 27.3 mL/min (A) (based on SCr of 2.7 mg/dL (H)). according to latest data. Monitor urine output and serial BMP and adjust therapy as needed. Avoid nephrotoxins and renally dose meds for GFR listed above.   Gentle IVF hydration  Held Ace inhibitor and diuretics

## 2022-05-11 NOTE — ASSESSMENT & PLAN NOTE
No acute issues.  Supportive care.  No evidence of acute stroke at this time  Resume home ASA and plavix

## 2022-05-11 NOTE — SUBJECTIVE & OBJECTIVE
Past Medical History:   Diagnosis Date    BPH (benign prostatic hyperplasia)     Dysarthria     HLD (hyperlipidemia)     Hypertension     Other and unspecified hyperlipidemia     Stroke        Past Surgical History:   Procedure Laterality Date    CATARACT EXTRACTION W/ INTRAOCULAR LENS  IMPLANT, BILATERAL         Review of patient's allergies indicates:  No Known Allergies    No current facility-administered medications on file prior to encounter.     Current Outpatient Medications on File Prior to Encounter   Medication Sig    aspirin 81 MG Chew Take 1 tablet (81 mg total) by mouth once daily.    clopidogrel (PLAVIX) 75 mg tablet Take 1 tablet (75 mg total) by mouth once daily.    doxazosin (CARDURA) 2 MG tablet Take 1 tablet (2 mg total) by mouth every evening. HOLD UNTIL TOLD TO RESUME BY PHYSICIAN    enalapril (VASOTEC) 5 MG tablet Take 5 mg by mouth once daily.    finasteride (PROSCAR) 5 mg tablet Take 5 mg by mouth once daily.     fish oil-omega-3 fatty acids 300-1,000 mg capsule Take 1 capsule by mouth once daily.    furosemide (LASIX) 40 MG tablet Take 40 mg by mouth 2 (two) times daily.    metoprolol tartrate (LOPRESSOR) 50 MG tablet Take 0.5 tablets (25 mg total) by mouth once daily. HOLD UNTIL TOLD TO RESUME BY PHYSICIAN    multivitamin (THERAGRAN) per tablet Take 1 tablet by mouth once daily.    potassium chloride (MICRO-K) 10 MEQ CpSR Take 10 mEq by mouth once daily.    rosuvastatin (CRESTOR) 20 MG tablet Take 1 tablet (20 mg total) by mouth every evening. HOLD UNTIL TOLD TO RESUME BY PHYSICIAN     Family History       Problem Relation (Age of Onset)    Dementia Father    Hypertension Mother, Father    Seizures Mother    Stroke Mother          Tobacco Use    Smoking status: Former Smoker    Smokeless tobacco: Never Used   Substance and Sexual Activity    Alcohol use: Yes     Comment: rare, once a week or less    Drug use: No    Sexual activity: Never     Review of Systems  Objective:     Vital Signs  (Most Recent):  Temp: 97.9 °F (36.6 °C) (05/10/22 2033)  Pulse: 100 (05/10/22 2339)  Resp: 20 (05/10/22 2233)  BP: 106/62 (05/10/22 2339)  SpO2: 99 % (05/10/22 2339) Vital Signs (24h Range):  Temp:  [97.9 °F (36.6 °C)] 97.9 °F (36.6 °C)  Pulse:  [] 100  Resp:  [20] 20  SpO2:  [98 %-99 %] 99 %  BP: ()/(60-75) 106/62     Weight: 90 kg (198 lb 6.6 oz)  Body mass index is 30.17 kg/m².    Physical Exam        Significant Labs: {Results:42529}    Significant Imaging: {Imaging Review:26025}

## 2022-05-11 NOTE — SUBJECTIVE & OBJECTIVE
Past Medical History:   Diagnosis Date    BPH (benign prostatic hyperplasia)     Dysarthria     HLD (hyperlipidemia)     Hypertension     Other and unspecified hyperlipidemia     Stroke        Past Surgical History:   Procedure Laterality Date    CATARACT EXTRACTION W/ INTRAOCULAR LENS  IMPLANT, BILATERAL         Review of patient's allergies indicates:  No Known Allergies    No current facility-administered medications on file prior to encounter.     Current Outpatient Medications on File Prior to Encounter   Medication Sig    aspirin 81 MG Chew Take 1 tablet (81 mg total) by mouth once daily.    clopidogrel (PLAVIX) 75 mg tablet Take 1 tablet (75 mg total) by mouth once daily.    doxazosin (CARDURA) 2 MG tablet Take 1 tablet (2 mg total) by mouth every evening. HOLD UNTIL TOLD TO RESUME BY PHYSICIAN    enalapril (VASOTEC) 5 MG tablet Take 5 mg by mouth once daily.    finasteride (PROSCAR) 5 mg tablet Take 5 mg by mouth once daily.     fish oil-omega-3 fatty acids 300-1,000 mg capsule Take 1 capsule by mouth once daily.    furosemide (LASIX) 40 MG tablet Take 40 mg by mouth 2 (two) times daily.    metoprolol tartrate (LOPRESSOR) 50 MG tablet Take 0.5 tablets (25 mg total) by mouth once daily. HOLD UNTIL TOLD TO RESUME BY PHYSICIAN    multivitamin (THERAGRAN) per tablet Take 1 tablet by mouth once daily.    potassium chloride (MICRO-K) 10 MEQ CpSR Take 10 mEq by mouth once daily.    rosuvastatin (CRESTOR) 20 MG tablet Take 1 tablet (20 mg total) by mouth every evening. HOLD UNTIL TOLD TO RESUME BY PHYSICIAN     Family History       Problem Relation (Age of Onset)    Dementia Father    Hypertension Mother, Father    Seizures Mother    Stroke Mother          Tobacco Use    Smoking status: Former Smoker    Smokeless tobacco: Never Used   Substance and Sexual Activity    Alcohol use: Yes     Comment: rare, once a week or less    Drug use: No    Sexual activity: Never     Review of Systems   Constitutional:  Negative for  chills and fever.   Eyes:  Negative for photophobia and visual disturbance.   Respiratory:  Negative for cough and shortness of breath.    Cardiovascular:  Negative for chest pain, palpitations and leg swelling.   Gastrointestinal:  Negative for abdominal pain, diarrhea, nausea and vomiting.   Genitourinary:  Negative for frequency, hematuria and urgency.   Musculoskeletal:         Patient slumping over in chair and shoulder pain   Skin:  Negative for pallor, rash and wound.   Neurological:  Negative for light-headedness and headaches.   Psychiatric/Behavioral:  Negative for confusion and decreased concentration.    Objective:     Vital Signs (Most Recent):  Temp: 97.9 °F (36.6 °C) (05/10/22 2033)  Pulse: 97 (05/10/22 2233)  Resp: 20 (05/10/22 2233)  BP: 109/75 (05/10/22 2233)  SpO2: 98 % (05/10/22 2233) Vital Signs (24h Range):  Temp:  [97.9 °F (36.6 °C)] 97.9 °F (36.6 °C)  Pulse:  [95-97] 97  Resp:  [20] 20  SpO2:  [98 %] 98 %  BP: ()/(60-75) 109/75     Weight: 90 kg (198 lb 6.6 oz)  Body mass index is 30.17 kg/m².    Physical Exam  Constitutional:       Comments: Severe aphasia and speech deficit    HENT:      Head: Normocephalic and atraumatic.   Eyes:      Extraocular Movements: Extraocular movements intact.   Cardiovascular:      Rate and Rhythm: Normal rate.   Pulmonary:      Effort: Pulmonary effort is normal.      Breath sounds: Normal breath sounds.   Abdominal:      General: Bowel sounds are normal.      Palpations: Abdomen is soft.   Musculoskeletal:      Comments: Right side hemiparesis    Neurological:      Mental Status: He is alert. Mental status is at baseline.   Psychiatric:         Mood and Affect: Mood normal.           Significant Labs: All pertinent labs within the past 24 hours have been reviewed.    Significant Imaging: I have reviewed all pertinent imaging results/findings within the past 24 hours.

## 2022-05-12 VITALS
OXYGEN SATURATION: 97 % | SYSTOLIC BLOOD PRESSURE: 94 MMHG | DIASTOLIC BLOOD PRESSURE: 63 MMHG | HEART RATE: 77 BPM | TEMPERATURE: 98 F | HEIGHT: 68 IN | BODY MASS INDEX: 30.07 KG/M2 | WEIGHT: 198.44 LBS | RESPIRATION RATE: 18 BRPM

## 2022-05-12 LAB
ANION GAP SERPL CALC-SCNC: 9 MMOL/L (ref 8–16)
BASOPHILS # BLD AUTO: 0.03 K/UL (ref 0–0.2)
BASOPHILS NFR BLD: 0.6 % (ref 0–1.9)
BUN SERPL-MCNC: 37 MG/DL (ref 8–23)
CALCIUM SERPL-MCNC: 9.5 MG/DL (ref 8.7–10.5)
CHLORIDE SERPL-SCNC: 108 MMOL/L (ref 95–110)
CO2 SERPL-SCNC: 20 MMOL/L (ref 23–29)
CREAT SERPL-MCNC: 1.4 MG/DL (ref 0.5–1.4)
DIFFERENTIAL METHOD: ABNORMAL
EOSINOPHIL # BLD AUTO: 0.1 K/UL (ref 0–0.5)
EOSINOPHIL NFR BLD: 2 % (ref 0–8)
ERYTHROCYTE [DISTWIDTH] IN BLOOD BY AUTOMATED COUNT: 15.7 % (ref 11.5–14.5)
EST. GFR  (AFRICAN AMERICAN): 58 ML/MIN/1.73 M^2
EST. GFR  (NON AFRICAN AMERICAN): 50 ML/MIN/1.73 M^2
GLUCOSE SERPL-MCNC: 122 MG/DL (ref 70–110)
HCT VFR BLD AUTO: 37.1 % (ref 40–54)
HGB BLD-MCNC: 11.8 G/DL (ref 14–18)
IMM GRANULOCYTES # BLD AUTO: 0.07 K/UL (ref 0–0.04)
IMM GRANULOCYTES NFR BLD AUTO: 1.4 % (ref 0–0.5)
LYMPHOCYTES # BLD AUTO: 1.3 K/UL (ref 1–4.8)
LYMPHOCYTES NFR BLD: 26.3 % (ref 18–48)
MCH RBC QN AUTO: 24.1 PG (ref 27–31)
MCHC RBC AUTO-ENTMCNC: 31.8 G/DL (ref 32–36)
MCV RBC AUTO: 76 FL (ref 82–98)
MONOCYTES # BLD AUTO: 0.3 K/UL (ref 0.3–1)
MONOCYTES NFR BLD: 6.4 % (ref 4–15)
NEUTROPHILS # BLD AUTO: 3.2 K/UL (ref 1.8–7.7)
NEUTROPHILS NFR BLD: 63.3 % (ref 38–73)
NRBC BLD-RTO: 0 /100 WBC
PLATELET # BLD AUTO: 195 K/UL (ref 150–450)
PMV BLD AUTO: 11.1 FL (ref 9.2–12.9)
POTASSIUM SERPL-SCNC: 4.2 MMOL/L (ref 3.5–5.1)
RBC # BLD AUTO: 4.89 M/UL (ref 4.6–6.2)
SODIUM SERPL-SCNC: 137 MMOL/L (ref 136–145)
WBC # BLD AUTO: 4.98 K/UL (ref 3.9–12.7)

## 2022-05-12 PROCEDURE — 36415 COLL VENOUS BLD VENIPUNCTURE: CPT | Performed by: STUDENT IN AN ORGANIZED HEALTH CARE EDUCATION/TRAINING PROGRAM

## 2022-05-12 PROCEDURE — 25000003 PHARM REV CODE 250: Performed by: NURSE PRACTITIONER

## 2022-05-12 PROCEDURE — 63600175 PHARM REV CODE 636 W HCPCS: Performed by: NURSE PRACTITIONER

## 2022-05-12 PROCEDURE — 85025 COMPLETE CBC W/AUTO DIFF WBC: CPT | Performed by: STUDENT IN AN ORGANIZED HEALTH CARE EDUCATION/TRAINING PROGRAM

## 2022-05-12 PROCEDURE — 80048 BASIC METABOLIC PNL TOTAL CA: CPT | Performed by: STUDENT IN AN ORGANIZED HEALTH CARE EDUCATION/TRAINING PROGRAM

## 2022-05-12 PROCEDURE — 25000003 PHARM REV CODE 250: Performed by: STUDENT IN AN ORGANIZED HEALTH CARE EDUCATION/TRAINING PROGRAM

## 2022-05-12 RX ORDER — FUROSEMIDE 40 MG/1
40 TABLET ORAL DAILY
Start: 2022-05-12

## 2022-05-12 RX ADMIN — CLOPIDOGREL 75 MG: 75 TABLET, FILM COATED ORAL at 08:05

## 2022-05-12 RX ADMIN — CEFTRIAXONE 1 G: 1 INJECTION, SOLUTION INTRAVENOUS at 04:05

## 2022-05-12 RX ADMIN — Medication 1 CAPSULE: at 05:05

## 2022-05-12 RX ADMIN — CALCIUM CARBONATE (ANTACID) CHEW TAB 500 MG 1000 MG: 500 CHEW TAB at 08:05

## 2022-05-12 RX ADMIN — METOPROLOL TARTRATE 25 MG: 25 TABLET, FILM COATED ORAL at 08:05

## 2022-05-12 RX ADMIN — ASPIRIN 81 MG CHEWABLE TABLET 81 MG: 81 TABLET CHEWABLE at 08:05

## 2022-05-12 RX ADMIN — FINASTERIDE 5 MG: 5 TABLET, FILM COATED ORAL at 08:05

## 2022-05-12 RX ADMIN — POTASSIUM PHOSPHATE, MONOBASIC 500 MG: 500 TABLET, SOLUBLE ORAL at 08:05

## 2022-05-12 NOTE — PLAN OF CARE
Problem: Fluid and Electrolyte Imbalance (Acute Kidney Injury/Impairment)  Goal: Fluid and Electrolyte Balance  Outcome: Ongoing, Progressing     Problem: Oral Intake Inadequate (Acute Kidney Injury/Impairment)  Goal: Optimal Nutrition Intake  Outcome: Ongoing, Progressing

## 2022-05-12 NOTE — NURSING
Report given to nurse Edilia who will assume the patient's care. He is resting comfortably. Completed chart check.

## 2022-05-12 NOTE — PLAN OF CARE
[facility] - Telemetry  Discharge Final Note    Primary Care Provider: Helio Farr MD    Expected Discharge Date: 5/12/2022    Final Discharge Note (most recent)       Final Note - 05/12/22 1306          Final Note    Assessment Type Final Discharge Note     Anticipated Discharge Disposition Home or Self Care     What phone number can be called within the next 1-3 days to see how you are doing after discharge? [phone]     Hospital Resources/Appts/Education Provided Provided patient/caregiver with written discharge plan information        Post-Acute Status    Post-Acute Authorization Other     Other Status No Post-Acute Service Needs     Discharge Delays None known at this time                     Important Message from Medicare             SW spoke with patient's nurse providing her with report #  [phone] to call report to charge nurse at [facility]. TRISH also informed Elias that patient is cleared from case management standpoint.

## 2022-05-12 NOTE — PLAN OF CARE
TRISH spoke with Loulou ([place] [facility] [employer] [facility]) re: patient returning back. Loulou asked if TRISH can fax patient discharge paperwork to [phone] and to have [facility] nurse call report to [phone].     TRISH faxed patient's discharge paperwork to [facility] [employer] [facility].

## 2022-05-12 NOTE — DISCHARGE SUMMARY
[facility] Medicine  Discharge Summary      Patient Name: Alexsander Tafoya  MRN: [id number]  Patient Class: IP- Inpatient  Admission Date: 5/10/2022  Hospital Length of Stay: 1 days  Discharge Date and Time: 5/12/2022  3:05 PM  Attending Physician: No att. providers found   Discharging Provider: Isaac Aguilar MD  Primary Care Provider: Helio Farr MD      HPI:   Per ED:   71-year-old male presents to the emergency room with a history of a stroke with dense right-sided hemiparesis and a severe speech deficit.  Patient is maintained on a baby aspirin.  The patient complains of chronic pain in his right shoulder.  This is a chronic ongoing problem there is no history of new trauma or fever.  The patient is paralyzed in the right upper extremity.  The patient also complains that when he is in his chair he is starting to slouch to the left.  He identifies that this has been going on for a couple of weeks off and on.  The family has concern for TIA.         Patient seen and evaluated.... unable to give any history and family member not at bedside.       * No surgery found *      Hospital Course:   Admitted for concerns for NSTEMI and dehydration. Found to have SHAYY, started on heparin and IVF. SHAYY resolved, also noted multiple electrolyte abnormalities now being replaced. Cardiology following. Troponin trend was flat and patient had no chest pain. Repeat echocardiogram actually showed significant improvement in EF to 55%. Electrolytes returned to baseline. He was resumed on his home medications. Lasix was decreased to 40 mg daily. Discussed with patient and family, stable for discharge home. Patient has close follow up with his Cardiologist the following week. He was discharged home in stable condition to continue meds and make sure he stays hydrated.        Goals of Care Treatment Preferences:  Code Status: Full Code    Living Will: Yes              Consults:   Consults (From admission, onward)         Status Ordering Provider     IP consult to case management  Once        Provider:  (Not yet assigned)    Completed XIOMARA CAPONE     Inpatient consult to Cardiology  Once        Provider:  Nj Resendiz MD    Completed JIM BLAS          No new Assessment & Plan notes have been filed under this hospital service since the last note was generated.  Service: Hospital Medicine    Final Active Diagnoses:    Diagnosis Date Noted POA    PRINCIPAL PROBLEM:  NSTEMI (non-ST elevated myocardial infarction) [I21.4] 05/11/2022 Yes    Abnormal urinalysis [R82.90] 05/11/2022 Yes    SHAYY (acute kidney injury) [N17.9] 05/10/2022 Yes    BPH (benign prostatic hyperplasia) [N40.0] 08/05/2019 Yes    Essential hypertension [I10] 07/17/2017 Yes    Hyperlipidemia [E78.5] 07/17/2017 Yes    Aphasia as late effect of cerebrovascular accident [I69.320] 07/17/2017 Not Applicable    Hemiplegia affecting right side in right-dominant patient as late effect of cerebrovascular disease [I69.951] 07/17/2017 Not Applicable      Problems Resolved During this Admission:       Discharged Condition: stable    Disposition: Home or Self Care    Follow Up:    Patient Instructions:      Diet Cardiac     Notify your health care provider if you experience any of the following:  temperature >100.4     Notify your health care provider if you experience any of the following:  persistent nausea and vomiting or diarrhea     Notify your health care provider if you experience any of the following:  severe uncontrolled pain     Notify your health care provider if you experience any of the following:  difficulty breathing or increased cough     Notify your health care provider if you experience any of the following:  severe persistent headache     Notify your health care provider if you experience any of the following:  worsening rash     Notify your health care provider if you experience any of the following:  persistent dizziness,  light-headedness, or visual disturbances     Notify your health care provider if you experience any of the following:  increased confusion or weakness     Activity as tolerated     · TDS  · The left ventricle is normal in size with normal systolic function.  · The estimated ejection fraction is 55%.  · Normal left ventricular diastolic function.  · Normal central venous pressure (3 mmHg).      Significant Diagnostic Studies: Labs: All labs within the past 24 hours have been reviewed    Pending Diagnostic Studies:     None         Medications:  Reconciled Home Medications:      Medication List      CHANGE how you take these medications    furosemide 40 MG tablet  Commonly known as: LASIX  Take 1 tablet (40 mg total) by mouth once daily.  What changed: when to take this        CONTINUE taking these medications    aspirin 81 MG Chew  Take 1 tablet (81 mg total) by mouth once daily.     clopidogreL 75 mg tablet  Commonly known as: PLAVIX  Take 1 tablet (75 mg total) by mouth once daily.     enalapril 5 MG tablet  Commonly known as: VASOTEC  Take 5 mg by mouth once daily.     finasteride 5 mg tablet  Commonly known as: PROSCAR  Take 5 mg by mouth once daily.     fish oil-omega-3 fatty acids 300-1,000 mg capsule  Take 1 capsule by mouth once daily.     metoprolol tartrate 50 MG tablet  Commonly known as: LOPRESSOR  Take 0.5 tablets (25 mg total) by mouth once daily. HOLD UNTIL TOLD TO RESUME BY PHYSICIAN     multivitamin per tablet  Commonly known as: THERAGRAN  Take 1 tablet by mouth once daily.     potassium chloride 10 MEQ Cpsr  Commonly known as: MICRO-K  Take 10 mEq by mouth once daily.     rosuvastatin 20 MG tablet  Commonly known as: CRESTOR  Take 1 tablet (20 mg total) by mouth every evening. HOLD UNTIL TOLD TO RESUME BY PHYSICIAN        STOP taking these medications    doxazosin 2 MG tablet  Commonly known as: CARDURA            Indwelling Lines/Drains at time of discharge:   Lines/Drains/Airways     None                  Time spent on the discharge of patient: >35 minutes         Isaac Aguilar MD  Department of Hospital Medicine  [facility] [place] - Telemetry

## 2022-05-12 NOTE — PLAN OF CARE
Pt lying in bed resting quietly.  No distress noted.  Call bell within reach.  Bed locked and in lowest position.    Problem: Adult Inpatient Plan of Care  Goal: Plan of Care Review  Outcome: Ongoing, Not Progressing  Goal: Patient-Specific Goal (Individualized)  Outcome: Ongoing, Not Progressing  Goal: Absence of Hospital-Acquired Illness or Injury  Outcome: Ongoing, Not Progressing  Goal: Optimal Comfort and Wellbeing  Outcome: Ongoing, Not Progressing  Goal: Readiness for Transition of Care  Outcome: Ongoing, Not Progressing     Problem: Fluid and Electrolyte Imbalance (Acute Kidney Injury/Impairment)  Goal: Fluid and Electrolyte Balance  Outcome: Ongoing, Not Progressing     Problem: Oral Intake Inadequate (Acute Kidney Injury/Impairment)  Goal: Optimal Nutrition Intake  Outcome: Ongoing, Not Progressing     Problem: Renal Function Impairment (Acute Kidney Injury/Impairment)  Goal: Effective Renal Function  Outcome: Ongoing, Not Progressing     Problem: Impaired Wound Healing  Goal: Optimal Wound Healing  Outcome: Ongoing, Not Progressing     Problem: Skin Injury Risk Increased  Goal: Skin Health and Integrity  Outcome: Ongoing, Not Progressing     Problem: Fall Injury Risk  Goal: Absence of Fall and Fall-Related Injury  Outcome: Ongoing, Not Progressing

## 2022-05-12 NOTE — PT/OT/SLP PROGRESS
Physical Therapy      Patient Name:  Alexsander Tafoya   MRN:  [id number]    Patient not seen today secondary to  (Pt D/C'd prior to initial PT evaluation). PT to sign off..

## 2022-05-13 ENCOUNTER — PATIENT MESSAGE (OUTPATIENT)
Dept: ADMINISTRATIVE | Facility: CLINIC | Age: 72
End: 2022-05-13
Payer: MEDICARE

## 2022-05-13 ENCOUNTER — PATIENT OUTREACH (OUTPATIENT)
Dept: ADMINISTRATIVE | Facility: CLINIC | Age: 72
End: 2022-05-13
Payer: MEDICARE

## 2022-05-13 LAB — BACTERIA UR CULT: ABNORMAL

## 2022-05-13 NOTE — PROGRESS NOTES
C3 nurse attempted to contact Alexsander Tafoya for a TCC post hospital discharge follow up call. No answer. The patient does not have a scheduled HOSFU appointment, and the pt does not have an [facility] PCP.

## 2022-05-17 NOTE — PROGRESS NOTES
C3 nurse spoke with Alexsander Tafoya's sister, Idalia, for a TCC post hospital discharge follow up call. The patient reports does not have a scheduled HOSFU appointment. C3 nurse was unable to schedule [facility] appointment [facility]. Patient advised to contact their PCP to schedule a HOSPFU within 7 days.   Patient's sister, Idalia Jordan, states that patient has an appt with his PCP tomorrow.

## 2022-05-17 NOTE — PATIENT INSTRUCTIONS
[facility] teaching reviewed with Alexsander Tafoya's sister, Idalia . She verbalized understanding.    Education was provided based on the patient's discharge diagnosis using the attached [facility] patient education as a reference.

## 2022-05-19 NOTE — PHYSICIAN QUERY
PT Name: Alexsander Tafoya  MR #: [id number]     Documentation Clarification      CDS/: Mary Jean RN CDIS             Contact information: [email or web address]      This form is a permanent document in the medical record.     Query Date: May 19, 2022    By submitting this query, we are merely seeking further clarification of documentation. Please utilize your independent clinical judgment when addressing the question(s) below.    The Medical Record reflects the following:    Clinical Findings Location in Medical Records   Admitted for concerns for NSTEMI and dehydration. Found to have SHAYY, started on heparin and IVF. SHAYY resolved, also noted multiple electrolyte abnormalities now being replaced. Cardiology following. Troponin trend was flat and patient had no chest pain. Repeat echocardiogram actually showed significant improvement in EF to 55%. Electrolytes returned to baseline. He was resumed on his home medications. Lasix was decreased to 40 mg daily. Discussed with patient and family, stable for discharge home. Patient has close follow up with his Cardiologist the following week. He was discharged home in stable condition to continue meds and make sure he stays hydrated  .  Discharge summary    NSTEMI (non-ST elevated myocardial infarction)  - Presents with right shoulder pain, denies any chest pain  - troponin found to be elevated and trending up  - started on heparin drip  - Cardiology consulted - ok to stop heparin   - pending echo - known hx of EF 15%, has not had ischemic work up yet  - continue on telemetry  - continue home asa/plavix HM note 5/11                                                                                 Provider, please clarify the diagnosis of NSTEMI:      [   ] Diagnosis is confirmed and additional clinical support/decision-making indicators for the diagnosis include (please specify):________________   [   ] Above stated diagnosis is not confirmed and/or it has been ruled  out   [   ] Above stated diagnosis is not confirmed and/or it has been ruled out, other diagnosis ruled in                 [    ] Demand ischemia                 [    ] elevated troponin only without corresponding diagnosis                 [    ] Myocardial injury non ischemic    [   ] Other clarification (please specify): ___________________   [x] Clinically undetermined

## 2023-12-23 ENCOUNTER — HOSPITAL ENCOUNTER (INPATIENT)
Facility: HOSPITAL | Age: 73
LOS: 12 days | DRG: 870 | End: 2024-01-04
Attending: EMERGENCY MEDICINE | Admitting: INTERNAL MEDICINE
Payer: MEDICARE

## 2023-12-23 DIAGNOSIS — Z45.2 PICC (PERIPHERALLY INSERTED CENTRAL CATHETER) IN PLACE: ICD-10-CM

## 2023-12-23 DIAGNOSIS — A41.9 SEPTIC SHOCK: ICD-10-CM

## 2023-12-23 DIAGNOSIS — I21.4 NSTEMI (NON-ST ELEVATED MYOCARDIAL INFARCTION): ICD-10-CM

## 2023-12-23 DIAGNOSIS — I50.43 ACUTE ON CHRONIC COMBINED SYSTOLIC AND DIASTOLIC CONGESTIVE HEART FAILURE: ICD-10-CM

## 2023-12-23 DIAGNOSIS — J96.01 ACUTE RESPIRATORY FAILURE WITH HYPOXIA: ICD-10-CM

## 2023-12-23 DIAGNOSIS — I50.9 CHF (CONGESTIVE HEART FAILURE): ICD-10-CM

## 2023-12-23 DIAGNOSIS — J90 PLEURAL EFFUSION: ICD-10-CM

## 2023-12-23 DIAGNOSIS — Z92.89 HISTORY OF ETT: ICD-10-CM

## 2023-12-23 DIAGNOSIS — J96.01 ACUTE HYPOXIC RESPIRATORY FAILURE: ICD-10-CM

## 2023-12-23 DIAGNOSIS — R65.21 SEPTIC SHOCK: ICD-10-CM

## 2023-12-23 DIAGNOSIS — J10.1 INFLUENZA A: Primary | ICD-10-CM

## 2023-12-23 DIAGNOSIS — R06.02 SOB (SHORTNESS OF BREATH): ICD-10-CM

## 2023-12-23 PROBLEM — J18.9 SEPSIS DUE TO PNEUMONIA: Status: ACTIVE | Noted: 2019-09-01

## 2023-12-23 PROBLEM — R74.01 TRANSAMINITIS: Chronic | Status: ACTIVE | Noted: 2019-08-05

## 2023-12-23 LAB
ALBUMIN SERPL BCP-MCNC: 3.6 G/DL (ref 3.5–5.2)
ALLENS TEST: ABNORMAL
ALLENS TEST: ABNORMAL
ALP SERPL-CCNC: 102 U/L (ref 55–135)
ALT SERPL W/O P-5'-P-CCNC: 152 U/L (ref 10–44)
ANION GAP SERPL CALC-SCNC: 14 MMOL/L (ref 8–16)
AST SERPL-CCNC: 192 U/L (ref 10–40)
BACTERIA #/AREA URNS HPF: ABNORMAL /HPF
BASOPHILS # BLD AUTO: 0.02 K/UL (ref 0–0.2)
BASOPHILS NFR BLD: 0.5 % (ref 0–1.9)
BILIRUB SERPL-MCNC: 1.4 MG/DL (ref 0.1–1)
BILIRUB UR QL STRIP: NEGATIVE
BNP SERPL-MCNC: 716 PG/ML (ref 0–99)
BUN SERPL-MCNC: 17 MG/DL (ref 8–23)
CALCIUM SERPL-MCNC: 9.3 MG/DL (ref 8.7–10.5)
CHLORIDE SERPL-SCNC: 107 MMOL/L (ref 95–110)
CLARITY UR: ABNORMAL
CO2 SERPL-SCNC: 16 MMOL/L (ref 23–29)
COLOR UR: YELLOW
CREAT SERPL-MCNC: 1.9 MG/DL (ref 0.5–1.4)
CTP QC/QA: YES
CTP QC/QA: YES
DELSYS: ABNORMAL
DELSYS: ABNORMAL
DIFFERENTIAL METHOD BLD: ABNORMAL
EOSINOPHIL # BLD AUTO: 0 K/UL (ref 0–0.5)
EOSINOPHIL NFR BLD: 0.2 % (ref 0–8)
ERYTHROCYTE [DISTWIDTH] IN BLOOD BY AUTOMATED COUNT: 17.1 % (ref 11.5–14.5)
EST. GFR  (NO RACE VARIABLE): 37 ML/MIN/1.73 M^2
FIO2: 100
FIO2: 100
FLOW: 15
FLOW: 15
GLUCOSE SERPL-MCNC: 136 MG/DL (ref 70–110)
GLUCOSE UR QL STRIP: NEGATIVE
HCO3 UR-SCNC: 16.1 MMOL/L (ref 24–28)
HCT VFR BLD AUTO: 34.1 % (ref 40–54)
HGB BLD-MCNC: 10.5 G/DL (ref 14–18)
HGB UR QL STRIP: NEGATIVE
HYALINE CASTS #/AREA URNS LPF: 11 /LPF
IMM GRANULOCYTES # BLD AUTO: 0.02 K/UL (ref 0–0.04)
IMM GRANULOCYTES NFR BLD AUTO: 0.5 % (ref 0–0.5)
INR PPP: 1.4 (ref 0.8–1.2)
KETONES UR QL STRIP: NEGATIVE
LACTATE SERPL-SCNC: 3.1 MMOL/L (ref 0.5–2.2)
LACTATE SERPL-SCNC: 3.2 MMOL/L (ref 0.5–2.2)
LDH SERPL L TO P-CCNC: 3.06 MMOL/L (ref 0.5–2.2)
LEUKOCYTE ESTERASE UR QL STRIP: NEGATIVE
LYMPHOCYTES # BLD AUTO: 0.3 K/UL (ref 1–4.8)
LYMPHOCYTES NFR BLD: 5.8 % (ref 18–48)
MAGNESIUM SERPL-MCNC: 1.5 MG/DL (ref 1.6–2.6)
MCH RBC QN AUTO: 22.3 PG (ref 27–31)
MCHC RBC AUTO-ENTMCNC: 30.8 G/DL (ref 32–36)
MCV RBC AUTO: 72 FL (ref 82–98)
MICROSCOPIC COMMENT: ABNORMAL
MODE: ABNORMAL
MODE: ABNORMAL
MONOCYTES # BLD AUTO: 0.3 K/UL (ref 0.3–1)
MONOCYTES NFR BLD: 7 % (ref 4–15)
NEUTROPHILS # BLD AUTO: 3.7 K/UL (ref 1.8–7.7)
NEUTROPHILS NFR BLD: 86 % (ref 38–73)
NITRITE UR QL STRIP: NEGATIVE
NRBC BLD-RTO: 0 /100 WBC
PCO2 BLDA: 28.3 MMHG (ref 35–45)
PH SMN: 7.36 [PH] (ref 7.35–7.45)
PH UR STRIP: 6 [PH] (ref 5–8)
PHOSPHATE SERPL-MCNC: 4.6 MG/DL (ref 2.7–4.5)
PLATELET # BLD AUTO: 159 K/UL (ref 150–450)
PMV BLD AUTO: 11.6 FL (ref 9.2–12.9)
PO2 BLDA: 65 MMHG (ref 40–60)
POC BE: -8 MMOL/L
POC MOLECULAR INFLUENZA A AGN: POSITIVE
POC MOLECULAR INFLUENZA B AGN: NEGATIVE
POC SATURATED O2: 92 % (ref 95–100)
POC TCO2: 17 MMOL/L (ref 24–29)
POTASSIUM SERPL-SCNC: 4.5 MMOL/L (ref 3.5–5.1)
PROCALCITONIN SERPL IA-MCNC: 0.26 NG/ML
PROT SERPL-MCNC: 7.7 G/DL (ref 6–8.4)
PROT UR QL STRIP: ABNORMAL
PROTHROMBIN TIME: 14.1 SEC (ref 9–12.5)
RBC # BLD AUTO: 4.71 M/UL (ref 4.6–6.2)
RBC #/AREA URNS HPF: 7 /HPF (ref 0–4)
SAMPLE: ABNORMAL
SAMPLE: ABNORMAL
SARS-COV-2 RDRP RESP QL NAA+PROBE: NEGATIVE
SITE: ABNORMAL
SITE: ABNORMAL
SODIUM SERPL-SCNC: 137 MMOL/L (ref 136–145)
SP GR UR STRIP: 1.02 (ref 1–1.03)
TROPONIN I SERPL DL<=0.01 NG/ML-MCNC: 0.62 NG/ML (ref 0–0.03)
URN SPEC COLLECT METH UR: ABNORMAL
UROBILINOGEN UR STRIP-ACNC: ABNORMAL EU/DL
WBC # BLD AUTO: 4.28 K/UL (ref 3.9–12.7)
WBC #/AREA URNS HPF: 3 /HPF (ref 0–5)

## 2023-12-23 PROCEDURE — 63600175 PHARM REV CODE 636 W HCPCS: Performed by: INTERNAL MEDICINE

## 2023-12-23 PROCEDURE — 25000003 PHARM REV CODE 250: Performed by: INTERNAL MEDICINE

## 2023-12-23 PROCEDURE — 94002 VENT MGMT INPAT INIT DAY: CPT

## 2023-12-23 PROCEDURE — 96365 THER/PROPH/DIAG IV INF INIT: CPT

## 2023-12-23 PROCEDURE — 83605 ASSAY OF LACTIC ACID: CPT | Mod: 91 | Performed by: EMERGENCY MEDICINE

## 2023-12-23 PROCEDURE — 84145 PROCALCITONIN (PCT): CPT | Performed by: EMERGENCY MEDICINE

## 2023-12-23 PROCEDURE — 94640 AIRWAY INHALATION TREATMENT: CPT

## 2023-12-23 PROCEDURE — 83880 ASSAY OF NATRIURETIC PEPTIDE: CPT | Performed by: EMERGENCY MEDICINE

## 2023-12-23 PROCEDURE — 80053 COMPREHEN METABOLIC PANEL: CPT | Performed by: EMERGENCY MEDICINE

## 2023-12-23 PROCEDURE — 99900035 HC TECH TIME PER 15 MIN (STAT)

## 2023-12-23 PROCEDURE — 0BH17EZ INSERTION OF ENDOTRACHEAL AIRWAY INTO TRACHEA, VIA NATURAL OR ARTIFICIAL OPENING: ICD-10-PCS | Performed by: EMERGENCY MEDICINE

## 2023-12-23 PROCEDURE — 36569 INSJ PICC 5 YR+ W/O IMAGING: CPT

## 2023-12-23 PROCEDURE — 84484 ASSAY OF TROPONIN QUANT: CPT | Performed by: EMERGENCY MEDICINE

## 2023-12-23 PROCEDURE — 96361 HYDRATE IV INFUSION ADD-ON: CPT

## 2023-12-23 PROCEDURE — 82803 BLOOD GASES ANY COMBINATION: CPT

## 2023-12-23 PROCEDURE — 12000002 HC ACUTE/MED SURGE SEMI-PRIVATE ROOM

## 2023-12-23 PROCEDURE — 25000003 PHARM REV CODE 250: Performed by: EMERGENCY MEDICINE

## 2023-12-23 PROCEDURE — 96374 THER/PROPH/DIAG INJ IV PUSH: CPT

## 2023-12-23 PROCEDURE — 84100 ASSAY OF PHOSPHORUS: CPT | Performed by: EMERGENCY MEDICINE

## 2023-12-23 PROCEDURE — 85025 COMPLETE CBC W/AUTO DIFF WBC: CPT | Performed by: EMERGENCY MEDICINE

## 2023-12-23 PROCEDURE — 99291 CRITICAL CARE FIRST HOUR: CPT

## 2023-12-23 PROCEDURE — 87635 SARS-COV-2 COVID-19 AMP PRB: CPT | Performed by: EMERGENCY MEDICINE

## 2023-12-23 PROCEDURE — 02HV33Z INSERTION OF INFUSION DEVICE INTO SUPERIOR VENA CAVA, PERCUTANEOUS APPROACH: ICD-10-PCS | Performed by: INTERNAL MEDICINE

## 2023-12-23 PROCEDURE — 63600175 PHARM REV CODE 636 W HCPCS: Performed by: EMERGENCY MEDICINE

## 2023-12-23 PROCEDURE — 87502 INFLUENZA DNA AMP PROBE: CPT

## 2023-12-23 PROCEDURE — 94761 N-INVAS EAR/PLS OXIMETRY MLT: CPT | Mod: XB

## 2023-12-23 PROCEDURE — 83735 ASSAY OF MAGNESIUM: CPT | Performed by: EMERGENCY MEDICINE

## 2023-12-23 PROCEDURE — 83605 ASSAY OF LACTIC ACID: CPT

## 2023-12-23 PROCEDURE — C1751 CATH, INF, PER/CENT/MIDLINE: HCPCS

## 2023-12-23 PROCEDURE — 94660 CPAP INITIATION&MGMT: CPT

## 2023-12-23 PROCEDURE — 27000190 HC CPAP FULL FACE MASK W/VALVE

## 2023-12-23 PROCEDURE — 5A1955Z RESPIRATORY VENTILATION, GREATER THAN 96 CONSECUTIVE HOURS: ICD-10-PCS | Performed by: INTERNAL MEDICINE

## 2023-12-23 PROCEDURE — 25000242 PHARM REV CODE 250 ALT 637 W/ HCPCS: Performed by: EMERGENCY MEDICINE

## 2023-12-23 PROCEDURE — 87040 BLOOD CULTURE FOR BACTERIA: CPT | Performed by: EMERGENCY MEDICINE

## 2023-12-23 PROCEDURE — 93010 ELECTROCARDIOGRAM REPORT: CPT | Mod: ,,, | Performed by: INTERNAL MEDICINE

## 2023-12-23 PROCEDURE — 85610 PROTHROMBIN TIME: CPT | Performed by: EMERGENCY MEDICINE

## 2023-12-23 PROCEDURE — 93005 ELECTROCARDIOGRAM TRACING: CPT

## 2023-12-23 PROCEDURE — 81000 URINALYSIS NONAUTO W/SCOPE: CPT | Performed by: EMERGENCY MEDICINE

## 2023-12-23 RX ORDER — CALCIUM GLUCONATE 20 MG/ML
3 INJECTION, SOLUTION INTRAVENOUS
Status: DISCONTINUED | OUTPATIENT
Start: 2023-12-23 | End: 2023-12-30

## 2023-12-23 RX ORDER — GUAIFENESIN 100 MG/5ML
81 LIQUID (ML) ORAL DAILY
Status: DISCONTINUED | OUTPATIENT
Start: 2023-12-24 | End: 2024-01-04 | Stop reason: HOSPADM

## 2023-12-23 RX ORDER — POTASSIUM CHLORIDE 29.8 MG/ML
80 INJECTION INTRAVENOUS
Status: DISCONTINUED | OUTPATIENT
Start: 2023-12-23 | End: 2023-12-30

## 2023-12-23 RX ORDER — CALCIUM GLUCONATE 20 MG/ML
2 INJECTION, SOLUTION INTRAVENOUS
Status: DISCONTINUED | OUTPATIENT
Start: 2023-12-23 | End: 2023-12-30

## 2023-12-23 RX ORDER — MAGNESIUM SULFATE HEPTAHYDRATE 40 MG/ML
4 INJECTION, SOLUTION INTRAVENOUS
Status: DISCONTINUED | OUTPATIENT
Start: 2023-12-23 | End: 2023-12-30

## 2023-12-23 RX ORDER — ACETAMINOPHEN 500 MG
1000 TABLET ORAL
Status: COMPLETED | OUTPATIENT
Start: 2023-12-23 | End: 2023-12-23

## 2023-12-23 RX ORDER — ACETAMINOPHEN 650 MG/1
650 SUPPOSITORY RECTAL
Status: DISCONTINUED | OUTPATIENT
Start: 2023-12-23 | End: 2023-12-23

## 2023-12-23 RX ORDER — OSELTAMIVIR PHOSPHATE 75 MG/1
75 CAPSULE ORAL
Status: COMPLETED | OUTPATIENT
Start: 2023-12-23 | End: 2023-12-23

## 2023-12-23 RX ORDER — ETOMIDATE 2 MG/ML
20 INJECTION INTRAVENOUS
Status: COMPLETED | OUTPATIENT
Start: 2023-12-23 | End: 2023-12-23

## 2023-12-23 RX ORDER — ONDANSETRON 2 MG/ML
4 INJECTION INTRAMUSCULAR; INTRAVENOUS EVERY 8 HOURS PRN
Status: DISCONTINUED | OUTPATIENT
Start: 2023-12-23 | End: 2024-01-04 | Stop reason: HOSPADM

## 2023-12-23 RX ORDER — SODIUM CHLORIDE 0.9 % (FLUSH) 0.9 %
10 SYRINGE (ML) INJECTION EVERY 6 HOURS
Status: DISCONTINUED | OUTPATIENT
Start: 2023-12-24 | End: 2024-01-01

## 2023-12-23 RX ORDER — FINASTERIDE 5 MG/1
5 TABLET, FILM COATED ORAL DAILY
Status: DISCONTINUED | OUTPATIENT
Start: 2023-12-24 | End: 2024-01-04 | Stop reason: HOSPADM

## 2023-12-23 RX ORDER — SODIUM CHLORIDE 0.9 % (FLUSH) 0.9 %
10 SYRINGE (ML) INJECTION
Status: DISCONTINUED | OUTPATIENT
Start: 2023-12-23 | End: 2023-12-26

## 2023-12-23 RX ORDER — CALCIUM GLUCONATE 20 MG/ML
1 INJECTION, SOLUTION INTRAVENOUS
Status: DISCONTINUED | OUTPATIENT
Start: 2023-12-23 | End: 2023-12-30

## 2023-12-23 RX ORDER — IPRATROPIUM BROMIDE AND ALBUTEROL SULFATE 2.5; .5 MG/3ML; MG/3ML
3 SOLUTION RESPIRATORY (INHALATION)
Status: COMPLETED | OUTPATIENT
Start: 2023-12-23 | End: 2023-12-23

## 2023-12-23 RX ORDER — CLOPIDOGREL BISULFATE 75 MG/1
75 TABLET ORAL DAILY
Status: DISCONTINUED | OUTPATIENT
Start: 2023-12-24 | End: 2024-01-04 | Stop reason: HOSPADM

## 2023-12-23 RX ORDER — PROPOFOL 10 MG/ML
20 INJECTION, EMULSION INTRAVENOUS
Status: COMPLETED | OUTPATIENT
Start: 2023-12-23 | End: 2023-12-23

## 2023-12-23 RX ORDER — IPRATROPIUM BROMIDE AND ALBUTEROL SULFATE 2.5; .5 MG/3ML; MG/3ML
3 SOLUTION RESPIRATORY (INHALATION) EVERY 4 HOURS
Status: DISCONTINUED | OUTPATIENT
Start: 2023-12-24 | End: 2023-12-28

## 2023-12-23 RX ORDER — ROCURONIUM BROMIDE 10 MG/ML
0.6 INJECTION, SOLUTION INTRAVENOUS
Status: COMPLETED | OUTPATIENT
Start: 2023-12-23 | End: 2023-12-23

## 2023-12-23 RX ORDER — NOREPINEPHRINE BITARTRATE/D5W 4MG/250ML
0-3 PLASTIC BAG, INJECTION (ML) INTRAVENOUS CONTINUOUS
Status: DISCONTINUED | OUTPATIENT
Start: 2023-12-23 | End: 2023-12-29

## 2023-12-23 RX ORDER — TRIPROLIDINE/PSEUDOEPHEDRINE 2.5MG-60MG
600 TABLET ORAL
Status: COMPLETED | OUTPATIENT
Start: 2023-12-23 | End: 2023-12-23

## 2023-12-23 RX ORDER — POTASSIUM CHLORIDE 14.9 MG/ML
60 INJECTION INTRAVENOUS
Status: DISCONTINUED | OUTPATIENT
Start: 2023-12-23 | End: 2023-12-30

## 2023-12-23 RX ORDER — FAMOTIDINE 10 MG/ML
20 INJECTION INTRAVENOUS DAILY
Status: DISCONTINUED | OUTPATIENT
Start: 2023-12-24 | End: 2023-12-30

## 2023-12-23 RX ORDER — POTASSIUM CHLORIDE 29.8 MG/ML
40 INJECTION INTRAVENOUS
Status: DISCONTINUED | OUTPATIENT
Start: 2023-12-23 | End: 2023-12-30

## 2023-12-23 RX ORDER — MAGNESIUM SULFATE HEPTAHYDRATE 40 MG/ML
2 INJECTION, SOLUTION INTRAVENOUS
Status: DISCONTINUED | OUTPATIENT
Start: 2023-12-23 | End: 2023-12-30

## 2023-12-23 RX ORDER — ENOXAPARIN SODIUM 100 MG/ML
40 INJECTION SUBCUTANEOUS EVERY 24 HOURS
Status: DISCONTINUED | OUTPATIENT
Start: 2023-12-23 | End: 2024-01-04 | Stop reason: HOSPADM

## 2023-12-23 RX ORDER — SODIUM CHLORIDE 0.9 % (FLUSH) 0.9 %
10 SYRINGE (ML) INJECTION
Status: DISCONTINUED | OUTPATIENT
Start: 2023-12-24 | End: 2024-01-01

## 2023-12-23 RX ORDER — ATORVASTATIN CALCIUM 40 MG/1
40 TABLET, FILM COATED ORAL DAILY
Status: DISCONTINUED | OUTPATIENT
Start: 2023-12-24 | End: 2023-12-24

## 2023-12-23 RX ADMIN — IPRATROPIUM BROMIDE AND ALBUTEROL SULFATE 3 ML: 2.5; .5 SOLUTION RESPIRATORY (INHALATION) at 09:12

## 2023-12-23 RX ADMIN — PROPOFOL 15 MCG/KG/MIN: 10 INJECTION, EMULSION INTRAVENOUS at 09:12

## 2023-12-23 RX ADMIN — PIPERACILLIN AND TAZOBACTAM 4.5 G: 4; .5 INJECTION, POWDER, LYOPHILIZED, FOR SOLUTION INTRAVENOUS; PARENTERAL at 11:12

## 2023-12-23 RX ADMIN — OSELTAMIVIR PHOSPHATE 75 MG: 75 CAPSULE ORAL at 07:12

## 2023-12-23 RX ADMIN — ACETAMINOPHEN 1000 MG: 500 TABLET ORAL at 07:12

## 2023-12-23 RX ADMIN — AZITHROMYCIN 500 MG: 500 INJECTION, POWDER, LYOPHILIZED, FOR SOLUTION INTRAVENOUS at 07:12

## 2023-12-23 RX ADMIN — IBUPROFEN 600 MG: 100 SUSPENSION ORAL at 10:12

## 2023-12-23 RX ADMIN — ETOMIDATE 20 MG: 20 INJECTION, SOLUTION INTRAVENOUS at 09:12

## 2023-12-23 RX ADMIN — SODIUM CHLORIDE, POTASSIUM CHLORIDE, SODIUM LACTATE AND CALCIUM CHLORIDE 2517 ML: 600; 310; 30; 20 INJECTION, SOLUTION INTRAVENOUS at 07:12

## 2023-12-23 RX ADMIN — CEFTRIAXONE SODIUM 2 G: 2 INJECTION, POWDER, FOR SOLUTION INTRAMUSCULAR; INTRAVENOUS at 07:12

## 2023-12-23 RX ADMIN — ENOXAPARIN SODIUM 40 MG: 40 INJECTION SUBCUTANEOUS at 10:12

## 2023-12-23 RX ADMIN — ROCURONIUM BROMIDE 50 MG: 10 INJECTION INTRAVENOUS at 09:12

## 2023-12-23 RX ADMIN — NOREPINEPHRINE BITARTRATE 0.02 MCG/KG/MIN: 4 INJECTION, SOLUTION INTRAVENOUS at 09:12

## 2023-12-24 PROBLEM — I42.0 DILATED CARDIOMYOPATHY: Status: ACTIVE | Noted: 2023-12-24

## 2023-12-24 PROBLEM — R57.9 SHOCK: Status: ACTIVE | Noted: 2023-12-24

## 2023-12-24 PROBLEM — E87.20 LACTIC ACIDOSIS: Status: ACTIVE | Noted: 2023-12-24

## 2023-12-24 PROBLEM — Z71.89 GOALS OF CARE, COUNSELING/DISCUSSION: Status: ACTIVE | Noted: 2023-12-24

## 2023-12-24 LAB
ALBUMIN SERPL BCP-MCNC: 3.1 G/DL (ref 3.5–5.2)
ALP SERPL-CCNC: 101 U/L (ref 55–135)
ALT SERPL W/O P-5'-P-CCNC: 1113 U/L (ref 10–44)
ANION GAP SERPL CALC-SCNC: 12 MMOL/L (ref 8–16)
ANION GAP SERPL CALC-SCNC: 13 MMOL/L (ref 8–16)
ASCENDING AORTA: 3.32 CM
AST SERPL-CCNC: 1387 U/L (ref 10–40)
AV INDEX (PROSTH): 0.62
AV MEAN GRADIENT: 2 MMHG
AV PEAK GRADIENT: 3 MMHG
AV VALVE AREA BY VELOCITY RATIO: 2.33 CM²
AV VALVE AREA: 2.16 CM²
AV VELOCITY RATIO: 0.67
BASOPHILS # BLD AUTO: 0.02 K/UL (ref 0–0.2)
BASOPHILS NFR BLD: 0.4 % (ref 0–1.9)
BILIRUB SERPL-MCNC: 1.3 MG/DL (ref 0.1–1)
BSA FOR ECHO PROCEDURE: 2.15 M2
BUN SERPL-MCNC: 21 MG/DL (ref 8–23)
BUN SERPL-MCNC: 21 MG/DL (ref 8–23)
CALCIUM SERPL-MCNC: 8.3 MG/DL (ref 8.7–10.5)
CALCIUM SERPL-MCNC: 8.4 MG/DL (ref 8.7–10.5)
CHLORIDE SERPL-SCNC: 106 MMOL/L (ref 95–110)
CHLORIDE SERPL-SCNC: 106 MMOL/L (ref 95–110)
CO2 SERPL-SCNC: 17 MMOL/L (ref 23–29)
CO2 SERPL-SCNC: 18 MMOL/L (ref 23–29)
CREAT SERPL-MCNC: 2.1 MG/DL (ref 0.5–1.4)
CREAT SERPL-MCNC: 2.2 MG/DL (ref 0.5–1.4)
DIFFERENTIAL METHOD BLD: ABNORMAL
DOP CALC AO PEAK VEL: 0.87 M/S
DOP CALC AO VTI: 13.4 CM
DOP CALC LVOT AREA: 3.5 CM2
DOP CALC LVOT DIAMETER: 2.11 CM
DOP CALC LVOT PEAK VEL: 0.58 M/S
DOP CALC LVOT STROKE VOLUME: 29.01 CM3
DOP CALCLVOT PEAK VEL VTI: 8.3 CM
E WAVE DECELERATION TIME: 124.67 MSEC
E/A RATIO: 2.33
E/E' RATIO: 15.56 M/S
EOSINOPHIL # BLD AUTO: 0 K/UL (ref 0–0.5)
EOSINOPHIL NFR BLD: 0 % (ref 0–8)
ERYTHROCYTE [DISTWIDTH] IN BLOOD BY AUTOMATED COUNT: 17.3 % (ref 11.5–14.5)
EST. GFR  (NO RACE VARIABLE): 31 ML/MIN/1.73 M^2
EST. GFR  (NO RACE VARIABLE): 33 ML/MIN/1.73 M^2
GLUCOSE SERPL-MCNC: 153 MG/DL (ref 70–110)
GLUCOSE SERPL-MCNC: 214 MG/DL (ref 70–110)
HCT VFR BLD AUTO: 34.5 % (ref 40–54)
HGB BLD-MCNC: 10.4 G/DL (ref 14–18)
IMM GRANULOCYTES # BLD AUTO: 0.02 K/UL (ref 0–0.04)
IMM GRANULOCYTES NFR BLD AUTO: 0.4 % (ref 0–0.5)
IVC DIAMETER: 2.48 CM
IVRT: 83.73 MSEC
LA MAJOR: 6.41 CM
LA MINOR: 5.98 CM
LA WIDTH: 4.7 CM
LACTATE SERPL-SCNC: 3 MMOL/L (ref 0.5–2.2)
LV LATERAL E/E' RATIO: 11.67 M/S
LV SEPTAL E/E' RATIO: 23.33 M/S
LVOT MG: 0.81 MMHG
LVOT MV: 0.43 CM/S
LYMPHOCYTES # BLD AUTO: 0.4 K/UL (ref 1–4.8)
LYMPHOCYTES NFR BLD: 8.6 % (ref 18–48)
MCH RBC QN AUTO: 22.4 PG (ref 27–31)
MCHC RBC AUTO-ENTMCNC: 30.1 G/DL (ref 32–36)
MCV RBC AUTO: 74 FL (ref 82–98)
MONOCYTES # BLD AUTO: 0.3 K/UL (ref 0.3–1)
MONOCYTES NFR BLD: 5.6 % (ref 4–15)
MV PEAK A VEL: 0.3 M/S
MV PEAK E VEL: 0.7 M/S
MV STENOSIS PRESSURE HALF TIME: 36.15 MS
MV VALVE AREA P 1/2 METHOD: 6.09 CM2
NEUTROPHILS # BLD AUTO: 4.3 K/UL (ref 1.8–7.7)
NEUTROPHILS NFR BLD: 85 % (ref 38–73)
NRBC BLD-RTO: 0 /100 WBC
PISA TR MAX VEL: 2.31 M/S
PLATELET # BLD AUTO: 178 K/UL (ref 150–450)
PMV BLD AUTO: 11.6 FL (ref 9.2–12.9)
POTASSIUM SERPL-SCNC: 3.8 MMOL/L (ref 3.5–5.1)
POTASSIUM SERPL-SCNC: 4.7 MMOL/L (ref 3.5–5.1)
PROT SERPL-MCNC: 6.3 G/DL (ref 6–8.4)
RA MAJOR: 5.15 CM
RA WIDTH: 4.2 CM
RBC # BLD AUTO: 4.65 M/UL (ref 4.6–6.2)
RIGHT VENTRICULAR END-DIASTOLIC DIMENSION: 4.49 CM
RV TISSUE DOPPLER FREE WALL SYSTOLIC VELOCITY 1 (APICAL 4 CHAMBER VIEW): 4.09 CM/S
SINUS: 3 CM
SODIUM SERPL-SCNC: 136 MMOL/L (ref 136–145)
SODIUM SERPL-SCNC: 136 MMOL/L (ref 136–145)
STJ: 2.39 CM
TDI LATERAL: 0.06 M/S
TDI SEPTAL: 0.03 M/S
TDI: 0.05 M/S
TR MAX PG: 21 MMHG
TRICUSPID ANNULAR PLANE SYSTOLIC EXCURSION: 0.68 CM
TROPONIN I SERPL DL<=0.01 NG/ML-MCNC: 2.78 NG/ML (ref 0–0.03)
VANCOMYCIN SERPL-MCNC: 16.3 UG/ML
WBC # BLD AUTO: 5.02 K/UL (ref 3.9–12.7)

## 2023-12-24 PROCEDURE — 25000003 PHARM REV CODE 250: Performed by: HOSPITALIST

## 2023-12-24 PROCEDURE — 99900035 HC TECH TIME PER 15 MIN (STAT)

## 2023-12-24 PROCEDURE — 25000003 PHARM REV CODE 250: Performed by: EMERGENCY MEDICINE

## 2023-12-24 PROCEDURE — 27000221 HC OXYGEN, UP TO 24 HOURS

## 2023-12-24 PROCEDURE — 83605 ASSAY OF LACTIC ACID: CPT | Performed by: INTERNAL MEDICINE

## 2023-12-24 PROCEDURE — 87070 CULTURE OTHR SPECIMN AEROBIC: CPT | Performed by: INTERNAL MEDICINE

## 2023-12-24 PROCEDURE — 80048 BASIC METABOLIC PNL TOTAL CA: CPT | Mod: XB | Performed by: INTERNAL MEDICINE

## 2023-12-24 PROCEDURE — 80053 COMPREHEN METABOLIC PANEL: CPT | Performed by: INTERNAL MEDICINE

## 2023-12-24 PROCEDURE — 63600175 PHARM REV CODE 636 W HCPCS: Performed by: INTERNAL MEDICINE

## 2023-12-24 PROCEDURE — 27000207 HC ISOLATION

## 2023-12-24 PROCEDURE — 94761 N-INVAS EAR/PLS OXIMETRY MLT: CPT

## 2023-12-24 PROCEDURE — 84484 ASSAY OF TROPONIN QUANT: CPT | Performed by: INTERNAL MEDICINE

## 2023-12-24 PROCEDURE — 99291 CRITICAL CARE FIRST HOUR: CPT | Mod: ,,, | Performed by: INTERNAL MEDICINE

## 2023-12-24 PROCEDURE — 94640 AIRWAY INHALATION TREATMENT: CPT

## 2023-12-24 PROCEDURE — 87205 SMEAR GRAM STAIN: CPT | Performed by: INTERNAL MEDICINE

## 2023-12-24 PROCEDURE — 63600175 PHARM REV CODE 636 W HCPCS: Performed by: HOSPITALIST

## 2023-12-24 PROCEDURE — 25000242 PHARM REV CODE 250 ALT 637 W/ HCPCS: Performed by: INTERNAL MEDICINE

## 2023-12-24 PROCEDURE — 25000003 PHARM REV CODE 250: Performed by: INTERNAL MEDICINE

## 2023-12-24 PROCEDURE — 94003 VENT MGMT INPAT SUBQ DAY: CPT

## 2023-12-24 PROCEDURE — 83605 ASSAY OF LACTIC ACID: CPT | Mod: 91 | Performed by: HOSPITALIST

## 2023-12-24 PROCEDURE — 99291 CRITICAL CARE FIRST HOUR: CPT | Mod: 25,,, | Performed by: INTERNAL MEDICINE

## 2023-12-24 PROCEDURE — 80202 ASSAY OF VANCOMYCIN: CPT | Performed by: HOSPITALIST

## 2023-12-24 PROCEDURE — A4216 STERILE WATER/SALINE, 10 ML: HCPCS | Performed by: EMERGENCY MEDICINE

## 2023-12-24 PROCEDURE — 99900026 HC AIRWAY MAINTENANCE (STAT)

## 2023-12-24 PROCEDURE — 20000000 HC ICU ROOM

## 2023-12-24 PROCEDURE — 85025 COMPLETE CBC W/AUTO DIFF WBC: CPT | Performed by: INTERNAL MEDICINE

## 2023-12-24 RX ORDER — FUROSEMIDE 10 MG/ML
60 INJECTION INTRAMUSCULAR; INTRAVENOUS ONCE
Status: COMPLETED | OUTPATIENT
Start: 2023-12-24 | End: 2023-12-24

## 2023-12-24 RX ORDER — MIDAZOLAM HYDROCHLORIDE 1 MG/ML
2 INJECTION INTRAMUSCULAR; INTRAVENOUS ONCE
Status: DISCONTINUED | OUTPATIENT
Start: 2023-12-24 | End: 2023-12-30

## 2023-12-24 RX ORDER — PROPOFOL 10 MG/ML
0-50 INJECTION, EMULSION INTRAVENOUS CONTINUOUS
Status: DISCONTINUED | OUTPATIENT
Start: 2023-12-24 | End: 2023-12-26

## 2023-12-24 RX ORDER — OSELTAMIVIR PHOSPHATE 30 MG/1
30 CAPSULE ORAL 2 TIMES DAILY
Status: DISCONTINUED | OUTPATIENT
Start: 2023-12-24 | End: 2023-12-28

## 2023-12-24 RX ADMIN — VANCOMYCIN HYDROCHLORIDE 2000 MG: 500 INJECTION, POWDER, LYOPHILIZED, FOR SOLUTION INTRAVENOUS at 01:12

## 2023-12-24 RX ADMIN — FAMOTIDINE 20 MG: 10 INJECTION INTRAVENOUS at 10:12

## 2023-12-24 RX ADMIN — NOREPINEPHRINE BITARTRATE 0.1 MCG/KG/MIN: 4 INJECTION, SOLUTION INTRAVENOUS at 06:12

## 2023-12-24 RX ADMIN — ENOXAPARIN SODIUM 40 MG: 40 INJECTION SUBCUTANEOUS at 04:12

## 2023-12-24 RX ADMIN — ATORVASTATIN CALCIUM 40 MG: 40 TABLET, FILM COATED ORAL at 10:12

## 2023-12-24 RX ADMIN — Medication 10 ML: at 06:12

## 2023-12-24 RX ADMIN — Medication 10 ML: at 11:12

## 2023-12-24 RX ADMIN — IPRATROPIUM BROMIDE AND ALBUTEROL SULFATE 3 ML: 2.5; .5 SOLUTION RESPIRATORY (INHALATION) at 04:12

## 2023-12-24 RX ADMIN — IPRATROPIUM BROMIDE AND ALBUTEROL SULFATE 3 ML: 2.5; .5 SOLUTION RESPIRATORY (INHALATION) at 07:12

## 2023-12-24 RX ADMIN — PIPERACILLIN AND TAZOBACTAM 4.5 G: 4; .5 INJECTION, POWDER, LYOPHILIZED, FOR SOLUTION INTRAVENOUS; PARENTERAL at 02:12

## 2023-12-24 RX ADMIN — ASPIRIN 81 MG CHEWABLE TABLET 81 MG: 81 TABLET CHEWABLE at 10:12

## 2023-12-24 RX ADMIN — PROPOFOL 20 MCG/KG/MIN: 10 INJECTION, EMULSION INTRAVENOUS at 10:12

## 2023-12-24 RX ADMIN — Medication 10 ML: at 02:12

## 2023-12-24 RX ADMIN — FUROSEMIDE 60 MG: 10 INJECTION, SOLUTION INTRAMUSCULAR; INTRAVENOUS at 10:12

## 2023-12-24 RX ADMIN — PIPERACILLIN AND TAZOBACTAM 4.5 G: 4; .5 INJECTION, POWDER, LYOPHILIZED, FOR SOLUTION INTRAVENOUS; PARENTERAL at 10:12

## 2023-12-24 RX ADMIN — CLOPIDOGREL BISULFATE 75 MG: 75 TABLET ORAL at 10:12

## 2023-12-24 RX ADMIN — PROPOFOL 10 MCG/KG/MIN: 10 INJECTION, EMULSION INTRAVENOUS at 03:12

## 2023-12-24 RX ADMIN — NOREPINEPHRINE BITARTRATE 0.06 MCG/KG/MIN: 4 INJECTION, SOLUTION INTRAVENOUS at 03:12

## 2023-12-24 RX ADMIN — IPRATROPIUM BROMIDE AND ALBUTEROL SULFATE 3 ML: 2.5; .5 SOLUTION RESPIRATORY (INHALATION) at 11:12

## 2023-12-24 RX ADMIN — OSELTAMIVIR PHOSPHATE 30 MG: 30 CAPSULE ORAL at 10:12

## 2023-12-24 RX ADMIN — IPRATROPIUM BROMIDE AND ALBUTEROL SULFATE 3 ML: 2.5; .5 SOLUTION RESPIRATORY (INHALATION) at 12:12

## 2023-12-24 RX ADMIN — FINASTERIDE 5 MG: 5 TABLET, FILM COATED ORAL at 10:12

## 2023-12-24 RX ADMIN — IPRATROPIUM BROMIDE AND ALBUTEROL SULFATE 3 ML: 2.5; .5 SOLUTION RESPIRATORY (INHALATION) at 03:12

## 2023-12-24 RX ADMIN — IPRATROPIUM BROMIDE AND ALBUTEROL SULFATE 3 ML: 2.5; .5 SOLUTION RESPIRATORY (INHALATION) at 08:12

## 2023-12-24 RX ADMIN — PIPERACILLIN AND TAZOBACTAM 4.5 G: 4; .5 INJECTION, POWDER, LYOPHILIZED, FOR SOLUTION INTRAVENOUS; PARENTERAL at 05:12

## 2023-12-24 NOTE — SUBJECTIVE & OBJECTIVE
Interval History: remains on vent and pressors.    Review of Systems   Unable to perform ROS: Intubated     Objective:     Vital Signs (Most Recent):  Temp: 98.3 °F (36.8 °C) (12/24/23 0730)  Pulse: 110 (12/24/23 1345)  Resp: (!) 23.3 (12/24/23 1345)  BP: 103/63 (12/24/23 1345)  SpO2: 100 % (12/24/23 1345) Vital Signs (24h Range):  Temp:  [98.2 °F (36.8 °C)-103.1 °F (39.5 °C)] 98.3 °F (36.8 °C)  Pulse:  [103-153] 110  Resp:  [17-55] 23.3  SpO2:  [97 %-100 %] 100 %  BP: ()/() 103/63     Weight: 97.9 kg (215 lb 13.3 oz)  Body mass index is 33.8 kg/m².    Intake/Output Summary (Last 24 hours) at 12/24/2023 1352  Last data filed at 12/24/2023 1101  Gross per 24 hour   Intake 4034.19 ml   Output 125 ml   Net 3909.19 ml         Physical Exam  Constitutional:       Appearance: He is well-developed.   HENT:      Head: Normocephalic and atraumatic.      Mouth/Throat:      Comments: ETT in place  Eyes:      Conjunctiva/sclera: Conjunctivae normal.      Pupils: Pupils are equal, round, and reactive to light.   Cardiovascular:      Rate and Rhythm: Normal rate and regular rhythm.      Heart sounds: Normal heart sounds.   Pulmonary:      Effort: Pulmonary effort is normal.      Breath sounds: Normal breath sounds.      Comments: Decreased breath sound on the right.    Abdominal:      General: Bowel sounds are normal.      Palpations: Abdomen is soft.   Musculoskeletal:         General: Normal range of motion.      Cervical back: Normal range of motion and neck supple.      Right lower leg: Edema present.      Left lower leg: Edema present.   Skin:     General: Skin is warm.   Neurological:      Cranial Nerves: No cranial nerve deficit.      Comments: Sedated on vent.             Significant Labs: All pertinent labs within the past 24 hours have been reviewed.  BMP:   Recent Labs   Lab 12/23/23  1914 12/24/23  0509   * 214*    136   K 4.5 4.7    106   CO2 16* 17*   BUN 17 21   CREATININE 1.9* 2.2*    CALCIUM 9.3 8.4*   MG 1.5*  --      CBC:   Recent Labs   Lab 12/23/23  1914 12/24/23  0509   WBC 4.28 5.02   HGB 10.5* 10.4*   HCT 34.1* 34.5*    178       Significant Imaging: I have reviewed all pertinent imaging results/findings within the past 24 hours.

## 2023-12-24 NOTE — NURSING
[facility] [place] Intensive Care  ICU Shift Summary  Date: 12/24/2023      Prehospitalization: Nursing Home  Admit Date / LOS : 12/23/2023/ 1 days    Diagnosis: Acute hypoxic respiratory failure    Consults:        Active: Pulm CC       Needed: N/A     Code Status: Full Code   Advanced Directive: Not Received    LDA:  Lines/Drains/Airways       Peripherally Inserted Central Catheter Line  Duration             PICC Triple Lumen 12/23/23 2320 left basilic <1 day              Drain  Duration                  NG/OG Tube 12/23/23 2146 orogastric 16 Fr. Center mouth <1 day         Urethral Catheter 12/23/23 2231 Straight-tip 16 Fr. <1 day              Airway  Duration                  Airway - Non-Surgical 12/23/23 2146 Endotracheal Tube <1 day              Peripheral Intravenous Line  Duration                  Peripheral IV - Single Lumen 12/23/23 1913 20 G Anterior;Distal;Left Antecubital <1 day         Peripheral IV - Single Lumen 12/23/23 1913 20 G Anterior;Left Forearm <1 day                  Central Lines/Site/Justification:Pressors  Urinary Cath/Order/Justification:Critically Ill in the ICU and requiring intensive monitoring    Vasopressors/Infusions:    NORepinephrine bitartrate-D5W 0.1 mcg/kg/min (12/23/23 2313)    propofoL 10 mcg/kg/min (12/24/23 0030)          GOALS: Volume/ Hemodynamic: MAP > 65                     RASS: -1  awakes to voice (eye opening/contact) > 10 seconds    Pain Management: IV       Pain Controlled: yes     Rhythm: ST    Respiratory Device: Vent    Vent Mode: PRVC  Oxygen Concentration (%):  [] 70  Resp Rate Total:  [20 br/min-25 br/min] 20 br/min  Vt Set:  [500 mL] 500 mL  PEEP/CPAP:  [5 cmH20] 5 cmH20  Mean Airway Pressure:  [8.7 zoY02-49.4 cmH20] 8.7 cmH20                 Most Recent SBT/ SAT: Did not perform       MOVE Screen: PASS  ICU Liberation: no    VTE Prophylaxis: Pharm  Mobility: Bedrest  Stress Ulcer Prophylaxis: Yes    Isolation: Droplet    Dietary:   Current Diet Order  "  Procedures    Diet NPO      Tolerance: not applicable  Advancement: no    I & O (24h):    Intake/Output Summary (Last 24 hours) at 12/24/2023 0601  Last data filed at 12/24/2023 0523  Gross per 24 hour   Intake 2903.24 ml   Output 125 ml   Net 2778.24 ml        Restraints: Yes    Significant Dates:  Post Op Date: N/A  Rescue Date: N/A  Imaging/ Diagnostics: N/A    Noteworthy Labs:  Cr 2.2, AST 1387, ALT 1113    COVID Test: (--)  CBC/Anemia Labs: Coags:    Recent Labs   Lab 12/23/23 1914 12/24/23  0509   WBC 4.28 5.02   HGB 10.5* 10.4*   HCT 34.1* 34.5*    178   MCV 72* 74*   RDW 17.1* 17.3*    Recent Labs   Lab 12/23/23 1914   INR 1.4*        Chemistries:   Recent Labs   Lab 12/23/23 1914 12/24/23  0509    136   K 4.5 4.7    106   CO2 16* 17*   BUN 17 21   CREATININE 1.9* 2.2*   CALCIUM 9.3 8.4*   PROT 7.7 6.3   BILITOT 1.4* 1.3*   ALKPHOS 102 101   * 1,113*   * 1,387*   MG 1.5*  --    PHOS 4.6*  --         Cardiac Enzymes: Ejection Fractions:    Recent Labs     12/23/23 1914   TROPONINI 0.617*    EF   Date Value Ref Range Status   05/11/2022 55 % Final        POCT Glucose: HbA1c:    No results for input(s): "POCTGLUCOSE" in the last 168 hours. No results found for: "HGBA1C"        ICU LOS 6h  Level of Care: Critical Care    Chart Check: 24 HR Done  Shift Summary/Plan for the shift: Plan is to continue current treatment. No acute distress noted, safety maintained. Resting in bed peacefully, side rails up x3 with call light within reach.   "

## 2023-12-24 NOTE — ED NOTES
Pt RR continues 40-50. Pt appears more anxious. Notified Dr Herzog. BP 84/52. + exp wheezing noted to L lung sounds, CRT notified for BIPAP   No

## 2023-12-24 NOTE — CONSULTS
[facility] [place] Intensive Care  Sunny Tsang MD Cardiology  Consult Note    Patient Name: Alexsander Tafoya  MRN: [id number]  Admission Date: 12/23/2023  Hospital Length of Stay: 1 days  Code Status: Full Code   Attending Provider: Sunny Tsang MD   Consulting Provider: Hilario Morgan MD  Primary Care Physician: Helio Farr MD  Principal Problem:Acute hypoxic respiratory failure    Patient information was obtained from ER records.     Inpatient consult to Cardiology  Consult performed by: Hilario Morgan MD  Consult ordered by: Sunny Tsang MD        Subjective:     Chief Complaint:  CHF, elevated troponin         HPI:  Mr Tafoya is a 73-year-old male being admitted for acute hypoxic respiratory failure.  His medical history significant for BPH, dyslipidemia, chronic CVA with resultant aphasia, and right-sided hemiparesis.  During my evaluation the patient is in severe respiratory distress, on BiPAP and unable to provide any meaningful detailed history.  Seems that he came from [facility] due to worsening dyspnea, and EMS noted low oxygen saturations. in the ER, he was noted to have Tmax of 103.1 °F, persistently tachycardic in 130s, hypotensive 84/52, and was initially on nonrebreather and subsequently placed on BiPAP.  His CBC shows normal white count, microcytic anemia 10.5 and normal platelets.  INR 1.4.  CBC with metabolic acidosis bicarb 16, creatinine 1.9 (appears to have previous creatinine of 1.09 in September 2022 with baseline of about 1.1-1.3,), hypomagnesemia 1.5, transaminitis AST: ALT-192: 152, lactic acidosis 3.1, elevated procalcitonin 0.26.  His influenza a swab is positive.  His chest x-ray suggests moderate right-sided pleural effusion with compressive atelectasis and/or lower lobe consolidation.     After evaluating the patient, discussed with ER physician that the patient may need to be intubated given severe respiratory distress tachycardia, respiratory rate in 50s and inability to  protect airway with previous stroke.  Patient will be placed on mechanical ventilation.  He has so far received breathing treatments, Rocephin and Zithromax along with Tamiflu.  Admission has been requested for further evaluation and treatment.     Overview/Hospital Course:  72 y/o male presents with SOB. In the ER, he was noted to have Tmax of 103.1 °F, persistently tachycardic in 130s, hypotensive 84/52, and was initially on nonrebreather and subsequently placed on BiPAP.  Respiratory status worsened and he was then intubated.  Flu positive and started on Tamiflu. His chest x-ray suggests moderate right-sided pleural effusion with compressive atelectasis and/or lower lobe consolidation.  Started on broad spectrum ABX's.       On vent in ICU on levophed  EKG sinus tachycardia 123 NSSTT changes  Troponin 0.6 to 2.7  Cr 2.2      Echo 12/24/23    Left Ventricle: The left ventricle is normal in size. There is concentric hypertrophy. There is severely reduced systolic function with a visually estimated ejection fraction of 25 - 30%.    Right Ventricle: Mild right ventricular enlargement. Systolic function is normal.    Left Atrium: Left atrium is moderately dilated.    Right Atrium: Right atrium is mildly dilated.    Tricuspid Valve: There is trace regurgitation. The estimated PA systolic pressure is at least 21 mmHg.    IVC/SVC: Patient is ventilated, cannot use IVC diameter to estimate right atrial pressure.         Followed by [facility]      This patient comes to the office today for follow-up. Clinically he has been stable at this time. Who was felt not to pursue further workup for possible cardiac amyloidosis.    He does take a Lasix 40 mg 1/2 tablet every day. At times he probably could use a little bit extra. He lives in an assisted living facility at this time    NOTE 3/20/23 --------------------------------  This is a very pleasant patient here today for follow-up visit. He had been going under a  workup for possible TTR amyloidosis. His echocardiogram and technetium pyrophosphate scan suggested such. He had been evaluated by the [facility] group for this and also [facility]. The patient is decided not to pursue therapy for this with Lyssa garcia.    The patient clinically has been stable. His blood pressure is on the low side. He is on enalapril 2.5 mg every day, Lasix 40 mg 1/2 tablet a day potassium chloride    As mentioned, the family and the patient have decided not to pursue therapy for presumptive diagnosis of amyloid heart. We will follow him on the present regimen.    I would expect his LV function to continue to decrease but we have no plans right now for further invasive therapies or therapy for cardiac amyloidosis      1. Cerebral vascular accident 2007 right hemiparesis  2. Hypertension  3. High cholesterol  4. Abnormal ECG  5. Severe cardiomyopathy  6. Chronic systolic heart failure  7. Patient of assisted living  8. Swallowing difficulty with aspiration  9. Dysarthria  10. +ATTR technectium PYP scan (6/2022)  Echo c/w amyloid heart (5/2022)    As mentioned the patient is not interested in pursuit of an ICD or therapy for cardiac amyloid right now.      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  Severely decreased left  ventricular systolic function.  Left ventricular ejection fraction is estimated at 15%.  Grade II/IV diastolic dysfunction, moderately elevated filling pressures.  Normal right ventricular systolic function.  Mildly increased left atrial size.  Structurally normal trileaflet aortic valve.          Past Medical History:   Diagnosis Date    BPH (benign prostatic hyperplasia)     Dysarthria     HLD (hyperlipidemia)     Hypertension     Other and unspecified hyperlipidemia     Stroke        Past Surgical History:   Procedure Laterality Date    CATARACT EXTRACTION W/ INTRAOCULAR LENS  IMPLANT, BILATERAL         Review of patient's allergies indicates:  No Known Allergies    No current facility-administered medications on file prior to encounter.     Current Outpatient Medications on File Prior to Encounter   Medication Sig    aspirin 81 MG Chew Take 1 tablet (81 mg total) by mouth once daily.    clopidogrel (PLAVIX) 75 mg tablet Take 1 tablet (75 mg total) by mouth once daily.    enalapril (VASOTEC) 5 MG tablet Take 5 mg by mouth once daily.    finasteride (PROSCAR) 5 mg tablet Take 5 mg by mouth once daily.     fish oil-omega-3 fatty acids 300-1,000 mg capsule Take 1 capsule by mouth once daily.    furosemide (LASIX) 40 MG tablet Take 1 tablet (40 mg total) by mouth once daily.    potassium chloride (MICRO-K) 10 MEQ CpSR Take 10 mEq by mouth once daily.    rosuvastatin (CRESTOR) 20 MG tablet Take 1 tablet (20 mg total) by mouth every evening. HOLD UNTIL TOLD TO RESUME BY PHYSICIAN (Patient taking differently: Take 20 mg by mouth every evening.)    metoprolol tartrate (LOPRESSOR) 50 MG tablet Take 0.5 tablets (25 mg total) by mouth once daily. HOLD UNTIL TOLD TO RESUME BY PHYSICIAN (Patient taking differently: Take 12.5 mg by mouth once daily. HOLD UNTIL TOLD TO RESUME BY PHYSICIAN)    multivitamin (THERAGRAN) per tablet Take 1 tablet by mouth once daily.    [DISCONTINUED] doxazosin (CARDURA) 2 MG tablet Take 1 tablet (2  mg total) by mouth every evening. HOLD UNTIL TOLD TO RESUME BY PHYSICIAN     Family History       Problem Relation (Age of Onset)    Dementia Father    Hypertension Mother, Father    Seizures Mother    Stroke Mother          Tobacco Use    Smoking status: Former    Smokeless tobacco: Never   Substance and Sexual Activity    Alcohol use: Yes     Comment: rare, once a week or less    Drug use: No    Sexual activity: Never     Review of Systems   Unable to perform ROS: Intubated     Objective:     Vital Signs (Most Recent):  Temp: 98.3 °F (36.8 °C) (12/24/23 0730)  Pulse: 110 (12/24/23 1400)  Resp: (!) 23.4 (12/24/23 1400)  BP: 103/62 (12/24/23 1400)  SpO2: 100 % (12/24/23 1400) Vital Signs (24h Range):  Temp:  [98.2 °F (36.8 °C)-103.1 °F (39.5 °C)] 98.3 °F (36.8 °C)  Pulse:  [103-153] 110  Resp:  [17-55] 23.4  SpO2:  [97 %-100 %] 100 %  BP: ()/() 103/62     Weight: 97.9 kg (215 lb 13.3 oz)  Body mass index is 33.8 kg/m².    SpO2: 100 %         Intake/Output Summary (Last 24 hours) at 12/24/2023 1419  Last data filed at 12/24/2023 1200  Gross per 24 hour   Intake 4063.97 ml   Output 125 ml   Net 3938.97 ml       Lines/Drains/Airways       Peripherally Inserted Central Catheter Line  Duration             PICC Triple Lumen 12/23/23 2320 left basilic <1 day              Drain  Duration                  NG/OG Tube 12/23/23 2146 orogastric 16 Fr. Center mouth <1 day         Urethral Catheter 12/23/23 2231 Straight-tip 16 Fr. <1 day              Airway  Duration                  Airway - Non-Surgical 12/23/23 2146 Endotracheal Tube <1 day              Peripheral Intravenous Line  Duration                  Peripheral IV - Single Lumen 12/23/23 1913 20 G Anterior;Distal;Left Antecubital <1 day         Peripheral IV - Single Lumen 12/23/23 1913 20 G Anterior;Left Forearm <1 day                     Physical Exam  Constitutional:       Appearance: He is well-developed.   HENT:      Head: Normocephalic and atraumatic.    Eyes:      Conjunctiva/sclera: Conjunctivae normal.      Pupils: Pupils are equal, round, and reactive to light.   Cardiovascular:      Rate and Rhythm: Normal rate.      Pulses: Intact distal pulses.      Heart sounds: Normal heart sounds.   Pulmonary:      Effort: Pulmonary effort is normal.      Breath sounds: Normal breath sounds.   Abdominal:      General: Bowel sounds are normal.      Palpations: Abdomen is soft.   Musculoskeletal:         General: Normal range of motion.      Cervical back: Normal range of motion and neck supple.   Skin:     General: Skin is warm and dry.   Neurological:      Mental Status: He is alert.          Significant Labs: All pertinent lab results from the last 24 hours have been reviewed.    Significant Imaging: Echocardiogram: 2D echo with color flow doppler: No results found for this or any previous visit.  Assessment and Plan:     Dilated cardiomyopathy  EF 25-30% which is not new per [facility] records. Suspected cardiac amyloidosis but has declined further evaluation and AICD in the past. Continue supportive care    Shock  Pressors as needed    Influenza A  Per primary    NSTEMI (non-ST elevated myocardial infarction)  Troponin up to 2.7. Cr 2.2. has declined invasive procedures and AICD per [facility] records. Conservative medical Rx    Hemiplegia affecting right side in right-dominant patient as late effect of cerebrovascular disease  Per primary        VTE Risk Mitigation (From admission, onward)           Ordered     enoxaparin injection 40 mg  Daily         12/23/23 2143     IP VTE HIGH RISK PATIENT  Once         12/23/23 2143     Place sequential compression device  Until discontinued         12/23/23 2143                  35 minutes spent with patient in ICU    Thank you for your consult. I will follow-up with patient. Please contact us if you have any additional questions.    Hilario Morgan MD  Cardiology   [facility] - Intensive Care

## 2023-12-24 NOTE — CONSULTS
[facility] - Intensive Care  Critical Care Medicine  Consult Note    Patient Name: Alexsander Tafoya  MRN: [id number]  Admission Date: 12/23/2023  Hospital Length of Stay: 1 days  Code Status: Full Code  Attending Physician: Sunny Tsang MD   Primary Care Provider: Helio Farr MD   Principal Problem: Acute hypoxic respiratory failure    Inpatient consult to Pulmonology  Consult performed by: Brijesh Edward MD  Consult ordered by: Sunny Tsang MD        Subjective:     HPI:  Patient is 73 y.o. male  has a past medical history of BPH (benign prostatic hyperplasia), Dysarthria, HLD (hyperlipidemia), Hypertension, Other and unspecified hyperlipidemia, and Stroke. presented to [facility] on 12/23/23 with cough and sob.   In the ER, he was noted to have Tmax of 103.1 °F, persistently tachycardic in 130s, hypotensive 84/52, and was initially on nonrebreather and subsequently placed on BiPAP.  His CBC shows normal white count, microcytic anemia 10.5 and normal platelets.  INR 1.4.  CBC with metabolic acidosis bicarb 16, creatinine 1.9 (appears to have previous creatinine of 1.09 in September 2022 with baseline of about 1.1-1.3,), hypomagnesemia 1.5, transaminitis AST: ALT-192: 152, lactic acidosis 3.1, elevated procalcitonin 0.26.  His influenza a swab is positive.  His chest x-ray suggests moderate right-sided pleural effusion with compressive atelectasis and/or lower lobe consolidation.  Patient was intubated in ED and pulmonary was consulted for further inputs.        During my initial evaluation, patient was intubated and sedated with propofol.  Patient sat was 100% with 70% Fio2 and 5 peep.  Vss stable with 0.08 mcg/kg/min.      Hospital/ICU Course:  No notes on file    Past Medical History:   Diagnosis Date    BPH (benign prostatic hyperplasia)     Dysarthria     HLD (hyperlipidemia)     Hypertension     Other and unspecified hyperlipidemia     Stroke        Past Surgical History:   Procedure  Laterality Date    CATARACT EXTRACTION W/ INTRAOCULAR LENS  IMPLANT, BILATERAL         Review of patient's allergies indicates:  No Known Allergies    Family History       Problem Relation (Age of Onset)    Dementia Father    Hypertension Mother, Father    Seizures Mother    Stroke Mother          Tobacco Use    Smoking status: Former    Smokeless tobacco: Never   Substance and Sexual Activity    Alcohol use: Yes     Comment: rare, once a week or less    Drug use: No    Sexual activity: Never         Review of Systems   Unable to perform ROS: Intubated     Objective:     Vital Signs (Most Recent):  Temp: 98.3 °F (36.8 °C) (12/24/23 0730)  Pulse: (!) 127 (12/24/23 0800)  Resp: (!) 24.1 (12/24/23 0800)  BP: 102/64 (12/24/23 0800)  SpO2: 100 % (12/24/23 0800) Vital Signs (24h Range):  Temp:  [98.2 °F (36.8 °C)-103.1 °F (39.5 °C)] 98.3 °F (36.8 °C)  Pulse:  [115-153] 127  Resp:  [17-55] 24.1  SpO2:  [97 %-100 %] 100 %  BP: ()/() 102/64     Weight: 97.9 kg (215 lb 13.3 oz)  Body mass index is 33.8 kg/m².      Intake/Output Summary (Last 24 hours) at 12/24/2023 0821  Last data filed at 12/24/2023 0800  Gross per 24 hour   Intake 3817.61 ml   Output 125 ml   Net 3692.61 ml        Physical Exam  Constitutional:       Appearance: He is well-developed.   HENT:      Head: Normocephalic and atraumatic.      Mouth/Throat:      Comments: ETT in place  Eyes:      Conjunctiva/sclera: Conjunctivae normal.      Pupils: Pupils are equal, round, and reactive to light.   Cardiovascular:      Rate and Rhythm: Normal rate and regular rhythm.      Heart sounds: Normal heart sounds.   Pulmonary:      Effort: Pulmonary effort is normal.      Breath sounds: Normal breath sounds.      Comments: Decreased breath sound on the right.    Abdominal:      General: Bowel sounds are normal.      Palpations: Abdomen is soft.   Musculoskeletal:         General: Normal range of motion.      Cervical back: Normal range of motion and neck  "supple.      Right lower leg: Edema present.      Left lower leg: Edema present.   Skin:     General: Skin is warm.   Neurological:      Cranial Nerves: No cranial nerve deficit.      Comments: Sedated on vent.          Vents:  Vent Mode: PRVC (12/24/23 0722)  Ventilator Initiated: Yes (12/23/23 2212)  Set Rate: 20 BPM (12/24/23 0722)  Vt Set: 500 mL (12/24/23 0722)  PEEP/CPAP: 5 cmH20 (12/24/23 0722)  Oxygen Concentration (%): 70 (12/24/23 0800)  Peak Airway Pressure: 15.6 cmH20 (12/24/23 0722)  Total Ve: 10.59 L/m (12/24/23 0722)  F/VT Ratio<105 (RSBI): (!) 41.29 (12/24/23 0722)    Lines/Drains/Airways       Peripherally Inserted Central Catheter Line  Duration             PICC Triple Lumen 12/23/23 2320 left basilic <1 day              Drain  Duration                  NG/OG Tube 12/23/23 2146 orogastric 16 Fr. Center mouth <1 day         Urethral Catheter 12/23/23 2231 Straight-tip 16 Fr. <1 day              Airway  Duration                  Airway - Non-Surgical 12/23/23 2146 Endotracheal Tube <1 day              Peripheral Intravenous Line  Duration                  Peripheral IV - Single Lumen 12/23/23 1913 20 G Anterior;Distal;Left Antecubital <1 day         Peripheral IV - Single Lumen 12/23/23 1913 20 G Anterior;Left Forearm <1 day                    Significant Labs:    CBC/Anemia Profile:  Recent Labs   Lab 12/23/23 1914 12/24/23  0509   WBC 4.28 5.02   HGB 10.5* 10.4*   HCT 34.1* 34.5*    178   MCV 72* 74*   RDW 17.1* 17.3*        Chemistries:  Recent Labs   Lab 12/23/23 1914 12/24/23  0509    136   K 4.5 4.7    106   CO2 16* 17*   BUN 17 21   CREATININE 1.9* 2.2*   CALCIUM 9.3 8.4*   ALBUMIN 3.6 3.1*   PROT 7.7 6.3   BILITOT 1.4* 1.3*   ALKPHOS 102 101   * 1,113*   * 1,387*   MG 1.5*  --    PHOS 4.6*  --        ABGs:   Recent Labs   Lab 12/23/23 1913   PH 7.362   PCO2 28.3*   HCO3 16.1*   POCSATURATED 92   BE -8*     Cardiac Markers: No results for input(s): "CKMB", " ""TROPONINT", "MYOGLOBIN" in the last 48 hours.  Lactic Acid:   Recent Labs   Lab 12/23/23 1914 12/23/23  2304   LACTATE 3.1* 3.2*     Troponin:   Recent Labs   Lab 12/23/23 1914   TROPONINI 0.617*     BNP  Recent Labs   Lab 12/23/23 1914   *     Echo 5/11/22  TDS  The left ventricle is normal in size with normal systolic function.  The estimated ejection fraction is 55%.  Normal left ventricular diastolic function.  Normal central venous pressure (3 mmHg).    Significant Imaging:   I have reviewed all pertinent imaging results/findings within the past 24 hours.  CT: I have reviewed all pertinent results/findings within the past 24 hours and my personal findings are:  moderate right effusion with compressive atelectasis and trace left effusion.     ABG  Recent Labs   Lab 12/23/23 1913   PH 7.362   PO2 65*   PCO2 28.3*   HCO3 16.1*   BE -8*     Assessment/Plan:     Pulmonary  Pleural effusion  Bilateral effusion on ct.  Rt>Lt  Moderate right effusion confirmed via pocus 12/24  DDx: parapneumonic vs volume overload  Given normal wbc and borderline, will monitor effusion with diuresis.    Cardiac/Vascular  Elevated troponin  EKG in ED without ST elevation  Suspect demand ischemia.  A  Await echo and repeat troponin    Renal/  Acute renal failure  Creatinine was 1.1 in 2022  Suspect atn a/w hypotension  Appear volume overload on exam and ct  Will monitor with diuresis    ID  Influenza A  Tamiflu adjusted to renal function    Other  * Acute hypoxic respiratory failure  Patient with Hypoxic Respiratory failure which is Acute.  he is not on home oxygen. Supplemental oxygen was provided and noted- Vent Mode: PRVC  Oxygen Concentration (%):  [] 50  Resp Rate Total:  [20 br/min-25 br/min] 20 br/min  Vt Set:  [500 mL] 500 mL  PEEP/CPAP:  [5 cmH20] 5 cmH20  Mean Airway Pressure:  [8.2 egO26-23.4 cmH20] 8.2 cmH20    .   Signs/symptoms of respiratory failure include- tachypnea, respiratory distress, and " lethargy. Contributing diagnoses includes - ARDS vs CHF Labs and images were reviewed. Patient Has recent ABG, which has been reviewed. Will treat underlying causes and adjust management of respiratory failure as follows-   Ct with bilateral effusion and compressive atelectasis of left lung  + Influenza with borderline procal and normal wbc  Will start empiric diuresis  Wean fio2 and possible sbt  Tamiflu + broad spectrum antibiotics  Sputum culture to help decision about de-escalation         Critical Care Time: 60 minutes  Critical secondary to Patient has a condition that poses threat to life and bodily function: Severe Respiratory Distress and Acute Renal Failure     Critical care was time spent personally by me on the following activities: development of treatment plan with patient or surrogate and bedside caregivers, discussions with consultants, evaluation of patient's response to treatment, examination of patient, ordering and performing treatments and interventions, ordering and review of laboratory studies, ordering and review of radiographic studies, pulse oximetry, re-evaluation of patient's condition. This critical care time did not overlap with that of any other provider or involve time for any procedures.  Goals o   Cryotherapy Text: The wound bed was treated with cryotherapy after the biopsy was performed.

## 2023-12-24 NOTE — ASSESSMENT & PLAN NOTE
Creatinine was 1.1 in 2022  Suspect atn a/w hypotension  Appear volume overload on exam and ct  Will monitor with diuresis

## 2023-12-24 NOTE — HPI
Patient is 73 y.o. male  has a past medical history of BPH (benign prostatic hyperplasia), Dysarthria, HLD (hyperlipidemia), Hypertension, Other and unspecified hyperlipidemia, and Stroke. presented to [facility] on 12/23/23 with cough and sob.   In the ER, he was noted to have Tmax of 103.1 °F, persistently tachycardic in 130s, hypotensive 84/52, and was initially on nonrebreather and subsequently placed on BiPAP.  His CBC shows normal white count, microcytic anemia 10.5 and normal platelets.  INR 1.4.  CBC with metabolic acidosis bicarb 16, creatinine 1.9 (appears to have previous creatinine of 1.09 in September 2022 with baseline of about 1.1-1.3,), hypomagnesemia 1.5, transaminitis AST: ALT-192: 152, lactic acidosis 3.1, elevated procalcitonin 0.26.  His influenza a swab is positive.  His chest x-ray suggests moderate right-sided pleural effusion with compressive atelectasis and/or lower lobe consolidation.  Patient was intubated in ED and pulmonary was consulted for further inputs.      During my initial evaluation, patient was intubated and sedated with propofol.  Patient sat was 100% with 70% Fio2 and 5 peep.  Vss stable with 0.08 mcg/kg/min.

## 2023-12-24 NOTE — ASSESSMENT & PLAN NOTE
-Moderate effusion on x-ray.  -CAT scan pending.  -Received 30 cc/kilo fluids in the ER.  Will hold further scheduled fluids for now due to concurrent effusion, and utilize pressors.  -If respiratory status continues to be worse and if CT shows a large pleural effusion could consider thoracentesis.

## 2023-12-24 NOTE — PROCEDURES
"Alexsander Tafoya is a 73 y.o. male patient.    Temp: (!) 100.8 °F (38.2 °C) (12/23/23 2317)  Pulse: (!) 119 (12/23/23 2317)  Resp: (!) 27 (12/23/23 2317)  BP: 100/73 (12/23/23 2317)  SpO2: 100 % (12/23/23 2232)  Weight: 83.9 kg (185 lb) (12/23/23 1902)  Height: 5' 7" (170.2 cm) (12/23/23 1935)    PICC  Date/Time: 12/23/2023 11:20 PM  Performed by: Jose Luis Chao RN  Consent Done: Yes  Time out: Immediately prior to procedure a time out was called to verify the correct patient, procedure, equipment, support staff and site/side marked as required  Indications: med administration  Anesthesia: local infiltration  Local anesthetic: lidocaine 1% without epinephrine  Anesthetic Total (mL): 1  Preparation: skin prepped with ChloraPrep  Skin prep agent dried: skin prep agent completely dried prior to procedure  Sterile barriers: all five maximum sterile barriers used - cap, mask, sterile gown, sterile gloves, and large sterile sheet  Hand hygiene: hand hygiene performed prior to central venous catheter insertion  Location details: left basilic  Catheter type: triple lumen  Catheter size: 5 Fr  Catheter Length: 44cm    Ultrasound guidance: yes  Vessel Caliber: medium and large and patent, compressibility normal  Needle advanced into vessel with real time Ultrasound guidance.  Guidewire confirmed in vessel.  Sterile sheath used.  Number of attempts: 1  Post-procedure: blood return through all ports, chlorhexidine patch and sterile dressing applied  Estimated blood loss (mL): 0            Name Jose Luis Chao   12/23/2023    "

## 2023-12-24 NOTE — NURSING
[facility]  Nurses Note -- 4 Eyes      12/24/2023       Skin assessed on: Q Shift      [] No Pressure Injuries Present    []Prevention Measures Documented    [x] Yes LDA  for Pressure Injury Previously documented     [] Yes New Pressure Injury Discovered   [] LDA for New Pressure Injury Added      Attending RN:  Radha Carrasco RN     Second RN:  Nuris Conte RN

## 2023-12-24 NOTE — ED TRIAGE NOTES
Pt reports to the ED with C/O Sob, fever, and cough that started yesterday. Per EMS the nursing home reports he was sating in the 80's last night. Pt was sating 80% on RA when EMS arrived. Pt was breathing 50 TPM. Pt currently sating 99% on 15L NRB breathing between 30 and 40 TPM. Pt breathing appears shallow and pt grunting while breathing. Pt with some R upper extremity edema that is pitting. Pt bi lateral lower extremities with +2 edema at the ankles. Pt is a poor a historian D/T previous Hx of CVA. MD Herzog at bedside for eval. Pt placed on monitor. ED workup in progress. Assessment to follow in narrator.

## 2023-12-24 NOTE — PROGRESS NOTES
"Pharmacokinetic Initial Assessment: IV Vancomycin    Assessment/Plan:    Initiate intravenous vancomycin with loading dose of 2000 mg once followed by a maintenance dose of vancomycin 1250 mg IV every 24 hours  Desired empiric serum trough concentration is 10 to 20 mcg/mL  Draw vancomycin trough level 60 min prior to third dose on 12/26/23 at approximately 0100  Pharmacy will continue to follow and monitor vancomycin.      Please contact pharmacy at extension [phone] with any questions regarding this assessment.     Thank you for the consult,   Naresh Hernandez       Patient brief summary:  Alexsander Tafoya is a 73 y.o. male initiated on antimicrobial therapy with IV Vancomycin for treatment of suspected  pneumonia    Drug Allergies:   Review of patient's allergies indicates:  No Known Allergies    Actual Body Weight:   97.9 kg    Renal Function:   Estimated Creatinine Clearance: 38.6 mL/min (A) (based on SCr of 1.9 mg/dL (H)).,     Dialysis Method (if applicable):  N/A    CBC (last 72 hours):  Recent Labs   Lab Result Units 12/23/23 1914   WBC K/uL 4.28   Hemoglobin g/dL 10.5*   Hematocrit % 34.1*   Platelets K/uL 159   Gran % % 86.0*   Lymph % % 5.8*   Mono % % 7.0   Eosinophil % % 0.2   Basophil % % 0.5   Differential Method  Automated       Metabolic Panel (last 72 hours):  Recent Labs   Lab Result Units 12/23/23 1914 12/23/23 2050   Sodium mmol/L 137  --    Potassium mmol/L 4.5  --    Chloride mmol/L 107  --    CO2 mmol/L 16*  --    Glucose mg/dL 136*  --    Glucose, UA   --  Negative   BUN mg/dL 17  --    Creatinine mg/dL 1.9*  --    Albumin g/dL 3.6  --    Total Bilirubin mg/dL 1.4*  --    Alkaline Phosphatase U/L 102  --    AST U/L 192*  --    ALT U/L 152*  --    Magnesium mg/dL 1.5*  --    Phosphorus mg/dL 4.6*  --        Drug levels (last 3 results):  No results for input(s): "VANCOMYCINRA", "VANCORANDOM", "VANCOMYCINPE", "VANCOPEAK", "VANCOMYCINTR", "VANCOTROUGH" in the last 72 hours.    Microbiologic " Results:  Microbiology Results (last 7 days)       Procedure Component Value Units Date/Time    Blood culture x two cultures. Draw prior to antibiotics. [[id number]] Collected: 12/23/23 1915    Order Status: Sent Specimen: Blood from Peripheral, Forearm, Right Updated: 12/23/23 1926    Blood culture x two cultures. Draw prior to antibiotics. [[id number]] Collected: 12/23/23 1914    Order Status: Sent Specimen: Blood from Peripheral, Antecubital, Left Updated: 12/23/23 1926

## 2023-12-24 NOTE — ASSESSMENT & PLAN NOTE
Patient with Hypoxic Respiratory failure which is Acute.  he is not on home oxygen. Supplemental oxygen was provided and noted- Vent Mode: PRVC  Oxygen Concentration (%):  [] 50  Resp Rate Total:  [20 br/min-25 br/min] 20 br/min  Vt Set:  [500 mL] 500 mL  PEEP/CPAP:  [5 cmH20-8 cmH20] 8 cmH20  Mean Airway Pressure:  [8.2 fuT18-62.4 cmH20] 11.5 cmH20.   Signs/symptoms of respiratory failure include- tachypnea, increased work of breathing, respiratory distress, use of accessory muscles, wheezing, and stridor. Contributing diagnoses includes - Pleural effusion and Pneumonia Labs and images were reviewed. Patient Has not had a recent ABG. Will treat underlying causes and adjust management of respiratory failure as follows- Treat underlying etiologies of pneumonia and evaluate pleural effusion.  Intubated in ER.  Pulmonary consulted.  Respiratory failure secondary to flu/pneumonia/pleural effusion.

## 2023-12-24 NOTE — ASSESSMENT & PLAN NOTE
This patient does have evidence of infective focus  My overall impression is septic shock due to MAP < 65.  Source: Respiratory  Antibiotics given-   Antibiotics (72h ago, onward)      Start     Stop Route Frequency Ordered    12/25/23 0200  vancomycin 1,250 mg in dextrose 5 % (D5W) 250 mL IVPB (Vial-Mate)         -- IV Every 24 hours (non-standard times) 12/24/23 0202    12/24/23 0142  vancomycin - pharmacy to dose  (vancomycin IVPB (PEDS and ADULTS))        See Hyperspace for full Linked Orders Report.    -- IV pharmacy to manage frequency 12/24/23 0042    12/23/23 2245  piperacillin-tazobactam (ZOSYN) 4.5 g in dextrose 5 % in water (D5W) 100 mL IVPB (MB+)         -- IV Every 8 hours (non-standard times) 12/23/23 2144          Latest lactate reviewed-  Recent Labs   Lab 12/23/23  1914 12/23/23  2304 12/24/23  1037   LACTATE 3.1* 3.2* 3.0*       Organ dysfunction indicated by Acute liver injury and Acute respiratory failure    Will Start Pressors- Levophed for MAP of 65  Source control achieved by:   Antibiotics.  -Given history of CVA and likely aspiration, will continue Zosyn.  -Also has influenza A infection which is being treated.

## 2023-12-24 NOTE — HOSPITAL COURSE
Mr Alexsander Tafoya was admitted with acute hypoxic respiratory failure and septic shock due to R sided pneumonia and influenza. Suspect also CHF contributing- new EF 25-30%. Intubated in ED. Started antibiotics, diuretics, tamiflu. He also had shock liver on admission. Developed episodes of VT and started on lidocaine gtt. Started lactulose for elevated ammonia associated with shock liver. He did improve and was extubated on 12/28. Stepped down to floor. Able to start metoprolol prior to d/c but not other GDMT meds. Palliative following- DNR code status. He is at functional baseline- bedbound with aphasia but sister states he is not at his baseline and his assisted living would not be able to care for him. Working on NH placement. He was accepted to nursing home and consult to palliative care placed. Patient discharged in stable condition. Needs follow up with Cardiology and PCP.

## 2023-12-24 NOTE — SUBJECTIVE & OBJECTIVE
Past Medical History:   Diagnosis Date    BPH (benign prostatic hyperplasia)     Dysarthria     HLD (hyperlipidemia)     Hypertension     Other and unspecified hyperlipidemia     Stroke        Past Surgical History:   Procedure Laterality Date    CATARACT EXTRACTION W/ INTRAOCULAR LENS  IMPLANT, BILATERAL         Review of patient's allergies indicates:  No Known Allergies    No current facility-administered medications on file prior to encounter.     Current Outpatient Medications on File Prior to Encounter   Medication Sig    aspirin 81 MG Chew Take 1 tablet (81 mg total) by mouth once daily.    clopidogrel (PLAVIX) 75 mg tablet Take 1 tablet (75 mg total) by mouth once daily.    enalapril (VASOTEC) 5 MG tablet Take 5 mg by mouth once daily.    finasteride (PROSCAR) 5 mg tablet Take 5 mg by mouth once daily.     fish oil-omega-3 fatty acids 300-1,000 mg capsule Take 1 capsule by mouth once daily.    furosemide (LASIX) 40 MG tablet Take 1 tablet (40 mg total) by mouth once daily.    potassium chloride (MICRO-K) 10 MEQ CpSR Take 10 mEq by mouth once daily.    rosuvastatin (CRESTOR) 20 MG tablet Take 1 tablet (20 mg total) by mouth every evening. HOLD UNTIL TOLD TO RESUME BY PHYSICIAN (Patient taking differently: Take 20 mg by mouth every evening.)    metoprolol tartrate (LOPRESSOR) 50 MG tablet Take 0.5 tablets (25 mg total) by mouth once daily. HOLD UNTIL TOLD TO RESUME BY PHYSICIAN (Patient taking differently: Take 12.5 mg by mouth once daily. HOLD UNTIL TOLD TO RESUME BY PHYSICIAN)    multivitamin (THERAGRAN) per tablet Take 1 tablet by mouth once daily.    [DISCONTINUED] doxazosin (CARDURA) 2 MG tablet Take 1 tablet (2 mg total) by mouth every evening. HOLD UNTIL TOLD TO RESUME BY PHYSICIAN     Family History       Problem Relation (Age of Onset)    Dementia Father    Hypertension Mother, Father    Seizures Mother    Stroke Mother          Tobacco Use    Smoking status: Former    Smokeless tobacco: Never    Substance and Sexual Activity    Alcohol use: Yes     Comment: rare, once a week or less    Drug use: No    Sexual activity: Never     Review of Systems   Unable to perform ROS: Intubated     Objective:     Vital Signs (Most Recent):  Temp: 98.3 °F (36.8 °C) (12/24/23 0730)  Pulse: 110 (12/24/23 1400)  Resp: (!) 23.4 (12/24/23 1400)  BP: 103/62 (12/24/23 1400)  SpO2: 100 % (12/24/23 1400) Vital Signs (24h Range):  Temp:  [98.2 °F (36.8 °C)-103.1 °F (39.5 °C)] 98.3 °F (36.8 °C)  Pulse:  [103-153] 110  Resp:  [17-55] 23.4  SpO2:  [97 %-100 %] 100 %  BP: ()/() 103/62     Weight: 97.9 kg (215 lb 13.3 oz)  Body mass index is 33.8 kg/m².    SpO2: 100 %         Intake/Output Summary (Last 24 hours) at 12/24/2023 1419  Last data filed at 12/24/2023 1200  Gross per 24 hour   Intake 4063.97 ml   Output 125 ml   Net 3938.97 ml       Lines/Drains/Airways       Peripherally Inserted Central Catheter Line  Duration             PICC Triple Lumen 12/23/23 2320 left basilic <1 day              Drain  Duration                  NG/OG Tube 12/23/23 2146 orogastric 16 Fr. Center mouth <1 day         Urethral Catheter 12/23/23 2231 Straight-tip 16 Fr. <1 day              Airway  Duration                  Airway - Non-Surgical 12/23/23 2146 Endotracheal Tube <1 day              Peripheral Intravenous Line  Duration                  Peripheral IV - Single Lumen 12/23/23 1913 20 G Anterior;Distal;Left Antecubital <1 day         Peripheral IV - Single Lumen 12/23/23 1913 20 G Anterior;Left Forearm <1 day                     Physical Exam  Constitutional:       Appearance: He is well-developed.   HENT:      Head: Normocephalic and atraumatic.   Eyes:      Conjunctiva/sclera: Conjunctivae normal.      Pupils: Pupils are equal, round, and reactive to light.   Cardiovascular:      Rate and Rhythm: Normal rate.      Pulses: Intact distal pulses.      Heart sounds: Normal heart sounds.   Pulmonary:      Effort: Pulmonary effort is  normal.      Breath sounds: Normal breath sounds.   Abdominal:      General: Bowel sounds are normal.      Palpations: Abdomen is soft.   Musculoskeletal:         General: Normal range of motion.      Cervical back: Normal range of motion and neck supple.   Skin:     General: Skin is warm and dry.   Neurological:      Mental Status: He is alert.          Significant Labs: All pertinent lab results from the last 24 hours have been reviewed.    Significant Imaging: Echocardiogram: 2D echo with color flow doppler: No results found for this or any previous visit.

## 2023-12-24 NOTE — ASSESSMENT & PLAN NOTE
Bilateral effusion on ct.  Rt>Lt  Moderate right effusion confirmed via pocus 12/24  DDx: parapneumonic vs volume overload  Given normal wbc and borderline, will monitor effusion with diuresis.

## 2023-12-24 NOTE — ASSESSMENT & PLAN NOTE
Significantly elevated Troponin consistent with NSTEMI.  Probably demand ischemia from shock.  On ASA and Plavix.  Hold Statin with shock liver.  Cardiology consulted.

## 2023-12-24 NOTE — ASSESSMENT & PLAN NOTE
-Chronic CVA with previously noted resultant right-sided hemiparesis and aphasia.  -Currently unable to assess secondary to severe respiratory distress.

## 2023-12-24 NOTE — ASSESSMENT & PLAN NOTE
Troponin up to 2.7. Cr 2.2. has declined invasive procedures and [facility]. Conservative medical Rx

## 2023-12-24 NOTE — EICU
Brief Note     73 r old male     1- Bilateral pneumonia / acute   2-Large parapneumonic effusion   3-H Flu   4-Acute respiratory failure     On vent   Sedated   Resteraints so that he does not idvertantly pull out ETT / lines   Central line   On levophed   Vancomycin added. On zosyn   Oseltamivir   Needs drainage of pleural effusion in am / chest tube   Monitor urine output   Critically ill

## 2023-12-24 NOTE — HPI
Mr Tafoya is a 73-year-old male being admitted for acute hypoxic respiratory failure.  His medical history significant for BPH, dyslipidemia, chronic CVA with resultant aphasia, and right-sided hemiparesis.  During my evaluation the patient is in severe respiratory distress, on BiPAP and unable to provide any meaningful detailed history.  Seems that he came from [facility] due to worsening dyspnea, and EMS noted low oxygen saturations. in the ER, he was noted to have Tmax of 103.1 °F, persistently tachycardic in 130s, hypotensive 84/52, and was initially on nonrebreather and subsequently placed on BiPAP.  His CBC shows normal white count, microcytic anemia 10.5 and normal platelets.  INR 1.4.  CBC with metabolic acidosis bicarb 16, creatinine 1.9 (appears to have previous creatinine of 1.09 in September 2022 with baseline of about 1.1-1.3,), hypomagnesemia 1.5, transaminitis AST: ALT-192: 152, lactic acidosis 3.1, elevated procalcitonin 0.26.  His influenza a swab is positive.  His chest x-ray suggests moderate right-sided pleural effusion with compressive atelectasis and/or lower lobe consolidation.    After evaluating the patient, discussed with ER physician that the patient may need to be intubated given severe respiratory distress tachycardia, respiratory rate in 50s and inability to protect airway with previous stroke.  Patient will be placed on mechanical ventilation.  He has so far received breathing treatments, Rocephin and Zithromax along with Tamiflu.  Admission has been requested for further evaluation and treatment.

## 2023-12-24 NOTE — SUBJECTIVE & OBJECTIVE
Past Medical History:   Diagnosis Date    BPH (benign prostatic hyperplasia)     Dysarthria     HLD (hyperlipidemia)     Hypertension     Other and unspecified hyperlipidemia     Stroke        Past Surgical History:   Procedure Laterality Date    CATARACT EXTRACTION W/ INTRAOCULAR LENS  IMPLANT, BILATERAL         Review of patient's allergies indicates:  No Known Allergies    Family History       Problem Relation (Age of Onset)    Dementia Father    Hypertension Mother, Father    Seizures Mother    Stroke Mother          Tobacco Use    Smoking status: Former    Smokeless tobacco: Never   Substance and Sexual Activity    Alcohol use: Yes     Comment: rare, once a week or less    Drug use: No    Sexual activity: Never         Review of Systems   Unable to perform ROS: Intubated     Objective:     Vital Signs (Most Recent):  Temp: 98.3 °F (36.8 °C) (12/24/23 0730)  Pulse: (!) 127 (12/24/23 0800)  Resp: (!) 24.1 (12/24/23 0800)  BP: 102/64 (12/24/23 0800)  SpO2: 100 % (12/24/23 0800) Vital Signs (24h Range):  Temp:  [98.2 °F (36.8 °C)-103.1 °F (39.5 °C)] 98.3 °F (36.8 °C)  Pulse:  [115-153] 127  Resp:  [17-55] 24.1  SpO2:  [97 %-100 %] 100 %  BP: ()/() 102/64     Weight: 97.9 kg (215 lb 13.3 oz)  Body mass index is 33.8 kg/m².      Intake/Output Summary (Last 24 hours) at 12/24/2023 0821  Last data filed at 12/24/2023 0800  Gross per 24 hour   Intake 3817.61 ml   Output 125 ml   Net 3692.61 ml        Physical Exam  Constitutional:       Appearance: He is well-developed.   HENT:      Head: Normocephalic and atraumatic.      Mouth/Throat:      Comments: ETT in place  Eyes:      Conjunctiva/sclera: Conjunctivae normal.      Pupils: Pupils are equal, round, and reactive to light.   Cardiovascular:      Rate and Rhythm: Normal rate and regular rhythm.      Heart sounds: Normal heart sounds.   Pulmonary:      Effort: Pulmonary effort is normal.      Breath sounds: Normal breath sounds.      Comments: Decreased  breath sound on the right.    Abdominal:      General: Bowel sounds are normal.      Palpations: Abdomen is soft.   Musculoskeletal:         General: Normal range of motion.      Cervical back: Normal range of motion and neck supple.      Right lower leg: Edema present.      Left lower leg: Edema present.   Skin:     General: Skin is warm.   Neurological:      Cranial Nerves: No cranial nerve deficit.      Comments: Sedated on vent.          Vents:  Vent Mode: PRVC (12/24/23 0722)  Ventilator Initiated: Yes (12/23/23 2212)  Set Rate: 20 BPM (12/24/23 0722)  Vt Set: 500 mL (12/24/23 0722)  PEEP/CPAP: 5 cmH20 (12/24/23 0722)  Oxygen Concentration (%): 70 (12/24/23 0800)  Peak Airway Pressure: 15.6 cmH20 (12/24/23 0722)  Total Ve: 10.59 L/m (12/24/23 0722)  F/VT Ratio<105 (RSBI): (!) 41.29 (12/24/23 0722)    Lines/Drains/Airways       Peripherally Inserted Central Catheter Line  Duration             PICC Triple Lumen 12/23/23 2320 left basilic <1 day              Drain  Duration                  NG/OG Tube 12/23/23 2146 orogastric 16 Fr. Center mouth <1 day         Urethral Catheter 12/23/23 2231 Straight-tip 16 Fr. <1 day              Airway  Duration                  Airway - Non-Surgical 12/23/23 2146 Endotracheal Tube <1 day              Peripheral Intravenous Line  Duration                  Peripheral IV - Single Lumen 12/23/23 1913 20 G Anterior;Distal;Left Antecubital <1 day         Peripheral IV - Single Lumen 12/23/23 1913 20 G Anterior;Left Forearm <1 day                    Significant Labs:    CBC/Anemia Profile:  Recent Labs   Lab 12/23/23 1914 12/24/23  0509   WBC 4.28 5.02   HGB 10.5* 10.4*   HCT 34.1* 34.5*    178   MCV 72* 74*   RDW 17.1* 17.3*        Chemistries:  Recent Labs   Lab 12/23/23 1914 12/24/23  0509    136   K 4.5 4.7    106   CO2 16* 17*   BUN 17 21   CREATININE 1.9* 2.2*   CALCIUM 9.3 8.4*   ALBUMIN 3.6 3.1*   PROT 7.7 6.3   BILITOT 1.4* 1.3*   ALKPHOS 102 101   ALT  "152* 1,113*   * 1,387*   MG 1.5*  --    PHOS 4.6*  --        ABGs:   Recent Labs   Lab 12/23/23 1913   PH 7.362   PCO2 28.3*   HCO3 16.1*   POCSATURATED 92   BE -8*     Cardiac Markers: No results for input(s): "CKMB", "TROPONINT", "MYOGLOBIN" in the last 48 hours.  Lactic Acid:   Recent Labs   Lab 12/23/23 1914 12/23/23 2304   LACTATE 3.1* 3.2*     Troponin:   Recent Labs   Lab 12/23/23 1914   TROPONINI 0.617*     BNP  Recent Labs   Lab 12/23/23 1914   *     Echo 5/11/22  TDS  The left ventricle is normal in size with normal systolic function.  The estimated ejection fraction is 55%.  Normal left ventricular diastolic function.  Normal central venous pressure (3 mmHg).    Significant Imaging:   I have reviewed all pertinent imaging results/findings within the past 24 hours.  CT: I have reviewed all pertinent results/findings within the past 24 hours and my personal findings are:  moderate right effusion with compressive atelectasis and trace left effusion.   "

## 2023-12-24 NOTE — PROGRESS NOTES
[facility] Medicine  Progress Note    Patient Name: Alexsander Tafoya  MRN: [id number]  Patient Class: IP- Inpatient   Admission Date: 12/23/2023  Length of Stay: 1 days  Attending Physician: Sunny Tsang MD  Primary Care Provider: Helio Farr MD        Subjective:     Principal Problem:Acute hypoxic respiratory failure        HPI:  Mr Tafoya is a 73-year-old male being admitted for acute hypoxic respiratory failure.  His medical history significant for BPH, dyslipidemia, chronic CVA with resultant aphasia, and right-sided hemiparesis.  During my evaluation the patient is in severe respiratory distress, on BiPAP and unable to provide any meaningful detailed history.  Seems that he came from [facility] due to worsening dyspnea, and EMS noted low oxygen saturations. in the ER, he was noted to have Tmax of 103.1 °F, persistently tachycardic in 130s, hypotensive 84/52, and was initially on nonrebreather and subsequently placed on BiPAP.  His CBC shows normal white count, microcytic anemia 10.5 and normal platelets.  INR 1.4.  CBC with metabolic acidosis bicarb 16, creatinine 1.9 (appears to have previous creatinine of 1.09 in September 2022 with baseline of about 1.1-1.3,), hypomagnesemia 1.5, transaminitis AST: ALT-192: 152, lactic acidosis 3.1, elevated procalcitonin 0.26.  His influenza a swab is positive.  His chest x-ray suggests moderate right-sided pleural effusion with compressive atelectasis and/or lower lobe consolidation.    After evaluating the patient, discussed with ER physician that the patient may need to be intubated given severe respiratory distress tachycardia, respiratory rate in 50s and inability to protect airway with previous stroke.  Patient will be placed on mechanical ventilation.  He has so far received breathing treatments, Rocephin and Zithromax along with Tamiflu.  Admission has been requested for further evaluation and treatment.    Overview/Hospital Course:  73  y/o male presents with SOB. In the ER, he was noted to have Tmax of 103.1 °F, persistently tachycardic in 130s, hypotensive 84/52, and was initially on nonrebreather and subsequently placed on BiPAP.  Respiratory status worsened and he was then intubated.  Flu positive and started on Tamiflu. His chest x-ray suggests moderate right-sided pleural effusion with compressive atelectasis and/or lower lobe consolidation.  Started on broad spectrum ABX's.    Interval History: remains on vent and pressors.    Review of Systems   Unable to perform ROS: Intubated     Objective:     Vital Signs (Most Recent):  Temp: 98.3 °F (36.8 °C) (12/24/23 0730)  Pulse: 110 (12/24/23 1345)  Resp: (!) 23.3 (12/24/23 1345)  BP: 103/63 (12/24/23 1345)  SpO2: 100 % (12/24/23 1345) Vital Signs (24h Range):  Temp:  [98.2 °F (36.8 °C)-103.1 °F (39.5 °C)] 98.3 °F (36.8 °C)  Pulse:  [103-153] 110  Resp:  [17-55] 23.3  SpO2:  [97 %-100 %] 100 %  BP: ()/() 103/63     Weight: 97.9 kg (215 lb 13.3 oz)  Body mass index is 33.8 kg/m².    Intake/Output Summary (Last 24 hours) at 12/24/2023 1352  Last data filed at 12/24/2023 1101  Gross per 24 hour   Intake 4034.19 ml   Output 125 ml   Net 3909.19 ml         Physical Exam  Constitutional:       Appearance: He is well-developed.   HENT:      Head: Normocephalic and atraumatic.      Mouth/Throat:      Comments: ETT in place  Eyes:      Conjunctiva/sclera: Conjunctivae normal.      Pupils: Pupils are equal, round, and reactive to light.   Cardiovascular:      Rate and Rhythm: Normal rate and regular rhythm.      Heart sounds: Normal heart sounds.   Pulmonary:      Effort: Pulmonary effort is normal.      Breath sounds: Normal breath sounds.      Comments: Decreased breath sound on the right.    Abdominal:      General: Bowel sounds are normal.      Palpations: Abdomen is soft.   Musculoskeletal:         General: Normal range of motion.      Cervical back: Normal range of motion and neck supple.       Right lower leg: Edema present.      Left lower leg: Edema present.   Skin:     General: Skin is warm.   Neurological:      Cranial Nerves: No cranial nerve deficit.      Comments: Sedated on vent.             Significant Labs: All pertinent labs within the past 24 hours have been reviewed.  BMP:   Recent Labs   Lab 12/23/23 1914 12/24/23  0509   * 214*    136   K 4.5 4.7    106   CO2 16* 17*   BUN 17 21   CREATININE 1.9* 2.2*   CALCIUM 9.3 8.4*   MG 1.5*  --      CBC:   Recent Labs   Lab 12/23/23 1914 12/24/23  0509   WBC 4.28 5.02   HGB 10.5* 10.4*   HCT 34.1* 34.5*    178       Significant Imaging: I have reviewed all pertinent imaging results/findings within the past 24 hours.    Assessment/Plan:      * Acute hypoxic respiratory failure  Patient with Hypoxic Respiratory failure which is Acute.  he is not on home oxygen. Supplemental oxygen was provided and noted- Vent Mode: PRVC  Oxygen Concentration (%):  [] 50  Resp Rate Total:  [20 br/min-25 br/min] 20 br/min  Vt Set:  [500 mL] 500 mL  PEEP/CPAP:  [5 cmH20-8 cmH20] 8 cmH20  Mean Airway Pressure:  [8.2 lpF12-69.4 cmH20] 11.5 cmH20.   Signs/symptoms of respiratory failure include- tachypnea, increased work of breathing, respiratory distress, use of accessory muscles, wheezing, and stridor. Contributing diagnoses includes - Pleural effusion and Pneumonia Labs and images were reviewed. Patient Has not had a recent ABG. Will treat underlying causes and adjust management of respiratory failure as follows- Treat underlying etiologies of pneumonia and evaluate pleural effusion.  Intubated in ER.  Pulmonary consulted.  Respiratory failure secondary to flu/pneumonia/pleural effusion.    Sepsis due to pneumonia  This patient does have evidence of infective focus  My overall impression is septic shock due to MAP < 65.  Source: Respiratory  Antibiotics given-   Antibiotics (72h ago, onward)      Start     Stop Route Frequency Ordered     12/25/23 0200  vancomycin 1,250 mg in dextrose 5 % (D5W) 250 mL IVPB (Vial-Mate)         -- IV Every 24 hours (non-standard times) 12/24/23 0202    12/24/23 0142  vancomycin - pharmacy to dose  (vancomycin IVPB (PEDS and ADULTS))        See Hyperspace for full Linked Orders Report.    -- IV pharmacy to manage frequency 12/24/23 0042    12/23/23 2245  piperacillin-tazobactam (ZOSYN) 4.5 g in dextrose 5 % in water (D5W) 100 mL IVPB (MB+)         -- IV Every 8 hours (non-standard times) 12/23/23 2144          Latest lactate reviewed-  Recent Labs   Lab 12/23/23  1914 12/23/23  2304 12/24/23  1037   LACTATE 3.1* 3.2* 3.0*       Organ dysfunction indicated by Acute liver injury and Acute respiratory failure    Will Start Pressors- Levophed for MAP of 65  Source control achieved by:   Antibiotics.  -Given history of CVA and likely aspiration, will continue Zosyn.  -Also has influenza A infection which is being treated.    Lactic acidosis  Secondary to septic shock.  Repeat lactate in Am.      Shock  Septic shock as above.      Pleural effusion  -Moderate effusion on x-ray.  Pulmonary following.  Will try diuresis.  May need thoracentesis.      Influenza A  -Continue Tamiflu.      Acute renal failure  Patient with acute kidney injury/acute renal failure likely due to acute tubular necrosis caused by septic shock  SHAYY is currently worsening. Baseline creatinine  ranging from 1 to 1.7  - Labs reviewed- Renal function/electrolytes with Estimated Creatinine Clearance: 33.3 mL/min (A) (based on SCr of 2.2 mg/dL (H)). according to latest data. Monitor urine output and serial BMP and adjust therapy as needed. Avoid nephrotoxins and renally dose meds for GFR listed above.  Treat shock.  Continue pressors.  Closely monitor renal function.  Nephrology consult if any further worsening.    Elevated troponin  Significantly elevated Troponin consistent with NSTEMI.  Probably demand ischemia from shock.  On ASA and Plavix.  Hold  Statin with shock liver.  Cardiology consulted.        Transaminitis  Significant increase in LFT's, consistent with shock liver.  Continue to monitor.      BPH (benign prostatic hyperplasia)  -Continue Proscar.      Hyperlipidemia  -On statin.  Hold Statin with shock liver.    Essential hypertension  -antihypertensives on hold due to septic shock.    Aphasia as late effect of cerebrovascular accident  Per notes.      Hemiplegia affecting right side in right-dominant patient as late effect of cerebrovascular disease  -Chronic CVA with previously noted resultant right-sided hemiparesis and aphasia.      VTE Risk Mitigation (From admission, onward)           Ordered     enoxaparin injection 40 mg  Daily         12/23/23 2143     IP VTE HIGH RISK PATIENT  Once         12/23/23 2143     Place sequential compression device  Until discontinued         12/23/23 2143                    Discharge Planning   THAIS:      Code Status: Full Code   Is the patient medically ready for discharge?:     Reason for patient still in hospital (select all that apply): Patient unstable  Discharge Plan A: Assisted Living (Suits of [facility]-return)            Critical care time spent on the evaluation and treatment of severe organ dysfunction, review of pertinent labs and imaging studies, discussions with consulting providers and discussions with patient/family: 50 minutes.      Sunny Tsang MD  Department of Hospital Medicine   [facility] - Intensive Care

## 2023-12-24 NOTE — ADMISSIONCARE
AdmissionCare    Guideline: Systemic / Infectious Condition, Inpatient    Based on the indications selected for the patient, the bed status of Admit to Inpatient was determined to be MET    The following indications were selected as present at the time of evaluation of the patient:      Hemodynamic instability, as indicated by 1 or more of the following:   -     Vital sign abnormality not readily corrected by appropriate treatment, as indicated by 1 or more of the following:    -      Tachycardia that persists despite appropriate treatment (eg, volume repletion, treatment of pain, treatment of underlying cause)     -      Hypotension that persists despite appropriate treatment (eg, volume repletion, treatment of underlying cause)     -       - Not patient baseline (eg, healthy adult with low SBP) or intentional therapeutic goal (eg, low SBP as treatment goal in heart failure)     Severe hypotension, as indicated by 1 or more of the following:     -       - IV inotropic or vasopressor medication required to maintain adequate blood pressure or perfusion    AdmissionCare documentation entered by: Marlon Woods    [facility], 27th edition, Copyright © 2023 [facility] [employer], [facility] All Rights Reserved.  [id number]

## 2023-12-24 NOTE — ASSESSMENT & PLAN NOTE
This patient does have evidence of infective focus  My overall impression is septic shock due to MAP < 65.  Source: Respiratory  Antibiotics given-   Antibiotics (72h ago, onward)      Start     Stop Route Frequency Ordered    12/23/23 2245  piperacillin-tazobactam (ZOSYN) 4.5 g in dextrose 5 % in water (D5W) 100 mL IVPB (MB+)         -- IV Every 8 hours (non-standard times) 12/23/23 2144          Latest lactate reviewed-  Recent Labs   Lab 12/23/23  1914   LACTATE 3.1*     Organ dysfunction indicated by Acute liver injury and Acute respiratory failure    Fluid challenge Ideal Body Weight- The patient's ideal body weight is Ideal body weight: 66.1 kg (145 lb 11.6 oz) which will be used to calculate fluid bolus of 30 ml/kg for treatment of septic shock.      Post- resuscitation assessment No - Post resuscitation assessment not needed       Will Start Pressors- Levophed for MAP of 65  Source control achieved by:   Antibiotics.    -Check CT chest without contrast to further evaluate pneumonia/effusion.  -Given history of CVA and likely aspiration, will continue Zosyn.  -Also has influenza A infection which is being treated.

## 2023-12-24 NOTE — SUBJECTIVE & OBJECTIVE
Past Medical History:   Diagnosis Date    BPH (benign prostatic hyperplasia)     Dysarthria     HLD (hyperlipidemia)     Hypertension     Other and unspecified hyperlipidemia     Stroke        Past Surgical History:   Procedure Laterality Date    CATARACT EXTRACTION W/ INTRAOCULAR LENS  IMPLANT, BILATERAL         Review of patient's allergies indicates:  No Known Allergies    No current facility-administered medications on file prior to encounter.     Current Outpatient Medications on File Prior to Encounter   Medication Sig    aspirin 81 MG Chew Take 1 tablet (81 mg total) by mouth once daily.    clopidogrel (PLAVIX) 75 mg tablet Take 1 tablet (75 mg total) by mouth once daily.    enalapril (VASOTEC) 5 MG tablet Take 5 mg by mouth once daily.    finasteride (PROSCAR) 5 mg tablet Take 5 mg by mouth once daily.     fish oil-omega-3 fatty acids 300-1,000 mg capsule Take 1 capsule by mouth once daily.    furosemide (LASIX) 40 MG tablet Take 1 tablet (40 mg total) by mouth once daily.    potassium chloride (MICRO-K) 10 MEQ CpSR Take 10 mEq by mouth once daily.    rosuvastatin (CRESTOR) 20 MG tablet Take 1 tablet (20 mg total) by mouth every evening. HOLD UNTIL TOLD TO RESUME BY PHYSICIAN (Patient taking differently: Take 20 mg by mouth every evening.)    metoprolol tartrate (LOPRESSOR) 50 MG tablet Take 0.5 tablets (25 mg total) by mouth once daily. HOLD UNTIL TOLD TO RESUME BY PHYSICIAN (Patient taking differently: Take 12.5 mg by mouth once daily. HOLD UNTIL TOLD TO RESUME BY PHYSICIAN)    multivitamin (THERAGRAN) per tablet Take 1 tablet by mouth once daily.    [DISCONTINUED] doxazosin (CARDURA) 2 MG tablet Take 1 tablet (2 mg total) by mouth every evening. HOLD UNTIL TOLD TO RESUME BY PHYSICIAN     Family History       Problem Relation (Age of Onset)    Dementia Father    Hypertension Mother, Father    Seizures Mother    Stroke Mother          Tobacco Use    Smoking status: Former    Smokeless tobacco: Never    Substance and Sexual Activity    Alcohol use: Yes     Comment: rare, once a week or less    Drug use: No    Sexual activity: Never     Review of Systems  Unable to participate with review of systems secondary to severe respiratory distress and while being on BiPAP.  Objective:     Vital Signs (Most Recent):  Temp: (!) 101.3 °F (38.5 °C) (12/23/23 2232)  Pulse: (!) 124 (12/23/23 2232)  Resp: (!) 27 (12/23/23 2232)  BP: (!) 91/58 (12/23/23 2232)  SpO2: 100 % (12/23/23 2232) Vital Signs (24h Range):  Temp:  [101.3 °F (38.5 °C)-103.1 °F (39.5 °C)] 101.3 °F (38.5 °C)  Pulse:  [124-153] 124  Resp:  [17-55] 27  SpO2:  [97 %-100 %] 100 %  BP: ()/() 91/58     Weight: 83.9 kg (185 lb)  Body mass index is 28.98 kg/m².     Physical Exam     Somnolent, barely opens eyes on verbal commands  Appears to be in severe respiratory distress  Lungs coarse and congested bilaterally, tachypneic.  Tachycardic, soft systolic murmur.  No peripheral edema.  Abdomen nondistended, decreased bowel sounds.  Unable to assess neurological status or deficits  Due to severe respiratory distress.        Significant Labs: All pertinent labs within the past 24 hours have been reviewed.  ABGs:   Recent Labs   Lab 12/23/23 1913   PH 7.362   PCO2 28.3*   HCO3 16.1*   POCSATURATED 92   BE -8*   PO2 65*     CBC:   Recent Labs   Lab 12/23/23 1914   WBC 4.28   HGB 10.5*   HCT 34.1*        CMP:   Recent Labs   Lab 12/23/23 1914      K 4.5      CO2 16*   *   BUN 17   CREATININE 1.9*   CALCIUM 9.3   PROT 7.7   ALBUMIN 3.6   BILITOT 1.4*   ALKPHOS 102   *   *   ANIONGAP 14     Cardiac Markers:   Recent Labs   Lab 12/23/23 1914   *     Lactic Acid:   Recent Labs   Lab 12/23/23 1914   LACTATE 3.1*     Magnesium:   Recent Labs   Lab 12/23/23 1914   MG 1.5*       Significant Imaging: I have reviewed all pertinent imaging results/findings within the past 24 hours.  I have reviewed and interpreted all  pertinent imaging results/findings within the past 24 hours.

## 2023-12-24 NOTE — ASSESSMENT & PLAN NOTE
Patient with Hypoxic Respiratory failure which is Acute.  he is not on home oxygen. Supplemental oxygen was provided and noted- Vent Mode: PRVC  Oxygen Concentration (%):  [] 50  Resp Rate Total:  [20 br/min-25 br/min] 20 br/min  Vt Set:  [500 mL] 500 mL  PEEP/CPAP:  [5 cmH20] 5 cmH20  Mean Airway Pressure:  [8.2 bpF78-29.4 cmH20] 8.2 cmH20    .   Signs/symptoms of respiratory failure include- tachypnea, respiratory distress, and lethargy. Contributing diagnoses includes - ARDS vs CHF Labs and images were reviewed. Patient Has recent ABG, which has been reviewed. Will treat underlying causes and adjust management of respiratory failure as follows-   Ct with bilateral effusion and compressive atelectasis of left lung  + Influenza with borderline procal and normal wbc  Will start empiric diuresis  Wean fio2 and possible sbt  Tamiflu + broad spectrum antibiotics  Sputum culture to help decision about de-escalation

## 2023-12-24 NOTE — ED PROVIDER NOTES
Encounter Date: 12/23/2023    SCRIBE #1 NOTE: I, Chip Scott, am scribing for, and in the presence of,  Daniel Herzog MD.       History     Chief Complaint   Patient presents with    Shortness of Breath     Pt reports to the ED BIB [facility] from the [facility] with C/O SOB and cough that started yesterday. Per EMS pt was sating in the low 80's on RA. Pt was placed on 15L NRB by EMS. Pt breathing appears labored and Pt is grunting with each breath. Resp shallow. Pt placed in 17 for eval.      Alexsander Tafoya is a 73 y.o. male with a PMHx of BPH (benign prostatic hyperplasia), Dysarthria, HLD, HTN, and Stroke who presents to the ED due to shortness of breath.  Patient arrives to ED via EMS who reports that the patient has been experiencing worsening cough and shortness of breath since yesterday. EMS found patient at [facility] assisted living home with O2 saturation at 80% on RA, improved to 98% with 15L NRB en route. He is at his baseline minimally verbal status with right sided deficits due to stroke hx.      The history is provided by the EMS personnel.     Review of patient's allergies indicates:  No Known Allergies  Past Medical History:   Diagnosis Date    BPH (benign prostatic hyperplasia)     Dysarthria     HLD (hyperlipidemia)     Hypertension     Other and unspecified hyperlipidemia     Stroke      Past Surgical History:   Procedure Laterality Date    CATARACT EXTRACTION W/ INTRAOCULAR LENS  IMPLANT, BILATERAL       Family History   Problem Relation Age of Onset    Hypertension Mother     Seizures Mother     Stroke Mother     Hypertension Father     Dementia Father      Social History     Tobacco Use    Smoking status: Former    Smokeless tobacco: Never   Substance Use Topics    Alcohol use: Yes     Comment: rare, once a week or less    Drug use: No     Review of Systems   Unable to perform ROS: Patient nonverbal   Respiratory:  Positive for cough and shortness of breath.        Physical  Exam     Initial Vitals   BP Pulse Resp Temp SpO2   12/23/23 1910 12/23/23 1910 12/23/23 1902 12/23/23 1902 12/23/23 1902   104/65 (!) 136 (!) 30 (!) 101.5 °F (38.6 °C) 100 %      MAP       --                Physical Exam    Nursing note and vitals reviewed.  Constitutional: He appears well-developed and well-nourished.   HENT:   Head: Normocephalic and atraumatic.   Eyes: EOM are normal. Pupils are equal, round, and reactive to light.   Neck: Neck supple. No thyromegaly present. No JVD present.   Normal range of motion.  Cardiovascular:    Tachycardia present.   Exam reveals no gallop and no friction rub.       No murmur heard.  Pulmonary/Chest: Tachypnea noted. He is in respiratory distress.   Abdominal: Abdomen is soft. Bowel sounds are normal. There is no abdominal tenderness.   Musculoskeletal:         General: No edema. Normal range of motion.      Cervical back: Normal range of motion and neck supple.     Neurological: He is alert and oriented to person, place, and time. He has normal strength. GCS score is 15. GCS eye subscore is 4. GCS verbal subscore is 5. GCS motor subscore is 6.   Skin: Skin is warm. Capillary refill takes less than 2 seconds.   Psychiatric: He has a normal mood and affect. His behavior is normal. Thought content normal.         ED Course   Critical Care    Date/Time: 12/23/2023 10:10 PM    Performed by: Daniel Herzog MD  Authorized by: Daniel Herzog MD  Direct patient critical care time: 20 minutes  Additional history critical care time: 5 minutes  Ordering / reviewing critical care time: 10 minutes  Documentation critical care time: 10 minutes  Consulting other physicians critical care time: 10 minutes  Total critical care time (exclusive of procedural time) : 55 minutes  Critical care time was exclusive of teaching time and separately billable procedures and treating other patients.  Critical care was necessary to treat or prevent imminent or life-threatening  deterioration of the following conditions: respiratory failure.  Critical care was time spent personally by me on the following activities: development of treatment plan with patient or surrogate, discussions with consultants, evaluation of patient's response to treatment, examination of patient, obtaining history from patient or surrogate, ordering and performing treatments and interventions, ordering and review of laboratory studies, ordering and review of radiographic studies, pulse oximetry, re-evaluation of patient's condition, review of old charts and interpretation of cardiac output measurements.      Intubation    Date/Time: 12/23/2023 10:13 PM  Location procedure was performed: [facility] ED PHYSICIAN / ED SCRIBE    Performed by: Daniel Herzog MD  Authorized by: Daniel Herzog MD  Indications: respiratory distress  Intubation method: video-assisted  Patient status: paralyzed (RSI)  Preoxygenation: BVM  Sedatives: etomidate  Paralytic: rocuronium  Laryngoscope size: Glide 3  Tube size: 7.5 mm  Tube type: cuffed  Number of attempts: 1  Cricoid pressure: no  Cords visualized: yes  Post-procedure assessment: CO2 detector  Breath sounds: equal  Cuff inflated: yes  ETT to lip: 23 cm  Tube secured with: ETT murphy  Chest x-ray interpreted by me.  Chest x-ray findings: endotracheal tube in appropriate position  Patient tolerance: Patient tolerated the procedure well with no immediate complications  Complications: No  Specimens: No  Implants: No        Labs Reviewed   CBC W/ AUTO DIFFERENTIAL - Abnormal; Notable for the following components:       Result Value    Hemoglobin 10.5 (*)     Hematocrit 34.1 (*)     MCV 72 (*)     MCH 22.3 (*)     MCHC 30.8 (*)     RDW 17.1 (*)     Lymph # 0.3 (*)     Gran % 86.0 (*)     Lymph % 5.8 (*)     All other components within normal limits   COMPREHENSIVE METABOLIC PANEL - Abnormal; Notable for the following components:    CO2 16 (*)     Glucose 136 (*)     Creatinine  1.9 (*)     Total Bilirubin 1.4 (*)      (*)      (*)     eGFR 37 (*)     All other components within normal limits   LACTIC ACID, PLASMA - Abnormal; Notable for the following components:    Lactate (Lactic Acid) 3.1 (*)     All other components within normal limits   URINALYSIS, REFLEX TO URINE CULTURE - Abnormal; Notable for the following components:    Appearance, UA Hazy (*)     Protein, UA 1+ (*)     Urobilinogen, UA 4.0-6.0 (*)     All other components within normal limits    Narrative:     Specimen Source->Urine   MAGNESIUM - Abnormal; Notable for the following components:    Magnesium 1.5 (*)     All other components within normal limits   PHOSPHORUS - Abnormal; Notable for the following components:    Phosphorus 4.6 (*)     All other components within normal limits   PROTIME-INR - Abnormal; Notable for the following components:    Prothrombin Time 14.1 (*)     INR 1.4 (*)     All other components within normal limits   B-TYPE NATRIURETIC PEPTIDE - Abnormal; Notable for the following components:     (*)     All other components within normal limits   TROPONIN I - Abnormal; Notable for the following components:    Troponin I 0.617 (*)     All other components within normal limits   PROCALCITONIN - Abnormal; Notable for the following components:    Procalcitonin 0.26 (*)     All other components within normal limits   URINALYSIS MICROSCOPIC - Abnormal; Notable for the following components:    RBC, UA 7 (*)     Hyaline Casts, UA 11 (*)     All other components within normal limits    Narrative:     Specimen Source->Urine   POCT INFLUENZA A/B MOLECULAR - Abnormal; Notable for the following components:    POC Molecular Influenza A Ag Positive (*)     All other components within normal limits   ISTAT LACTATE - Abnormal; Notable for the following components:    POC Lactate 3.06 (*)     All other components within normal limits   ISTAT PROCEDURE - Abnormal; Notable for the following components:     POC PCO2 28.3 (*)     POC PO2 65 (*)     POC HCO3 16.1 (*)     POC BE -8 (*)     POC TCO2 17 (*)     All other components within normal limits   CULTURE, BLOOD   CULTURE, BLOOD   LACTIC ACID, PLASMA   SARS-COV-2 RDRP GENE          Imaging Results              X-Ray Chest AP Portable (In process)                      XR NG/OG tube placement check, non-radiologist performed (In process)                      X-Ray Chest AP Portable (Final result)  Result time 12/23/23 20:01:02      Final result by Hilary Ware MD (12/23/23 20:01:02)                   Impression:      Findings may suggest moderate right pleural effusion with associated compressive atelectasis and/or lower lobe consolidation.      Electronically signed by: Hilary Ware  Date:    12/23/2023  Time:    20:01               Narrative:    EXAMINATION:  AP PORTABLE CHEST    CLINICAL HISTORY:  Sepsis;    TECHNIQUE:  AP portable chest radiograph was submitted.    COMPARISON:  08/31/2019    FINDINGS:  AP portable chest radiograph demonstrates stable cardiac silhouette.  There is obscuration of the right hemidiaphragm and right lower lobe opacity. There is no pneumothorax.  The left lung appears to be grossly clear of focal consolidation.                                       Medications   NORepinephrine 4 mg in dextrose 5% 250 mL infusion (premix) (0.02 mcg/kg/min × 83.9 kg Intravenous New Bag 12/23/23 2152)   ibuprofen 20 mg/mL oral liquid 600 mg (600 mg Oral Not Given 12/23/23 2045)   aspirin chewable tablet 81 mg (has no administration in time range)   clopidogreL tablet 75 mg (has no administration in time range)   finasteride tablet 5 mg (has no administration in time range)   atorvastatin tablet 40 mg (has no administration in time range)   sodium chloride 0.9% flush 10 mL (has no administration in time range)   enoxaparin injection 40 mg (has no administration in time range)   potassium chloride 40 mEq in 100 mL IVPB (FOR CENTRAL LINE  ADMINISTRATION ONLY) (has no administration in time range)     And   potassium chloride 20 mEq in 100 mL IVPB (FOR CENTRAL LINE ADMINISTRATION ONLY) (has no administration in time range)     And   potassium chloride 40 mEq in 100 mL IVPB (FOR CENTRAL LINE ADMINISTRATION ONLY) (has no administration in time range)   magnesium sulfate 2g in water 50mL IVPB (premix) (has no administration in time range)   magnesium sulfate 2g in water 50mL IVPB (premix) (has no administration in time range)   sodium phosphate 15 mmol in dextrose 5 % (D5W) 250 mL IVPB (has no administration in time range)   sodium phosphate 20.01 mmol in dextrose 5 % (D5W) 250 mL IVPB (has no administration in time range)   sodium phosphate 30 mmol in dextrose 5 % (D5W) 250 mL IVPB (has no administration in time range)   calcium gluconate 1 g in NS IVPB (premixed) (has no administration in time range)   calcium gluconate 1 g in NS IVPB (premixed) (has no administration in time range)   calcium gluconate 1 g in NS IVPB (premixed) (has no administration in time range)   ondansetron injection 4 mg (has no administration in time range)   famotidine (PF) injection 20 mg (has no administration in time range)   albuterol-ipratropium 2.5 mg-0.5 mg/3 mL nebulizer solution 3 mL (has no administration in time range)   piperacillin-tazobactam (ZOSYN) 4.5 g in dextrose 5 % in water (D5W) 100 mL IVPB (MB+) (has no administration in time range)   lactated ringers bolus 2,517 mL (0 mLs Intravenous Stopped 12/23/23 2022)   cefTRIAXone (ROCEPHIN) 2 g in dextrose 5 % in water (D5W) 100 mL IVPB (MB+) (0 g Intravenous Stopped 12/23/23 1959)   azithromycin (ZITHROMAX) 500 mg in dextrose 5 % (D5W) 250 mL IVPB (Vial-Mate) (0 mg Intravenous Stopped 12/23/23 2052)   oseltamivir capsule 75 mg (75 mg Oral Given 12/23/23 1945)   acetaminophen tablet 1,000 mg (1,000 mg Oral Given 12/23/23 1945)   albuterol-ipratropium 2.5 mg-0.5 mg/3 mL nebulizer solution 3 mL (3 mLs Nebulization  Given 12/23/23 2110)   etomidate injection 20 mg (20 mg Intravenous Given 12/23/23 2142)   propofol (DIPRIVAN) 10 mg/mL infusion (15 mcg/kg/min × 83.9 kg Intravenous New Bag 12/23/23 2157)   rocuronium injection 50 mg (50 mg Intravenous Given 12/23/23 2142)     Medical Decision Making  This is an emergent evaluation of a 73 y.o. male who presents with shortness of breath. The patient was seen and examined. The history and physical exam was obtained. The nursing notes and vital signs were reviewed. Secondary to symptoms and examination findings, I ordered Labs, COVID, Flu, VBG, EKG, CXR.      Amount and/or Complexity of Data Reviewed  Labs: ordered. Decision-making details documented in ED Course.  Radiology: ordered and independent interpretation performed. Decision-making details documented in ED Course.  ECG/medicine tests: ordered. Decision-making details documented in ED Course.    Risk  OTC drugs.  Prescription drug management.  Decision regarding hospitalization.                                      Clinical Impression:  Final diagnoses:  [R06.02] SOB (shortness of breath)  [A41.9, R65.21] Septic shock  [Z92.89] History of ETT  [J10.1] Influenza A (Primary)  [J96.01] Acute respiratory failure with hypoxia  [J90] Pleural effusion  [I21.4] NSTEMI (non-ST elevated myocardial infarction)          ED Disposition Condition    Admit                I, Daniel Herzog, personally performed the services described in this documentation. All medical record entries made by the scribe were at my direction and in my presence. I have reviewed the chart and agree that the record reflects my personal performance and is accurate and complete.       Daniel Herzog MD  12/23/23 [id number]

## 2023-12-24 NOTE — NURSING
[facility]  Nurses Note -- 4 Eyes      12/24/2023       Skin assessed on: Admit      [] No Pressure Injuries Present    []Prevention Measures Documented    [x] Yes LDA  for Pressure Injury Previously documented     [] Yes New Pressure Injury Discovered   [] LDA for New Pressure Injury Added      Attending RN:  Nuris Conte RN     Second RN:  Jessi Barcenas RN

## 2023-12-24 NOTE — PLAN OF CARE
Case Management Assessment     PCP: Helio Farr MD; prefers AM appointments  Pharmacy: [facility]; facility  Patient Arrived From: [facility].  Existing Help at Home: Staff; sister  Barriers to Discharge: None    Discharge Plan:    A. Return to [facility]; follow-ups   B. TBD    Total assist needed; facility staff assist and sister; no current medical services; uses [employer] power and regular; plan is to return to [facility] if possible.      12/24/23 1153   Discharge Assessment   Assessment Type Discharge Planning Assessment   Confirmed/corrected address, phone number and insurance Yes   Confirmed Demographics Correct on Facesheet   Source of Information family;health record   If unable to respond/provide information was family/caregiver contacted? Yes   Contact Name/Number Vent: Ginny: [phone]   Communicated THAIS with patient/caregiver Date not available/Unable to determine   Reason For Admission SOB   People in Home facility resident   Facility Arrived From: [facility]: Assisted Living   Do you expect to return to your current living situation? Yes   Do you have help at home or someone to help you manage your care at home? Yes   Who are your caregiver(s) and their phone number(s)? Staff at facility; Ginny: [phone]   Prior to hospitilization cognitive status: Unable to Assess   Current cognitive status: Unable to Assess   Walking or Climbing Stairs Difficulty yes   Walking or Climbing Stairs ambulation difficulty, requires equipment;transferring difficulty, dependent;stair climbing difficulty, dependent;ambulation difficulty, dependent   Mobility Management W/C: non-ambulatory   Dressing/Bathing Difficulty yes   Dressing/Bathing bathing difficulty, dependent;dressing difficulty, dependent   Dressing/Bathing Management Total assist   Do you have any problems with: Errands/Grocery;Needs other help   Specify other help Total assist needed    Home Accessibility wheelchair accessible   Home Layout Able to live on 1st floor   Equipment Currently Used at Home wheelchair   Readmission within 30 days? No   Patient currently being followed by outpatient case management? No   Do you currently have service(s) that help you manage your care at home? Yes   Name and Contact number of agency [facility] staff   Do you take prescription medications? Yes   Do you have prescription coverage? Yes   Coverage [facility] Medicare   Do you have any problems affording any of your prescribed medications? No   Is the patient taking medications as prescribed? yes   Who is going to help you get home at discharge? Facility stafff who assist; Ginny   How do you get to doctors appointments? agency   Are you on dialysis? No   Do you take coumadin? No   Discharge Plan A Assisted Living  ([facility]-return)   Discharge Plan B Other  (TBD)   DME Needed Upon Discharge  other (see comments)  (TBD)   Discharge Plan discussed with: Sibling   Name(s) and Number(s) Idalia-sister: [phone]   Transition of Care Barriers None     SW Role explained to patient; two patient identifiers recognized; SW contact information placed on Communication board. Discussed patient managing health care at home and discussed discharge plans A and B; determined who would be helping patient at home with recovery: [facility] staff will help with recovery at home; Idalia- also helps out.

## 2023-12-24 NOTE — ASSESSMENT & PLAN NOTE
Patient with acute kidney injury/acute renal failure likely due to acute tubular necrosis caused by septic shock  SHAYY is currently worsening. Baseline creatinine  ranging from 1 to 1.7  - Labs reviewed- Renal function/electrolytes with Estimated Creatinine Clearance: 33.3 mL/min (A) (based on SCr of 2.2 mg/dL (H)). according to latest data. Monitor urine output and serial BMP and adjust therapy as needed. Avoid nephrotoxins and renally dose meds for GFR listed above.  Treat shock.  Continue pressors.  Closely monitor renal function.  Nephrology consult if any further worsening.

## 2023-12-24 NOTE — PLAN OF CARE
Patient remains intubated and to ventilator.   Patient opens eyes, follows commands, does not move right side due to old stroke.    Propofol continues,  Levophed weaned.   Restraint to left arm only.   OGT for meds only.   Sisters visit and updated by Dr Edward.

## 2023-12-24 NOTE — PROGRESS NOTES
Vancomycin Consult Follow-Up:  Patient reviewed, renal function stable (random level 16.3). No new labs. Continue current therapy. Vancomycin trough due 12/26 @ 01:00

## 2023-12-24 NOTE — ASSESSMENT & PLAN NOTE
Patient with Hypoxic Respiratory failure which is Acute.  he is not on home oxygen. Supplemental oxygen was provided and noted- Vent Mode: PRVC  Oxygen Concentration (%):  [100] 100  Vt Set:  [500 mL] 500 mL  PEEP/CPAP:  [5 cmH20] 5 cmH20  Mean Airway Pressure:  [10.7 cmH20] 10.7 cmH20    .   Signs/symptoms of respiratory failure include- tachypnea, increased work of breathing, respiratory distress, use of accessory muscles, wheezing, and stridor. Contributing diagnoses includes - Pleural effusion and Pneumonia Labs and images were reviewed. Patient Has not had a recent ABG. Will treat underlying causes and adjust management of respiratory failure as follows- Treat underlying etiologies of pneumonia and evaluate pleural effusion.    -upon evaluating the patient, due to severe respiratory distress tachypnea, tachycardia, and inability to protect airway due to  previous stroke discussed with ER physician that the patient will need mechanical ventilation.    -Continue lung protective  mechanical ventilation.  -Intensivist/pulmonology to follow.  -Currently  not on scheduled IV fluids due to effusion.  Will utilize pressors.

## 2023-12-24 NOTE — H&P
[facility]  [facility] Medicine  History & Physical    Patient Name: Alexsander Tafoya  MRN: [id number]  Patient Class: IP- Inpatient  Admission Date: 12/23/2023  Attending Physician: Sunny Tsang MD   Primary Care Provider: Helio Farr MD         Patient information was obtained from past medical records and ER records.     Subjective:     Principal Problem:Acute hypoxic respiratory failure    Chief Complaint:   Chief Complaint   Patient presents with    Shortness of Breath     Pt reports to the ED BIB [facility] from the [facility] with C/O SOB and cough that started yesterday. Per EMS pt was sating in the low 80's on RA. Pt was placed on 15L NRB by EMS. Pt breathing appears labored and Pt is grunting with each breath. Resp shallow. Pt placed in 17 for eval.         HPI: Mr Tafoya is a 73-year-old male being admitted for acute hypoxic respiratory failure.  His medical history significant for BPH, dyslipidemia, chronic CVA with resultant aphasia, and right-sided hemiparesis.  During my evaluation the patient is in severe respiratory distress, on BiPAP and unable to provide any meaningful detailed history.  Seems that he came from [facility] due to worsening dyspnea, and EMS noted low oxygen saturations. in the ER, he was noted to have Tmax of 103.1 °F, persistently tachycardic in 130s, hypotensive 84/52, and was initially on nonrebreather and subsequently placed on BiPAP.  His CBC shows normal white count, microcytic anemia 10.5 and normal platelets.  INR 1.4.  CBC with metabolic acidosis bicarb 16, creatinine 1.9 (appears to have previous creatinine of 1.09 in September 2022 with baseline of about 1.1-1.3,), hypomagnesemia 1.5, transaminitis AST: ALT-192: 152, lactic acidosis 3.1, elevated procalcitonin 0.26.  His influenza a swab is positive.  His chest x-ray suggests moderate right-sided pleural effusion with compressive atelectasis and/or lower lobe consolidation.    After evaluating the  patient, discussed with ER physician that the patient may need to be intubated given severe respiratory distress tachycardia, respiratory rate in 50s and inability to protect airway with previous stroke.  Patient will be placed on mechanical ventilation.  He has so far received breathing treatments, Rocephin and Zithromax along with Tamiflu.  Admission has been requested for further evaluation and treatment.    Past Medical History:   Diagnosis Date    BPH (benign prostatic hyperplasia)     Dysarthria     HLD (hyperlipidemia)     Hypertension     Other and unspecified hyperlipidemia     Stroke        Past Surgical History:   Procedure Laterality Date    CATARACT EXTRACTION W/ INTRAOCULAR LENS  IMPLANT, BILATERAL         Review of patient's allergies indicates:  No Known Allergies    No current facility-administered medications on file prior to encounter.     Current Outpatient Medications on File Prior to Encounter   Medication Sig    aspirin 81 MG Chew Take 1 tablet (81 mg total) by mouth once daily.    clopidogrel (PLAVIX) 75 mg tablet Take 1 tablet (75 mg total) by mouth once daily.    enalapril (VASOTEC) 5 MG tablet Take 5 mg by mouth once daily.    finasteride (PROSCAR) 5 mg tablet Take 5 mg by mouth once daily.     fish oil-omega-3 fatty acids 300-1,000 mg capsule Take 1 capsule by mouth once daily.    furosemide (LASIX) 40 MG tablet Take 1 tablet (40 mg total) by mouth once daily.    potassium chloride (MICRO-K) 10 MEQ CpSR Take 10 mEq by mouth once daily.    rosuvastatin (CRESTOR) 20 MG tablet Take 1 tablet (20 mg total) by mouth every evening. HOLD UNTIL TOLD TO RESUME BY PHYSICIAN (Patient taking differently: Take 20 mg by mouth every evening.)    metoprolol tartrate (LOPRESSOR) 50 MG tablet Take 0.5 tablets (25 mg total) by mouth once daily. HOLD UNTIL TOLD TO RESUME BY PHYSICIAN (Patient taking differently: Take 12.5 mg by mouth once daily. HOLD UNTIL TOLD TO RESUME BY PHYSICIAN)    multivitamin  (THERAGRAN) per tablet Take 1 tablet by mouth once daily.    [DISCONTINUED] doxazosin (CARDURA) 2 MG tablet Take 1 tablet (2 mg total) by mouth every evening. HOLD UNTIL TOLD TO RESUME BY PHYSICIAN     Family History       Problem Relation (Age of Onset)    Dementia Father    Hypertension Mother, Father    Seizures Mother    Stroke Mother          Tobacco Use    Smoking status: Former    Smokeless tobacco: Never   Substance and Sexual Activity    Alcohol use: Yes     Comment: rare, once a week or less    Drug use: No    Sexual activity: Never     Review of Systems  Unable to participate with review of systems secondary to severe respiratory distress and while being on BiPAP.  Objective:     Vital Signs (Most Recent):  Temp: (!) 101.3 °F (38.5 °C) (12/23/23 2232)  Pulse: (!) 124 (12/23/23 2232)  Resp: (!) 27 (12/23/23 2232)  BP: (!) 91/58 (12/23/23 2232)  SpO2: 100 % (12/23/23 2232) Vital Signs (24h Range):  Temp:  [101.3 °F (38.5 °C)-103.1 °F (39.5 °C)] 101.3 °F (38.5 °C)  Pulse:  [124-153] 124  Resp:  [17-55] 27  SpO2:  [97 %-100 %] 100 %  BP: ()/() 91/58     Weight: 83.9 kg (185 lb)  Body mass index is 28.98 kg/m².     Physical Exam     Somnolent, barely opens eyes on verbal commands  Appears to be in severe respiratory distress  Lungs coarse and congested bilaterally, tachypneic.  Tachycardic, soft systolic murmur.  No peripheral edema.  Abdomen nondistended, decreased bowel sounds.  Unable to assess neurological status or deficits  Due to severe respiratory distress.        Significant Labs: All pertinent labs within the past 24 hours have been reviewed.  ABGs:   Recent Labs   Lab 12/23/23 1913   PH 7.362   PCO2 28.3*   HCO3 16.1*   POCSATURATED 92   BE -8*   PO2 65*     CBC:   Recent Labs   Lab 12/23/23 1914   WBC 4.28   HGB 10.5*   HCT 34.1*        CMP:   Recent Labs   Lab 12/23/23 1914      K 4.5      CO2 16*   *   BUN 17   CREATININE 1.9*   CALCIUM 9.3   PROT 7.7    ALBUMIN 3.6   BILITOT 1.4*   ALKPHOS 102   *   *   ANIONGAP 14     Cardiac Markers:   Recent Labs   Lab 12/23/23 1914   *     Lactic Acid:   Recent Labs   Lab 12/23/23 1914   LACTATE 3.1*     Magnesium:   Recent Labs   Lab 12/23/23 1914   MG 1.5*       Significant Imaging: I have reviewed all pertinent imaging results/findings within the past 24 hours.  I have reviewed and interpreted all pertinent imaging results/findings within the past 24 hours.  Assessment/Plan:     * Acute hypoxic respiratory failure  Patient with Hypoxic Respiratory failure which is Acute.  he is not on home oxygen. Supplemental oxygen was provided and noted- Vent Mode: PRVC  Oxygen Concentration (%):  [100] 100  Vt Set:  [500 mL] 500 mL  PEEP/CPAP:  [5 cmH20] 5 cmH20  Mean Airway Pressure:  [10.7 cmH20] 10.7 cmH20    .   Signs/symptoms of respiratory failure include- tachypnea, increased work of breathing, respiratory distress, use of accessory muscles, wheezing, and stridor. Contributing diagnoses includes - Pleural effusion and Pneumonia Labs and images were reviewed. Patient Has not had a recent ABG. Will treat underlying causes and adjust management of respiratory failure as follows- Treat underlying etiologies of pneumonia and evaluate pleural effusion.    -upon evaluating the patient, due to severe respiratory distress tachypnea, tachycardia, and inability to protect airway due to  previous stroke discussed with ER physician that the patient will need mechanical ventilation.    -Continue lung protective  mechanical ventilation.  -Intensivist/pulmonology to follow.  -Currently  not on scheduled IV fluids due to effusion.  Will utilize pressors.    Sepsis due to pneumonia  This patient does have evidence of infective focus  My overall impression is septic shock due to MAP < 65.  Source: Respiratory  Antibiotics given-   Antibiotics (72h ago, onward)      Start     Stop Route Frequency Ordered    12/23/23 3575   piperacillin-tazobactam (ZOSYN) 4.5 g in dextrose 5 % in water (D5W) 100 mL IVPB (MB+)         -- IV Every 8 hours (non-standard times) 12/23/23 2144          Latest lactate reviewed-  Recent Labs   Lab 12/23/23 1914   LACTATE 3.1*     Organ dysfunction indicated by Acute liver injury and Acute respiratory failure    Fluid challenge Ideal Body Weight- The patient's ideal body weight is Ideal body weight: 66.1 kg (145 lb 11.6 oz) which will be used to calculate fluid bolus of 30 ml/kg for treatment of septic shock.      Post- resuscitation assessment No - Post resuscitation assessment not needed       Will Start Pressors- Levophed for MAP of 65  Source control achieved by:   Antibiotics.    -Check CT chest without contrast to further evaluate pneumonia/effusion.  -Given history of CVA and likely aspiration, will continue Zosyn.  -Also has influenza A infection which is being treated.    Influenza A  -Continue Tamiflu.      Pleural effusion, right  -Moderate effusion on x-ray.  -CAT scan pending.  -Received 30 cc/kilo fluids in the ER.  Will hold further scheduled fluids for now due to concurrent effusion, and utilize pressors.  -If respiratory status continues to be worse and if CT shows a large pleural effusion could consider thoracentesis.      Transaminitis  -Probably from septic shock.  -Continue to monitor.  -Avoid hepatotoxic agents.      BPH (benign prostatic hyperplasia)  -Continue Proscar.      Aphasia as late effect of cerebrovascular accident  -As above.      Hemiplegia affecting right side in right-dominant patient as late effect of cerebrovascular disease  -Chronic CVA with previously noted resultant right-sided hemiparesis and aphasia.  -Currently unable to assess secondary to severe respiratory distress.    Essential hypertension  -antihypertensives on hold due to septic shock.    Hyperlipidemia  -On statin.        VTE Risk Mitigation (From admission, onward)           Ordered     enoxaparin injection  40 mg  Daily         12/23/23 2143     IP VTE HIGH RISK PATIENT  Once         12/23/23 2143     Place sequential compression device  Until discontinued         12/23/23 2143                     The patient was promptly assessed due to her critical illness/injury that acutely impairs and/or threatens sustainability of patient's stability due to imminent and/or life threatening deterioration.  Assessments and frequent reassessments were necessary as the clinical database and the patient's condition involved in response to the therapy provided, high degree of complex decision making process to assess, manipulate and support vital organ systems and their functions to prevent and/or treat vital organ failure and further life-threatening deterioration of the patient's condition was provided.  Critical care time spent 35 minutes.          AdmissionCare    Guideline: Systemic / Infectious Condition, Inpatient    Based on the indications selected for the patient, the bed status of Admit to Inpatient was determined to be MET    The following indications were selected as present at the time of evaluation of the patient:      Hemodynamic instability, as indicated by 1 or more of the following:   -     Vital sign abnormality not readily corrected by appropriate treatment, as indicated by 1 or more of the following:    -      Tachycardia that persists despite appropriate treatment (eg, volume repletion, treatment of pain, treatment of underlying cause)     -      Hypotension that persists despite appropriate treatment (eg, volume repletion, treatment of underlying cause)     -       - Not patient baseline (eg, healthy adult with low SBP) or intentional therapeutic goal (eg, low SBP as treatment goal in heart failure)     Severe hypotension, as indicated by 1 or more of the following:     -       - IV inotropic or vasopressor medication required to maintain adequate blood pressure or perfusion    AdmissionCare documentation  entered by: Marlon Woods    [facility], 27th edition, Copyright © 2023 [facility], [facility] All Rights Reserved.  [id number]:41:[id number]:00    Renan Boyd MD  Department of Hospital Medicine  [facility] - Emergency Dept

## 2023-12-24 NOTE — ASSESSMENT & PLAN NOTE
EF 25-30% which is not new per  records. Suspected cardiac amyloidosis but has declined further evaluation and AICD in the past. Continue supportive care

## 2023-12-24 NOTE — HPI
HPI:  Mr Tafoya is a 73-year-old male being admitted for acute hypoxic respiratory failure.  His medical history significant for BPH, dyslipidemia, chronic CVA with resultant aphasia, and right-sided hemiparesis.  During my evaluation the patient is in severe respiratory distress, on BiPAP and unable to provide any meaningful detailed history.  Seems that he came from [facility] due to worsening dyspnea, and EMS noted low oxygen saturations. in the ER, he was noted to have Tmax of 103.1 °F, persistently tachycardic in 130s, hypotensive 84/52, and was initially on nonrebreather and subsequently placed on BiPAP.  His CBC shows normal white count, microcytic anemia 10.5 and normal platelets.  INR 1.4.  CBC with metabolic acidosis bicarb 16, creatinine 1.9 (appears to have previous creatinine of 1.09 in September 2022 with baseline of about 1.1-1.3,), hypomagnesemia 1.5, transaminitis AST: ALT-192: 152, lactic acidosis 3.1, elevated procalcitonin 0.26.  His influenza a swab is positive.  His chest x-ray suggests moderate right-sided pleural effusion with compressive atelectasis and/or lower lobe consolidation.     After evaluating the patient, discussed with ER physician that the patient may need to be intubated given severe respiratory distress tachycardia, respiratory rate in 50s and inability to protect airway with previous stroke.  Patient will be placed on mechanical ventilation.  He has so far received breathing treatments, Rocephin and Zithromax along with Tamiflu.  Admission has been requested for further evaluation and treatment.     Overview/Hospital Course:  72 y/o male presents with SOB. In the ER, he was noted to have Tmax of 103.1 °F, persistently tachycardic in 130s, hypotensive 84/52, and was initially on nonrebreather and subsequently placed on BiPAP.  Respiratory status worsened and he was then intubated.  Flu positive and started on Tamiflu. His chest x-ray suggests moderate right-sided pleural effusion  with compressive atelectasis and/or lower lobe consolidation.  Started on broad spectrum ABX's.       On vent in ICU on levophed  EKG sinus tachycardia 123 NSSTT changes  Troponin 0.6 to 2.7  Cr 2.2      Echo 12/24/23    Left Ventricle: The left ventricle is normal in size. There is concentric hypertrophy. There is severely reduced systolic function with a visually estimated ejection fraction of 25 - 30%.    Right Ventricle: Mild right ventricular enlargement. Systolic function is normal.    Left Atrium: Left atrium is moderately dilated.    Right Atrium: Right atrium is mildly dilated.    Tricuspid Valve: There is trace regurgitation. The estimated PA systolic pressure is at least 21 mmHg.    IVC/SVC: Patient is ventilated, cannot use IVC diameter to estimate right atrial pressure.         Followed by [facility]      This patient comes to the office today for follow-up. Clinically he has been stable at this time. Who was felt not to pursue further workup for possible cardiac amyloidosis.    He does take a Lasix 40 mg 1/2 tablet every day. At times he probably could use a little bit extra. He lives in an assisted living facility at this time    NOTE 3/20/23 --------------------------------  This is a very pleasant patient here today for follow-up visit. He had been going under a workup for possible TTR amyloidosis. His echocardiogram and technetium pyrophosphate scan suggested such. He had been evaluated by the [facility] group for this and also [facility]. The patient is decided not to pursue therapy for this with Lyssa garcia.    The patient clinically has been stable. His blood pressure is on the low side. He is on enalapril 2.5 mg every day, Lasix 40 mg 1/2 tablet a day potassium chloride    As mentioned, the family and the patient have decided not to pursue therapy for presumptive diagnosis of amyloid heart. We will follow him on the present regimen.    I would  expect his LV function to continue to decrease but we have no plans right now for further invasive therapies or therapy for cardiac amyloidosis      1. Cerebral vascular accident 2007 right hemiparesis  2. Hypertension  3. High cholesterol  4. Abnormal ECG  5. Severe cardiomyopathy  6. Chronic systolic heart failure  7. Patient of assisted living  8. Swallowing difficulty with aspiration  9. Dysarthria  10. +ATTR technectium PYP scan (6/2022)  Echo c/w amyloid heart (5/2022)    As mentioned the patient is not interested in pursuit of an ICD or therapy for cardiac amyloid right now.      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  Severely decreased left ventricular systolic function.  Left ventricular ejection fraction is estimated at 15%.  Grade II/IV diastolic dysfunction, moderately elevated filling pressures.  Normal right ventricular systolic function.  Mildly increased left atrial size.  Structurally normal trileaflet aortic valve.

## 2023-12-25 PROBLEM — I47.29 NSVT (NONSUSTAINED VENTRICULAR TACHYCARDIA): Status: ACTIVE | Noted: 2023-12-25

## 2023-12-25 PROBLEM — I50.33 ACUTE ON CHRONIC DIASTOLIC CONGESTIVE HEART FAILURE: Status: ACTIVE | Noted: 2023-12-25

## 2023-12-25 PROBLEM — R65.21 SEPTIC SHOCK: Status: ACTIVE | Noted: 2019-09-01

## 2023-12-25 LAB
ALBUMIN SERPL BCP-MCNC: 2.7 G/DL (ref 3.5–5.2)
ALLENS TEST: ABNORMAL
ALP SERPL-CCNC: 100 U/L (ref 55–135)
ALT SERPL W/O P-5'-P-CCNC: 1199 U/L (ref 10–44)
ANION GAP SERPL CALC-SCNC: 11 MMOL/L (ref 8–16)
AST SERPL-CCNC: 1167 U/L (ref 10–40)
BASOPHILS # BLD AUTO: 0.03 K/UL (ref 0–0.2)
BASOPHILS NFR BLD: 0.6 % (ref 0–1.9)
BILIRUB SERPL-MCNC: 1.4 MG/DL (ref 0.1–1)
BUN SERPL-MCNC: 20 MG/DL (ref 8–23)
CALCIUM SERPL-MCNC: 8.1 MG/DL (ref 8.7–10.5)
CHLORIDE SERPL-SCNC: 104 MMOL/L (ref 95–110)
CO2 SERPL-SCNC: 21 MMOL/L (ref 23–29)
CREAT SERPL-MCNC: 1.9 MG/DL (ref 0.5–1.4)
DELSYS: ABNORMAL
DIFFERENTIAL METHOD BLD: ABNORMAL
EOSINOPHIL # BLD AUTO: 0.1 K/UL (ref 0–0.5)
EOSINOPHIL NFR BLD: 1.2 % (ref 0–8)
ERYTHROCYTE [DISTWIDTH] IN BLOOD BY AUTOMATED COUNT: 17.4 % (ref 11.5–14.5)
ERYTHROCYTE [SEDIMENTATION RATE] IN BLOOD BY WESTERGREN METHOD: 20 MM/H
EST. GFR  (NO RACE VARIABLE): 37 ML/MIN/1.73 M^2
FIO2: 40
GLUCOSE SERPL-MCNC: 159 MG/DL (ref 70–110)
HCO3 UR-SCNC: 20.1 MMOL/L (ref 24–28)
HCT VFR BLD AUTO: 34.4 % (ref 40–54)
HGB BLD-MCNC: 10.6 G/DL (ref 14–18)
IMM GRANULOCYTES # BLD AUTO: 0.01 K/UL (ref 0–0.04)
IMM GRANULOCYTES NFR BLD AUTO: 0.2 % (ref 0–0.5)
LACTATE SERPL-SCNC: 1.8 MMOL/L (ref 0.5–2.2)
LYMPHOCYTES # BLD AUTO: 0.5 K/UL (ref 1–4.8)
LYMPHOCYTES NFR BLD: 10.5 % (ref 18–48)
MCH RBC QN AUTO: 22.1 PG (ref 27–31)
MCHC RBC AUTO-ENTMCNC: 30.8 G/DL (ref 32–36)
MCV RBC AUTO: 72 FL (ref 82–98)
MODE: ABNORMAL
MONOCYTES # BLD AUTO: 0.3 K/UL (ref 0.3–1)
MONOCYTES NFR BLD: 6.1 % (ref 4–15)
NEUTROPHILS # BLD AUTO: 4 K/UL (ref 1.8–7.7)
NEUTROPHILS NFR BLD: 81.4 % (ref 38–73)
NRBC BLD-RTO: 0 /100 WBC
PCO2 BLDA: 30 MMHG (ref 35–45)
PEEP: 5
PH SMN: 7.43 [PH] (ref 7.35–7.45)
PLATELET # BLD AUTO: 184 K/UL (ref 150–450)
PMV BLD AUTO: 11.4 FL (ref 9.2–12.9)
PO2 BLDA: 139 MMHG (ref 80–100)
POC BE: -3 MMOL/L
POC SATURATED O2: 99 % (ref 95–100)
POC TCO2: 21 MMOL/L (ref 23–27)
POTASSIUM SERPL-SCNC: 3.4 MMOL/L (ref 3.5–5.1)
POTASSIUM SERPL-SCNC: 3.6 MMOL/L (ref 3.5–5.1)
PROT SERPL-MCNC: 6.2 G/DL (ref 6–8.4)
RBC # BLD AUTO: 4.79 M/UL (ref 4.6–6.2)
SAMPLE: ABNORMAL
SITE: ABNORMAL
SODIUM SERPL-SCNC: 136 MMOL/L (ref 136–145)
VT: 500
WBC # BLD AUTO: 4.88 K/UL (ref 3.9–12.7)

## 2023-12-25 PROCEDURE — 20000000 HC ICU ROOM

## 2023-12-25 PROCEDURE — 25000003 PHARM REV CODE 250: Performed by: HOSPITALIST

## 2023-12-25 PROCEDURE — A4216 STERILE WATER/SALINE, 10 ML: HCPCS | Performed by: EMERGENCY MEDICINE

## 2023-12-25 PROCEDURE — 25000003 PHARM REV CODE 250: Performed by: INTERNAL MEDICINE

## 2023-12-25 PROCEDURE — 99291 CRITICAL CARE FIRST HOUR: CPT | Mod: ,,, | Performed by: INTERNAL MEDICINE

## 2023-12-25 PROCEDURE — 63600175 PHARM REV CODE 636 W HCPCS: Performed by: INTERNAL MEDICINE

## 2023-12-25 PROCEDURE — 82803 BLOOD GASES ANY COMBINATION: CPT

## 2023-12-25 PROCEDURE — 85025 COMPLETE CBC W/AUTO DIFF WBC: CPT | Performed by: INTERNAL MEDICINE

## 2023-12-25 PROCEDURE — 25000003 PHARM REV CODE 250: Performed by: EMERGENCY MEDICINE

## 2023-12-25 PROCEDURE — 84132 ASSAY OF SERUM POTASSIUM: CPT | Performed by: INTERNAL MEDICINE

## 2023-12-25 PROCEDURE — 94640 AIRWAY INHALATION TREATMENT: CPT

## 2023-12-25 PROCEDURE — 25000242 PHARM REV CODE 250 ALT 637 W/ HCPCS: Performed by: INTERNAL MEDICINE

## 2023-12-25 PROCEDURE — 99900026 HC AIRWAY MAINTENANCE (STAT)

## 2023-12-25 PROCEDURE — 94761 N-INVAS EAR/PLS OXIMETRY MLT: CPT | Mod: XB

## 2023-12-25 PROCEDURE — 27000207 HC ISOLATION

## 2023-12-25 PROCEDURE — 63600175 PHARM REV CODE 636 W HCPCS: Performed by: HOSPITALIST

## 2023-12-25 PROCEDURE — 94003 VENT MGMT INPAT SUBQ DAY: CPT

## 2023-12-25 PROCEDURE — 36600 WITHDRAWAL OF ARTERIAL BLOOD: CPT

## 2023-12-25 PROCEDURE — 99900035 HC TECH TIME PER 15 MIN (STAT)

## 2023-12-25 PROCEDURE — 99233 SBSQ HOSP IP/OBS HIGH 50: CPT | Mod: ,,, | Performed by: INTERNAL MEDICINE

## 2023-12-25 PROCEDURE — 80053 COMPREHEN METABOLIC PANEL: CPT | Performed by: INTERNAL MEDICINE

## 2023-12-25 PROCEDURE — 25000003 PHARM REV CODE 250: Performed by: STUDENT IN AN ORGANIZED HEALTH CARE EDUCATION/TRAINING PROGRAM

## 2023-12-25 RX ORDER — LIDOCAINE HYDROCHLORIDE 20 MG/ML
100 INJECTION INTRAVENOUS ONCE
Status: COMPLETED | OUTPATIENT
Start: 2023-12-25 | End: 2023-12-25

## 2023-12-25 RX ORDER — FUROSEMIDE 10 MG/ML
40 INJECTION INTRAMUSCULAR; INTRAVENOUS
Status: DISCONTINUED | OUTPATIENT
Start: 2023-12-25 | End: 2024-01-01

## 2023-12-25 RX ORDER — MUPIROCIN 20 MG/G
OINTMENT TOPICAL 2 TIMES DAILY
Status: COMPLETED | OUTPATIENT
Start: 2023-12-25 | End: 2023-12-29

## 2023-12-25 RX ORDER — LIDOCAINE HYDROCHLORIDE ANHYDROUS AND DEXTROSE MONOHYDRATE .8; 5 G/100ML; G/100ML
1 INJECTION, SOLUTION INTRAVENOUS CONTINUOUS
Status: DISCONTINUED | OUTPATIENT
Start: 2023-12-25 | End: 2023-12-27

## 2023-12-25 RX ORDER — LIDOCAINE HYDROCHLORIDE 20 MG/ML
100 INJECTION INTRAVENOUS ONCE
Status: DISCONTINUED | OUTPATIENT
Start: 2023-12-25 | End: 2023-12-26

## 2023-12-25 RX ADMIN — Medication 10 ML: at 11:12

## 2023-12-25 RX ADMIN — Medication 10 ML: at 05:12

## 2023-12-25 RX ADMIN — FUROSEMIDE 40 MG: 10 INJECTION, SOLUTION INTRAVENOUS at 12:12

## 2023-12-25 RX ADMIN — VANCOMYCIN HYDROCHLORIDE 1250 MG: 1.25 INJECTION, POWDER, LYOPHILIZED, FOR SOLUTION INTRAVENOUS at 02:12

## 2023-12-25 RX ADMIN — PIPERACILLIN AND TAZOBACTAM 4.5 G: 4; .5 INJECTION, POWDER, LYOPHILIZED, FOR SOLUTION INTRAVENOUS; PARENTERAL at 06:12

## 2023-12-25 RX ADMIN — IPRATROPIUM BROMIDE AND ALBUTEROL SULFATE 3 ML: 2.5; .5 SOLUTION RESPIRATORY (INHALATION) at 07:12

## 2023-12-25 RX ADMIN — OSELTAMIVIR PHOSPHATE 30 MG: 30 CAPSULE ORAL at 09:12

## 2023-12-25 RX ADMIN — NOREPINEPHRINE BITARTRATE 0.09 MCG/KG/MIN: 4 INJECTION, SOLUTION INTRAVENOUS at 11:12

## 2023-12-25 RX ADMIN — IPRATROPIUM BROMIDE AND ALBUTEROL SULFATE 3 ML: 2.5; .5 SOLUTION RESPIRATORY (INHALATION) at 04:12

## 2023-12-25 RX ADMIN — IPRATROPIUM BROMIDE AND ALBUTEROL SULFATE 3 ML: 2.5; .5 SOLUTION RESPIRATORY (INHALATION) at 12:12

## 2023-12-25 RX ADMIN — PROPOFOL 20 MCG/KG/MIN: 10 INJECTION, EMULSION INTRAVENOUS at 12:12

## 2023-12-25 RX ADMIN — NOREPINEPHRINE BITARTRATE 0.07 MCG/KG/MIN: 4 INJECTION, SOLUTION INTRAVENOUS at 12:12

## 2023-12-25 RX ADMIN — IPRATROPIUM BROMIDE AND ALBUTEROL SULFATE 3 ML: 2.5; .5 SOLUTION RESPIRATORY (INHALATION) at 08:12

## 2023-12-25 RX ADMIN — ASPIRIN 81 MG CHEWABLE TABLET 81 MG: 81 TABLET CHEWABLE at 08:12

## 2023-12-25 RX ADMIN — MUPIROCIN: 20 OINTMENT TOPICAL at 12:12

## 2023-12-25 RX ADMIN — FINASTERIDE 5 MG: 5 TABLET, FILM COATED ORAL at 08:12

## 2023-12-25 RX ADMIN — FUROSEMIDE 40 MG: 10 INJECTION, SOLUTION INTRAVENOUS at 11:12

## 2023-12-25 RX ADMIN — LIDOCAINE HYDROCHLORIDE 2 MG/MIN: 8 INJECTION, SOLUTION INTRAVENOUS at 07:12

## 2023-12-25 RX ADMIN — OSELTAMIVIR PHOSPHATE 30 MG: 30 CAPSULE ORAL at 08:12

## 2023-12-25 RX ADMIN — NOREPINEPHRINE BITARTRATE 0.08 MCG/KG/MIN: 4 INJECTION, SOLUTION INTRAVENOUS at 02:12

## 2023-12-25 RX ADMIN — PIPERACILLIN AND TAZOBACTAM 4.5 G: 4; .5 INJECTION, POWDER, LYOPHILIZED, FOR SOLUTION INTRAVENOUS; PARENTERAL at 02:12

## 2023-12-25 RX ADMIN — FAMOTIDINE 20 MG: 10 INJECTION INTRAVENOUS at 08:12

## 2023-12-25 RX ADMIN — PIPERACILLIN AND TAZOBACTAM 4.5 G: 4; .5 INJECTION, POWDER, LYOPHILIZED, FOR SOLUTION INTRAVENOUS; PARENTERAL at 10:12

## 2023-12-25 RX ADMIN — CLOPIDOGREL BISULFATE 75 MG: 75 TABLET ORAL at 08:12

## 2023-12-25 RX ADMIN — PROPOFOL 15 MCG/KG/MIN: 10 INJECTION, EMULSION INTRAVENOUS at 07:12

## 2023-12-25 RX ADMIN — MUPIROCIN: 20 OINTMENT TOPICAL at 09:12

## 2023-12-25 RX ADMIN — Medication 10 ML: at 12:12

## 2023-12-25 RX ADMIN — LIDOCAINE HYDROCHLORIDE 100 MG: 20 INJECTION INTRAVENOUS at 07:12

## 2023-12-25 RX ADMIN — ENOXAPARIN SODIUM 40 MG: 40 INJECTION SUBCUTANEOUS at 05:12

## 2023-12-25 NOTE — CARE UPDATE
Decreased patient PEEP to 5 and decreased patient FiO2 to 40%, Patient looks good no distress noted

## 2023-12-25 NOTE — ASSESSMENT & PLAN NOTE
Patient with Hypoxic Respiratory failure which is Acute.  he is not on home oxygen. Supplemental oxygen was provided and noted- Vent Mode: PRVC  Oxygen Concentration (%):  [] 30  Resp Rate Total:  [20 br/min-40 br/min] 24 br/min  Vt Set:  [500 mL] 500 mL  PEEP/CPAP:  [5 cmH20-8 cmH20] 5 cmH20  Pressure Support:  [8 cmH20] 8 cmH20  Mean Airway Pressure:  [7.5 amX88-17.3 cmH20] 10.3 cmH20.   Signs/symptoms of respiratory failure include- tachypnea, increased work of breathing, respiratory distress, use of accessory muscles, wheezing, and stridor. Contributing diagnoses includes - Pleural effusion and Pneumonia Labs and images were reviewed. Patient Has not had a recent ABG. Will treat underlying causes and adjust management of respiratory failure as follows- Treat underlying etiologies of pneumonia and evaluate pleural effusion.  Intubated in ER.  Pulmonary consulted.  Respiratory failure secondary to flu/pneumonia/pleural effusion.

## 2023-12-25 NOTE — PROGRESS NOTES
[facility] - Intensive Care  Critical Care Medicine  Progress Note    Patient Name: Alexsander Tafoya  MRN: [id number]  Admission Date: 12/23/2023  Hospital Length of Stay: 2 days  Code Status: Full Code  Attending Provider: Isaac Aguilar MD  Primary Care Provider: Helio Farr MD   Principal Problem: Acute hypoxic respiratory failure    Subjective:     HPI:  Patient is 73 y.o. male  has a past medical history of BPH (benign prostatic hyperplasia), Dysarthria, HLD (hyperlipidemia), Hypertension, Other and unspecified hyperlipidemia, and Stroke. presented to [facility] on 12/23/23 with cough and sob.   In the ER, he was noted to have Tmax of 103.1 °F, persistently tachycardic in 130s, hypotensive 84/52, and was initially on nonrebreather and subsequently placed on BiPAP.  His CBC shows normal white count, microcytic anemia 10.5 and normal platelets.  INR 1.4.  CBC with metabolic acidosis bicarb 16, creatinine 1.9 (appears to have previous creatinine of 1.09 in September 2022 with baseline of about 1.1-1.3,), hypomagnesemia 1.5, transaminitis AST: ALT-192: 152, lactic acidosis 3.1, elevated procalcitonin 0.26.  His influenza a swab is positive.  His chest x-ray suggests moderate right-sided pleural effusion with compressive atelectasis and/or lower lobe consolidation.  Patient was intubated in ED and pulmonary was consulted for further inputs.      During my initial evaluation, patient was intubated and sedated with propofol.  Patient sat was 100% with 70% Fio2 and 5 peep.  Vss stable with 0.08 mcg/kg/min.      Hospital/ICU Course:  No notes on file    Interval History: diuresed well.  Alert of sedation.  Oxygenation much improved.  Tolerating sbt.        Objective:     Vital Signs (Most Recent):  Temp: 97.5 °F (36.4 °C) (12/25/23 0745)  Pulse: 105 (12/25/23 1015)  Resp: (!) 26.8 (12/25/23 1015)  BP: 92/67 (12/25/23 1015)  SpO2: 100 % (12/25/23 1015) Vital Signs (24h Range):  Temp:  [96.2 °F (35.7 °C)-97.9 °F  (36.6 °C)] 97.5 °F (36.4 °C)  Pulse:  [100-125] 105  Resp:  [20-31.8] 26.8  SpO2:  [100 %] 100 %  BP: ()/(58-77) 92/67     Weight: 97.9 kg (215 lb 13.3 oz)  Body mass index is 33.8 kg/m².      Intake/Output Summary (Last 24 hours) at 12/25/2023 1059  Last data filed at 12/25/2023 1000  Gross per 24 hour   Intake 1318.75 ml   Output 5375 ml   Net -4056.25 ml        Physical Exam  Constitutional:       Appearance: He is well-developed.   HENT:      Head: Normocephalic and atraumatic.      Mouth/Throat:      Comments: ETT in place  Eyes:      Conjunctiva/sclera: Conjunctivae normal.      Pupils: Pupils are equal, round, and reactive to light.   Cardiovascular:      Rate and Rhythm: Normal rate and regular rhythm.      Heart sounds: Normal heart sounds.   Pulmonary:      Effort: Pulmonary effort is normal.      Breath sounds: Normal breath sounds.      Comments: Decreased breath sound on the right.    Abdominal:      General: Bowel sounds are normal.      Palpations: Abdomen is soft.   Musculoskeletal:         General: Normal range of motion.      Cervical back: Normal range of motion and neck supple.      Right lower leg: Edema present.      Left lower leg: Edema present.   Skin:     General: Skin is warm.   Neurological:      Cranial Nerves: No cranial nerve deficit.      Comments: Sedated on vent.           Review of Systems    Vents:  Vent Mode: (S) PS/CPAP (12/25/23 0908)  Ventilator Initiated: Yes (12/23/23 2212)  Set Rate: 20 BPM (12/25/23 0727)  Vt Set: 500 mL (12/25/23 0727)  Pressure Support: 8 cmH20 (12/25/23 0908)  PEEP/CPAP: 5 cmH20 (12/25/23 0908)  Oxygen Concentration (%): 30 (12/25/23 1015)  Peak Airway Pressure: 13.6 cmH20 (12/25/23 0908)  Total Ve: 10.24 L/m (12/25/23 0908)  F/VT Ratio<105 (RSBI): (!) 55.7 (12/25/23 0908)    Lines/Drains/Airways       Peripherally Inserted Central Catheter Line  Duration             PICC Triple Lumen 12/23/23 2320 left basilic 1 day              Drain  Duration  "                 NG/OG Tube 12/23/23 2146 orogastric 16 Fr. Center mouth 1 day         Urethral Catheter 12/23/23 2231 Straight-tip 16 Fr. 1 day              Airway  Duration                  Airway - Non-Surgical 12/23/23 2146 Endotracheal Tube 1 day              Peripheral Intravenous Line  Duration                  Peripheral IV - Single Lumen 12/23/23 1913 20 G Anterior;Distal;Left Antecubital 1 day         Peripheral IV - Single Lumen 12/23/23 1913 20 G Anterior;Left Forearm 1 day                    Significant Labs:    CBC/Anemia Profile:  Recent Labs   Lab 12/23/23 1914 12/24/23  0509 12/25/23  0607   WBC 4.28 5.02 4.88   HGB 10.5* 10.4* 10.6*   HCT 34.1* 34.5* 34.4*    178 184   MCV 72* 74* 72*   RDW 17.1* 17.3* 17.4*        Chemistries:  Recent Labs   Lab 12/23/23 1914 12/24/23  0509 12/24/23  1500 12/25/23  0607    136 136 136   K 4.5 4.7 3.8 3.4*    106 106 104   CO2 16* 17* 18* 21*   BUN 17 21 21 20   CREATININE 1.9* 2.2* 2.1* 1.9*   CALCIUM 9.3 8.4* 8.3* 8.1*   ALBUMIN 3.6 3.1*  --  2.7*   PROT 7.7 6.3  --  6.2   BILITOT 1.4* 1.3*  --  1.4*   ALKPHOS 102 101  --  100   * 1,113*  --  1,199*   * 1,387*  --  1,167*   MG 1.5*  --   --   --    PHOS 4.6*  --   --   --        ABGs:   Recent Labs   Lab 12/25/23  0729   PH 7.433   PCO2 30.0*   HCO3 20.1*   POCSATURATED 99   BE -3*     Blood Culture:   Recent Labs   Lab 12/23/23 1914 12/23/23 1915   LABBLOO No Growth to date  No Growth to date No Growth to date  No Growth to date     Cardiac Markers: No results for input(s): "CKMB", "TROPONINT", "MYOGLOBIN" in the last 48 hours.  Lactic Acid:   Recent Labs   Lab 12/23/23  2304 12/24/23  1037 12/24/23  2340   LACTATE 3.2* 3.0* 1.8     Respiratory Culture:   Recent Labs   Lab 12/24/23  1527   GSRESP <10 epithelial cells per low power field.  Many WBC's  Few Gram negative rods  Rare Gram positive rods   RESPIRATORYC Normal respiratory christina     Troponin:   Recent Labs   Lab " "12/23/23 1914 12/24/23  1037   TROPONINI 0.617* 2.784*     Urine Culture: No results for input(s): "LABURIN" in the last 48 hours.    BNP  Recent Labs   Lab 12/23/23 1914   *     Echo 12/24/23    Left Ventricle: The left ventricle is normal in size. There is concentric hypertrophy. There is severely reduced systolic function with a visually estimated ejection fraction of 25 - 30%.    Right Ventricle: Mild right ventricular enlargement. Systolic function is normal.    Left Atrium: Left atrium is moderately dilated.    Right Atrium: Right atrium is mildly dilated.    Tricuspid Valve: There is trace regurgitation. The estimated PA systolic pressure is at least 21 mmHg.    IVC/SVC: Patient is ventilated, cannot use IVC diameter to estimate right atrial pressure.    Significant Imaging:  I have reviewed all pertinent imaging results/findings within the past 24 hours.  CXR: I have reviewed all pertinent results/findings within the past 24 hours and my personal findings are:  persistent haziness at bases.      ABG  Recent Labs   Lab 12/25/23  0729   PH 7.433   PO2 139*   PCO2 30.0*   HCO3 20.1*   BE -3*     Assessment/Plan:     Pulmonary  Pleural effusion  Bilateral effusion on ct.  Rt>Lt  Moderate right effusion confirmed via pocus 12/24  DDx: parapneumonic vs volume overload  Given normal wbc and borderline, will monitor effusion with diuresis.    Cardiac/Vascular  Acute on chronic diastolic congestive heart failure  Appreciate card inputs  H/o amyloidosis  Diurese as tolerated.   EF20%.  Chronic per Dr. Morgan.     Recent Labs   Lab 12/23/23 1914   *       Shock  Suspect mixed with distributive and cardiogenic  Minimal levophes  EF 25% + sirs from influenza    Elevated troponin  EKG in ED without ST elevation  Card consulted  Plavix.  Held asa due love    Renal/  Acute renal failure  Creatinine was 1.1 in 2022  Suspect atn a/w hypotension  Appear volume overload on exam and ct  Improving despite " aggressive diuresis    ID  Influenza A  Tamiflu adjusted to renal function    GI  Transaminitis  Most likely hepatic congestion  Will trend    Other  * Acute hypoxic respiratory failure  Patient with Hypoxic Respiratory failure which is Acute.  he is not on home oxygen. Supplemental oxygen was provided and noted- Vent Mode: PS/CPAP  Oxygen Concentration (%):  [30-50] 30  Resp Rate Total:  [20 br/min-25 br/min] 25 br/min  Vt Set:  [500 mL] 500 mL  PEEP/CPAP:  [5 cmH20-8 cmH20] 5 cmH20  Pressure Support:  [8 cmH20] 8 cmH20  Mean Airway Pressure:  [7.5 giN41-57.3 cmH20] 7.5 cmH20    .   Signs/symptoms of respiratory failure include- tachypnea, respiratory distress, and lethargy. Contributing diagnoses includes - ARDS vs CHF Labs and images were reviewed. Patient Has recent ABG, which has been reviewed. Will treat underlying causes and adjust management of respiratory failure as follows-   Ct with bilateral effusion and compressive atelectasis of left lung  + Influenza with borderline procal and normal wbc  Ef 25%     diurese  SBT and possible extubation  Tamiflu + broad spectrum antibiotics  Procal is normal + culture is nonrevealing.  Recommend stopping antibiotics (vanc/zosyn)        Critical Care Time: 45 minutes  Critical secondary to Patient has a condition that poses threat to life and bodily function: Severe Respiratory Distress, Acute Renal Failure, and CHF      Critical care was time spent personally by me on the following activities: development of treatment plan with patient or surrogate and bedside caregivers, discussions with consultants, evaluation of patient's response to treatment, examination of patient, ordering and performing treatments and interventions, ordering and review of laboratory studies, ordering and review of radiographic studies, pulse oximetry, re-evaluation of patient's condition. This critical care time did not overlap with that of any other provider or involve time for any  procedures.     Brijesh Edward MD  Critical Care Medicine  [facility] [place] - Intensive Care

## 2023-12-25 NOTE — ASSESSMENT & PLAN NOTE
Creatinine was 1.1 in 2022  Suspect atn a/w hypotension  Appear volume overload on exam and ct  Improving despite aggressive diuresis

## 2023-12-25 NOTE — NURSING
[facility] Intensive Care  ICU Shift Summary  Date: 12/25/2023      Prehospitalization: Home  Admit Date / LOS : 12/23/2023/ 2 days    Diagnosis: Acute hypoxic respiratory failure    Consults:        Active: Cardio and Pulm CC       Needed: N/A     Code Status: Full Code   Advanced Directive: Not Received    LDA:  Lines/Drains/Airways       Peripherally Inserted Central Catheter Line  Duration             PICC Triple Lumen 12/23/23 2320 left basilic 1 day              Drain  Duration                  NG/OG Tube 12/23/23 2146 orogastric 16 Fr. Center mouth 1 day         Urethral Catheter 12/23/23 2231 Straight-tip 16 Fr. 1 day              Airway  Duration                  Airway - Non-Surgical 12/23/23 2146 Endotracheal Tube 1 day              Peripheral Intravenous Line  Duration                  Peripheral IV - Single Lumen 12/23/23 1913 20 G Anterior;Distal;Left Antecubital 1 day         Peripheral IV - Single Lumen 12/23/23 1913 20 G Anterior;Left Forearm 1 day                  Central Lines/Site/Justification:Pressors and Multiple GTTS  Urinary Cath/Order/Justification:Critically Ill in the ICU and requiring intensive monitoring    Vasopressors/Infusions:    NORepinephrine bitartrate-D5W 0.08 mcg/kg/min (12/25/23 0600)    propofoL 25 mcg/kg/min (12/25/23 0600)          GOALS: Volume/ Hemodynamic: MAP >65                     RASS: -2  light sedation, briefly awakes to voice (eye opening)    Pain Management: none       Pain Controlled: not applicable     Rhythm: ST    Respiratory Device: Vent    Vent Mode: PRVC  Oxygen Concentration (%):  [40-70] 40  Vt Set:  [500 mL] 500 mL  PEEP/CPAP:  [5 cmH20-8 cmH20] 5 cmH20  Mean Airway Pressure:  [8.2 rkT56-30.3 cmH20] 9.6 cmH20                 Most Recent SBT/ SAT: N/A       MOVE Screen: PT Consult  ICU Liberation: yes    VTE Prophylaxis: Pharm and Mechanical  Mobility: Bedrest  Stress Ulcer Prophylaxis: Yes    Isolation: Droplet    Dietary:   Current Diet Order  "  Procedures    Diet NPO      Tolerance: yes  Advancement: @ goal    I & O (24h):    Intake/Output Summary (Last 24 hours) at 12/25/2023 0656  Last data filed at 12/25/2023 0600  Gross per 24 hour   Intake 1492.78 ml   Output 4925 ml   Net -3432.22 ml        Restraints: Yes    Significant Dates:  Post Op Date: N/A  Rescue Date: N/A  Imaging/ Diagnostics: N/A    Noteworthy Labs:  See below    COVID Test: (--)  CBC/Anemia Labs: Coags:    Recent Labs   Lab 12/24/23  0509 12/25/23  0607   WBC 5.02 4.88   HGB 10.4* 10.6*   HCT 34.5* 34.4*    184   MCV 74* 72*   RDW 17.3* 17.4*    Recent Labs   Lab 12/23/23 1914   INR 1.4*        Chemistries:   Recent Labs   Lab 12/23/23  1914 12/24/23  0509 12/24/23  1500 12/25/23  0607    136 136 136   K 4.5 4.7 3.8 3.4*    106 106 104   CO2 16* 17* 18* 21*   BUN 17 21 21 20   CREATININE 1.9* 2.2* 2.1* 1.9*   CALCIUM 9.3 8.4* 8.3* 8.1*   PROT 7.7 6.3  --  6.2   BILITOT 1.4* 1.3*  --  1.4*   ALKPHOS 102 101  --  100   * 1,113*  --  1,199*   * 1,387*  --  1,167*   MG 1.5*  --   --   --    PHOS 4.6*  --   --   --         Cardiac Enzymes: Ejection Fractions:    Recent Labs     12/23/23 1914 12/24/23  1037   TROPONINI 0.617* 2.784*    EF   Date Value Ref Range Status   05/11/2022 55 % Final        POCT Glucose: HbA1c:    No results for input(s): "POCTGLUCOSE" in the last 168 hours. No results found for: "HGBA1C"        ICU LOS 1d 6h  Level of Care: Critical Care    Chart Check: 12 HR Done  Shift Summary/Plan for the shift: Patient remains in ICU on ventilator. VSS and afebrile. Continuous GTT of levophed to maintain a MAP >65 and propofol to maintain a RASS goal of (-2) per MD orders. PICC in place. Adequate urine output per [place], >2000 mL.Patient's sister updated per phone call. Free of injury, falls, and skin breakdown on this shift.  "

## 2023-12-25 NOTE — ASSESSMENT & PLAN NOTE
Brief VT overnight. Consider adding lidocaine if this continues. Would avoid amiodarone with elevated LFTs

## 2023-12-25 NOTE — PROGRESS NOTES
Vancomycin Consult  Follow Up:  Patient reviewed. Renal function improved. No new labs. Continue current therapy. Vancomycin trough due 12/26 @ 01:00

## 2023-12-25 NOTE — ASSESSMENT & PLAN NOTE
Significantly elevated Troponin consistent with NSTEMI.  Probably demand ischemia from shock.  On ASA and Plavix.  Hold Statin with shock liver.  Cardiology consulted.  - recommend to treat conservatively

## 2023-12-25 NOTE — EICU
eICU Physician Virtual/Remote Brief Evaluation Note      Message from bedside RN   Requesting restraints renewal   Chart reviewed, patient observed, discussed with RN  BP 93/65 (74), P 105, RR 20, O2 sat 100   Sedated on ventilator 500/20/8/45%.  Peak pressure 24  Last ABG from yesterday pH 7.36, pCO2 28, PO2 65, O2 sat 92 on 100% non-rebreather   Patient does not move right side due to old stroke .  Does reach for endotracheal tube with left hand when awake.  Renew left wrist restraints for patient safety  Decrease PEEP to 5, titrate FiO2 to O2 sat 92-94  A.m. ABG ordered      PATIENCE Vallejo MD  eICU Attending  [phone]    This report has been created through the use of [employer] dictation software. Typographical and content errors may occur with this process. While efforts are made to detect and correct such errors, in some cases errors will persist. For this reason, wording in this document should be considered in the proper context and not strictly verbatim

## 2023-12-25 NOTE — ASSESSMENT & PLAN NOTE
Appreciate card inputs  H/o amyloidosis  Diurese as tolerated.   EF20%.  Chronic per Dr. Morgan.     Recent Labs   Lab 12/23/23  1914   *

## 2023-12-25 NOTE — NURSING
[facility]  Nurses Note -- 4 Eyes      12/24/2023       Skin assessed on: Q Shift      [] No Pressure Injuries Present    []Prevention Measures Documented    [x] Yes LDA  for Pressure Injury Previously documented     [] Yes New Pressure Injury Discovered   [] LDA for New Pressure Injury Added      Attending RN:  Radha Gonzalez RN     Second RN:  Radha Carrasco RN

## 2023-12-25 NOTE — ASSESSMENT & PLAN NOTE
Patient is identified as having Systolic (HFrEF) heart failure that is Acute on chronic. CHF is currently controlled. Latest ECHO performed and demonstrates- Results for orders placed during the hospital encounter of 12/23/23    Echo Saline Bubble? No    Interpretation Summary    Left Ventricle: The left ventricle is normal in size. There is concentric hypertrophy. There is severely reduced systolic function with a visually estimated ejection fraction of 25 - 30%.    Right Ventricle: Mild right ventricular enlargement. Systolic function is normal.    Left Atrium: Left atrium is moderately dilated.    Right Atrium: Right atrium is mildly dilated.    Tricuspid Valve: There is trace regurgitation. The estimated PA systolic pressure is at least 21 mmHg.    IVC/SVC: Patient is ventilated, cannot use IVC diameter to estimate right atrial pressure.  . Continue Furosemide and monitor clinical status closely. Monitor on telemetry. Patient is off CHF pathway.  Monitor strict Is&Os and daily weights.  Place on fluid restriction of 1.5 L. Cardiology has been consulted. Continue to stress to patient importance of self efficacy and  on diet for CHF. Last BNP reviewed- and noted below   Recent Labs   Lab 12/23/23  1914   *

## 2023-12-25 NOTE — PLAN OF CARE
Problem: Adult Inpatient Plan of Care  Goal: Plan of Care Review  Outcome: Ongoing, Progressing  Goal: Patient-Specific Goal (Individualized)  Outcome: Ongoing, Progressing  Goal: Absence of Hospital-Acquired Illness or Injury  Outcome: Ongoing, Progressing  Goal: Optimal Comfort and Wellbeing  Outcome: Ongoing, Progressing  Goal: Readiness for Transition of Care  Outcome: Ongoing, Progressing     Problem: Infection  Goal: Absence of Infection Signs and Symptoms  Outcome: Ongoing, Progressing     Problem: Adjustment to Illness (Sepsis/Septic Shock)  Goal: Optimal Coping  Outcome: Ongoing, Progressing     Problem: Bleeding (Sepsis/Septic Shock)  Goal: Absence of Bleeding  Outcome: Ongoing, Progressing     Problem: Glycemic Control Impaired (Sepsis/Septic Shock)  Goal: Blood Glucose Level Within Desired Range  Outcome: Ongoing, Progressing     Problem: Infection Progression (Sepsis/Septic Shock)  Goal: Absence of Infection Signs and Symptoms  Outcome: Ongoing, Progressing     Problem: Nutrition Impaired (Sepsis/Septic Shock)  Goal: Optimal Nutrition Intake  Outcome: Ongoing, Progressing     Problem: Fluid and Electrolyte Imbalance (Acute Kidney Injury/Impairment)  Goal: Fluid and Electrolyte Balance  Outcome: Ongoing, Progressing     Problem: Oral Intake Inadequate (Acute Kidney Injury/Impairment)  Goal: Optimal Nutrition Intake  Outcome: Ongoing, Progressing     Problem: Renal Function Impairment (Acute Kidney Injury/Impairment)  Goal: Effective Renal Function  Outcome: Ongoing, Progressing     Problem: Fluid Imbalance (Pneumonia)  Goal: Fluid Balance  Outcome: Ongoing, Progressing     Problem: Infection (Pneumonia)  Goal: Resolution of Infection Signs and Symptoms  Outcome: Ongoing, Progressing     Problem: Respiratory Compromise (Pneumonia)  Goal: Effective Oxygenation and Ventilation  Outcome: Ongoing, Progressing     Problem: Fall Injury Risk  Goal: Absence of Fall and Fall-Related Injury  Outcome: Ongoing,  Progressing     Problem: Restraint, Nonbehavioral (Nonviolent)  Goal: Absence of Harm or Injury  Outcome: Ongoing, Progressing     Problem: Skin Injury Risk Increased  Goal: Skin Health and Integrity  Outcome: Ongoing, Progressing     Problem: Impaired Wound Healing  Goal: Optimal Wound Healing  Outcome: Ongoing, Progressing     Problem: Coping Ineffective  Goal: Effective Coping  Outcome: Ongoing, Progressing

## 2023-12-25 NOTE — ASSESSMENT & PLAN NOTE
- noted overnight, if needed recommendations include starting lidocaine instead of amio (liver enzymes)

## 2023-12-25 NOTE — SUBJECTIVE & OBJECTIVE
Interval History: diuresed well.  Alert of sedation.  Oxygenation much improved.  Tolerating sbt.        Objective:     Vital Signs (Most Recent):  Temp: 97.5 °F (36.4 °C) (12/25/23 0745)  Pulse: 105 (12/25/23 1015)  Resp: (!) 26.8 (12/25/23 1015)  BP: 92/67 (12/25/23 1015)  SpO2: 100 % (12/25/23 1015) Vital Signs (24h Range):  Temp:  [96.2 °F (35.7 °C)-97.9 °F (36.6 °C)] 97.5 °F (36.4 °C)  Pulse:  [100-125] 105  Resp:  [20-31.8] 26.8  SpO2:  [100 %] 100 %  BP: ()/(58-77) 92/67     Weight: 97.9 kg (215 lb 13.3 oz)  Body mass index is 33.8 kg/m².      Intake/Output Summary (Last 24 hours) at 12/25/2023 1059  Last data filed at 12/25/2023 1000  Gross per 24 hour   Intake 1318.75 ml   Output 5375 ml   Net -4056.25 ml        Physical Exam  Constitutional:       Appearance: He is well-developed.   HENT:      Head: Normocephalic and atraumatic.      Mouth/Throat:      Comments: ETT in place  Eyes:      Conjunctiva/sclera: Conjunctivae normal.      Pupils: Pupils are equal, round, and reactive to light.   Cardiovascular:      Rate and Rhythm: Normal rate and regular rhythm.      Heart sounds: Normal heart sounds.   Pulmonary:      Effort: Pulmonary effort is normal.      Breath sounds: Normal breath sounds.      Comments: Decreased breath sound on the right.    Abdominal:      General: Bowel sounds are normal.      Palpations: Abdomen is soft.   Musculoskeletal:         General: Normal range of motion.      Cervical back: Normal range of motion and neck supple.      Right lower leg: Edema present.      Left lower leg: Edema present.   Skin:     General: Skin is warm.   Neurological:      Cranial Nerves: No cranial nerve deficit.      Comments: Sedated on vent.           Review of Systems    Vents:  Vent Mode: (S) PS/CPAP (12/25/23 0908)  Ventilator Initiated: Yes (12/23/23 2212)  Set Rate: 20 BPM (12/25/23 0727)  Vt Set: 500 mL (12/25/23 0727)  Pressure Support: 8 cmH20 (12/25/23 0908)  PEEP/CPAP: 5 cmH20 (12/25/23  "0908)  Oxygen Concentration (%): 30 (12/25/23 1015)  Peak Airway Pressure: 13.6 cmH20 (12/25/23 0908)  Total Ve: 10.24 L/m (12/25/23 0908)  F/VT Ratio<105 (RSBI): (!) 55.7 (12/25/23 0908)    Lines/Drains/Airways       Peripherally Inserted Central Catheter Line  Duration             PICC Triple Lumen 12/23/23 2320 left basilic 1 day              Drain  Duration                  NG/OG Tube 12/23/23 2146 orogastric 16 Fr. Center mouth 1 day         Urethral Catheter 12/23/23 2231 Straight-tip 16 Fr. 1 day              Airway  Duration                  Airway - Non-Surgical 12/23/23 2146 Endotracheal Tube 1 day              Peripheral Intravenous Line  Duration                  Peripheral IV - Single Lumen 12/23/23 1913 20 G Anterior;Distal;Left Antecubital 1 day         Peripheral IV - Single Lumen 12/23/23 1913 20 G Anterior;Left Forearm 1 day                    Significant Labs:    CBC/Anemia Profile:  Recent Labs   Lab 12/23/23 1914 12/24/23  0509 12/25/23  0607   WBC 4.28 5.02 4.88   HGB 10.5* 10.4* 10.6*   HCT 34.1* 34.5* 34.4*    178 184   MCV 72* 74* 72*   RDW 17.1* 17.3* 17.4*        Chemistries:  Recent Labs   Lab 12/23/23 1914 12/24/23  0509 12/24/23  1500 12/25/23  0607    136 136 136   K 4.5 4.7 3.8 3.4*    106 106 104   CO2 16* 17* 18* 21*   BUN 17 21 21 20   CREATININE 1.9* 2.2* 2.1* 1.9*   CALCIUM 9.3 8.4* 8.3* 8.1*   ALBUMIN 3.6 3.1*  --  2.7*   PROT 7.7 6.3  --  6.2   BILITOT 1.4* 1.3*  --  1.4*   ALKPHOS 102 101  --  100   * 1,113*  --  1,199*   * 1,387*  --  1,167*   MG 1.5*  --   --   --    PHOS 4.6*  --   --   --        ABGs:   Recent Labs   Lab 12/25/23  0729   PH 7.433   PCO2 30.0*   HCO3 20.1*   POCSATURATED 99   BE -3*     Blood Culture:   Recent Labs   Lab 12/23/23 1914 12/23/23 1915   LABBLOO No Growth to date  No Growth to date No Growth to date  No Growth to date     Cardiac Markers: No results for input(s): "CKMB", "TROPONINT", "MYOGLOBIN" in the " "last 48 hours.  Lactic Acid:   Recent Labs   Lab 12/23/23  2304 12/24/23  1037 12/24/23  2340   LACTATE 3.2* 3.0* 1.8     Respiratory Culture:   Recent Labs   Lab 12/24/23  1527   GSRESP <10 epithelial cells per low power field.  Many WBC's  Few Gram negative rods  Rare Gram positive rods   RESPIRATORYC Normal respiratory christina     Troponin:   Recent Labs   Lab 12/23/23  1914 12/24/23  1037   TROPONINI 0.617* 2.784*     Urine Culture: No results for input(s): "LABURIN" in the last 48 hours.    BNP  Recent Labs   Lab 12/23/23 1914   *     Echo 12/24/23    Left Ventricle: The left ventricle is normal in size. There is concentric hypertrophy. There is severely reduced systolic function with a visually estimated ejection fraction of 25 - 30%.    Right Ventricle: Mild right ventricular enlargement. Systolic function is normal.    Left Atrium: Left atrium is moderately dilated.    Right Atrium: Right atrium is mildly dilated.    Tricuspid Valve: There is trace regurgitation. The estimated PA systolic pressure is at least 21 mmHg.    IVC/SVC: Patient is ventilated, cannot use IVC diameter to estimate right atrial pressure.    Significant Imaging:  I have reviewed all pertinent imaging results/findings within the past 24 hours.  CXR: I have reviewed all pertinent results/findings within the past 24 hours and my personal findings are:  persistent haziness at bases.    "

## 2023-12-25 NOTE — ASSESSMENT & PLAN NOTE
Significant increase in LFT's, consistent with shock liver.  Continue to monitor.  - trending down, recheck in AM

## 2023-12-25 NOTE — CARE UPDATE
[facility] [place]  Nurses Note -- 4 Eyes      12/25/2023       Skin assessed on: Q ShiftQ Shift      [] No Pressure Injuries Present    []Prevention Measures Documented    [x] Yes LDA  for Pressure Injury Previously documented     [] Yes New Pressure Injury Discovered   [] LDA for New Pressure Injury Added      Attending RN:  Radha Carrasco, RN     Second RN:  Radha Gonzalez RN

## 2023-12-25 NOTE — HOSPITAL COURSE
12/25/23 on vent and levophed. Brief NSVT noted overnight  12/27/23 No further VT on lidocaine. On vent and low dose levophed    Echo 12/24/23    Left Ventricle: The left ventricle is normal in size. There is concentric hypertrophy. There is severely reduced systolic function with a visually estimated ejection fraction of 25 - 30%.    Right Ventricle: Mild right ventricular enlargement. Systolic function is normal.    Left Atrium: Left atrium is moderately dilated.    Right Atrium: Right atrium is mildly dilated.    Tricuspid Valve: There is trace regurgitation. The estimated PA systolic pressure is at least 21 mmHg.    IVC/SVC: Patient is ventilated, cannot use IVC diameter to estimate right atrial pressure.

## 2023-12-25 NOTE — PROGRESS NOTES
[facility] Medicine  Progress Note    Patient Name: Alexsander Tafoya  MRN: [id number]  Patient Class: IP- Inpatient   Admission Date: 12/23/2023  Length of Stay: 2 days  Attending Physician: Isaac Aguilar MD  Primary Care Provider: Helio Farr MD        Subjective:     Principal Problem:Acute hypoxic respiratory failure        HPI:  Mr Tafoya is a 73-year-old male being admitted for acute hypoxic respiratory failure.  His medical history significant for BPH, dyslipidemia, chronic CVA with resultant aphasia, and right-sided hemiparesis.  During my evaluation the patient is in severe respiratory distress, on BiPAP and unable to provide any meaningful detailed history.  Seems that he came from [place] due to worsening dyspnea, and EMS noted low oxygen saturations. in the ER, he was noted to have Tmax of 103.1 °F, persistently tachycardic in 130s, hypotensive 84/52, and was initially on nonrebreather and subsequently placed on BiPAP.  His CBC shows normal white count, microcytic anemia 10.5 and normal platelets.  INR 1.4.  CBC with metabolic acidosis bicarb 16, creatinine 1.9 (appears to have previous creatinine of 1.09 in September 2022 with baseline of about 1.1-1.3,), hypomagnesemia 1.5, transaminitis AST: ALT-192: 152, lactic acidosis 3.1, elevated procalcitonin 0.26.  His influenza a swab is positive.  His chest x-ray suggests moderate right-sided pleural effusion with compressive atelectasis and/or lower lobe consolidation.    After evaluating the patient, discussed with ER physician that the patient may need to be intubated given severe respiratory distress tachycardia, respiratory rate in 50s and inability to protect airway with previous stroke.  Patient will be placed on mechanical ventilation.  He has so far received breathing treatments, Rocephin and Zithromax along with Tamiflu.  Admission has been requested for further evaluation and treatment.    Overview/Hospital Course:  72 y/o  male presents with SOB. In the ER, he was noted to have Tmax of 103.1 °F, persistently tachycardic in 130s, hypotensive 84/52, and was initially on nonrebreather and subsequently placed on BiPAP.  Respiratory status worsened and he was then intubated.  Flu positive and started on Tamiflu. His chest x-ray suggests moderate right-sided pleural effusion with compressive atelectasis and/or lower lobe consolidation.  Started on broad spectrum ABX's.    Interval History: no new issues, attempted SAT/SBT earlier but placed back on support. Family updated at bedside    Review of Systems   Unable to perform ROS: Intubated     Objective:     Vital Signs (Most Recent):  Temp: 97.5 °F (36.4 °C) (12/25/23 0745)  Pulse: (!) 114 (12/25/23 1330)  Resp: (!) 23 (12/25/23 1330)  BP: 102/70 (12/25/23 1330)  SpO2: 100 % (12/25/23 1330) Vital Signs (24h Range):  Temp:  [96.2 °F (35.7 °C)-97.9 °F (36.6 °C)] 97.5 °F (36.4 °C)  Pulse:  [100-125] 114  Resp:  [20-39.8] 23  SpO2:  [85 %-100 %] 100 %  BP: ()/(58-77) 102/70     Weight: 97.9 kg (215 lb 13.3 oz)  Body mass index is 33.8 kg/m².    Intake/Output Summary (Last 24 hours) at 12/25/2023 1351  Last data filed at 12/25/2023 1300  Gross per 24 hour   Intake 1414.49 ml   Output 4375 ml   Net -2960.51 ml           Physical Exam  Constitutional:       Appearance: He is well-developed.   HENT:      Head: Normocephalic and atraumatic.      Mouth/Throat:      Comments: ETT in place  Eyes:      Conjunctiva/sclera: Conjunctivae normal.      Pupils: Pupils are equal, round, and reactive to light.   Cardiovascular:      Rate and Rhythm: Normal rate and regular rhythm.      Heart sounds: Normal heart sounds.   Pulmonary:      Effort: Pulmonary effort is normal.      Breath sounds: Normal breath sounds.      Comments: Decreased breath sound on the right.    Abdominal:      General: Bowel sounds are normal.      Palpations: Abdomen is soft.   Musculoskeletal:         General: Normal range of  motion.      Cervical back: Normal range of motion and neck supple.      Right lower leg: Edema present.      Left lower leg: Edema present.   Skin:     General: Skin is warm.   Neurological:      Cranial Nerves: No cranial nerve deficit.      Comments: Calm, awake some             Significant Labs: All pertinent labs within the past 24 hours have been reviewed.  BMP:   Recent Labs   Lab 12/23/23 1914 12/24/23  0509 12/25/23  0607   *   < > 159*      < > 136   K 4.5   < > 3.4*      < > 104   CO2 16*   < > 21*   BUN 17   < > 20   CREATININE 1.9*   < > 1.9*   CALCIUM 9.3   < > 8.1*   MG 1.5*  --   --     < > = values in this interval not displayed.       CBC:   Recent Labs   Lab 12/23/23 1914 12/24/23 0509 12/25/23  0607   WBC 4.28 5.02 4.88   HGB 10.5* 10.4* 10.6*   HCT 34.1* 34.5* 34.4*    178 184         Significant Imaging: I have reviewed all pertinent imaging results/findings within the past 24 hours.    Assessment/Plan:      * Acute hypoxic respiratory failure  Patient with Hypoxic Respiratory failure which is Acute.  he is not on home oxygen. Supplemental oxygen was provided and noted- Vent Mode: PRVC  Oxygen Concentration (%):  [] 30  Resp Rate Total:  [20 br/min-40 br/min] 24 br/min  Vt Set:  [500 mL] 500 mL  PEEP/CPAP:  [5 cmH20-8 cmH20] 5 cmH20  Pressure Support:  [8 cmH20] 8 cmH20  Mean Airway Pressure:  [7.5 bkM06-87.3 cmH20] 10.3 cmH20.   Signs/symptoms of respiratory failure include- tachypnea, increased work of breathing, respiratory distress, use of accessory muscles, wheezing, and stridor. Contributing diagnoses includes - Pleural effusion and Pneumonia Labs and images were reviewed. Patient Has not had a recent ABG. Will treat underlying causes and adjust management of respiratory failure as follows- Treat underlying etiologies of pneumonia and evaluate pleural effusion.  Intubated in ER.  Pulmonary consulted.  Respiratory failure secondary to  flu/pneumonia/pleural effusion.    Acute on chronic diastolic congestive heart failure  Patient is identified as having Systolic (HFrEF) heart failure that is Acute on chronic. CHF is currently controlled. Latest ECHO performed and demonstrates- Results for orders placed during the hospital encounter of 12/23/23    Echo Saline Bubble? No    Interpretation Summary    Left Ventricle: The left ventricle is normal in size. There is concentric hypertrophy. There is severely reduced systolic function with a visually estimated ejection fraction of 25 - 30%.    Right Ventricle: Mild right ventricular enlargement. Systolic function is normal.    Left Atrium: Left atrium is moderately dilated.    Right Atrium: Right atrium is mildly dilated.    Tricuspid Valve: There is trace regurgitation. The estimated PA systolic pressure is at least 21 mmHg.    IVC/SVC: Patient is ventilated, cannot use IVC diameter to estimate right atrial pressure.  . Continue Furosemide and monitor clinical status closely. Monitor on telemetry. Patient is off CHF pathway.  Monitor strict Is&Os and daily weights.  Place on fluid restriction of 1.5 L. Cardiology has been consulted. Continue to stress to patient importance of self efficacy and  on diet for CHF. Last BNP reviewed- and noted below   Recent Labs   Lab 12/23/23 1914   *       NSVT (nonsustained ventricular tachycardia)  - noted overnight, if needed recommendations include starting lidocaine instead of amio (liver enzymes)      Lactic acidosis  Secondary to septic shock.  Repeat lactate in Am. Improved      Shock  Septic shock as above.      Pleural effusion  -Moderate effusion on x-ray.  Pulmonary following.  Will try diuresis.  May need thoracentesis. If unable to wean       Influenza A  -Continue Tamiflu.      Acute renal failure  Patient with acute kidney injury/acute renal failure likely due to acute tubular necrosis caused by septic shock  SHAYY is currently worsening.  Baseline creatinine  ranging from 1 to 1.7  - Labs reviewed- Renal function/electrolytes with Estimated Creatinine Clearance: 38.6 mL/min (A) (based on SCr of 1.9 mg/dL (H)). according to latest data. Monitor urine output and serial BMP and adjust therapy as needed. Avoid nephrotoxins and renally dose meds for GFR listed above.  Treat shock.  Continue pressors.  Closely monitor renal function.  Nephrology consult if any further worsening.  - relatively stable     Elevated troponin  Significantly elevated Troponin consistent with NSTEMI.  Probably demand ischemia from shock.  On ASA and Plavix.  Hold Statin with shock liver.  Cardiology consulted.  - recommend to treat conservatively      Septic shock  This patient does have evidence of infective focus  My overall impression is septic shock due to MAP < 65.  Source: Respiratory  Antibiotics given-   Antibiotics (72h ago, onward)      Start     Stop Route Frequency Ordered    12/25/23 1145  mupirocin 2 % ointment         12/30/23 0859 Nasl 2 times daily 12/25/23 1036    12/25/23 0200  vancomycin 1,250 mg in dextrose 5 % (D5W) 250 mL IVPB (Vial-Mate)         -- IV Every 24 hours (non-standard times) 12/24/23 0202    12/24/23 0142  vancomycin - pharmacy to dose  (vancomycin IVPB (PEDS and ADULTS))        See Hyperspace for full Linked Orders Report.    -- IV pharmacy to manage frequency 12/24/23 0042    12/23/23 2245  piperacillin-tazobactam (ZOSYN) 4.5 g in dextrose 5 % in water (D5W) 100 mL IVPB (MB+)         -- IV Every 8 hours (non-standard times) 12/23/23 2144          Latest lactate reviewed-  Recent Labs   Lab 12/23/23  2304 12/24/23  1037 12/24/23  2340   LACTATE 3.2* 3.0* 1.8       Organ dysfunction indicated by Acute liver injury and Acute respiratory failure    Will Start Pressors- Levophed for MAP of 65  Source control achieved by:   Antibiotics.  -Given history of CVA and likely aspiration, will continue Zosyn.  -Also has influenza A infection which is  being treated.    Transaminitis  Significant increase in LFT's, consistent with shock liver.  Continue to monitor.  - trending down, recheck in AM      BPH (benign prostatic hyperplasia)  -Continue Proscar.      Hyperlipidemia  -On statin.  Hold Statin with shock liver.    Essential hypertension  -antihypertensives on hold due to septic shock.    Aphasia as late effect of cerebrovascular accident  Per notes.      Hemiplegia affecting right side in right-dominant patient as late effect of cerebrovascular disease  -Chronic CVA with previously noted resultant right-sided hemiparesis and aphasia.      VTE Risk Mitigation (From admission, onward)           Ordered     enoxaparin injection 40 mg  Daily         12/23/23 2143     IP VTE HIGH RISK PATIENT  Once         12/23/23 2143     Place sequential compression device  Until discontinued         12/23/23 2143                    Discharge Planning   THAIS:      Code Status: Full Code   Is the patient medically ready for discharge?:     Reason for patient still in hospital (select all that apply): Treatment  Discharge Plan A: Assisted Living (Suits of [facility]-return)            Critical care time spent on the evaluation and treatment of severe organ dysfunction, review of pertinent labs and imaging studies, discussions with consulting providers and discussions with patient/family: 15 minutes.      Isaac Aguilar MD  Department of Hospital Medicine   [facility] - Intensive Care

## 2023-12-25 NOTE — ASSESSMENT & PLAN NOTE
Patient with Hypoxic Respiratory failure which is Acute.  he is not on home oxygen. Supplemental oxygen was provided and noted- Vent Mode: PS/CPAP  Oxygen Concentration (%):  [30-50] 30  Resp Rate Total:  [20 br/min-25 br/min] 25 br/min  Vt Set:  [500 mL] 500 mL  PEEP/CPAP:  [5 cmH20-8 cmH20] 5 cmH20  Pressure Support:  [8 cmH20] 8 cmH20  Mean Airway Pressure:  [7.5 tjH62-34.3 cmH20] 7.5 cmH20    .   Signs/symptoms of respiratory failure include- tachypnea, respiratory distress, and lethargy. Contributing diagnoses includes - ARDS vs CHF Labs and images were reviewed. Patient Has recent ABG, which has been reviewed. Will treat underlying causes and adjust management of respiratory failure as follows-   Ct with bilateral effusion and compressive atelectasis of left lung  + Influenza with borderline procal and normal wbc  Ef 25%     diurese  SBT and possible extubation  Tamiflu + broad spectrum antibiotics  Procal is normal + culture is nonrevealing.  Recommend stopping antibiotics (vanc/zosyn)

## 2023-12-25 NOTE — SUBJECTIVE & OBJECTIVE
Interval History: no new issues, attempted SAT/SBT earlier but placed back on support. Family updated at bedside    Review of Systems   Unable to perform ROS: Intubated     Objective:     Vital Signs (Most Recent):  Temp: 97.5 °F (36.4 °C) (12/25/23 0745)  Pulse: (!) 114 (12/25/23 1330)  Resp: (!) 23 (12/25/23 1330)  BP: 102/70 (12/25/23 1330)  SpO2: 100 % (12/25/23 1330) Vital Signs (24h Range):  Temp:  [96.2 °F (35.7 °C)-97.9 °F (36.6 °C)] 97.5 °F (36.4 °C)  Pulse:  [100-125] 114  Resp:  [20-39.8] 23  SpO2:  [85 %-100 %] 100 %  BP: ()/(58-77) 102/70     Weight: 97.9 kg (215 lb 13.3 oz)  Body mass index is 33.8 kg/m².    Intake/Output Summary (Last 24 hours) at 12/25/2023 1351  Last data filed at 12/25/2023 1300  Gross per 24 hour   Intake 1414.49 ml   Output 4375 ml   Net -2960.51 ml           Physical Exam  Constitutional:       Appearance: He is well-developed.   HENT:      Head: Normocephalic and atraumatic.      Mouth/Throat:      Comments: ETT in place  Eyes:      Conjunctiva/sclera: Conjunctivae normal.      Pupils: Pupils are equal, round, and reactive to light.   Cardiovascular:      Rate and Rhythm: Normal rate and regular rhythm.      Heart sounds: Normal heart sounds.   Pulmonary:      Effort: Pulmonary effort is normal.      Breath sounds: Normal breath sounds.      Comments: Decreased breath sound on the right.    Abdominal:      General: Bowel sounds are normal.      Palpations: Abdomen is soft.   Musculoskeletal:         General: Normal range of motion.      Cervical back: Normal range of motion and neck supple.      Right lower leg: Edema present.      Left lower leg: Edema present.   Skin:     General: Skin is warm.   Neurological:      Cranial Nerves: No cranial nerve deficit.      Comments: Calm, awake some             Significant Labs: All pertinent labs within the past 24 hours have been reviewed.  BMP:   Recent Labs   Lab 12/23/23  1914 12/24/23  0509 12/25/23  0607   *   < >  159*      < > 136   K 4.5   < > 3.4*      < > 104   CO2 16*   < > 21*   BUN 17   < > 20   CREATININE 1.9*   < > 1.9*   CALCIUM 9.3   < > 8.1*   MG 1.5*  --   --     < > = values in this interval not displayed.       CBC:   Recent Labs   Lab 12/23/23  1914 12/24/23  0509 12/25/23  0607   WBC 4.28 5.02 4.88   HGB 10.5* 10.4* 10.6*   HCT 34.1* 34.5* 34.4*    178 184         Significant Imaging: I have reviewed all pertinent imaging results/findings within the past 24 hours.

## 2023-12-25 NOTE — ASSESSMENT & PLAN NOTE
-Moderate effusion on x-ray.  Pulmonary following.  Will try diuresis.  May need thoracentesis. If unable to wean

## 2023-12-25 NOTE — PROGRESS NOTES
[facility] Intensive Care  Cardiology  Progress Note    Patient Name: Alexsander Tafoya  MRN: [id number]  Admission Date: 12/23/2023  Hospital Length of Stay: 2 days  Code Status: Full Code   Attending Physician: Isaac Aguilar MD   Primary Care Physician: Helio Farr MD  Expected Discharge Date:   Principal Problem:Acute hypoxic respiratory failure    Subjective:     Hospital Course:   12/25/23 on vent and levophed. Brief NSVT noted overnight    Echo 12/24/23    Left Ventricle: The left ventricle is normal in size. There is concentric hypertrophy. There is severely reduced systolic function with a visually estimated ejection fraction of 25 - 30%.    Right Ventricle: Mild right ventricular enlargement. Systolic function is normal.    Left Atrium: Left atrium is moderately dilated.    Right Atrium: Right atrium is mildly dilated.    Tricuspid Valve: There is trace regurgitation. The estimated PA systolic pressure is at least 21 mmHg.    IVC/SVC: Patient is ventilated, cannot use IVC diameter to estimate right atrial pressure.       Interval History:     Review of Systems   Unable to perform ROS: Intubated     Objective:     Vital Signs (Most Recent):  Temp: 97.5 °F (36.4 °C) (12/25/23 0745)  Pulse: 105 (12/25/23 1015)  Resp: (!) 26.8 (12/25/23 1015)  BP: 92/67 (12/25/23 1015)  SpO2: 100 % (12/25/23 1015) Vital Signs (24h Range):  Temp:  [96.2 °F (35.7 °C)-97.9 °F (36.6 °C)] 97.5 °F (36.4 °C)  Pulse:  [100-125] 105  Resp:  [20-31.8] 26.8  SpO2:  [100 %] 100 %  BP: ()/(58-77) 92/67     Weight: 97.9 kg (215 lb 13.3 oz)  Body mass index is 33.8 kg/m².     SpO2: 100 %         Intake/Output Summary (Last 24 hours) at 12/25/2023 1103  Last data filed at 12/25/2023 1000  Gross per 24 hour   Intake 1289.13 ml   Output 4375 ml   Net -3085.87 ml       Lines/Drains/Airways       Peripherally Inserted Central Catheter Line  Duration             PICC Triple Lumen 12/23/23 2320 left basilic 1 day              Drain   Duration                  NG/OG Tube 12/23/23 2146 orogastric 16 Fr. Center mouth 1 day         Urethral Catheter 12/23/23 2231 Straight-tip 16 Fr. 1 day              Airway  Duration                  Airway - Non-Surgical 12/23/23 2146 Endotracheal Tube 1 day              Peripheral Intravenous Line  Duration                  Peripheral IV - Single Lumen 12/23/23 1913 20 G Anterior;Distal;Left Antecubital 1 day         Peripheral IV - Single Lumen 12/23/23 1913 20 G Anterior;Left Forearm 1 day                       Physical Exam  Constitutional:       Appearance: He is well-developed.   HENT:      Head: Normocephalic and atraumatic.   Eyes:      Conjunctiva/sclera: Conjunctivae normal.      Pupils: Pupils are equal, round, and reactive to light.   Cardiovascular:      Rate and Rhythm: Normal rate.      Pulses: Intact distal pulses.      Heart sounds: Normal heart sounds.   Pulmonary:      Effort: Pulmonary effort is normal.      Breath sounds: Normal breath sounds.   Abdominal:      General: Bowel sounds are normal.      Palpations: Abdomen is soft.   Musculoskeletal:         General: Normal range of motion.      Cervical back: Normal range of motion and neck supple.   Skin:     General: Skin is warm and dry.   Neurological:      Mental Status: He is alert.            Significant Labs: All pertinent lab results from the last 24 hours have been reviewed.    Significant Imaging: Echocardiogram: 2D echo with color flow doppler: No results found for this or any previous visit.  Assessment and Plan:     Brief HPI:     NSVT (nonsustained ventricular tachycardia)  Brief VT overnight. Consider adding lidocaine if this continues. Would avoid amiodarone with elevated LFTs    Dilated cardiomyopathy  EF 25-30% which is not new per  records. Suspected cardiac amyloidosis but has declined further evaluation and AICD in the past. Continue supportive care    Shock  Pressors as needed    Influenza A  Per primary    NSTEMI (non-ST  elevated myocardial infarction)  Troponin up to 2.7. Cr 2.2. has declined invasive procedures and AICD per [facility]. Conservative medical Rx    Hemiplegia affecting right side in right-dominant patient as late effect of cerebrovascular disease  Per primary        VTE Risk Mitigation (From admission, onward)           Ordered     enoxaparin injection 40 mg  Daily         12/23/23 2143     IP VTE HIGH RISK PATIENT  Once         12/23/23 2143     Place sequential compression device  Until discontinued         12/23/23 2143                    Hilario Morgan MD  Cardiology  [facility] [place] - Intensive Care

## 2023-12-25 NOTE — ASSESSMENT & PLAN NOTE
This patient does have evidence of infective focus  My overall impression is septic shock due to MAP < 65.  Source: Respiratory  Antibiotics given-   Antibiotics (72h ago, onward)    Start     Stop Route Frequency Ordered    12/25/23 1145  mupirocin 2 % ointment         12/30/23 0859 Nasl 2 times daily 12/25/23 1036    12/25/23 0200  vancomycin 1,250 mg in dextrose 5 % (D5W) 250 mL IVPB (Vial-Mate)         -- IV Every 24 hours (non-standard times) 12/24/23 0202    12/24/23 0142  vancomycin - pharmacy to dose  (vancomycin IVPB (PEDS and ADULTS))        See Kristin for full Linked Orders Report.    -- IV pharmacy to manage frequency 12/24/23 0042    12/23/23 2245  piperacillin-tazobactam (ZOSYN) 4.5 g in dextrose 5 % in water (D5W) 100 mL IVPB (MB+)         -- IV Every 8 hours (non-standard times) 12/23/23 2144        Latest lactate reviewed-  Recent Labs   Lab 12/23/23  2304 12/24/23  1037 12/24/23  2340   LACTATE 3.2* 3.0* 1.8       Organ dysfunction indicated by Acute liver injury and Acute respiratory failure    Will Start Pressors- Levophed for MAP of 65  Source control achieved by:   Antibiotics.  -Given history of CVA and likely aspiration, will continue Zosyn.  -Also has influenza A infection which is being treated.

## 2023-12-25 NOTE — ASSESSMENT & PLAN NOTE
Bilateral effusion on ct.  Rt>Lt  Moderate right effusion confirmed via pocus 12/24  DDx: parapneumonic vs volume overload  Given normal wbc and borderline, will monitor effusion with diuresis.   [Negative] : Integumentary

## 2023-12-25 NOTE — ASSESSMENT & PLAN NOTE
Patient with acute kidney injury/acute renal failure likely due to acute tubular necrosis caused by septic shock  SHAYY is currently worsening. Baseline creatinine  ranging from 1 to 1.7  - Labs reviewed- Renal function/electrolytes with Estimated Creatinine Clearance: 38.6 mL/min (A) (based on SCr of 1.9 mg/dL (H)). according to latest data. Monitor urine output and serial BMP and adjust therapy as needed. Avoid nephrotoxins and renally dose meds for GFR listed above.  Treat shock.  Continue pressors.  Closely monitor renal function.  Nephrology consult if any further worsening.  - relatively stable

## 2023-12-25 NOTE — SUBJECTIVE & OBJECTIVE
Interval History:     Review of Systems   Unable to perform ROS: Intubated     Objective:     Vital Signs (Most Recent):  Temp: 97.5 °F (36.4 °C) (12/25/23 0745)  Pulse: 105 (12/25/23 1015)  Resp: (!) 26.8 (12/25/23 1015)  BP: 92/67 (12/25/23 1015)  SpO2: 100 % (12/25/23 1015) Vital Signs (24h Range):  Temp:  [96.2 °F (35.7 °C)-97.9 °F (36.6 °C)] 97.5 °F (36.4 °C)  Pulse:  [100-125] 105  Resp:  [20-31.8] 26.8  SpO2:  [100 %] 100 %  BP: ()/(58-77) 92/67     Weight: 97.9 kg (215 lb 13.3 oz)  Body mass index is 33.8 kg/m².     SpO2: 100 %         Intake/Output Summary (Last 24 hours) at 12/25/2023 1103  Last data filed at 12/25/2023 1000  Gross per 24 hour   Intake 1289.13 ml   Output 4375 ml   Net -3085.87 ml       Lines/Drains/Airways       Peripherally Inserted Central Catheter Line  Duration             PICC Triple Lumen 12/23/23 2320 left basilic 1 day              Drain  Duration                  NG/OG Tube 12/23/23 2146 orogastric 16 Fr. Center mouth 1 day         Urethral Catheter 12/23/23 2231 Straight-tip 16 Fr. 1 day              Airway  Duration                  Airway - Non-Surgical 12/23/23 2146 Endotracheal Tube 1 day              Peripheral Intravenous Line  Duration                  Peripheral IV - Single Lumen 12/23/23 1913 20 G Anterior;Distal;Left Antecubital 1 day         Peripheral IV - Single Lumen 12/23/23 1913 20 G Anterior;Left Forearm 1 day                       Physical Exam  Constitutional:       Appearance: He is well-developed.   HENT:      Head: Normocephalic and atraumatic.   Eyes:      Conjunctiva/sclera: Conjunctivae normal.      Pupils: Pupils are equal, round, and reactive to light.   Cardiovascular:      Rate and Rhythm: Normal rate.      Pulses: Intact distal pulses.      Heart sounds: Normal heart sounds.   Pulmonary:      Effort: Pulmonary effort is normal.      Breath sounds: Normal breath sounds.   Abdominal:      General: Bowel sounds are normal.      Palpations:  Abdomen is soft.   Musculoskeletal:         General: Normal range of motion.      Cervical back: Normal range of motion and neck supple.   Skin:     General: Skin is warm and dry.   Neurological:      Mental Status: He is alert.            Significant Labs: All pertinent lab results from the last 24 hours have been reviewed.    Significant Imaging: Echocardiogram: 2D echo with color flow doppler: No results found for this or any previous visit.

## 2023-12-26 PROBLEM — Z71.89 ADVANCE CARE PLANNING: Status: ACTIVE | Noted: 2023-12-26

## 2023-12-26 PROBLEM — I50.43 ACUTE ON CHRONIC COMBINED SYSTOLIC AND DIASTOLIC CONGESTIVE HEART FAILURE: Status: ACTIVE | Noted: 2023-12-25

## 2023-12-26 LAB
ALBUMIN SERPL BCP-MCNC: 2.7 G/DL (ref 3.5–5.2)
ALLENS TEST: ABNORMAL
ALP SERPL-CCNC: 123 U/L (ref 55–135)
ALT SERPL W/O P-5'-P-CCNC: 2025 U/L (ref 10–44)
AMMONIA PLAS-SCNC: 63 UMOL/L (ref 10–50)
ANION GAP SERPL CALC-SCNC: 11 MMOL/L (ref 8–16)
AST SERPL-CCNC: 1932 U/L (ref 10–40)
BACTERIA SPEC AEROBE CULT: NORMAL
BASOPHILS # BLD AUTO: 0.02 K/UL (ref 0–0.2)
BASOPHILS NFR BLD: 0.3 % (ref 0–1.9)
BILIRUB SERPL-MCNC: 1.4 MG/DL (ref 0.1–1)
BUN SERPL-MCNC: 19 MG/DL (ref 8–23)
CALCIUM SERPL-MCNC: 8.2 MG/DL (ref 8.7–10.5)
CHLORIDE SERPL-SCNC: 102 MMOL/L (ref 95–110)
CO2 SERPL-SCNC: 22 MMOL/L (ref 23–29)
CREAT SERPL-MCNC: 2.1 MG/DL (ref 0.5–1.4)
DELSYS: ABNORMAL
DIFFERENTIAL METHOD BLD: ABNORMAL
EOSINOPHIL # BLD AUTO: 0.1 K/UL (ref 0–0.5)
EOSINOPHIL NFR BLD: 0.8 % (ref 0–8)
ERYTHROCYTE [DISTWIDTH] IN BLOOD BY AUTOMATED COUNT: 17.4 % (ref 11.5–14.5)
EST. GFR  (NO RACE VARIABLE): 33 ML/MIN/1.73 M^2
FIO2: 30
GLUCOSE SERPL-MCNC: 151 MG/DL (ref 70–110)
GRAM STN SPEC: NORMAL
HCO3 UR-SCNC: 23.6 MMOL/L (ref 24–28)
HCT VFR BLD AUTO: 35.2 % (ref 40–54)
HGB BLD-MCNC: 10.9 G/DL (ref 14–18)
IMM GRANULOCYTES # BLD AUTO: 0.03 K/UL (ref 0–0.04)
IMM GRANULOCYTES NFR BLD AUTO: 0.5 % (ref 0–0.5)
INR PPP: 1.2 (ref 0.8–1.2)
LYMPHOCYTES # BLD AUTO: 1.1 K/UL (ref 1–4.8)
LYMPHOCYTES NFR BLD: 18 % (ref 18–48)
MCH RBC QN AUTO: 22.2 PG (ref 27–31)
MCHC RBC AUTO-ENTMCNC: 31 G/DL (ref 32–36)
MCV RBC AUTO: 72 FL (ref 82–98)
MODE: ABNORMAL
MONOCYTES # BLD AUTO: 0.4 K/UL (ref 0.3–1)
MONOCYTES NFR BLD: 7.3 % (ref 4–15)
NEUTROPHILS # BLD AUTO: 4.3 K/UL (ref 1.8–7.7)
NEUTROPHILS NFR BLD: 73.1 % (ref 38–73)
NRBC BLD-RTO: 0 /100 WBC
PCO2 BLDA: 31.1 MMHG (ref 35–45)
PEEP: 7
PH SMN: 7.49 [PH] (ref 7.35–7.45)
PLATELET # BLD AUTO: 245 K/UL (ref 150–450)
PMV BLD AUTO: 10.9 FL (ref 9.2–12.9)
PO2 BLDA: 38 MMHG (ref 80–100)
POC BE: 1 MMOL/L
POC SATURATED O2: 78 % (ref 95–100)
POC TCO2: 25 MMOL/L (ref 23–27)
POTASSIUM SERPL-SCNC: 3.6 MMOL/L (ref 3.5–5.1)
PROT SERPL-MCNC: 6.5 G/DL (ref 6–8.4)
PROTHROMBIN TIME: 12.4 SEC (ref 9–12.5)
RBC # BLD AUTO: 4.92 M/UL (ref 4.6–6.2)
SAMPLE: ABNORMAL
SITE: ABNORMAL
SODIUM SERPL-SCNC: 135 MMOL/L (ref 136–145)
SP02: 8
SPONT RATE: 27
VANCOMYCIN TROUGH SERPL-MCNC: 17.6 UG/ML (ref 10–22)
WBC # BLD AUTO: 5.93 K/UL (ref 3.9–12.7)

## 2023-12-26 PROCEDURE — 25000003 PHARM REV CODE 250: Performed by: INTERNAL MEDICINE

## 2023-12-26 PROCEDURE — 63600175 PHARM REV CODE 636 W HCPCS: Performed by: INTERNAL MEDICINE

## 2023-12-26 PROCEDURE — 99900035 HC TECH TIME PER 15 MIN (STAT)

## 2023-12-26 PROCEDURE — 85025 COMPLETE CBC W/AUTO DIFF WBC: CPT | Performed by: INTERNAL MEDICINE

## 2023-12-26 PROCEDURE — 94640 AIRWAY INHALATION TREATMENT: CPT

## 2023-12-26 PROCEDURE — 80053 COMPREHEN METABOLIC PANEL: CPT | Performed by: INTERNAL MEDICINE

## 2023-12-26 PROCEDURE — 27000221 HC OXYGEN, UP TO 24 HOURS

## 2023-12-26 PROCEDURE — 80202 ASSAY OF VANCOMYCIN: CPT | Performed by: HOSPITALIST

## 2023-12-26 PROCEDURE — 82140 ASSAY OF AMMONIA: CPT | Performed by: INTERNAL MEDICINE

## 2023-12-26 PROCEDURE — 25000242 PHARM REV CODE 250 ALT 637 W/ HCPCS: Performed by: INTERNAL MEDICINE

## 2023-12-26 PROCEDURE — 99900026 HC AIRWAY MAINTENANCE (STAT)

## 2023-12-26 PROCEDURE — 25000003 PHARM REV CODE 250: Performed by: HOSPITALIST

## 2023-12-26 PROCEDURE — 99291 CRITICAL CARE FIRST HOUR: CPT | Mod: ,,, | Performed by: INTERNAL MEDICINE

## 2023-12-26 PROCEDURE — 36600 WITHDRAWAL OF ARTERIAL BLOOD: CPT

## 2023-12-26 PROCEDURE — 94761 N-INVAS EAR/PLS OXIMETRY MLT: CPT

## 2023-12-26 PROCEDURE — 25000003 PHARM REV CODE 250: Performed by: EMERGENCY MEDICINE

## 2023-12-26 PROCEDURE — 85610 PROTHROMBIN TIME: CPT | Performed by: INTERNAL MEDICINE

## 2023-12-26 PROCEDURE — 82803 BLOOD GASES ANY COMBINATION: CPT

## 2023-12-26 PROCEDURE — 63600175 PHARM REV CODE 636 W HCPCS: Performed by: HOSPITALIST

## 2023-12-26 PROCEDURE — 99223 1ST HOSP IP/OBS HIGH 75: CPT | Mod: ,,, | Performed by: INTERNAL MEDICINE

## 2023-12-26 PROCEDURE — 27000207 HC ISOLATION

## 2023-12-26 PROCEDURE — 20000000 HC ICU ROOM

## 2023-12-26 PROCEDURE — 94003 VENT MGMT INPAT SUBQ DAY: CPT

## 2023-12-26 PROCEDURE — 99497 ADVNCD CARE PLAN 30 MIN: CPT | Mod: 25,,, | Performed by: INTERNAL MEDICINE

## 2023-12-26 PROCEDURE — A4216 STERILE WATER/SALINE, 10 ML: HCPCS | Performed by: EMERGENCY MEDICINE

## 2023-12-26 RX ADMIN — ENOXAPARIN SODIUM 40 MG: 40 INJECTION SUBCUTANEOUS at 05:12

## 2023-12-26 RX ADMIN — FAMOTIDINE 20 MG: 10 INJECTION INTRAVENOUS at 08:12

## 2023-12-26 RX ADMIN — NOREPINEPHRINE BITARTRATE 0.12 MCG/KG/MIN: 4 INJECTION, SOLUTION INTRAVENOUS at 08:12

## 2023-12-26 RX ADMIN — FUROSEMIDE 40 MG: 10 INJECTION, SOLUTION INTRAVENOUS at 11:12

## 2023-12-26 RX ADMIN — NOREPINEPHRINE BITARTRATE 0.09 MCG/KG/MIN: 4 INJECTION, SOLUTION INTRAVENOUS at 02:12

## 2023-12-26 RX ADMIN — MUPIROCIN: 20 OINTMENT TOPICAL at 08:12

## 2023-12-26 RX ADMIN — Medication 10 ML: at 12:12

## 2023-12-26 RX ADMIN — IPRATROPIUM BROMIDE AND ALBUTEROL SULFATE 3 ML: 2.5; .5 SOLUTION RESPIRATORY (INHALATION) at 05:12

## 2023-12-26 RX ADMIN — ASPIRIN 81 MG CHEWABLE TABLET 81 MG: 81 TABLET CHEWABLE at 08:12

## 2023-12-26 RX ADMIN — OSELTAMIVIR PHOSPHATE 30 MG: 30 CAPSULE ORAL at 08:12

## 2023-12-26 RX ADMIN — PIPERACILLIN AND TAZOBACTAM 4.5 G: 4; .5 INJECTION, POWDER, LYOPHILIZED, FOR SOLUTION INTRAVENOUS; PARENTERAL at 05:12

## 2023-12-26 RX ADMIN — IPRATROPIUM BROMIDE AND ALBUTEROL SULFATE 3 ML: 2.5; .5 SOLUTION RESPIRATORY (INHALATION) at 04:12

## 2023-12-26 RX ADMIN — Medication 10 ML: at 06:12

## 2023-12-26 RX ADMIN — Medication 10 ML: at 05:12

## 2023-12-26 RX ADMIN — IPRATROPIUM BROMIDE AND ALBUTEROL SULFATE 3 ML: 2.5; .5 SOLUTION RESPIRATORY (INHALATION) at 12:12

## 2023-12-26 RX ADMIN — FUROSEMIDE 40 MG: 10 INJECTION, SOLUTION INTRAVENOUS at 12:12

## 2023-12-26 RX ADMIN — IPRATROPIUM BROMIDE AND ALBUTEROL SULFATE 3 ML: 2.5; .5 SOLUTION RESPIRATORY (INHALATION) at 08:12

## 2023-12-26 RX ADMIN — LIDOCAINE HYDROCHLORIDE 2 MG/MIN: 8 INJECTION, SOLUTION INTRAVENOUS at 12:12

## 2023-12-26 RX ADMIN — IPRATROPIUM BROMIDE AND ALBUTEROL SULFATE 3 ML: 2.5; .5 SOLUTION RESPIRATORY (INHALATION) at 07:12

## 2023-12-26 RX ADMIN — VANCOMYCIN HYDROCHLORIDE 1250 MG: 1.25 INJECTION, POWDER, LYOPHILIZED, FOR SOLUTION INTRAVENOUS at 02:12

## 2023-12-26 RX ADMIN — CLOPIDOGREL BISULFATE 75 MG: 75 TABLET ORAL at 08:12

## 2023-12-26 NOTE — ASSESSMENT & PLAN NOTE
- Further evaluation and management per primary team; awaiting ultrasound. Unclear if shock liver vs congestive hepatopathy?

## 2023-12-26 NOTE — CONSULTS
[facility] - Intensive Care  Palliative Medicine  Consult Note    Patient Name: Alexsander Tafoya  MRN: [id number]  Admission Date: 12/23/2023  Hospital Length of Stay: 3 days  Code Status: Full Code   Attending Provider: Isaac Aguilar MD  Consulting Provider: Alaina Aguilar MD  Primary Care Physician: Helio Farr MD  Principal Problem:Acute hypoxic respiratory failure    Patient information was obtained from relative(s), past medical records, ER records, and primary team.      Inpatient consult to Palliative Care  Consult performed by: Alaina Aguilar MD  Consult ordered by: Sunny Tsang MD  Reason for consult: Advance Care Planning        Assessment/Plan:     Advance Care Planning    - Consult for support and advance care planning in pt currently in ICU with acute respiratory failure, septic shock, acute renal failure, and elevated LFTs in setting of pneumonia. History is significant for prior CVA requiring assistance for all ADLs. Chart reviewed in depth; discussed pt during MDT rounds.   - At time of consult, pt remained intubated and unable to participate in discussion. Initially spoke by phone with pt's sister Idalia, and following this had a separate call with pt's son, Saurabh; collectively, they are pt's legally-authorized surrogate medical decision-makers, and both are very invested in his care.   - Introduced role of palliative medicine, and learned more about pt outside of hospital. He is , and Saurabh is his only child. He worked for many years for [employer] in a variety of roles. His stroke has affected his health and functional status tremendously; additionally, verbal communicating with friends and family is very challenging for pt. Idalia and her  visit pt most days of the week at his NH. While he enjoys watching TV, it is difficult for him to use the remote; validated that it sounds like a lot for him to deal with.   - Thorough medical update provided; shared  transparent concern that pt has multiorgan dysfunction, which is a lot for him to have to overcome, particularly given his baseline poor health. They verbalized understanding of this, though remain hopeful for improvement.   - Discussed plan moving forward, as well as code status; given pt's low EF and current critical illness, discussed the likelihood of poor outcome with CPR. Explained difference between full code and DNR; explained that DNR only applies if pt's heart stops, and encouraged them to make decisions similar to what patient himself would verbalize.   - While overall plan remains to continue with maximal medical therapy in hopes of improvement, family will further discuss code status. Would maintain full code at this time, pending their discussion.   - Support provided during conversation; let them know our team will continue to check in on pt and family throughout hospital stay. Updated primary team and bedside RN in person after visit.     Other  Acute hypoxic respiratory failure  - Ventilator management per PCCM; did not pass SAT this morning. Was initially intubated in ER for respiratory distress as a result of his pneumonia and pleural effusion.    Neuro  Aphasia as late effect of cerebrovascular accident  - Noted at baseline     Hemiplegia affecting right side in right-dominant patient as late effect of cerebrovascular disease  - At baseline requires assistance for all ADLs    Pulmonary  Pleural effusion  - Management per PCCM     Cardiac/Vascular  Acute on chronic combined systolic and diastolic congestive heart failure  - Management per primary team     Dilated cardiomyopathy  - EF 25 to 30%; illness trajectory education provided to family     NSTEMI (non-ST elevated myocardial infarction)  - Cardiology consulted; medical management at this time    Renal/  Acute renal failure  - Management per primary team     ID  Influenza A  - Supportive care per primary     Septic shock  - Management per  "primary team; pneumonia as source?      GI  Transaminitis  - Further evaluation and management per primary team; awaiting ultrasound. Unclear if shock liver vs congestive hepatopathy?     Thank you for your consult. I will follow-up with patient. Please contact us if you have any additional questions.    NATALIIA Solorio  Palliative Medicine Staff   [phone]    Total visit time: 86 minutes    > 50% of 70 min visit spent in chart review, face to face discussion of symptom assessment, coordination of care with other specialists, and discharge planning.    16 min ACP time spent: goals of care, emotional support, formulating and communicating prognosis, exploring burden/ benefit of various approaches of treatment.      Subjective:     HPI: (From H&P) "Mr Tafoya is a 73-year-old male being admitted for acute hypoxic respiratory failure.  His medical history significant for BPH, dyslipidemia, chronic CVA with resultant aphasia, and right-sided hemiparesis.  During my evaluation the patient is in severe respiratory distress, on BiPAP and unable to provide any meaningful detailed history.  Seems that he came from [facility] due to worsening dyspnea, and EMS noted low oxygen saturations. in the ER, he was noted to have Tmax of 103.1 °F, persistently tachycardic in 130s, hypotensive 84/52, and was initially on nonrebreather and subsequently placed on BiPAP.  His CBC shows normal white count, microcytic anemia 10.5 and normal platelets.  INR 1.4.  CBC with metabolic acidosis bicarb 16, creatinine 1.9 (appears to have previous creatinine of 1.09 in September 2022 with baseline of about 1.1-1.3,), hypomagnesemia 1.5, transaminitis AST: ALT-192: 152, lactic acidosis 3.1, elevated procalcitonin 0.26.  His influenza a swab is positive.  His chest x-ray suggests moderate right-sided pleural effusion with compressive atelectasis and/or lower lobe consolidation.     After evaluating the patient, discussed with ER physician that the " "patient may need to be intubated given severe respiratory distress tachycardia, respiratory rate in 50s and inability to protect airway with previous stroke.  Patient will be placed on mechanical ventilation.  He has so far received breathing treatments, Rocephin and Zithromax along with Tamiflu.  Admission has been requested for further evaluation and treatment."    Palliative medicine is consulted for support and advance care planning; for details of visit with pt and family, see ACP section of plan.     Past Medical History:   Diagnosis Date    BPH (benign prostatic hyperplasia)     Dysarthria     HLD (hyperlipidemia)     Hypertension     Other and unspecified hyperlipidemia     Stroke        Past Surgical History:   Procedure Laterality Date    CATARACT EXTRACTION W/ INTRAOCULAR LENS  IMPLANT, BILATERAL         Review of patient's allergies indicates:  No Known Allergies    Medications:  Continuous Infusions:   LIDOcaine 1 mg/min (12/26/23 1312)    NORepinephrine bitartrate-D5W 0.1 mcg/kg/min (12/26/23 1311)     Scheduled Meds:   albuterol-ipratropium  3 mL Nebulization Q4H    aspirin  81 mg Oral Daily    clopidogreL  75 mg Oral Daily    enoxparin  40 mg Subcutaneous Daily    famotidine (PF)  20 mg Intravenous Daily    finasteride  5 mg Oral Daily    furosemide (LASIX) injection  40 mg Intravenous Q12H    midazolam  2 mg Intravenous Once    mupirocin   Nasal BID    oseltamivir  30 mg Oral BID    sodium chloride 0.9%  10 mL Intravenous Q6H     PRN Meds:calcium gluconate IVPB, calcium gluconate IVPB, calcium gluconate IVPB, magnesium sulfate IVPB, magnesium sulfate IVPB, ondansetron, potassium chloride in water **AND** potassium chloride in water **AND** potassium chloride in water, Flushing PICC/Midline Protocol **AND** sodium chloride 0.9% **AND** sodium chloride 0.9%, sodium phosphate 15 mmol in dextrose 5 % (D5W) 250 mL IVPB, sodium phosphate 20.01 mmol in dextrose 5 % (D5W) 250 mL IVPB, sodium phosphate 30 " mmol in dextrose 5 % (D5W) 250 mL IVPB    Family History       Problem Relation (Age of Onset)    Dementia Father    Hypertension Mother, Father    Seizures Mother    Stroke Mother          Tobacco Use    Smoking status: Former    Smokeless tobacco: Never   Substance and Sexual Activity    Alcohol use: Yes     Comment: rare, once a week or less    Drug use: No    Sexual activity: Never       Review of Systems   Unable to perform ROS: Intubated     Objective:     Vital Signs (Most Recent):  Temp: 97.8 °F (36.6 °C) (12/26/23 1101)  Pulse: (!) 111 (12/26/23 1300)  Resp: (!) 30.1 (12/26/23 1300)  BP: 104/73 (12/26/23 1259)  SpO2: 100 % (12/26/23 1300) Vital Signs (24h Range):  Temp:  [97.6 °F (36.4 °C)-99.1 °F (37.3 °C)] 97.8 °F (36.6 °C)  Pulse:  [103-116] 111  Resp:  [19.1-33] 30.1  SpO2:  [98 %-100 %] 100 %  BP: ()/(53-75) 104/73     Weight: 98 kg (216 lb 0.8 oz)  Body mass index is 33.84 kg/m².       Physical Exam  Constitutional:       Comments: Lying in bed, ill-appearing    Pulmonary:      Comments: ETT in place, mechanically ventilated       Significant Labs: All pertinent labs within the past 24 hours have been reviewed.  CBC:   Recent Labs   Lab 12/26/23  0556   WBC 5.93   HGB 10.9*   HCT 35.2*   MCV 72*        BMP:  Recent Labs   Lab 12/26/23  0556   *   *   K 3.6      CO2 22*   BUN 19   CREATININE 2.1*   CALCIUM 8.2*     LFT:  Lab Results   Component Value Date    AST 1,932 (H) 12/26/2023    GGT 42 08/05/2019    ALKPHOS 123 12/26/2023    BILITOT 1.4 (H) 12/26/2023     Albumin:   Albumin   Date Value Ref Range Status   12/26/2023 2.7 (L) 3.5 - 5.2 g/dL Final     Protein:   Total Protein   Date Value Ref Range Status   12/26/2023 6.5 6.0 - 8.4 g/dL Final     Lactic acid:   Lab Results   Component Value Date    LACTATE 1.8 12/24/2023    LACTATE 3.0 (H) 12/24/2023       Significant Imaging: I have reviewed all pertinent imaging results/findings within the past 24  hours.

## 2023-12-26 NOTE — SUBJECTIVE & OBJECTIVE
Interval History: developed episodes of NSVT overnight, started on lidocaine per Cardiology recommendations. LFTs still significantly elevated.    Review of Systems   Unable to perform ROS: Intubated     Objective:     Vital Signs (Most Recent):  Temp: 97.6 °F (36.4 °C) (12/26/23 0701)  Pulse: 110 (12/26/23 1000)  Resp: (!) 27.7 (12/26/23 1000)  BP: 98/71 (12/26/23 1000)  SpO2: 99 % (12/26/23 1000) Vital Signs (24h Range):  Temp:  [97.6 °F (36.4 °C)-99.1 °F (37.3 °C)] 97.6 °F (36.4 °C)  Pulse:  [104-117] 110  Resp:  [19.1-39.8] 27.7  SpO2:  [85 %-100 %] 99 %  BP: ()/(53-75) 98/71     Weight: 98 kg (216 lb 0.8 oz)  Body mass index is 33.84 kg/m².    Intake/Output Summary (Last 24 hours) at 12/26/2023 1032  Last data filed at 12/26/2023 0900  Gross per 24 hour   Intake 1596.51 ml   Output 4825 ml   Net -3228.49 ml           Physical Exam  Constitutional:       Appearance: He is well-developed.   HENT:      Head: Normocephalic and atraumatic.      Mouth/Throat:      Comments: ETT in place  Eyes:      Conjunctiva/sclera: Conjunctivae normal.      Pupils: Pupils are equal, round, and reactive to light.   Cardiovascular:      Rate and Rhythm: Normal rate and regular rhythm.      Heart sounds: Normal heart sounds.   Pulmonary:      Effort: Pulmonary effort is normal.      Breath sounds: Normal breath sounds.      Comments: Decreased breath sound on the right.    Abdominal:      General: Bowel sounds are normal.      Palpations: Abdomen is soft.   Musculoskeletal:         General: Normal range of motion.      Cervical back: Normal range of motion and neck supple.      Right lower leg: Edema present.      Left lower leg: Edema present.   Skin:     General: Skin is warm.   Neurological:      Cranial Nerves: No cranial nerve deficit.      Comments: Calm, awake some             Significant Labs: All pertinent labs within the past 24 hours have been reviewed.  BMP:   Recent Labs   Lab 12/26/23  0556   *   *    K 3.6      CO2 22*   BUN 19   CREATININE 2.1*   CALCIUM 8.2*       CBC:   Recent Labs   Lab 12/25/23  0607 12/26/23  0556   WBC 4.88 5.93   HGB 10.6* 10.9*   HCT 34.4* 35.2*    245         Significant Imaging: I have reviewed all pertinent imaging results/findings within the past 24 hours.

## 2023-12-26 NOTE — ASSESSMENT & PLAN NOTE
- noted overnight, if needed recommendations include starting lidocaine instead of amio (liver enzymes)  - more episodes of NSVT on 12/25 --> started on lidocaine drip

## 2023-12-26 NOTE — CARE UPDATE
Placed on lidocaine drip for recurrent NSVT.  No further VT - wean off lidocaine. Avoid amiodarone with rising LFTs

## 2023-12-26 NOTE — ASSESSMENT & PLAN NOTE
Patient with acute kidney injury/acute renal failure likely due to acute tubular necrosis caused by septic shock  SHAYY is currently worsening. Baseline creatinine  ranging from 1 to 1.7  - Labs reviewed- Renal function/electrolytes with Estimated Creatinine Clearance: 35 mL/min (A) (based on SCr of 2.1 mg/dL (H)). according to latest data. Monitor urine output and serial BMP and adjust therapy as needed. Avoid nephrotoxins and renally dose meds for GFR listed above.  Treat shock.  Continue pressors.  Closely monitor renal function.  Nephrology consult if any further worsening.  - relatively stable

## 2023-12-26 NOTE — ASSESSMENT & PLAN NOTE
[facility] did not pass SAT this morning. Was initially intubated in ER for respiratory distress as a result of his pneumonia and pleural effusion.   Hbg 7 Hgb 6.9/21.9 INR 7.38 PT 75.4, INR 6.54 Pt-87.1 INR 7.53 h&h 6.9/20.5 pt 88.7, inr 7.67 INR INR = 7.12

## 2023-12-26 NOTE — ASSESSMENT & PLAN NOTE
Bilateral effusion on ct.  Rt>Lt  Moderate right effusion confirmed via pocus 12/24  DDx: parapneumonic vs volume overload  Given normal wbc and borderline procal, will monitor effusion with diuresis.  Appear to improve with cxr today.

## 2023-12-26 NOTE — ASSESSMENT & PLAN NOTE
Creatinine was 1.1 in 2022  Suspect atn a/w hypotension  Appear volume overload on exam and ct  Stable at

## 2023-12-26 NOTE — NURSING
[facility] [place]  Nurses Note -- 4 Eyes      12/25/2023       Skin assessed on: Q Shift      [] No Pressure Injuries Present    []Prevention Measures Documented    [x] Yes LDA  for Pressure Injury Previously documented     [] Yes New Pressure Injury Discovered   [] LDA for New Pressure Injury Added      Attending RN:  Radha Gonzalez RN     Second RN:  Radha Carrasco RN

## 2023-12-26 NOTE — CLINICAL REVIEW
IP Sepsis Screen (most recent)       Sepsis Screen (IP) - 12/26/23 0827       Is the patient's history or complaint suggestive of a possible infection? Yes  -CB    Are there at least two of the following signs and symptoms present? Yes  -CB    Sepsis signs/symptoms - Tachycardia Tachycardia     >90  -CB    Sepsis signs/symptoms - Tachypnea Tachypnea     >20  -CB    Are any of the following organ dysfunction criteria present and not considered to be due to a chronic condition? Yes  -CB    Organ Dysfunction Criteria Creatinine > 2.0  -CB    Organ Dysfunction Criteria - Resp Comp Respiratory Compromise: Requiring > 5L NC  -CB    Initiate Sepsis Protocol No  -CB    Reason sepsis not considered Pt. receiving appropriate management   abx -CB              User Key  (r) = Recorded By, (t) = Taken By, (c) = Cosigned By      Initials Name    Mi Negron, RN

## 2023-12-26 NOTE — ASSESSMENT & PLAN NOTE
- Consult for support and advance care planning in pt currently in ICU with acute respiratory failure, septic shock, acute renal failure, and elevated LFTs in setting of pneumonia. History is significant for prior CVA requiring assistance for all ADLs. Chart reviewed in depth; discussed pt during MDT rounds.   - At time of consult, pt remained intubated and unable to participate in discussion. Initially spoke by phone with pt's sister Idalia, and following this had a separate call with pt's son, Saurabh; collectively, they are pt's legally-authorized surrogate medical decision-makers, and both are very invested in his care.   - Introduced role of palliative medicine, and learned more about pt outside of hospital. He is , and Saurabh is his only child. He worked for many years for [employer] in a variety of roles. His stroke has affected his health and functional status tremendously; additionally, verbal communicating with friends and family is very challenging for pt. Idalia and her  visit pt most days of the week at his NH. While he enjoys watching TV, it is difficult for him to use the remote; validated that it sounds like a lot for him to deal with.   - Thorough medical update provided; shared transparent concern that pt has multiorgan dysfunction, which is a lot for him to have to overcome, particularly given his baseline poor health. They verbalized understanding of this, though remain hopeful for improvement.   - Discussed plan moving forward, as well as code status; given pt's low EF and current critical illness, discussed the likelihood of poor outcome with CPR. Explained difference between full code and DNR; explained that DNR only applies if pt's heart stops, and encouraged them to make decisions similar to what patient himself would verbalize.   - While overall plan remains to continue with maximal medical therapy in hopes of improvement, family will further discuss code status. Would  maintain full code at this time, pending their discussion.   - Support provided during conversation; let them know our team will continue to check in on pt and family throughout hospital stay. Updated primary team and bedside RN in person after visit.

## 2023-12-26 NOTE — ASSESSMENT & PLAN NOTE
Patient is identified as having Systolic (HFrEF) heart failure that is Acute on chronic. CHF is currently controlled. Latest ECHO performed and demonstrates- Results for orders placed during the hospital encounter of 12/23/23    Echo Saline Bubble? No    Interpretation Summary    Left Ventricle: The left ventricle is normal in size. There is concentric hypertrophy. There is severely reduced systolic function with a visually estimated ejection fraction of 25 - 30%.    Right Ventricle: Mild right ventricular enlargement. Systolic function is normal.    Left Atrium: Left atrium is moderately dilated.    Right Atrium: Right atrium is mildly dilated.    Tricuspid Valve: There is trace regurgitation. The estimated PA systolic pressure is at least 21 mmHg.    IVC/SVC: Patient is ventilated, cannot use IVC diameter to estimate right atrial pressure.  . Continue Furosemide and monitor clinical status closely. Monitor on telemetry. Patient is off CHF pathway.  Monitor strict Is&Os and daily weights.  Place on fluid restriction of 1.5 L. Cardiology has been consulted. Continue to stress to patient importance of self efficacy and  on diet for CHF. Last BNP reviewed- and noted below   Recent Labs   Lab 12/23/23  1914   *     -Diuresing well

## 2023-12-26 NOTE — ASSESSMENT & PLAN NOTE
Patient with Hypoxic Respiratory failure which is Acute.  he is not on home oxygen. Supplemental oxygen was provided and noted- Vent Mode: PRVC  Oxygen Concentration (%):  [] 30  Resp Rate Total:  [22 br/min-40 br/min] 22 br/min  Vt Set:  [500 mL] 500 mL  PEEP/CPAP:  [5 cmH20] 5 cmH20  Mean Airway Pressure:  [8.7 cmH20-10.9 cmH20] 8.8 cmH20.   Signs/symptoms of respiratory failure include- tachypnea, increased work of breathing, respiratory distress, use of accessory muscles, wheezing, and stridor. Contributing diagnoses includes - Pleural effusion and Pneumonia Labs and images were reviewed. Patient Has not had a recent ABG. Will treat underlying causes and adjust management of respiratory failure as follows- Treat underlying etiologies of pneumonia and evaluate pleural effusion.  Intubated in ER.  Pulmonary consulted.  Respiratory failure secondary to flu/pneumonia/pleural effusion.  - SBT/SAT daily

## 2023-12-26 NOTE — ASSESSMENT & PLAN NOTE
Septic shock as above.     Rhomboid Transposition Flap Text: The defect edges were debeveled with a #15 scalpel blade.  Given the location of the defect and the proximity to free margins a rhomboid transposition flap was deemed most appropriate.  Using a sterile surgical marker, an appropriate rhomboid flap was drawn incorporating the defect.    The area thus outlined was incised deep to adipose tissue with a #15 scalpel blade.  The skin margins were undermined to an appropriate distance in all directions utilizing iris scissors.

## 2023-12-26 NOTE — ASSESSMENT & PLAN NOTE
Most likely hepatic congestion  Transaminase continue to increase.  Alkaline phosphatase normal  Will send for liver ultrasound with doppler to assess patency of hepatic artery  May consider gi/ct if numbers continue to get worse

## 2023-12-26 NOTE — PROGRESS NOTES
[facility] - Intensive Care  Critical Care Medicine  Progress Note    Patient Name: Alexsander Tafoya  MRN: [id number]  Admission Date: 12/23/2023  Hospital Length of Stay: 3 days  Code Status: Full Code  Attending Provider: Isaac Aguilar MD  Primary Care Provider: Helio Farr MD   Principal Problem: Acute hypoxic respiratory failure    Subjective:     HPI:  Patient is 73 y.o. male  has a past medical history of BPH (benign prostatic hyperplasia), Dysarthria, HLD (hyperlipidemia), Hypertension, Other and unspecified hyperlipidemia, and Stroke. presented to [facility] on 12/23/23 with cough and sob.   In the ER, he was noted to have Tmax of 103.1 °F, persistently tachycardic in 130s, hypotensive 84/52, and was initially on nonrebreather and subsequently placed on BiPAP.  His CBC shows normal white count, microcytic anemia 10.5 and normal platelets.  INR 1.4.  CBC with metabolic acidosis bicarb 16, creatinine 1.9 (appears to have previous creatinine of 1.09 in September 2022 with baseline of about 1.1-1.3,), hypomagnesemia 1.5, transaminitis AST: ALT-192: 152, lactic acidosis 3.1, elevated procalcitonin 0.26.  His influenza a swab is positive.  His chest x-ray suggests moderate right-sided pleural effusion with compressive atelectasis and/or lower lobe consolidation.  Patient was intubated in ED and pulmonary was consulted for further inputs.      During my initial evaluation, patient was intubated and sedated with propofol.  Patient sat was 100% with 70% Fio2 and 5 peep.  Vss stable with 0.08 mcg/kg/min.      Hospital/ICU Course:  No notes on file    Interval History: diuresing well.  Failed sbt yesterday      Objective:     Vital Signs (Most Recent):  Temp: 98.9 °F (37.2 °C) (12/26/23 0301)  Pulse: 104 (12/26/23 0816)  Resp: (!) 22 (12/26/23 0816)  BP: 91/67 (12/26/23 0816)  SpO2: 100 % (12/26/23 0816) Vital Signs (24h Range):  Temp:  [97.8 °F (36.6 °C)-99.1 °F (37.3 °C)] 98.9 °F (37.2 °C)  Pulse:   [104-117] 104  Resp:  [19.1-39.8] 22  SpO2:  [85 %-100 %] 100 %  BP: ()/(53-74) 91/67     Weight: 97.9 kg (215 lb 13.3 oz)  Body mass index is 33.8 kg/m².      Intake/Output Summary (Last 24 hours) at 12/26/2023 0948  Last data filed at 12/26/2023 0600  Gross per 24 hour   Intake 1401.1 ml   Output 4650 ml   Net -3248.9 ml          Physical Exam  Constitutional:       Appearance: He is well-developed.   HENT:      Head: Normocephalic and atraumatic.      Mouth/Throat:      Comments: ETT in place  Eyes:      Conjunctiva/sclera: Conjunctivae normal.      Pupils: Pupils are equal, round, and reactive to light.   Cardiovascular:      Rate and Rhythm: Normal rate and regular rhythm.      Heart sounds: Normal heart sounds.   Pulmonary:      Effort: Pulmonary effort is normal.      Breath sounds: Normal breath sounds.      Comments: Decreased breath sound on the right.    Abdominal:      General: Bowel sounds are normal.      Palpations: Abdomen is soft.   Musculoskeletal:         General: Normal range of motion.      Cervical back: Normal range of motion and neck supple.      Right lower leg: Edema present.      Left lower leg: Edema present.   Skin:     General: Skin is warm.   Neurological:      Cranial Nerves: No cranial nerve deficit.      Comments: Sedated on vent.           Review of Systems    Vents:  Vent Mode: PRVC (12/26/23 0816)  Ventilator Initiated: Yes (12/23/23 2212)  Set Rate: 20 BPM (12/26/23 0816)  Vt Set: 500 mL (12/26/23 0816)  Pressure Support: 8 cmH20 (12/25/23 0908)  PEEP/CPAP: 5 cmH20 (12/26/23 0816)  Oxygen Concentration (%): 30 (12/26/23 0816)  Peak Airway Pressure: 19.7 cmH20 (12/26/23 0816)  Total Ve: 10.64 L/m (12/26/23 0816)  F/VT Ratio<105 (RSBI): (!) 46.69 (12/26/23 0816)    Lines/Drains/Airways       Peripherally Inserted Central Catheter Line  Duration             PICC Triple Lumen 12/23/23 2320 left basilic 2 days              Drain  Duration                  NG/OG Tube 12/23/23  "2146 orogastric 16 Fr. Center mouth 2 days         Urethral Catheter 12/23/23 2231 Straight-tip 16 Fr. 2 days              Airway  Duration                  Airway - Non-Surgical 12/23/23 2146 Endotracheal Tube 2 days              Peripheral Intravenous Line  Duration                  Peripheral IV - Single Lumen 12/23/23 1913 20 G Anterior;Distal;Left Antecubital 2 days         Peripheral IV - Single Lumen 12/23/23 1913 20 G Anterior;Left Forearm 2 days                    Significant Labs:    CBC/Anemia Profile:  Recent Labs   Lab 12/25/23  0607 12/26/23  0556   WBC 4.88 5.93   HGB 10.6* 10.9*   HCT 34.4* 35.2*    245   MCV 72* 72*   RDW 17.4* 17.4*          Chemistries:  Recent Labs   Lab 12/24/23  1500 12/25/23  0607 12/25/23  1939 12/26/23  0556    136  --  135*   K 3.8 3.4* 3.6 3.6    104  --  102   CO2 18* 21*  --  22*   BUN 21 20  --  19   CREATININE 2.1* 1.9*  --  2.1*   CALCIUM 8.3* 8.1*  --  8.2*   ALBUMIN  --  2.7*  --  2.7*   PROT  --  6.2  --  6.5   BILITOT  --  1.4*  --  1.4*   ALKPHOS  --  100  --  123   ALT  --  1,199*  --  2,025*   AST  --  1,167*  --  1,932*         ABGs:   Recent Labs   Lab 12/25/23  0729   PH 7.433   PCO2 30.0*   HCO3 20.1*   POCSATURATED 99   BE -3*       Blood Culture:   No results for input(s): "LABBLOO" in the last 48 hours.    Cardiac Markers: No results for input(s): "CKMB", "TROPONINT", "MYOGLOBIN" in the last 48 hours.  Lactic Acid:   Recent Labs   Lab 12/24/23  1037 12/24/23  2340   LACTATE 3.0* 1.8       Respiratory Culture:   Recent Labs   Lab 12/24/23  1527   GSRESP <10 epithelial cells per low power field.  Many WBC's  Few Gram negative rods  Rare Gram positive rods   RESPIRATORYC Normal respiratory christina       Troponin:   Recent Labs   Lab 12/24/23  1037   TROPONINI 2.784*       Urine Culture: No results for input(s): "LABURIN" in the last 48 hours.    BNP  Recent Labs   Lab 12/23/23  1914   *       Echo 12/24/23    Left Ventricle: The " left ventricle is normal in size. There is concentric hypertrophy. There is severely reduced systolic function with a visually estimated ejection fraction of 25 - 30%.    Right Ventricle: Mild right ventricular enlargement. Systolic function is normal.    Left Atrium: Left atrium is moderately dilated.    Right Atrium: Right atrium is mildly dilated.    Tricuspid Valve: There is trace regurgitation. The estimated PA systolic pressure is at least 21 mmHg.    IVC/SVC: Patient is ventilated, cannot use IVC diameter to estimate right atrial pressure.    Significant Imaging:  I have reviewed all pertinent imaging results/findings within the past 24 hours.  CXR: I have reviewed all pertinent results/findings within the past 24 hours and my personal findings are:  persistent haziness at bases.      ABG  Recent Labs   Lab 12/25/23  0729   PH 7.433   PO2 139*   PCO2 30.0*   HCO3 20.1*   BE -3*     Assessment/Plan:     Pulmonary  Pleural effusion  Bilateral effusion on ct.  Rt>Lt  Moderate right effusion confirmed via pocus 12/24  DDx: parapneumonic vs volume overload  Given normal wbc and borderline procal, will monitor effusion with diuresis.  Appear to improve with cxr today.      Cardiac/Vascular  Acute on chronic combined systolic and diastolic congestive heart failure  Appreciate card inputs  H/o amyloidosis  Diurese as tolerated.   EF20%.  Chronic per Dr. Morgan.     Recent Labs   Lab 12/23/23  1914   *         Shock  Suspect mixed with distributive and cardiogenic  Minimal levophed  EF 25% + sirs from influenza    Elevated troponin  EKG in ED without ST elevation  Card consulted  Plavix.  Held asa due love    Renal/  Acute renal failure  Creatinine was 1.1 in 2022  Suspect atn a/w hypotension  Appear volume overload on exam and ct  Stable at     ID  Influenza A  Tamiflu adjusted to renal function    GI  Transaminitis  Most likely hepatic congestion  Transaminase continue to increase.  Alkaline phosphatase  normal  Will send for liver ultrasound with doppler to assess patency of hepatic artery  May consider gi/ct if numbers continue to get worse    Palliative Care  Goals of care, counseling/discussion  12/25/23 Spoken with sister, Idalia.  Patient has been living in nursing home since 2018.  Patient is not  but has a son.  Idalia is in agreement with current poc and is agreeable to resuscitation via cpr and cardioversion if indicated.        Other  * Acute hypoxic respiratory failure  Patient with Hypoxic Respiratory failure which is Acute.  he is not on home oxygen. Supplemental oxygen was provided and noted- Vent Mode: PRVC  Oxygen Concentration (%):  [] 30  Resp Rate Total:  [22 br/min-40 br/min] 22 br/min  Vt Set:  [500 mL] 500 mL  PEEP/CPAP:  [5 cmH20] 5 cmH20  Mean Airway Pressure:  [8.7 cmH20-10.9 cmH20] 8.8 cmH20    .   Signs/symptoms of respiratory failure include- tachypnea, respiratory distress, and lethargy. Contributing diagnoses includes - ARDS vs CHF Labs and images were reviewed. Patient Has recent ABG, which has been reviewed. Will treat underlying causes and adjust management of respiratory failure as follows-   Ct with bilateral effusion and compressive atelectasis of left lung  + Influenza with borderline procal and normal wbc  Ef 25%     diurese  SBT and possible extubation  Tamiflu + broad spectrum antibiotics  Procal is normal + culture is nonrevealing.  Recommend stopping antibiotics (vanc/zosyn)       Critical Care Daily Checklist:    A: Awake: RASS Goal/Actual Goal: RASS Goal: -2-->light sedation  Actual:     B: Spontaneous Breathing Trial Performed? Spon. Breathing Trial Initiated?: Not initiated (12/26/23 0600)   C: SAT & SBT Coordinated?  yes                      D: Delirium: CAM-ICU     E: Early Mobility Performed? no   F: Feeding Goal:    Status:     Current Diet Order   Procedures    Diet NPO      AS: Analgesia/Sedation none   T: Thromboembolic Prophylaxis lovenox   H: HOB  > 300 Yes   U: Stress Ulcer Prophylaxis (if needed) pepcid   G: Glucose Control iss   B: Bowel Function Stool Occurrence: 0   I: Indwelling Catheter (Lines & Gonzalez) Necessity Gonzalez, picc   D: De-escalation of Antimicrobials/Pharmacotherapies yes    Plan for the day/ETD Sbt and possible extubation    Code Status:  Family/Goals of Care: Full Code     Critical Care Time: 45 minutes  Critical secondary to Patient has a condition that poses threat to life and bodily function: respiratory failure      Critical care was time spent personally by me on the following activities: development of treatment plan with patient or surrogate and bedside caregivers, discussions with consultants, evaluation of patient's response to treatment, examination of patient, ordering and performing treatments and interventions, ordering and review of laboratory studies, ordering and review of radiographic studies, pulse oximetry, re-evaluation of patient's condition. This critical care time did not overlap with that of any other provider or involve time for any procedures.     Brijesh Edward MD  Critical Care Medicine  [facility] - [place] [facility]

## 2023-12-26 NOTE — PROGRESS NOTES
[facility] Medicine  Progress Note    Patient Name: Alexsander Tafoya  MRN: [id number]  Patient Class: IP- Inpatient   Admission Date: 12/23/2023  Length of Stay: 3 days  Attending Physician: Isaac Aguilar MD  Primary Care Provider: Helio Farr MD        Subjective:     Principal Problem:Acute hypoxic respiratory failure        HPI:  Mr Tafoya is a 73-year-old male being admitted for acute hypoxic respiratory failure.  His medical history significant for BPH, dyslipidemia, chronic CVA with resultant aphasia, and right-sided hemiparesis.  During my evaluation the patient is in severe respiratory distress, on BiPAP and unable to provide any meaningful detailed history.  Seems that he came from care home due to worsening dyspnea, and EMS noted low oxygen saturations. in the ER, he was noted to have Tmax of 103.1 °F, persistently tachycardic in 130s, hypotensive 84/52, and was initially on nonrebreather and subsequently placed on BiPAP.  His CBC shows normal white count, microcytic anemia 10.5 and normal platelets.  INR 1.4.  CBC with metabolic acidosis bicarb 16, creatinine 1.9 (appears to have previous creatinine of 1.09 in September 2022 with baseline of about 1.1-1.3,), hypomagnesemia 1.5, transaminitis AST: ALT-192: 152, lactic acidosis 3.1, elevated procalcitonin 0.26.  His influenza a swab is positive.  His chest x-ray suggests moderate right-sided pleural effusion with compressive atelectasis and/or lower lobe consolidation.    After evaluating the patient, discussed with ER physician that the patient may need to be intubated given severe respiratory distress tachycardia, respiratory rate in 50s and inability to protect airway with previous stroke.  Patient will be placed on mechanical ventilation.  He has so far received breathing treatments, Rocephin and Zithromax along with Tamiflu.  Admission has been requested for further evaluation and treatment.    Overview/Hospital Course:  74 y/o  male presents with SOB. In the ER, he was noted to have Tmax of 103.1 °F, persistently tachycardic in 130s, hypotensive 84/52, and was initially on nonrebreather and subsequently placed on BiPAP.  Respiratory status worsened and he was then intubated.  Flu positive and started on Tamiflu. His chest x-ray suggests moderate right-sided pleural effusion with compressive atelectasis and/or lower lobe consolidation.  Started on broad spectrum ABX's. Diuresis. Noted to have shock liver with LFTs in 1000s. Developed episodes of VT overnight, started on lidocaine drip per Cardiology recommendations.     Interval History: developed episodes of NSVT overnight, started on lidocaine per Cardiology recommendations. LFTs still significantly elevated.    Review of Systems   Unable to perform ROS: Intubated     Objective:     Vital Signs (Most Recent):  Temp: 97.6 °F (36.4 °C) (12/26/23 0701)  Pulse: 110 (12/26/23 1000)  Resp: (!) 27.7 (12/26/23 1000)  BP: 98/71 (12/26/23 1000)  SpO2: 99 % (12/26/23 1000) Vital Signs (24h Range):  Temp:  [97.6 °F (36.4 °C)-99.1 °F (37.3 °C)] 97.6 °F (36.4 °C)  Pulse:  [104-117] 110  Resp:  [19.1-39.8] 27.7  SpO2:  [85 %-100 %] 99 %  BP: ()/(53-75) 98/71     Weight: 98 kg (216 lb 0.8 oz)  Body mass index is 33.84 kg/m².    Intake/Output Summary (Last 24 hours) at 12/26/2023 1032  Last data filed at 12/26/2023 0900  Gross per 24 hour   Intake 1596.51 ml   Output 4825 ml   Net -3228.49 ml           Physical Exam  Constitutional:       Appearance: He is well-developed.   HENT:      Head: Normocephalic and atraumatic.      Mouth/Throat:      Comments: ETT in place  Eyes:      Conjunctiva/sclera: Conjunctivae normal.      Pupils: Pupils are equal, round, and reactive to light.   Cardiovascular:      Rate and Rhythm: Normal rate and regular rhythm.      Heart sounds: Normal heart sounds.   Pulmonary:      Effort: Pulmonary effort is normal.      Breath sounds: Normal breath sounds.      Comments:  Decreased breath sound on the right.    Abdominal:      General: Bowel sounds are normal.      Palpations: Abdomen is soft.   Musculoskeletal:         General: Normal range of motion.      Cervical back: Normal range of motion and neck supple.      Right lower leg: Edema present.      Left lower leg: Edema present.   Skin:     General: Skin is warm.   Neurological:      Cranial Nerves: No cranial nerve deficit.      Comments: Calm, awake some             Significant Labs: All pertinent labs within the past 24 hours have been reviewed.  BMP:   Recent Labs   Lab 12/26/23  0556   *   *   K 3.6      CO2 22*   BUN 19   CREATININE 2.1*   CALCIUM 8.2*       CBC:   Recent Labs   Lab 12/25/23  0607 12/26/23  0556   WBC 4.88 5.93   HGB 10.6* 10.9*   HCT 34.4* 35.2*    245         Significant Imaging: I have reviewed all pertinent imaging results/findings within the past 24 hours.    Assessment/Plan:      * Acute hypoxic respiratory failure  Patient with Hypoxic Respiratory failure which is Acute.  he is not on home oxygen. Supplemental oxygen was provided and noted- Vent Mode: PRVC  Oxygen Concentration (%):  [] 30  Resp Rate Total:  [22 br/min-40 br/min] 22 br/min  Vt Set:  [500 mL] 500 mL  PEEP/CPAP:  [5 cmH20] 5 cmH20  Mean Airway Pressure:  [8.7 cmH20-10.9 cmH20] 8.8 cmH20.   Signs/symptoms of respiratory failure include- tachypnea, increased work of breathing, respiratory distress, use of accessory muscles, wheezing, and stridor. Contributing diagnoses includes - Pleural effusion and Pneumonia Labs and images were reviewed. Patient Has not had a recent ABG. Will treat underlying causes and adjust management of respiratory failure as follows- Treat underlying etiologies of pneumonia and evaluate pleural effusion.  Intubated in ER.  Pulmonary consulted.  Respiratory failure secondary to flu/pneumonia/pleural effusion.  - SBT/SAT daily    Acute on chronic combined systolic and diastolic  congestive heart failure  Patient is identified as having Systolic (HFrEF) heart failure that is Acute on chronic. CHF is currently controlled. Latest ECHO performed and demonstrates- Results for orders placed during the hospital encounter of 12/23/23    Echo Saline Bubble? No    Interpretation Summary    Left Ventricle: The left ventricle is normal in size. There is concentric hypertrophy. There is severely reduced systolic function with a visually estimated ejection fraction of 25 - 30%.    Right Ventricle: Mild right ventricular enlargement. Systolic function is normal.    Left Atrium: Left atrium is moderately dilated.    Right Atrium: Right atrium is mildly dilated.    Tricuspid Valve: There is trace regurgitation. The estimated PA systolic pressure is at least 21 mmHg.    IVC/SVC: Patient is ventilated, cannot use IVC diameter to estimate right atrial pressure.  . Continue Furosemide and monitor clinical status closely. Monitor on telemetry. Patient is off CHF pathway.  Monitor strict Is&Os and daily weights.  Place on fluid restriction of 1.5 L. Cardiology has been consulted. Continue to stress to patient importance of self efficacy and  on diet for CHF. Last BNP reviewed- and noted below   Recent Labs   Lab 12/23/23 1914   *     -Diuresing well     NSVT (nonsustained ventricular tachycardia)  - noted overnight, if needed recommendations include starting lidocaine instead of amio (liver enzymes)  - more episodes of NSVT on 12/25 --> started on lidocaine drip      Lactic acidosis  Secondary to septic shock.  Repeat lactate in Am. Improved      Shock  Septic shock as above.      Pleural effusion  -Moderate effusion on x-ray.  Pulmonary following.  Will try diuresis.  May need thoracentesis if unable to wean       Influenza A  -Continue Tamiflu.      Acute renal failure  Patient with acute kidney injury/acute renal failure likely due to acute tubular necrosis caused by septic shock  SHAYY is  currently worsening. Baseline creatinine  ranging from 1 to 1.7  - Labs reviewed- Renal function/electrolytes with Estimated Creatinine Clearance: 35 mL/min (A) (based on SCr of 2.1 mg/dL (H)). according to latest data. Monitor urine output and serial BMP and adjust therapy as needed. Avoid nephrotoxins and renally dose meds for GFR listed above.  Treat shock.  Continue pressors.  Closely monitor renal function.  Nephrology consult if any further worsening.  - relatively stable     Elevated troponin  Significantly elevated Troponin consistent with NSTEMI.  Probably demand ischemia from shock.  On ASA and Plavix.  Hold Statin with shock liver.  Cardiology consulted.  - recommend to treat conservatively      Septic shock  This patient does have evidence of infective focus  My overall impression is septic shock due to MAP < 65.  Source: Respiratory  Antibiotics given-   Antibiotics (72h ago, onward)      Start     Stop Route Frequency Ordered    12/25/23 1145  mupirocin 2 % ointment         12/30/23 0859 Nasl 2 times daily 12/25/23 1036          Latest lactate reviewed-  Recent Labs   Lab 12/23/23  2304 12/24/23  1037 12/24/23  2340   LACTATE 3.2* 3.0* 1.8       Organ dysfunction indicated by Acute liver injury and Acute respiratory failure    Will Start Pressors- Levophed for MAP of 65  Source control achieved by:   Antibiotics.  -Given history of CVA and likely aspiration, will continue Zosyn.  -Also has influenza A infection which is being treated.  - continue with supportive care    Transaminitis  Significant increase in LFT's, consistent with shock liver.  Continue to monitor.  - still significant elevated in 1000s  - continue with supportive care for blood pressure support      BPH (benign prostatic hyperplasia)  -Continue Proscar.      Hyperlipidemia  -On statin.  Hold Statin with shock liver.    Essential hypertension  -antihypertensives on hold due to septic shock.    Aphasia as late effect of cerebrovascular  accident  Per notes.      Hemiplegia affecting right side in right-dominant patient as late effect of cerebrovascular disease  -Chronic CVA with previously noted resultant right-sided hemiparesis and aphasia.      VTE Risk Mitigation (From admission, onward)           Ordered     enoxaparin injection 40 mg  Daily         12/23/23 2143     IP VTE HIGH RISK PATIENT  Once         12/23/23 2143     Place sequential compression device  Until discontinued         12/23/23 2143                    Discharge Planning   THAIS:      Code Status: Full Code   Is the patient medically ready for discharge?:     Reason for patient still in hospital (select all that apply): Treatment  Discharge Plan A: Assisted Living ([facility]-return)            Critical care time spent on the evaluation and treatment of severe organ dysfunction, review of pertinent labs and imaging studies, discussions with consulting providers and discussions with patient/family: 18 minutes.      Isaac Aguilar MD  Department of Hospital Medicine   [facility] - Intensive Care

## 2023-12-26 NOTE — PROGRESS NOTES
VANCOMYCIN DOSING BY PHARMACY DISCONTINUATION NOTE    Alexsander Tafoya is a 73 y.o. male who had been consulted for vancomycin dosing.    The pharmacy consult for vancomycin dosing has been discontinued.     Vancomycin Dosing by Pharmacy Consult will sign-off. Please reconsult if necessary. Thank you for allowing us to participate in this patient's care.     Em Stinson  [phone]

## 2023-12-26 NOTE — PLAN OF CARE
Patient remains intubated and to ventilator---SBT failed due to tachypneic.    Propofol resumed.    Levophed weaned only slightly.   Restraint per left arm.    Lasix given with good diruresis.    Sisters visited and updated by Dr Aguilar.

## 2023-12-26 NOTE — PLAN OF CARE
TRISH followed up with patient's sister to confirm discharge plan.  Sister confirmed that patient resides at the [facility] and needs assistance with ADLs, feeding, dressing, bathing.  She stated that patient will return to the Suites at discharge and is open to home health if covered by insurance.  Patient is aphasic and has mobility limitations.   will continue to follow and assist with discharge planning if needed.

## 2023-12-26 NOTE — SUBJECTIVE & OBJECTIVE
Interval History: diuresing well.  Failed sbt yesterday      Objective:     Vital Signs (Most Recent):  Temp: 98.9 °F (37.2 °C) (12/26/23 0301)  Pulse: 104 (12/26/23 0816)  Resp: (!) 22 (12/26/23 0816)  BP: 91/67 (12/26/23 0816)  SpO2: 100 % (12/26/23 0816) Vital Signs (24h Range):  Temp:  [97.8 °F (36.6 °C)-99.1 °F (37.3 °C)] 98.9 °F (37.2 °C)  Pulse:  [104-117] 104  Resp:  [19.1-39.8] 22  SpO2:  [85 %-100 %] 100 %  BP: ()/(53-74) 91/67     Weight: 97.9 kg (215 lb 13.3 oz)  Body mass index is 33.8 kg/m².      Intake/Output Summary (Last 24 hours) at 12/26/2023 0948  Last data filed at 12/26/2023 0600  Gross per 24 hour   Intake 1401.1 ml   Output 4650 ml   Net -3248.9 ml          Physical Exam  Constitutional:       Appearance: He is well-developed.   HENT:      Head: Normocephalic and atraumatic.      Mouth/Throat:      Comments: ETT in place  Eyes:      Conjunctiva/sclera: Conjunctivae normal.      Pupils: Pupils are equal, round, and reactive to light.   Cardiovascular:      Rate and Rhythm: Normal rate and regular rhythm.      Heart sounds: Normal heart sounds.   Pulmonary:      Effort: Pulmonary effort is normal.      Breath sounds: Normal breath sounds.      Comments: Decreased breath sound on the right.    Abdominal:      General: Bowel sounds are normal.      Palpations: Abdomen is soft.   Musculoskeletal:         General: Normal range of motion.      Cervical back: Normal range of motion and neck supple.      Right lower leg: Edema present.      Left lower leg: Edema present.   Skin:     General: Skin is warm.   Neurological:      Cranial Nerves: No cranial nerve deficit.      Comments: Sedated on vent.           Review of Systems    Vents:  Vent Mode: PRVC (12/26/23 0816)  Ventilator Initiated: Yes (12/23/23 2212)  Set Rate: 20 BPM (12/26/23 0816)  Vt Set: 500 mL (12/26/23 0816)  Pressure Support: 8 cmH20 (12/25/23 0908)  PEEP/CPAP: 5 cmH20 (12/26/23 0816)  Oxygen Concentration (%): 30 (12/26/23  "0816)  Peak Airway Pressure: 19.7 cmH20 (12/26/23 0816)  Total Ve: 10.64 L/m (12/26/23 0816)  F/VT Ratio<105 (RSBI): (!) 46.69 (12/26/23 0816)    Lines/Drains/Airways       Peripherally Inserted Central Catheter Line  Duration             PICC Triple Lumen 12/23/23 2320 left basilic 2 days              Drain  Duration                  NG/OG Tube 12/23/23 2146 orogastric 16 Fr. Center mouth 2 days         Urethral Catheter 12/23/23 2231 Straight-tip 16 Fr. 2 days              Airway  Duration                  Airway - Non-Surgical 12/23/23 2146 Endotracheal Tube 2 days              Peripheral Intravenous Line  Duration                  Peripheral IV - Single Lumen 12/23/23 1913 20 G Anterior;Distal;Left Antecubital 2 days         Peripheral IV - Single Lumen 12/23/23 1913 20 G Anterior;Left Forearm 2 days                    Significant Labs:    CBC/Anemia Profile:  Recent Labs   Lab 12/25/23  0607 12/26/23  0556   WBC 4.88 5.93   HGB 10.6* 10.9*   HCT 34.4* 35.2*    245   MCV 72* 72*   RDW 17.4* 17.4*          Chemistries:  Recent Labs   Lab 12/24/23  1500 12/25/23  0607 12/25/23  1939 12/26/23  0556    136  --  135*   K 3.8 3.4* 3.6 3.6    104  --  102   CO2 18* 21*  --  22*   BUN 21 20  --  19   CREATININE 2.1* 1.9*  --  2.1*   CALCIUM 8.3* 8.1*  --  8.2*   ALBUMIN  --  2.7*  --  2.7*   PROT  --  6.2  --  6.5   BILITOT  --  1.4*  --  1.4*   ALKPHOS  --  100  --  123   ALT  --  1,199*  --  2,025*   AST  --  1,167*  --  1,932*         ABGs:   Recent Labs   Lab 12/25/23  0729   PH 7.433   PCO2 30.0*   HCO3 20.1*   POCSATURATED 99   BE -3*       Blood Culture:   No results for input(s): "LABBLOO" in the last 48 hours.    Cardiac Markers: No results for input(s): "CKMB", "TROPONINT", "MYOGLOBIN" in the last 48 hours.  Lactic Acid:   Recent Labs   Lab 12/24/23  1037 12/24/23  2340   LACTATE 3.0* 1.8       Respiratory Culture:   Recent Labs   Lab 12/24/23  1527   GSRESP <10 epithelial cells per low " "power field.  Many WBC's  Few Gram negative rods  Rare Gram positive rods   RESPIRATORYC Normal respiratory christina       Troponin:   Recent Labs   Lab 12/24/23  1037   TROPONINI 2.784*       Urine Culture: No results for input(s): "LABURIN" in the last 48 hours.    BNP  Recent Labs   Lab 12/23/23  1914   *       Echo 12/24/23    Left Ventricle: The left ventricle is normal in size. There is concentric hypertrophy. There is severely reduced systolic function with a visually estimated ejection fraction of 25 - 30%.    Right Ventricle: Mild right ventricular enlargement. Systolic function is normal.    Left Atrium: Left atrium is moderately dilated.    Right Atrium: Right atrium is mildly dilated.    Tricuspid Valve: There is trace regurgitation. The estimated PA systolic pressure is at least 21 mmHg.    IVC/SVC: Patient is ventilated, cannot use IVC diameter to estimate right atrial pressure.    Significant Imaging:  I have reviewed all pertinent imaging results/findings within the past 24 hours.  CXR: I have reviewed all pertinent results/findings within the past 24 hours and my personal findings are:  persistent haziness at bases.    "

## 2023-12-26 NOTE — NURSING
[facility]  Nurses Note -- 4 Eyes      12/26/2023       Skin assessed on: Q Shift      [] No Pressure Injuries Present    []Prevention Measures Documented    [x] Yes LDA  for Pressure Injury Previously documented     [] Yes New Pressure Injury Discovered   [] LDA for New Pressure Injury Added      Attending RN:  Susan Saeed RN     Second RN:  Radha Gonzalez RN

## 2023-12-26 NOTE — ASSESSMENT & PLAN NOTE
Significant increase in LFT's, consistent with shock liver.  Continue to monitor.  - still significant elevated in 1000s  - continue with supportive care for blood pressure support

## 2023-12-26 NOTE — ASSESSMENT & PLAN NOTE
-Moderate effusion on x-ray.  Pulmonary following.  Will try diuresis.  May need thoracentesis if unable to wean

## 2023-12-26 NOTE — ASSESSMENT & PLAN NOTE
Patient with Hypoxic Respiratory failure which is Acute.  he is not on home oxygen. Supplemental oxygen was provided and noted- Vent Mode: PRVC  Oxygen Concentration (%):  [] 30  Resp Rate Total:  [22 br/min-40 br/min] 22 br/min  Vt Set:  [500 mL] 500 mL  PEEP/CPAP:  [5 cmH20] 5 cmH20  Mean Airway Pressure:  [8.7 cmH20-10.9 cmH20] 8.8 cmH20    .   Signs/symptoms of respiratory failure include- tachypnea, respiratory distress, and lethargy. Contributing diagnoses includes - ARDS vs CHF Labs and images were reviewed. Patient Has recent ABG, which has been reviewed. Will treat underlying causes and adjust management of respiratory failure as follows-   Ct with bilateral effusion and compressive atelectasis of left lung  + Influenza with borderline procal and normal wbc  Ef 25%     diurese  SBT and possible extubation  Tamiflu + broad spectrum antibiotics  Procal is normal + culture is nonrevealing.  Recommend stopping antibiotics (vanc/zosyn)

## 2023-12-26 NOTE — ASSESSMENT & PLAN NOTE
12/25/23 Spoken with sister, Idalia.  Patient has been living in nursing home since 2018.  Patient is not  but has a son.  Idalia is in agreement with current poc and is agreeable to resuscitation via cpr and cardioversion if indicated.

## 2023-12-26 NOTE — SUBJECTIVE & OBJECTIVE
Past Medical History:   Diagnosis Date    BPH (benign prostatic hyperplasia)     Dysarthria     HLD (hyperlipidemia)     Hypertension     Other and unspecified hyperlipidemia     Stroke        Past Surgical History:   Procedure Laterality Date    CATARACT EXTRACTION W/ INTRAOCULAR LENS  IMPLANT, BILATERAL         Review of patient's allergies indicates:  No Known Allergies    Medications:  Continuous Infusions:   LIDOcaine 1 mg/min (12/26/23 1312)    NORepinephrine bitartrate-D5W 0.1 mcg/kg/min (12/26/23 1311)     Scheduled Meds:   albuterol-ipratropium  3 mL Nebulization Q4H    aspirin  81 mg Oral Daily    clopidogreL  75 mg Oral Daily    enoxparin  40 mg Subcutaneous Daily    famotidine (PF)  20 mg Intravenous Daily    finasteride  5 mg Oral Daily    furosemide (LASIX) injection  40 mg Intravenous Q12H    midazolam  2 mg Intravenous Once    mupirocin   Nasal BID    oseltamivir  30 mg Oral BID    sodium chloride 0.9%  10 mL Intravenous Q6H     PRN Meds:calcium gluconate IVPB, calcium gluconate IVPB, calcium gluconate IVPB, magnesium sulfate IVPB, magnesium sulfate IVPB, ondansetron, potassium chloride in water **AND** potassium chloride in water **AND** potassium chloride in water, Flushing PICC/Midline Protocol **AND** sodium chloride 0.9% **AND** sodium chloride 0.9%, sodium phosphate 15 mmol in dextrose 5 % (D5W) 250 mL IVPB, sodium phosphate 20.01 mmol in dextrose 5 % (D5W) 250 mL IVPB, sodium phosphate 30 mmol in dextrose 5 % (D5W) 250 mL IVPB    Family History       Problem Relation (Age of Onset)    Dementia Father    Hypertension Mother, Father    Seizures Mother    Stroke Mother          Tobacco Use    Smoking status: Former    Smokeless tobacco: Never   Substance and Sexual Activity    Alcohol use: Yes     Comment: rare, once a week or less    Drug use: No    Sexual activity: Never       Review of Systems   Unable to perform ROS: Intubated     Objective:     Vital Signs (Most Recent):  Temp: 97.8 °F  (36.6 °C) (12/26/23 1101)  Pulse: (!) 111 (12/26/23 1300)  Resp: (!) 30.1 (12/26/23 1300)  BP: 104/73 (12/26/23 1259)  SpO2: 100 % (12/26/23 1300) Vital Signs (24h Range):  Temp:  [97.6 °F (36.4 °C)-99.1 °F (37.3 °C)] 97.8 °F (36.6 °C)  Pulse:  [103-116] 111  Resp:  [19.1-33] 30.1  SpO2:  [98 %-100 %] 100 %  BP: ()/(53-75) 104/73     Weight: 98 kg (216 lb 0.8 oz)  Body mass index is 33.84 kg/m².       Physical Exam  Constitutional:       Comments: Lying in bed, ill-appearing    Pulmonary:      Comments: ETT in place, mechanically ventilated       Significant Labs: All pertinent labs within the past 24 hours have been reviewed.  CBC:   Recent Labs   Lab 12/26/23  0556   WBC 5.93   HGB 10.9*   HCT 35.2*   MCV 72*        BMP:  Recent Labs   Lab 12/26/23  0556   *   *   K 3.6      CO2 22*   BUN 19   CREATININE 2.1*   CALCIUM 8.2*     LFT:  Lab Results   Component Value Date    AST 1,932 (H) 12/26/2023    GGT 42 08/05/2019    ALKPHOS 123 12/26/2023    BILITOT 1.4 (H) 12/26/2023     Albumin:   Albumin   Date Value Ref Range Status   12/26/2023 2.7 (L) 3.5 - 5.2 g/dL Final     Protein:   Total Protein   Date Value Ref Range Status   12/26/2023 6.5 6.0 - 8.4 g/dL Final     Lactic acid:   Lab Results   Component Value Date    LACTATE 1.8 12/24/2023    LACTATE 3.0 (H) 12/24/2023       Significant Imaging: I have reviewed all pertinent imaging results/findings within the past 24 hours.

## 2023-12-26 NOTE — ASSESSMENT & PLAN NOTE
This patient does have evidence of infective focus  My overall impression is septic shock due to MAP < 65.  Source: Respiratory  Antibiotics given-   Antibiotics (72h ago, onward)      Start     Stop Route Frequency Ordered    12/25/23 1145  mupirocin 2 % ointment         12/30/23 0859 Nasl 2 times daily 12/25/23 1036          Latest lactate reviewed-  Recent Labs   Lab 12/23/23  2304 12/24/23  1037 12/24/23  2340   LACTATE 3.2* 3.0* 1.8       Organ dysfunction indicated by Acute liver injury and Acute respiratory failure    Will Start Pressors- Levophed for MAP of 65  Source control achieved by:   Antibiotics.  -Given history of CVA and likely aspiration, will continue Zosyn.  -Also has influenza A infection which is being treated.  - continue with supportive care

## 2023-12-27 LAB
ALBUMIN SERPL BCP-MCNC: 2.9 G/DL (ref 3.5–5.2)
ALP SERPL-CCNC: 151 U/L (ref 55–135)
ALT SERPL W/O P-5'-P-CCNC: 2246 U/L (ref 10–44)
ANION GAP SERPL CALC-SCNC: 12 MMOL/L (ref 8–16)
AST SERPL-CCNC: 1747 U/L (ref 10–40)
BACTERIA BLD CULT: NORMAL
BACTERIA BLD CULT: NORMAL
BILIRUB SERPL-MCNC: 1.6 MG/DL (ref 0.1–1)
BUN SERPL-MCNC: 21 MG/DL (ref 8–23)
CALCIUM SERPL-MCNC: 8.6 MG/DL (ref 8.7–10.5)
CHLORIDE SERPL-SCNC: 98 MMOL/L (ref 95–110)
CO2 SERPL-SCNC: 23 MMOL/L (ref 23–29)
CREAT SERPL-MCNC: 2 MG/DL (ref 0.5–1.4)
EST. GFR  (NO RACE VARIABLE): 35 ML/MIN/1.73 M^2
GLUCOSE SERPL-MCNC: 190 MG/DL (ref 70–110)
POTASSIUM SERPL-SCNC: 4 MMOL/L (ref 3.5–5.1)
PROT SERPL-MCNC: 7 G/DL (ref 6–8.4)
SODIUM SERPL-SCNC: 133 MMOL/L (ref 136–145)

## 2023-12-27 PROCEDURE — 27000207 HC ISOLATION

## 2023-12-27 PROCEDURE — 25000003 PHARM REV CODE 250: Performed by: EMERGENCY MEDICINE

## 2023-12-27 PROCEDURE — 99497 ADVNCD CARE PLAN 30 MIN: CPT | Mod: ,,, | Performed by: REGISTERED NURSE

## 2023-12-27 PROCEDURE — 99900035 HC TECH TIME PER 15 MIN (STAT)

## 2023-12-27 PROCEDURE — 94003 VENT MGMT INPAT SUBQ DAY: CPT

## 2023-12-27 PROCEDURE — 80053 COMPREHEN METABOLIC PANEL: CPT | Performed by: INTERNAL MEDICINE

## 2023-12-27 PROCEDURE — 25000003 PHARM REV CODE 250: Performed by: STUDENT IN AN ORGANIZED HEALTH CARE EDUCATION/TRAINING PROGRAM

## 2023-12-27 PROCEDURE — 94761 N-INVAS EAR/PLS OXIMETRY MLT: CPT

## 2023-12-27 PROCEDURE — 25000242 PHARM REV CODE 250 ALT 637 W/ HCPCS: Performed by: INTERNAL MEDICINE

## 2023-12-27 PROCEDURE — 99900026 HC AIRWAY MAINTENANCE (STAT)

## 2023-12-27 PROCEDURE — 99233 SBSQ HOSP IP/OBS HIGH 50: CPT | Mod: ,,, | Performed by: REGISTERED NURSE

## 2023-12-27 PROCEDURE — 94640 AIRWAY INHALATION TREATMENT: CPT

## 2023-12-27 PROCEDURE — 25000003 PHARM REV CODE 250: Performed by: INTERNAL MEDICINE

## 2023-12-27 PROCEDURE — A4216 STERILE WATER/SALINE, 10 ML: HCPCS | Performed by: EMERGENCY MEDICINE

## 2023-12-27 PROCEDURE — 99233 SBSQ HOSP IP/OBS HIGH 50: CPT | Mod: ,,, | Performed by: INTERNAL MEDICINE

## 2023-12-27 PROCEDURE — 63600175 PHARM REV CODE 636 W HCPCS: Performed by: INTERNAL MEDICINE

## 2023-12-27 PROCEDURE — 99291 CRITICAL CARE FIRST HOUR: CPT | Mod: ,,, | Performed by: INTERNAL MEDICINE

## 2023-12-27 PROCEDURE — 20000000 HC ICU ROOM

## 2023-12-27 PROCEDURE — 27000221 HC OXYGEN, UP TO 24 HOURS

## 2023-12-27 RX ORDER — LACTULOSE 10 G/15ML
20 SOLUTION ORAL 3 TIMES DAILY
Status: DISCONTINUED | OUTPATIENT
Start: 2023-12-27 | End: 2023-12-29

## 2023-12-27 RX ADMIN — IPRATROPIUM BROMIDE AND ALBUTEROL SULFATE 3 ML: 2.5; .5 SOLUTION RESPIRATORY (INHALATION) at 12:12

## 2023-12-27 RX ADMIN — FINASTERIDE 5 MG: 5 TABLET, FILM COATED ORAL at 08:12

## 2023-12-27 RX ADMIN — CLOPIDOGREL BISULFATE 75 MG: 75 TABLET ORAL at 08:12

## 2023-12-27 RX ADMIN — OSELTAMIVIR PHOSPHATE 30 MG: 30 CAPSULE ORAL at 08:12

## 2023-12-27 RX ADMIN — FUROSEMIDE 40 MG: 10 INJECTION, SOLUTION INTRAVENOUS at 12:12

## 2023-12-27 RX ADMIN — ENOXAPARIN SODIUM 40 MG: 40 INJECTION SUBCUTANEOUS at 05:12

## 2023-12-27 RX ADMIN — ASPIRIN 81 MG CHEWABLE TABLET 81 MG: 81 TABLET CHEWABLE at 08:12

## 2023-12-27 RX ADMIN — MUPIROCIN: 20 OINTMENT TOPICAL at 08:12

## 2023-12-27 RX ADMIN — Medication 10 ML: at 06:12

## 2023-12-27 RX ADMIN — IPRATROPIUM BROMIDE AND ALBUTEROL SULFATE 3 ML: 2.5; .5 SOLUTION RESPIRATORY (INHALATION) at 07:12

## 2023-12-27 RX ADMIN — IPRATROPIUM BROMIDE AND ALBUTEROL SULFATE 3 ML: 2.5; .5 SOLUTION RESPIRATORY (INHALATION) at 03:12

## 2023-12-27 RX ADMIN — NOREPINEPHRINE BITARTRATE 0.05 MCG/KG/MIN: 4 INJECTION, SOLUTION INTRAVENOUS at 06:12

## 2023-12-27 RX ADMIN — IPRATROPIUM BROMIDE AND ALBUTEROL SULFATE 3 ML: 2.5; .5 SOLUTION RESPIRATORY (INHALATION) at 11:12

## 2023-12-27 RX ADMIN — FAMOTIDINE 20 MG: 10 INJECTION INTRAVENOUS at 08:12

## 2023-12-27 RX ADMIN — Medication 10 ML: at 12:12

## 2023-12-27 RX ADMIN — IPRATROPIUM BROMIDE AND ALBUTEROL SULFATE 3 ML: 2.5; .5 SOLUTION RESPIRATORY (INHALATION) at 08:12

## 2023-12-27 RX ADMIN — IPRATROPIUM BROMIDE AND ALBUTEROL SULFATE 3 ML: 2.5; .5 SOLUTION RESPIRATORY (INHALATION) at 04:12

## 2023-12-27 RX ADMIN — LACTULOSE 20 G: 20 SOLUTION ORAL at 02:12

## 2023-12-27 RX ADMIN — LACTULOSE 20 G: 20 SOLUTION ORAL at 08:12

## 2023-12-27 NOTE — ASSESSMENT & PLAN NOTE
Brief VT overnight. Consider adding lidocaine if this continues. Would avoid amiodarone with elevated LFTs  12/27/23 no further VT on lidocaine - will d/c and observe for now

## 2023-12-27 NOTE — ASSESSMENT & PLAN NOTE
Significant increase in LFT's, consistent with shock liver.  Continue to monitor.  - still significant elevated in 1000s  - continue with supportive care for blood pressure support  - ammonia elevated, started lactulose

## 2023-12-27 NOTE — ASSESSMENT & PLAN NOTE
[facility]; did not pass SAT this morning. Was initially intubated in ER for respiratory distress as a result of his pneumonia and pleural effusion.

## 2023-12-27 NOTE — ASSESSMENT & PLAN NOTE
Patient is identified as having Systolic (HFrEF) heart failure that is Acute on chronic. CHF is currently controlled. Latest ECHO performed and demonstrates- Results for orders placed during the hospital encounter of 12/23/23    Echo Saline Bubble? No    Interpretation Summary    Left Ventricle: The left ventricle is normal in size. There is concentric hypertrophy. There is severely reduced systolic function with a visually estimated ejection fraction of 25 - 30%.    Right Ventricle: Mild right ventricular enlargement. Systolic function is normal.    Left Atrium: Left atrium is moderately dilated.    Right Atrium: Right atrium is mildly dilated.    Tricuspid Valve: There is trace regurgitation. The estimated PA systolic pressure is at least 21 mmHg.    IVC/SVC: Patient is ventilated, cannot use IVC diameter to estimate right atrial pressure.  . Continue Furosemide and monitor clinical status closely. Monitor on telemetry. Patient is off CHF pathway.  Monitor strict Is&Os and daily weights.  Place on fluid restriction of 1.5 L. Cardiology has been consulted. Continue to stress to patient importance of self efficacy and  on diet for CHF. Last BNP reviewed- and noted below   Recent Labs   Lab 12/23/23  1914   *     -Diuresing well

## 2023-12-27 NOTE — ASSESSMENT & PLAN NOTE
Initial palliative consult with Dr. Alaina Aguilar 12/26/2023 - Please see consult note for initial ACP/GOC.     12/27/2023  - intermittent chart reviewed; discussed pt with ICU MDT during ICU rounds   - discussed with PCCM, Dr. Edward, plan for extubation today; also discussed with bedside nurse, DAVE Tamayo, prior call today with pt's son, Saurabh, with questions regarding transition to DNR and overall plan of care   - along with Dr. Edward, PCCM, called pt's son, Saurabh; Dr. Isaac Aguilar, hospital primary, also able to listen to most of GOC/ACP discussion with son   - medical update and plan of care provided by Dr. Edward  - through discussion, son confirmed his and family's wishes for DNR; would not wish for reintubation once extubated, GOC do not align with long term life support or trach   - further dicussed plan for extubation today, with hopes that pt would be able to support his own respiratory status without ventilator support based on trials so far today; clarification provided that this wasn't a compassionate extubation after some questions from pt's son about amount of time pt had left; did however, discuss chance/risk that given current DNR status that pt could have difficulty maintaining airway on his own which could manifest at any time after extubation  - Dr. Edward provided plan/time for extubation today; encouraged son/pt's sister could be present at facility at time of extubation if desired, in the event pt were to have difficulty with respiratory status post extubation; son expressed ok with proceeding and would notify his aunt   - encouraged son that plan of care will involve symptom management, comfort, and continued treatment otherwise, that DNR does not correlate with do not treat; current GOC is to continue with all other planned treatment and therapies if pt tolerates   - son did transparently share conversation with pt on Friday, prior to admission with pt sharing dissatisfaction with quality of life at  that time, which was prior to events requiring hospital admission/intubation; son was confident that if pt's QOL was any less than pre-hospitalization QOL, it would not be an adequate QOL for pt if he could speak for himself   - son concerned with his crrent plan to visit pt on Friday; transparently discussed respiratory decline is not always predictable, but team would provide updates post extubation, and team will attempt to contact him if any declines occur or are anticipated  - emotional support provided; allowed time for questions/concerns   - ongoing communication with MDT

## 2023-12-27 NOTE — ASSESSMENT & PLAN NOTE
Most likely hepatic congestion  Transaminase continue to increase.  Alkaline phosphatase normal  Doppler of liver 12/26 without evidence of liver artery stenosis  AST is trending down but alt still trends up.

## 2023-12-27 NOTE — PROGRESS NOTES
[facility] Medicine  Progress Note    Patient Name: Alexsander Tafoya  MRN: [id number]  Patient Class: IP- Inpatient   Admission Date: 12/23/2023  Length of Stay: 4 days  Attending Physician: Isaac Aguilar MD  Primary Care Provider: Helio Farr MD        Subjective:     Principal Problem:Acute hypoxic respiratory failure        HPI:  Mr Tafoya is a 73-year-old male being admitted for acute hypoxic respiratory failure.  His medical history significant for BPH, dyslipidemia, chronic CVA with resultant aphasia, and right-sided hemiparesis.  During my evaluation the patient is in severe respiratory distress, on BiPAP and unable to provide any meaningful detailed history.  Seems that he came from shelter due to worsening dyspnea, and EMS noted low oxygen saturations. in the ER, he was noted to have Tmax of 103.1 °F, persistently tachycardic in 130s, hypotensive 84/52, and was initially on nonrebreather and subsequently placed on BiPAP.  His CBC shows normal white count, microcytic anemia 10.5 and normal platelets.  INR 1.4.  CBC with metabolic acidosis bicarb 16, creatinine 1.9 (appears to have previous creatinine of 1.09 in September 2022 with baseline of about 1.1-1.3,), hypomagnesemia 1.5, transaminitis AST: ALT-192: 152, lactic acidosis 3.1, elevated procalcitonin 0.26.  His influenza a swab is positive.  His chest x-ray suggests moderate right-sided pleural effusion with compressive atelectasis and/or lower lobe consolidation.    After evaluating the patient, discussed with ER physician that the patient may need to be intubated given severe respiratory distress tachycardia, respiratory rate in 50s and inability to protect airway with previous stroke.  Patient will be placed on mechanical ventilation.  He has so far received breathing treatments, Rocephin and Zithromax along with Tamiflu.  Admission has been requested for further evaluation and treatment.    Overview/Hospital Course:  74 y/o  male presents with SOB. In the ER, he was noted to have Tmax of 103.1 °F, persistently tachycardic in 130s, hypotensive 84/52, and was initially on nonrebreather and subsequently placed on BiPAP.  Respiratory status worsened and he was then intubated.  Flu positive and started on Tamiflu. His chest x-ray suggests moderate right-sided pleural effusion with compressive atelectasis and/or lower lobe consolidation.  Started on broad spectrum ABX's. Diuresis. Noted to have shock liver with LFTs in 1000s. Developed episodes of VT overnight, started on lidocaine drip per Cardiology recommendations. SAT/SBT ongoing. Palliative care following. Started on lactulose for elevated ammonia.     Interval History: no acute issues, hoping to extubate and ongoing discussions with family. Now DNR. Started on lactulose for elevated ammonia. Remains on lidocaine and norepinephrine.    Review of Systems   Unable to perform ROS: Intubated     Objective:     Vital Signs (Most Recent):  Temp: 98.5 °F (36.9 °C) (12/27/23 1430)  Pulse: 110 (12/27/23 1430)  Resp: (!) 27.2 (12/27/23 1430)  BP: 103/66 (12/27/23 1430)  SpO2: 99 % (12/27/23 1430) Vital Signs (24h Range):  Temp:  [97.9 °F (36.6 °C)-98.9 °F (37.2 °C)] 98.5 °F (36.9 °C)  Pulse:  [105-113] 110  Resp:  [18.1-35.3] 27.2  SpO2:  [99 %-100 %] 99 %  BP: ()/(56-74) 103/66     Weight: 98 kg (216 lb 0.8 oz)  Body mass index is 33.84 kg/m².    Intake/Output Summary (Last 24 hours) at 12/27/2023 1450  Last data filed at 12/27/2023 1000  Gross per 24 hour   Intake 486.78 ml   Output 2150 ml   Net -1663.22 ml           Physical Exam  Constitutional:       Appearance: He is well-developed.   HENT:      Head: Normocephalic and atraumatic.      Mouth/Throat:      Comments: ETT in place  Eyes:      Conjunctiva/sclera: Conjunctivae normal.      Pupils: Pupils are equal, round, and reactive to light.   Cardiovascular:      Rate and Rhythm: Normal rate and regular rhythm.      Heart sounds: Normal  heart sounds.   Pulmonary:      Effort: Pulmonary effort is normal.      Breath sounds: Normal breath sounds.      Comments: Decreased breath sound on the right.    Abdominal:      General: Bowel sounds are normal.      Palpations: Abdomen is soft.   Musculoskeletal:         General: Normal range of motion.      Cervical back: Normal range of motion and neck supple.      Right lower leg: Edema present.      Left lower leg: Edema present.   Skin:     General: Skin is warm.   Neurological:      Cranial Nerves: No cranial nerve deficit.      Comments: Calm, awake some             Significant Labs: All pertinent labs within the past 24 hours have been reviewed.  BMP:   Recent Labs   Lab 12/27/23  0511   *   *   K 4.0   CL 98   CO2 23   BUN 21   CREATININE 2.0*   CALCIUM 8.6*       CBC:   Recent Labs   Lab 12/26/23  0556   WBC 5.93   HGB 10.9*   HCT 35.2*            Significant Imaging: I have reviewed all pertinent imaging results/findings within the past 24 hours.    Assessment/Plan:      * Acute hypoxic respiratory failure  Patient with Hypoxic Respiratory failure which is Acute.  he is not on home oxygen. Supplemental oxygen was provided and noted- Vent Mode: PS/CPAP  Oxygen Concentration (%):  [30] 30  Resp Rate Total:  [23 br/min-27 br/min] 27 br/min  PEEP/CPAP:  [7 cmH20] 7 cmH20  Pressure Support:  [5 cmH20-8 cmH20] 5 cmH20  Mean Airway Pressure:  [8.2 tuE32-61.6 cmH20] 8.8 cmH20.   Signs/symptoms of respiratory failure include- tachypnea, increased work of breathing, respiratory distress, use of accessory muscles, wheezing, and stridor. Contributing diagnoses includes - Pleural effusion and Pneumonia Labs and images were reviewed. Patient Has not had a recent ABG. Will treat underlying causes and adjust management of respiratory failure as follows- Treat underlying etiologies of pneumonia and evaluate pleural effusion.  Intubated in ER.  Pulmonary consulted.  Respiratory failure secondary to  flu/pneumonia/pleural effusion.  - SBT/SAT daily    Acute on chronic combined systolic and diastolic congestive heart failure  Patient is identified as having Systolic (HFrEF) heart failure that is Acute on chronic. CHF is currently controlled. Latest ECHO performed and demonstrates- Results for orders placed during the hospital encounter of 12/23/23    Echo Saline Bubble? No    Interpretation Summary    Left Ventricle: The left ventricle is normal in size. There is concentric hypertrophy. There is severely reduced systolic function with a visually estimated ejection fraction of 25 - 30%.    Right Ventricle: Mild right ventricular enlargement. Systolic function is normal.    Left Atrium: Left atrium is moderately dilated.    Right Atrium: Right atrium is mildly dilated.    Tricuspid Valve: There is trace regurgitation. The estimated PA systolic pressure is at least 21 mmHg.    IVC/SVC: Patient is ventilated, cannot use IVC diameter to estimate right atrial pressure.  . Continue Furosemide and monitor clinical status closely. Monitor on telemetry. Patient is off CHF pathway.  Monitor strict Is&Os and daily weights.  Place on fluid restriction of 1.5 L. Cardiology has been consulted. Continue to stress to patient importance of self efficacy and  on diet for CHF. Last BNP reviewed- and noted below   Recent Labs   Lab 12/23/23 1914   *     -Diuresing well     NSVT (nonsustained ventricular tachycardia)  - noted overnight, if needed recommendations include starting lidocaine instead of amio (liver enzymes)  - more episodes of NSVT on 12/25 --> started on lidocaine drip      Lactic acidosis  Secondary to septic shock.  Repeat lactate in Am. Improved      Shock  Septic shock as above.      Pleural effusion  -Moderate effusion on x-ray.  Pulmonary following.  Will try diuresis.  May need thoracentesis if unable to wean       Influenza A  -Continue Tamiflu.      Acute renal failure  Patient with acute kidney  injury/acute renal failure likely due to acute tubular necrosis caused by septic shock  SHAYY is currently worsening. Baseline creatinine  ranging from 1 to 1.7  - Labs reviewed- Renal function/electrolytes with Estimated Creatinine Clearance: 36.7 mL/min (A) (based on SCr of 2 mg/dL (H)). according to latest data. Monitor urine output and serial BMP and adjust therapy as needed. Avoid nephrotoxins and renally dose meds for GFR listed above.  Treat shock.  Continue pressors.  Closely monitor renal function.  Nephrology consult if any further worsening.  - relatively stable     Elevated troponin  Significantly elevated Troponin consistent with NSTEMI.  Probably demand ischemia from shock.  On ASA and Plavix.  Hold Statin with shock liver.  Cardiology consulted.  - recommend to treat conservatively      Septic shock  This patient does have evidence of infective focus  My overall impression is septic shock due to MAP < 65.  Source: Respiratory  Antibiotics given-   Antibiotics (72h ago, onward)      Start     Stop Route Frequency Ordered    12/25/23 1145  mupirocin 2 % ointment         12/30/23 0859 Nasl 2 times daily 12/25/23 1036          Latest lactate reviewed-  Recent Labs   Lab 12/23/23  2304 12/24/23  1037 12/24/23  2340   LACTATE 3.2* 3.0* 1.8       Organ dysfunction indicated by Acute liver injury and Acute respiratory failure    Will Start Pressors- Levophed for MAP of 65  Source control achieved by:   Antibiotics.  -Given history of CVA and likely aspiration, will continue Zosyn.  -Also has influenza A infection which is being treated.  - continue with supportive care    Transaminitis  Significant increase in LFT's, consistent with shock liver.  Continue to monitor.  - still significant elevated in 1000s  - continue with supportive care for blood pressure support  - ammonia elevated, started lactulose      BPH (benign prostatic hyperplasia)  -Continue Proscar.      Hyperlipidemia  -On statin.  Hold Statin with  shock liver.    Essential hypertension  -antihypertensives on hold due to septic shock.    Aphasia as late effect of cerebrovascular accident  Per notes.      Hemiplegia affecting right side in right-dominant patient as late effect of cerebrovascular disease  -Chronic CVA with previously noted resultant right-sided hemiparesis and aphasia.      VTE Risk Mitigation (From admission, onward)           Ordered     enoxaparin injection 40 mg  Daily         12/23/23 2143     IP VTE HIGH RISK PATIENT  Once         12/23/23 2143     Place sequential compression device  Until discontinued         12/23/23 2143                    Discharge Planning   THAIS:      Code Status: DNR   Is the patient medically ready for discharge?:     Reason for patient still in hospital (select all that apply): Treatment  Discharge Plan A: Assisted Living ([facility] [place]-return)            Critical care time spent on the evaluation and treatment of severe organ dysfunction, review of pertinent labs and imaging studies, discussions with consulting providers and discussions with patient/family: 15 minutes.      Isaac Aguilar MD  Department of Hospital Medicine   [facility] [place] - Intensive Care

## 2023-12-27 NOTE — NURSING
[facility]  Nurses Note -- 4 Eyes      12/26/2023       Skin assessed on: Q Shift      [] No Pressure Injuries Present    []Prevention Measures Documented    [x] Yes LDA  for Pressure Injury Previously documented     [] Yes New Pressure Injury Discovered   [] LDA for New Pressure Injury Added      Attending RN:  Prema Browne RN     Second RN:  DAVE Peterson

## 2023-12-27 NOTE — SUBJECTIVE & OBJECTIVE
Interval History: no acute issues, hoping to extubate and ongoing discussions with family. Now DNR. Started on lactulose for elevated ammonia. Remains on lidocaine and norepinephrine.    Review of Systems   Unable to perform ROS: Intubated     Objective:     Vital Signs (Most Recent):  Temp: 98.5 °F (36.9 °C) (12/27/23 1430)  Pulse: 110 (12/27/23 1430)  Resp: (!) 27.2 (12/27/23 1430)  BP: 103/66 (12/27/23 1430)  SpO2: 99 % (12/27/23 1430) Vital Signs (24h Range):  Temp:  [97.9 °F (36.6 °C)-98.9 °F (37.2 °C)] 98.5 °F (36.9 °C)  Pulse:  [105-113] 110  Resp:  [18.1-35.3] 27.2  SpO2:  [99 %-100 %] 99 %  BP: ()/(56-74) 103/66     Weight: 98 kg (216 lb 0.8 oz)  Body mass index is 33.84 kg/m².    Intake/Output Summary (Last 24 hours) at 12/27/2023 1450  Last data filed at 12/27/2023 1000  Gross per 24 hour   Intake 486.78 ml   Output 2150 ml   Net -1663.22 ml           Physical Exam  Constitutional:       Appearance: He is well-developed.   HENT:      Head: Normocephalic and atraumatic.      Mouth/Throat:      Comments: ETT in place  Eyes:      Conjunctiva/sclera: Conjunctivae normal.      Pupils: Pupils are equal, round, and reactive to light.   Cardiovascular:      Rate and Rhythm: Normal rate and regular rhythm.      Heart sounds: Normal heart sounds.   Pulmonary:      Effort: Pulmonary effort is normal.      Breath sounds: Normal breath sounds.      Comments: Decreased breath sound on the right.    Abdominal:      General: Bowel sounds are normal.      Palpations: Abdomen is soft.   Musculoskeletal:         General: Normal range of motion.      Cervical back: Normal range of motion and neck supple.      Right lower leg: Edema present.      Left lower leg: Edema present.   Skin:     General: Skin is warm.   Neurological:      Cranial Nerves: No cranial nerve deficit.      Comments: Calm, awake some             Significant Labs: All pertinent labs within the past 24 hours have been reviewed.  BMP:   Recent Labs    Lab 12/27/23  0511   *   *   K 4.0   CL 98   CO2 23   BUN 21   CREATININE 2.0*   CALCIUM 8.6*       CBC:   Recent Labs   Lab 12/26/23  0556   WBC 5.93   HGB 10.9*   HCT 35.2*            Significant Imaging: I have reviewed all pertinent imaging results/findings within the past 24 hours.

## 2023-12-27 NOTE — PROGRESS NOTES
[facility] Intensive Care  Cardiology  Progress Note    Patient Name: Alexsander Tafoya  MRN: [id number]  Admission Date: 12/23/2023  Hospital Length of Stay: 4 days  Code Status: Full Code   Attending Physician: Isaac Aguilar MD   Primary Care Physician: Helio Farr MD  Expected Discharge Date:   Principal Problem:Acute hypoxic respiratory failure    Subjective:     Hospital Course:   12/25/23 on vent and levophed. Brief NSVT noted overnight  12/27/23 No further VT on lidocaine. On vent and low dose levophed    Echo 12/24/23    Left Ventricle: The left ventricle is normal in size. There is concentric hypertrophy. There is severely reduced systolic function with a visually estimated ejection fraction of 25 - 30%.    Right Ventricle: Mild right ventricular enlargement. Systolic function is normal.    Left Atrium: Left atrium is moderately dilated.    Right Atrium: Right atrium is mildly dilated.    Tricuspid Valve: There is trace regurgitation. The estimated PA systolic pressure is at least 21 mmHg.    IVC/SVC: Patient is ventilated, cannot use IVC diameter to estimate right atrial pressure.       Interval History:     Review of Systems   Unable to perform ROS: Intubated     Objective:     Vital Signs (Most Recent):  Temp: 98.8 °F (37.1 °C) (12/27/23 0700)  Pulse: 107 (12/27/23 0829)  Resp: (!) 24 (12/27/23 0829)  BP: 103/63 (12/27/23 0800)  SpO2: 99 % (12/27/23 0829) Vital Signs (24h Range):  Temp:  [97.8 °F (36.6 °C)-98.9 °F (37.2 °C)] 98.8 °F (37.1 °C)  Pulse:  [103-113] 107  Resp:  [18.1-35.3] 24  SpO2:  [99 %-100 %] 99 %  BP: ()/(61-75) 103/63     Weight: 98 kg (216 lb 0.8 oz)  Body mass index is 33.84 kg/m².     SpO2: 99 %         Intake/Output Summary (Last 24 hours) at 12/27/2023 0841  Last data filed at 12/27/2023 0701  Gross per 24 hour   Intake 781.46 ml   Output 2800 ml   Net -2018.54 ml       Lines/Drains/Airways       Peripherally Inserted Central Catheter Line  Duration             PICC  Triple Lumen 12/23/23 2320 left basilic 3 days              Drain  Duration                  NG/OG Tube 12/23/23 2146 orogastric 16 Fr. Center mouth 3 days         Urethral Catheter 12/23/23 2231 Straight-tip 16 Fr. 3 days              Airway  Duration                  Airway - Non-Surgical 12/23/23 2146 Endotracheal Tube 3 days              Peripheral Intravenous Line  Duration                  Peripheral IV - Single Lumen 12/23/23 1913 20 G Anterior;Distal;Left Antecubital 3 days         Peripheral IV - Single Lumen 12/23/23 1913 20 G Anterior;Left Forearm 3 days                       Physical Exam  Constitutional:       Appearance: He is well-developed.   HENT:      Head: Normocephalic and atraumatic.   Eyes:      Conjunctiva/sclera: Conjunctivae normal.      Pupils: Pupils are equal, round, and reactive to light.   Cardiovascular:      Rate and Rhythm: Tachycardia present.      Pulses: Intact distal pulses.      Heart sounds: Normal heart sounds.   Pulmonary:      Effort: Pulmonary effort is normal.      Breath sounds: Normal breath sounds.   Abdominal:      General: Bowel sounds are normal.      Palpations: Abdomen is soft.   Musculoskeletal:         General: Normal range of motion.      Cervical back: Normal range of motion and neck supple.   Skin:     General: Skin is warm and dry.   Neurological:      Mental Status: He is alert.            Significant Labs: All pertinent lab results from the last 24 hours have been reviewed.    Significant Imaging: Echocardiogram: 2D echo with color flow doppler: No results found for this or any previous visit.  Assessment and Plan:     Brief HPI:     NSVT (nonsustained ventricular tachycardia)  Brief VT overnight. Consider adding lidocaine if this continues. Would avoid amiodarone with elevated LFTs  12/27/23 no further VT on lidocaine - will d/c and observe for now    Dilated cardiomyopathy  EF 25-30% which is not new per  records. Suspected cardiac amyloidosis but has  declined further evaluation and AICD in the past. Continue supportive care    Shock  Pressors as needed    Influenza A  Per primary    NSTEMI (non-ST elevated myocardial infarction)  Troponin up to 2.7. Cr 2.2. has declined invasive procedures and AICD per [facility]. Conservative medical Rx    Hemiplegia affecting right side in right-dominant patient as late effect of cerebrovascular disease  Per primary        VTE Risk Mitigation (From admission, onward)           Ordered     enoxaparin injection 40 mg  Daily         12/23/23 2143     IP VTE HIGH RISK PATIENT  Once         12/23/23 2143     Place sequential compression device  Until discontinued         12/23/23 2143                    Hilario Morgan MD  Cardiology  [facility] [place] - Intensive Care

## 2023-12-27 NOTE — RESPIRATORY THERAPY
Patient remains on servoU ventilator with charted settings. Alarms on, set and functioning. AMBU BAG and mask at bedside. Oral care and suctioning done. Will continue to monitor.

## 2023-12-27 NOTE — ASSESSMENT & PLAN NOTE
Appreciate card inputs  H/o amyloidosis  Diurese as tolerated.   EF20%.  Chronic per Dr. Morgan.  Per Dr. Morgan, patient had refused AICD in the past    Recent Labs   Lab 12/23/23 1914   *

## 2023-12-27 NOTE — ASSESSMENT & PLAN NOTE
Patient with Hypoxic Respiratory failure which is Acute.  he is not on home oxygen. Supplemental oxygen was provided and noted- Vent Mode: PC  Oxygen Concentration (%):  [30] 30  Resp Rate Total:  [23 br/min-37 br/min] 27 br/min  PEEP/CPAP:  [7 cmH20] 7 cmH20  Pressure Support:  [8 cmH20] 8 cmH20  Mean Airway Pressure:  [8.2 rlG34-34.6 cmH20] 10.1 cmH20    .   Signs/symptoms of respiratory failure include- tachypnea, respiratory distress, and lethargy. Contributing diagnoses includes - ARDS vs CHF Labs and images were reviewed. Patient Has recent ABG, which has been reviewed. Will treat underlying causes and adjust management of respiratory failure as follows-   Ct with bilateral effusion and compressive atelectasis of left lung  + Influenza with borderline procal and normal wbc  Ef 25%     diurese  SBT and possible extubation  No plan to reintubate based on conversation with sister and son.    Tamiflu + broad spectrum antibiotics  Procal is normal + culture is nonrevealing.  Recommend stopping antibiotics (vanc/zosyn)

## 2023-12-27 NOTE — ASSESSMENT & PLAN NOTE
12/25/23 Spoken with sister, Idalia.  Patient has been living in nursing home since 2018.  Patient is not  but has a son.  Idalia is in agreement with current poc and is agreeable to resuscitation via cpr and cardioversion if indicated.    12/27/23.  Spoken with son, Saurabh, along with NP Ra.  All questions answered.  Son is aware that patient remained very weak despite improvement in oxygenation.  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  Per Saurabh, patient is chair dependent and require assistant with feeding while residing at nursing home. Saurabh does not believe that reintubation, CPR, or peg/trach would be in alignment with patient's wish.  Saurabh also in agreement with changing code status to DNR to better align with patient's GOC.  D/w Dr. Aguilar and she is in agreement.

## 2023-12-27 NOTE — SUBJECTIVE & OBJECTIVE
Medications:  Continuous Infusions:   NORepinephrine bitartrate-D5W 0.01 mcg/kg/min (12/27/23 1000)     Scheduled Meds:   albuterol-ipratropium  3 mL Nebulization Q4H    aspirin  81 mg Oral Daily    clopidogreL  75 mg Oral Daily    enoxparin  40 mg Subcutaneous Daily    famotidine (PF)  20 mg Intravenous Daily    finasteride  5 mg Oral Daily    furosemide (LASIX) injection  40 mg Intravenous Q12H    lactulose  20 g Per NG tube TID    midazolam  2 mg Intravenous Once    mupirocin   Nasal BID    oseltamivir  30 mg Oral BID    sodium chloride 0.9%  10 mL Intravenous Q6H     PRN Meds:calcium gluconate IVPB, calcium gluconate IVPB, calcium gluconate IVPB, magnesium sulfate IVPB, magnesium sulfate IVPB, ondansetron, potassium chloride in water **AND** potassium chloride in water **AND** potassium chloride in water, Flushing PICC/Midline Protocol **AND** sodium chloride 0.9% **AND** sodium chloride 0.9%, sodium phosphate 15 mmol in dextrose 5 % (D5W) 250 mL IVPB, sodium phosphate 20.01 mmol in dextrose 5 % (D5W) 250 mL IVPB, sodium phosphate 30 mmol in dextrose 5 % (D5W) 250 mL IVPB    Objective:     Vital Signs (Most Recent):  Temp: 98.8 °F (37.1 °C) (12/27/23 0700)  Pulse: 107 (12/27/23 1000)  Resp: (!) 20.8 (12/27/23 1000)  BP: 97/62 (12/27/23 1000)  SpO2: 99 % (12/27/23 1000) Vital Signs (24h Range):  Temp:  [97.8 °F (36.6 °C)-98.9 °F (37.2 °C)] 98.8 °F (37.1 °C)  Pulse:  [103-113] 107  Resp:  [18.1-35.3] 20.8  SpO2:  [99 %-100 %] 99 %  BP: ()/(61-74) 97/62     Weight: 98 kg (216 lb 0.8 oz)  Body mass index is 33.84 kg/m².     Physical Exam  Vitals and nursing note reviewed.   Constitutional:       Interventions: He is intubated.      Comments: Lying in bed, ill-appearing    Pulmonary:      Effort: He is intubated.      Comments: ETT in place, mechanically ventilated   Musculoskeletal:      Right lower leg: Edema present.      Left lower leg: Edema present.   Neurological:      Comments: Opens eyes  spontaneous/intermittently and to name; lightly squeezes hand with some commands to do so but not all         Advance Care Planning   Advance Directives:   Living Will: Yes        Copy on chart: Yes    LaPOST: No (will need LAPOST prior to discharge)    Do Not Resuscitate Status: Yes (per discussion with son Saurabh, with Dr. Edward on 12/27)    Medical Power of : No      Decision Making:  Family answered questions and Patient unable to communicate due to disease severity/cognitive impairment  Goals of Care: The family endorses that what is most important right now is to focus on symptom/pain control, quality of life, even if it means sacrificing a little time, and comfort and QOL     Accordingly, we have decided that the best plan to meet the patient's goals includes continuing with treatment with transition to DNR.  Would not wish for reintubation if respiratory status declines after extubation.       Significant Labs: All pertinent labs within the past 24 hours have been reviewed.  CBC:   Recent Labs   Lab 12/26/23  0556   WBC 5.93   HGB 10.9*   HCT 35.2*   MCV 72*        BMP:  Recent Labs   Lab 12/27/23  0511   *   *   K 4.0   CL 98   CO2 23   BUN 21   CREATININE 2.0*   CALCIUM 8.6*     LFT:  Lab Results   Component Value Date    AST 1,747 (H) 12/27/2023    GGT 42 08/05/2019    ALKPHOS 151 (H) 12/27/2023    BILITOT 1.6 (H) 12/27/2023     Albumin:   Albumin   Date Value Ref Range Status   12/27/2023 2.9 (L) 3.5 - 5.2 g/dL Final     Protein:   Total Protein   Date Value Ref Range Status   12/27/2023 7.0 6.0 - 8.4 g/dL Final     Lactic acid:   Lab Results   Component Value Date    LACTATE 1.8 12/24/2023    LACTATE 3.0 (H) 12/24/2023     Significant Imaging: I have reviewed all pertinent imaging results/findings within the past 24 hours.

## 2023-12-27 NOTE — NURSING
[facility]  ICU Shift Summary  Date: 12/27/2023      Prehospitalization: Home  Admit Date / LOS : 12/23/2023/ 4 days    Diagnosis: Acute hypoxic respiratory failure    Consults:        Active: Cardio, Palliative, and Pulm CC       Needed: N/A     Code Status: Full Code   Advanced Directive: Not Received    LDA:  Lines/Drains/Airways       Peripherally Inserted Central Catheter Line  Duration             PICC Triple Lumen 12/23/23 2320 left basilic 3 days              Drain  Duration                  NG/OG Tube 12/23/23 2146 orogastric 16 Fr. Center mouth 3 days         Urethral Catheter 12/23/23 2231 Straight-tip 16 Fr. 3 days              Airway  Duration                  Airway - Non-Surgical 12/23/23 2146 Endotracheal Tube 3 days              Peripheral Intravenous Line  Duration                  Peripheral IV - Single Lumen 12/23/23 1913 20 G Anterior;Distal;Left Antecubital 3 days         Peripheral IV - Single Lumen 12/23/23 1913 20 G Anterior;Left Forearm 3 days                  Central Lines/Site/Justification:Pressors and Multiple GTTS  Urinary Cath/Order/Justification:Critically Ill in the ICU and requiring intensive monitoring    Vasopressors/Infusions:    LIDOcaine 1 mg/min (12/27/23 0400)    NORepinephrine bitartrate-D5W 0.05 mcg/kg/min (12/27/23 0400)          GOALS: Volume/ Hemodynamic: MAP >65                     RASS: -2  light sedation, briefly awakes to voice (eye opening)    Pain Management: none       Pain Controlled: not applicable     Rhythm: ST    Respiratory Device: Vent    Vent Mode: PC  Oxygen Concentration (%):  [30] 30  Resp Rate Total:  [22 br/min-37 br/min] 27 br/min  Vt Set:  [500 mL] 500 mL  PEEP/CPAP:  [5 cmH20-7 cmH20] 7 cmH20  Pressure Support:  [8 cmH20] 8 cmH20  Mean Airway Pressure:  [8.2 nlK43-03.6 cmH20] 10.6 cmH20                 Most Recent SBT/ SAT: N/A       MOVE Screen: PT Consult  ICU Liberation: yes    VTE Prophylaxis: Pharm and Mechanical  Mobility:  "Bedrest  Stress Ulcer Prophylaxis: Yes    Isolation: Droplet    Dietary:   Current Diet Order   Procedures    Diet NPO      Tolerance: yes  Advancement: @ goal    I & O (24h):    Intake/Output Summary (Last 24 hours) at 12/27/2023 0448  Last data filed at 12/27/2023 0400  Gross per 24 hour   Intake 1026.37 ml   Output 3425 ml   Net -2398.63 ml        Restraints: Yes    Significant Dates:  Post Op Date: N/A  Rescue Date: N/A  Imaging/ Diagnostics: N/A    Noteworthy Labs:  See below    COVID Test: (--)  CBC/Anemia Labs: Coags:    Recent Labs   Lab 12/25/23  0607 12/26/23  0556   WBC 4.88 5.93   HGB 10.6* 10.9*   HCT 34.4* 35.2*    245   MCV 72* 72*   RDW 17.4* 17.4*    Recent Labs   Lab 12/23/23 1914 12/26/23  1040   INR 1.4* 1.2        Chemistries:   Recent Labs   Lab 12/23/23 1914 12/24/23  0509 12/25/23  0607 12/25/23  1939 12/26/23  0556      < > 136  --  135*   K 4.5   < > 3.4* 3.6 3.6      < > 104  --  102   CO2 16*   < > 21*  --  22*   BUN 17   < > 20  --  19   CREATININE 1.9*   < > 1.9*  --  2.1*   CALCIUM 9.3   < > 8.1*  --  8.2*   PROT 7.7   < > 6.2  --  6.5   BILITOT 1.4*   < > 1.4*  --  1.4*   ALKPHOS 102   < > 100  --  123   *   < > 1,199*  --  2,025*   *   < > 1,167*  --  1,932*   MG 1.5*  --   --   --   --    PHOS 4.6*  --   --   --   --     < > = values in this interval not displayed.        Cardiac Enzymes: Ejection Fractions:    Recent Labs     12/24/23  1037   TROPONINI 2.784*    EF   Date Value Ref Range Status   05/11/2022 55 % Final        POCT Glucose: HbA1c:    No results for input(s): "POCTGLUCOSE" in the last 168 hours. No results found for: "HGBA1C"        ICU LOS 3d 4h  Level of Care: Critical Care    Chart Check: 12 HR Done  Shift Summary/Plan for the shift: Pt remains in ICU on vent. Levophed @ 0.05mcg/kg/min to maintain MAP >65. Lidocaine @ 1mg/min because of the 29 beat V-Tach on 12/25/23. Pt in sinus tach all night with highest @ 113bpm. Pt opens eyes " spontaneously, but not following commands. Adequate urine output this shift. Free of falls, injury, and skin breakdown. CHG bath given, linen & gown changed, catheter care performed. Plan of care reviewed with patient and family.

## 2023-12-27 NOTE — SUBJECTIVE & OBJECTIVE
Interval History: diuresing well.  Tolerated sbt x 8 hours yesterday.  Did not extubated due to weakness and somnolent state      Objective:     Vital Signs (Most Recent):  Temp: 98.8 °F (37.1 °C) (12/27/23 0700)  Pulse: 106 (12/27/23 1100)  Resp: (!) 27.6 (12/27/23 1100)  BP: 102/64 (12/27/23 1100)  SpO2: 99 % (12/27/23 1100) Vital Signs (24h Range):  Temp:  [98 °F (36.7 °C)-98.9 °F (37.2 °C)] 98.8 °F (37.1 °C)  Pulse:  [103-113] 106  Resp:  [18.1-35.3] 27.6  SpO2:  [99 %-100 %] 99 %  BP: ()/(61-74) 102/64     Weight: 98 kg (216 lb 0.8 oz)  Body mass index is 33.84 kg/m².      Intake/Output Summary (Last 24 hours) at 12/27/2023 1107  Last data filed at 12/27/2023 1000  Gross per 24 hour   Intake 633.98 ml   Output 2650 ml   Net -2016.02 ml          Physical Exam  Constitutional:       Appearance: He is well-developed.   HENT:      Head: Normocephalic and atraumatic.      Mouth/Throat:      Comments: ETT in place  Eyes:      Conjunctiva/sclera: Conjunctivae normal.      Pupils: Pupils are equal, round, and reactive to light.   Cardiovascular:      Rate and Rhythm: Normal rate and regular rhythm.      Heart sounds: Normal heart sounds.   Pulmonary:      Effort: Pulmonary effort is normal.      Breath sounds: Normal breath sounds.      Comments: Decreased breath sound on the right.    Abdominal:      General: Bowel sounds are normal.      Palpations: Abdomen is soft.   Musculoskeletal:         General: Normal range of motion.      Cervical back: Normal range of motion and neck supple.      Right lower leg: Edema present.      Left lower leg: Edema present.   Skin:     General: Skin is warm.   Neurological:      Cranial Nerves: No cranial nerve deficit.      Comments: Open eyes to verbal command.  +cough.  No spontaneous movement.             Review of Systems    Vents:  Vent Mode: PC (12/27/23 0876)  Ventilator Initiated: Yes (12/23/23 2212)  Set Rate: 15 BPM (12/27/23 0829)  Vt Set: 500 mL (12/26/23  "0816)  Pressure Support: 8 cmH20 (12/26/23 1700)  PEEP/CPAP: 7 cmH20 (12/27/23 0829)  Oxygen Concentration (%): 30 (12/27/23 1100)  Peak Airway Pressure: 17.4 cmH20 (12/27/23 0829)  Total Ve: 11.05 L/m (12/27/23 0829)  F/VT Ratio<105 (RSBI): (!) 49.67 (12/27/23 0829)    Lines/Drains/Airways       Peripherally Inserted Central Catheter Line  Duration             PICC Triple Lumen 12/23/23 2320 left basilic 3 days              Drain  Duration                  NG/OG Tube 12/23/23 2146 orogastric 16 Fr. Center mouth 3 days         Urethral Catheter 12/23/23 2231 Straight-tip 16 Fr. 3 days              Airway  Duration                  Airway - Non-Surgical 12/23/23 2146 Endotracheal Tube 3 days              Peripheral Intravenous Line  Duration                  Peripheral IV - Single Lumen 12/23/23 1913 20 G Anterior;Distal;Left Antecubital 3 days         Peripheral IV - Single Lumen 12/23/23 1913 20 G Anterior;Left Forearm 3 days                    Significant Labs:    CBC/Anemia Profile:  Recent Labs   Lab 12/26/23  0556   WBC 5.93   HGB 10.9*   HCT 35.2*      MCV 72*   RDW 17.4*          Chemistries:  Recent Labs   Lab 12/25/23  1939 12/26/23  0556 12/27/23  0511   NA  --  135* 133*   K 3.6 3.6 4.0   CL  --  102 98   CO2  --  22* 23   BUN  --  19 21   CREATININE  --  2.1* 2.0*   CALCIUM  --  8.2* 8.6*   ALBUMIN  --  2.7* 2.9*   PROT  --  6.5 7.0   BILITOT  --  1.4* 1.6*   ALKPHOS  --  123 151*   ALT  --  2,025* 2,246*   AST  --  1,932* 1,747*         ABGs:   Recent Labs   Lab 12/26/23  1103   PH 7.489*   PCO2 31.1*   HCO3 23.6*   POCSATURATED 78   BE 1       Blood Culture:   No results for input(s): "LABBLOO" in the last 48 hours.    Cardiac Markers: No results for input(s): "CKMB", "TROPONINT", "MYOGLOBIN" in the last 48 hours.  Lactic Acid:   No results for input(s): "LACTATE" in the last 48 hours.    Respiratory Culture:   No results for input(s): "GSRESP", "RESPIRATORYC" in the last 48 hours.    Troponin: " "  No results for input(s): "TROPONINI" in the last 48 hours.    Urine Culture: No results for input(s): "LABURIN" in the last 48 hours.    BNP  Recent Labs   Lab 12/23/23  1914   *       Echo 12/24/23    Left Ventricle: The left ventricle is normal in size. There is concentric hypertrophy. There is severely reduced systolic function with a visually estimated ejection fraction of 25 - 30%.    Right Ventricle: Mild right ventricular enlargement. Systolic function is normal.    Left Atrium: Left atrium is moderately dilated.    Right Atrium: Right atrium is mildly dilated.    Tricuspid Valve: There is trace regurgitation. The estimated PA systolic pressure is at least 21 mmHg.    IVC/SVC: Patient is ventilated, cannot use IVC diameter to estimate right atrial pressure.    Significant Imaging:  I have reviewed all pertinent imaging results/findings within the past 24 hours.  CXR: I have reviewed all pertinent results/findings within the past 24 hours and my personal findings are:  persistent haziness at bases.    "

## 2023-12-27 NOTE — PROGRESS NOTES
[facility] - Intensive Care  Critical Care Medicine  Progress Note    Patient Name: Alexsander Tafoya  MRN: [id number]  Admission Date: 12/23/2023  Hospital Length of Stay: 4 days  Code Status: DNR  Attending Provider: Isaac Aguilar MD  Primary Care Provider: Helio Farr MD   Principal Problem: Acute hypoxic respiratory failure    Subjective:     HPI:  Patient is 73 y.o. male  has a past medical history of BPH (benign prostatic hyperplasia), Dysarthria, HLD (hyperlipidemia), Hypertension, Other and unspecified hyperlipidemia, and Stroke. presented to [facility] on 12/23/23 with cough and sob.   In the ER, he was noted to have Tmax of 103.1 °F, persistently tachycardic in 130s, hypotensive 84/52, and was initially on nonrebreather and subsequently placed on BiPAP.  His CBC shows normal white count, microcytic anemia 10.5 and normal platelets.  INR 1.4.  CBC with metabolic acidosis bicarb 16, creatinine 1.9 (appears to have previous creatinine of 1.09 in September 2022 with baseline of about 1.1-1.3,), hypomagnesemia 1.5, transaminitis AST: ALT-192: 152, lactic acidosis 3.1, elevated procalcitonin 0.26.  His influenza a swab is positive.  His chest x-ray suggests moderate right-sided pleural effusion with compressive atelectasis and/or lower lobe consolidation.  Patient was intubated in ED and pulmonary was consulted for further inputs.      During my initial evaluation, patient was intubated and sedated with propofol.  Patient sat was 100% with 70% Fio2 and 5 peep.  Vss stable with 0.08 mcg/kg/min.      Hospital/ICU Course:  No notes on file    Interval History: diuresing well.  Tolerated sbt x 8 hours yesterday.  Did not extubated due to weakness and somnolent state      Objective:     Vital Signs (Most Recent):  Temp: 98.8 °F (37.1 °C) (12/27/23 0700)  Pulse: 106 (12/27/23 1100)  Resp: (!) 27.6 (12/27/23 1100)  BP: 102/64 (12/27/23 1100)  SpO2: 99 % (12/27/23 1100) Vital Signs (24h Range):  Temp:  [98 °F  (36.7 °C)-98.9 °F (37.2 °C)] 98.8 °F (37.1 °C)  Pulse:  [103-113] 106  Resp:  [18.1-35.3] 27.6  SpO2:  [99 %-100 %] 99 %  BP: ()/(61-74) 102/64     Weight: 98 kg (216 lb 0.8 oz)  Body mass index is 33.84 kg/m².      Intake/Output Summary (Last 24 hours) at 12/27/2023 1107  Last data filed at 12/27/2023 1000  Gross per 24 hour   Intake 633.98 ml   Output 2650 ml   Net -2016.02 ml          Physical Exam  Constitutional:       Appearance: He is well-developed.   HENT:      Head: Normocephalic and atraumatic.      Mouth/Throat:      Comments: ETT in place  Eyes:      Conjunctiva/sclera: Conjunctivae normal.      Pupils: Pupils are equal, round, and reactive to light.   Cardiovascular:      Rate and Rhythm: Normal rate and regular rhythm.      Heart sounds: Normal heart sounds.   Pulmonary:      Effort: Pulmonary effort is normal.      Breath sounds: Normal breath sounds.      Comments: Decreased breath sound on the right.    Abdominal:      General: Bowel sounds are normal.      Palpations: Abdomen is soft.   Musculoskeletal:         General: Normal range of motion.      Cervical back: Normal range of motion and neck supple.      Right lower leg: Edema present.      Left lower leg: Edema present.   Skin:     General: Skin is warm.   Neurological:      Cranial Nerves: No cranial nerve deficit.      Comments: Open eyes to verbal command.  +cough.  No spontaneous movement.             Review of Systems    Vents:  Vent Mode: PC (12/27/23 0829)  Ventilator Initiated: Yes (12/23/23 2212)  Set Rate: 15 BPM (12/27/23 0829)  Vt Set: 500 mL (12/26/23 0816)  Pressure Support: 8 cmH20 (12/26/23 1700)  PEEP/CPAP: 7 cmH20 (12/27/23 0829)  Oxygen Concentration (%): 30 (12/27/23 1100)  Peak Airway Pressure: 17.4 cmH20 (12/27/23 0829)  Total Ve: 11.05 L/m (12/27/23 0829)  F/VT Ratio<105 (RSBI): (!) 49.67 (12/27/23 0462)    Lines/Drains/Airways       Peripherally Inserted Central Catheter Line  Duration             PICC Triple  "Lumen 12/23/23 2320 left basilic 3 days              Drain  Duration                  NG/OG Tube 12/23/23 2146 orogastric 16 Fr. Center mouth 3 days         Urethral Catheter 12/23/23 2231 Straight-tip 16 Fr. 3 days              Airway  Duration                  Airway - Non-Surgical 12/23/23 2146 Endotracheal Tube 3 days              Peripheral Intravenous Line  Duration                  Peripheral IV - Single Lumen 12/23/23 1913 20 G Anterior;Distal;Left Antecubital 3 days         Peripheral IV - Single Lumen 12/23/23 1913 20 G Anterior;Left Forearm 3 days                    Significant Labs:    CBC/Anemia Profile:  Recent Labs   Lab 12/26/23  0556   WBC 5.93   HGB 10.9*   HCT 35.2*      MCV 72*   RDW 17.4*          Chemistries:  Recent Labs   Lab 12/25/23 1939 12/26/23  0556 12/27/23  0511   NA  --  135* 133*   K 3.6 3.6 4.0   CL  --  102 98   CO2  --  22* 23   BUN  --  19 21   CREATININE  --  2.1* 2.0*   CALCIUM  --  8.2* 8.6*   ALBUMIN  --  2.7* 2.9*   PROT  --  6.5 7.0   BILITOT  --  1.4* 1.6*   ALKPHOS  --  123 151*   ALT  --  2,025* 2,246*   AST  --  1,932* 1,747*         ABGs:   Recent Labs   Lab 12/26/23  1103   PH 7.489*   PCO2 31.1*   HCO3 23.6*   POCSATURATED 78   BE 1       Blood Culture:   No results for input(s): "LABBLOO" in the last 48 hours.    Cardiac Markers: No results for input(s): "CKMB", "TROPONINT", "MYOGLOBIN" in the last 48 hours.  Lactic Acid:   No results for input(s): "LACTATE" in the last 48 hours.    Respiratory Culture:   No results for input(s): "GSRESP", "RESPIRATORYC" in the last 48 hours.    Troponin:   No results for input(s): "TROPONINI" in the last 48 hours.    Urine Culture: No results for input(s): "LABURIN" in the last 48 hours.    BNP  Recent Labs   Lab 12/23/23  1914   *       Echo 12/24/23    Left Ventricle: The left ventricle is normal in size. There is concentric hypertrophy. There is severely reduced systolic function with a visually estimated ejection " fraction of 25 - 30%.    Right Ventricle: Mild right ventricular enlargement. Systolic function is normal.    Left Atrium: Left atrium is moderately dilated.    Right Atrium: Right atrium is mildly dilated.    Tricuspid Valve: There is trace regurgitation. The estimated PA systolic pressure is at least 21 mmHg.    IVC/SVC: Patient is ventilated, cannot use IVC diameter to estimate right atrial pressure.    Significant Imaging:  I have reviewed all pertinent imaging results/findings within the past 24 hours.  CXR: I have reviewed all pertinent results/findings within the past 24 hours and my personal findings are:  persistent haziness at bases.      ABG  Recent Labs   Lab 12/26/23  1103   PH 7.489*   PO2 38*   PCO2 31.1*   HCO3 23.6*   BE 1     Assessment/Plan:     Pulmonary  Pleural effusion  Bilateral effusion on ct.  Rt>Lt  Moderate right effusion confirmed via pocus 12/24  DDx: parapneumonic vs volume overload  Given normal wbc and borderline procal, will monitor effusion with diuresis.  Appear to improve with cxr 12/26    Cardiac/Vascular  Acute on chronic combined systolic and diastolic congestive heart failure  Appreciate card inputs  H/o amyloidosis  Diurese as tolerated.   EF20%.  Chronic per Dr. Morgan.  Per Dr. Morgan, patient had refused AICD in the past    Recent Labs   Lab 12/23/23  1914   *         Shock  Suspect mixed with distributive and cardiogenic  Minimal levophed  EF 25% + sirs from influenza    Elevated troponin  EKG in ED without ST elevation  Appreciate cards inputs.    Suspect demand ischemia  Troponin is trending dwon.  Plavix.  Held asa due love    Renal/  Acute renal failure  Creatinine was 1.1 in 2022  Suspect atn a/w hypotension  Appear volume overload on exam and ct  Stable despite negative fluid balance.      ID  Influenza A  Tamiflu adjusted to renal function  Already with 5 days course.  Ok to stop    GI  Transaminitis  Most likely hepatic congestion  Transaminase continue  to increase.  Alkaline phosphatase normal  Doppler of liver 12/26 without evidence of liver artery stenosis  AST is trending down but alt still trends up.      Palliative Care  Goals of care, counseling/discussion  12/25/23 Spoken with sister, Idalia.  Patient has been living in nursing home since 2018.  Patient is not  but has a son.  Idalia is in agreement with current poc and is agreeable to resuscitation via cpr and cardioversion if indicated.    12/27/23.  Spoken with son, Saurabh, along with NP Ra.  All questions answered.  Son is aware that patient remained very weak despite improvement in oxygenation.  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  Per Saurabh, patient is chair dependent and require assistant with feeding while residing at nursing home. Saurabh does not believe that reintubation, CPR, or peg/trach would be in alignment with patient's wish.  Saurbah also in agreement with changing code status to DNR to better align with patient's GOC.  D/w Dr. Aguilar and she is in agreement.         Other  * Acute hypoxic respiratory failure  Patient with Hypoxic Respiratory failure which is Acute.  he is not on home oxygen. Supplemental oxygen was provided and noted- Vent Mode: PC  Oxygen Concentration (%):  [30] 30  Resp Rate Total:  [23 br/min-37 br/min] 27 br/min  PEEP/CPAP:  [7 cmH20] 7 cmH20  Pressure Support:  [8 cmH20] 8 cmH20  Mean Airway Pressure:  [8.2 wnI30-60.6 cmH20] 10.1 cmH20    .   Signs/symptoms of respiratory failure include- tachypnea, respiratory distress, and lethargy. Contributing diagnoses includes - ARDS vs CHF Labs and images were reviewed. Patient Has recent ABG, which has been reviewed. Will treat underlying causes and adjust management of respiratory failure as follows-   Ct with bilateral effusion and compressive atelectasis of left lung  + Influenza with borderline procal and normal wbc  Ef 25%      diurese  SBT and possible extubation  No plan to reintubate based on conversation with sister and son.    Tamiflu + broad spectrum antibiotics  Procal is normal + culture is nonrevealing.  Recommend stopping antibiotics (vanc/zosyn)    Neuro  *Encephalopathy  Open eyes to command.  1/5 strength of upper extremities.  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    Pt/ot after extubation     Critical Care Daily Checklist:    A: Awake: RASS Goal/Actual Goal: RASS Goal: -1-->drowsy  Actual:     B: Spontaneous Breathing Trial Performed? Spon. Breathing Trial Initiated?: Not initiated (12/26/23 0600)   C: SAT & SBT Coordinated?  yes                      D: Delirium: CAM-ICU Overall CAM-ICU: Positive   E: Early Mobility Performed? No   F: Feeding Goal:    Status:     Current Diet Order   Procedures    Diet NPO      AS: Analgesia/Sedation none   T: Thromboembolic Prophylaxis lovenox   H: HOB > 300 Yes   U: Stress Ulcer Prophylaxis (if needed) pepcid   G: Glucose Control iss   B: Bowel Function Stool Occurrence: 0   I: Indwelling Catheter (Lines & Gonzalez) Necessity Gonzalez, picc   D: De-escalation of Antimicrobials/Pharmacotherapies none    Plan for the day/ETD extubation    Code Status:  Family/Goals of Care: DNR       Critical Care Time: 60 minutes  Critical secondary to Patient has a condition that poses threat to life and bodily function: Acute Renal Failure and respiratory failure      Critical care was time spent personally by me on the following activities: development of treatment plan with patient or surrogate and bedside caregivers, discussions with consultants, evaluation of patient's response to treatment, examination of patient, ordering and performing treatments and interventions, ordering and review of laboratory studies, ordering and review of radiographic studies, pulse oximetry, re-evaluation of patient's condition. This critical care time did not overlap with that of any other provider or  involve time for any procedures.     Brijesh Edward MD  Critical Care Medicine  [facility] - Intensive Care

## 2023-12-27 NOTE — ASSESSMENT & PLAN NOTE
Patient with acute kidney injury/acute renal failure likely due to acute tubular necrosis caused by septic shock  SHAYY is currently worsening. Baseline creatinine  ranging from 1 to 1.7  - Labs reviewed- Renal function/electrolytes with Estimated Creatinine Clearance: 36.7 mL/min (A) (based on SCr of 2 mg/dL (H)). according to latest data. Monitor urine output and serial BMP and adjust therapy as needed. Avoid nephrotoxins and renally dose meds for GFR listed above.  Treat shock.  Continue pressors.  Closely monitor renal function.  Nephrology consult if any further worsening.  - relatively stable

## 2023-12-27 NOTE — ASSESSMENT & PLAN NOTE
Patient with Hypoxic Respiratory failure which is Acute.  he is not on home oxygen. Supplemental oxygen was provided and noted- Vent Mode: PS/CPAP  Oxygen Concentration (%):  [30] 30  Resp Rate Total:  [23 br/min-27 br/min] 27 br/min  PEEP/CPAP:  [7 cmH20] 7 cmH20  Pressure Support:  [5 cmH20-8 cmH20] 5 cmH20  Mean Airway Pressure:  [8.2 pzW17-29.6 cmH20] 8.8 cmH20.   Signs/symptoms of respiratory failure include- tachypnea, increased work of breathing, respiratory distress, use of accessory muscles, wheezing, and stridor. Contributing diagnoses includes - Pleural effusion and Pneumonia Labs and images were reviewed. Patient Has not had a recent ABG. Will treat underlying causes and adjust management of respiratory failure as follows- Treat underlying etiologies of pneumonia and evaluate pleural effusion.  Intubated in ER.  Pulmonary consulted.  Respiratory failure secondary to flu/pneumonia/pleural effusion.  - SBT/SAT daily

## 2023-12-27 NOTE — ASSESSMENT & PLAN NOTE
Bilateral effusion on ct.  Rt>Lt  Moderate right effusion confirmed via pocus 12/24  DDx: parapneumonic vs volume overload  Given normal wbc and borderline procal, will monitor effusion with diuresis.  Appear to improve with cxr 12/26

## 2023-12-27 NOTE — SUBJECTIVE & OBJECTIVE
Interval History:     Review of Systems   Unable to perform ROS: Intubated     Objective:     Vital Signs (Most Recent):  Temp: 98.8 °F (37.1 °C) (12/27/23 0700)  Pulse: 107 (12/27/23 0829)  Resp: (!) 24 (12/27/23 0829)  BP: 103/63 (12/27/23 0800)  SpO2: 99 % (12/27/23 0829) Vital Signs (24h Range):  Temp:  [97.8 °F (36.6 °C)-98.9 °F (37.2 °C)] 98.8 °F (37.1 °C)  Pulse:  [103-113] 107  Resp:  [18.1-35.3] 24  SpO2:  [99 %-100 %] 99 %  BP: ()/(61-75) 103/63     Weight: 98 kg (216 lb 0.8 oz)  Body mass index is 33.84 kg/m².     SpO2: 99 %         Intake/Output Summary (Last 24 hours) at 12/27/2023 0841  Last data filed at 12/27/2023 0701  Gross per 24 hour   Intake 781.46 ml   Output 2800 ml   Net -2018.54 ml       Lines/Drains/Airways       Peripherally Inserted Central Catheter Line  Duration             PICC Triple Lumen 12/23/23 2320 left basilic 3 days              Drain  Duration                  NG/OG Tube 12/23/23 2146 orogastric 16 Fr. Center mouth 3 days         Urethral Catheter 12/23/23 2231 Straight-tip 16 Fr. 3 days              Airway  Duration                  Airway - Non-Surgical 12/23/23 2146 Endotracheal Tube 3 days              Peripheral Intravenous Line  Duration                  Peripheral IV - Single Lumen 12/23/23 1913 20 G Anterior;Distal;Left Antecubital 3 days         Peripheral IV - Single Lumen 12/23/23 1913 20 G Anterior;Left Forearm 3 days                       Physical Exam  Constitutional:       Appearance: He is well-developed.   HENT:      Head: Normocephalic and atraumatic.   Eyes:      Conjunctiva/sclera: Conjunctivae normal.      Pupils: Pupils are equal, round, and reactive to light.   Cardiovascular:      Rate and Rhythm: Tachycardia present.      Pulses: Intact distal pulses.      Heart sounds: Normal heart sounds.   Pulmonary:      Effort: Pulmonary effort is normal.      Breath sounds: Normal breath sounds.   Abdominal:      General: Bowel sounds are normal.       Palpations: Abdomen is soft.   Musculoskeletal:         General: Normal range of motion.      Cervical back: Normal range of motion and neck supple.   Skin:     General: Skin is warm and dry.   Neurological:      Mental Status: He is alert.            Significant Labs: All pertinent lab results from the last 24 hours have been reviewed.    Significant Imaging: Echocardiogram: 2D echo with color flow doppler: No results found for this or any previous visit.

## 2023-12-27 NOTE — HPI
" (From H&P) "Mr Tafoya is a 73-year-old male being admitted for acute hypoxic respiratory failure.  His medical history significant for BPH, dyslipidemia, chronic CVA with resultant aphasia, and right-sided hemiparesis.  During my evaluation the patient is in severe respiratory distress, on BiPAP and unable to provide any meaningful detailed history.  Seems that he came from assisted due to worsening dyspnea, and EMS noted low oxygen saturations. in the ER, he was noted to have Tmax of 103.1 °F, persistently tachycardic in 130s, hypotensive 84/52, and was initially on nonrebreather and subsequently placed on BiPAP.  His CBC shows normal white count, microcytic anemia 10.5 and normal platelets.  INR 1.4.  CBC with metabolic acidosis bicarb 16, creatinine 1.9 (appears to have previous creatinine of 1.09 in September 2022 with baseline of about 1.1-1.3,), hypomagnesemia 1.5, transaminitis AST: ALT-192: 152, lactic acidosis 3.1, elevated procalcitonin 0.26.  His influenza a swab is positive.  His chest x-ray suggests moderate right-sided pleural effusion with compressive atelectasis and/or lower lobe consolidation.     After evaluating the patient, discussed with ER physician that the patient may need to be intubated given severe respiratory distress tachycardia, respiratory rate in 50s and inability to protect airway with previous stroke.  Patient will be placed on mechanical ventilation.  He has so far received breathing treatments, Rocephin and Zithromax along with Tamiflu.  Admission has been requested for further evaluation and treatment."     Palliative medicine is consulted for support and advance care planning; for details of visit with pt and family, see ACP section of plan.   "

## 2023-12-27 NOTE — PROGRESS NOTES
[facility] - Intensive Care  Palliative Medicine  Progress Note    Patient Name: Alexsander Tafoya  MRN: [id number]  Admission Date: 12/23/2023  Hospital Length of Stay: 4 days  Code Status: DNR   Attending Provider: Isaac Aguilar MD  Consulting Provider: Idalia Knapp NP  Primary Care Physician: Helio Farr MD  Principal Problem:Acute hypoxic respiratory failure    Patient information was obtained from relative(s), past medical records, and primary team.      Assessment/Plan:   Advance Care Planning     Palliative Care  Advance care planning    Initial palliative consult with Dr. Alaina Aguilar 12/26/2023 - Please see consult note for initial ACP/GOC.     12/27/2023  - intermittent chart reviewed; discussed pt with ICU MDT during ICU rounds   - discussed with PCCM, Dr. Edward, plan for extubation today; also discussed with bedside nurse, DAVE Tamayo, prior call today with pt's son, Saurabh, with questions regarding transition to DNR and overall plan of care   - along with Dr. Edward, PCCM, called pt's son, Saurabh; Dr. Isaac Aguilar, hospital primary, also able to listen to most of GOC/ACP discussion with son   - medical update and plan of care provided by Dr. Edward  - through discussion, son confirmed his and family's wishes for DNR; would not wish for reintubation once extubated, GOC do not align with long term life support or trach   - further dicussed plan for extubation today, with hopes that pt would be able to support his own respiratory status without ventilator support based on trials so far today; clarification provided that this wasn't a compassionate extubation after some questions from pt's son about amount of time pt had left; did however, discuss chance/risk that given current DNR status that pt could have difficulty maintaining airway on his own which could manifest at any time after extubation  - Dr. Edward provided plan/time for extubation today; encouraged son/pt's sister could be present at facility at  time of extubation if desired, in the event pt were to have difficulty with respiratory status post extubation; son expressed ok with proceeding and would notify his aunt   - encouraged son that plan of care will involve symptom management, comfort, and continued treatment otherwise, that DNR does not correlate with do not treat; current GOC is to continue with all other planned treatment and therapies if pt tolerates   - son did transparently share conversation with pt on Friday, prior to admission with pt sharing dissatisfaction with quality of life at that time, which was prior to events requiring hospital admission/intubation; son was confident that if pt's QOL was any less than pre-hospitalization QOL, it would not be an adequate QOL for pt if he could speak for himself   - son concerned with his current plan to visit pt on Friday; transparently discussed respiratory decline is not always predictable, but team would provide updates post extubation, and team will attempt to contact him if any declines occur or are anticipated  - emotional support provided; allowed time for questions/concerns   - ongoing communication with MDT     Neuro  Aphasia as late effect of cerebrovascular accident  - Noted at baseline     Hemiplegia affecting right side in right-dominant patient as late effect of cerebrovascular disease  - At baseline requires assistance for all ADLs    Pulmonary  Pleural effusion  - Management per PCCM     Cardiac/Vascular  Acute on chronic combined systolic and diastolic congestive heart failure  - Management per primary team     Dilated cardiomyopathy  - EF 25 to 30%; illness trajectory education provided to family     NSTEMI (non-ST elevated myocardial infarction)  - Cardiology consulted; medical management at this time    Renal/  Acute renal failure  - Management per primary team     ID  Influenza A  - Supportive care per primary     Septic shock  - Management per primary team; pneumonia as source?   "    GI  Transaminitis  - Further evaluation and management per primary team; awaiting ultrasound. Unclear if shock liver vs congestive hepatopathy?     Other  * Acute hypoxic respiratory failure  - Ventilator management per [facility]; did not pass SAT this morning. Was initially intubated in ER for respiratory distress as a result of his pneumonia and pleural effusion.    Palliative team will follow-up with patient. Please contact us if you have any additional questions.    Total visit time: 55 minutes    35 min visit time including: face to face time in discussion of symptom assessment, and exploring options and burdens of offered treatments.  This also includes non-face to face time preparing to see the patient including chart review, obtaining and/or reviewing separately obtained history, documenting clinical information in the electronic or other health record, independently interpreting results and communicating results to the patient/family/caregiver, family discussions by phone if not able to be present, coordination of care with other specialists, and discharge planning.     20 min ACP time spent: goals of care, advanced care planning, emotional support, formulating and communicating prognosis, exploring burden/ benefit of various approaches of treatment.     Idalia Knapp NP  Palliative Medicine  [facility]      Subjective:     Chief Complaint:   Chief Complaint   Patient presents with    Shortness of Breath     Pt reports to the ED BIB [facility] from the [facility] with C/O SOB and cough that started yesterday. Per EMS pt was sating in the low 80's on RA. Pt was placed on 15L NRB by EMS. Pt breathing appears labored and Pt is grunting with each breath. Resp shallow. Pt placed in 17 for eval.        HPI:    (From H&P) "Mr Tafoya is a 73-year-old male being admitted for acute hypoxic respiratory failure.  His medical history significant for BPH, dyslipidemia, chronic CVA with " "resultant aphasia, and right-sided hemiparesis.  During my evaluation the patient is in severe respiratory distress, on BiPAP and unable to provide any meaningful detailed history.  Seems that he came from [facility] due to worsening dyspnea, and EMS noted low oxygen saturations. in the ER, he was noted to have Tmax of 103.1 °F, persistently tachycardic in 130s, hypotensive 84/52, and was initially on nonrebreather and subsequently placed on BiPAP.  His CBC shows normal white count, microcytic anemia 10.5 and normal platelets.  INR 1.4.  CBC with metabolic acidosis bicarb 16, creatinine 1.9 (appears to have previous creatinine of 1.09 in September 2022 with baseline of about 1.1-1.3,), hypomagnesemia 1.5, transaminitis AST: ALT-192: 152, lactic acidosis 3.1, elevated procalcitonin 0.26.  His influenza a swab is positive.  His chest x-ray suggests moderate right-sided pleural effusion with compressive atelectasis and/or lower lobe consolidation.     After evaluating the patient, discussed with ER physician that the patient may need to be intubated given severe respiratory distress tachycardia, respiratory rate in 50s and inability to protect airway with previous stroke.  Patient will be placed on mechanical ventilation.  He has so far received breathing treatments, Rocephin and Zithromax along with Tamiflu.  Admission has been requested for further evaluation and treatment."     Palliative medicine is consulted for support and advance care planning; for details of visit with pt and family, see ACP section of plan.     Hospital Course:  No notes on file    Medications:  Continuous Infusions:   NORepinephrine bitartrate-D5W 0.01 mcg/kg/min (12/27/23 1000)     Scheduled Meds:   albuterol-ipratropium  3 mL Nebulization Q4H    aspirin  81 mg Oral Daily    clopidogreL  75 mg Oral Daily    enoxparin  40 mg Subcutaneous Daily    famotidine (PF)  20 mg Intravenous Daily    finasteride  5 mg Oral Daily    furosemide (LASIX) injection "  40 mg Intravenous Q12H    lactulose  20 g Per NG tube TID    midazolam  2 mg Intravenous Once    mupirocin   Nasal BID    oseltamivir  30 mg Oral BID    sodium chloride 0.9%  10 mL Intravenous Q6H     PRN Meds:calcium gluconate IVPB, calcium gluconate IVPB, calcium gluconate IVPB, magnesium sulfate IVPB, magnesium sulfate IVPB, ondansetron, potassium chloride in water **AND** potassium chloride in water **AND** potassium chloride in water, Flushing PICC/Midline Protocol **AND** sodium chloride 0.9% **AND** sodium chloride 0.9%, sodium phosphate 15 mmol in dextrose 5 % (D5W) 250 mL IVPB, sodium phosphate 20.01 mmol in dextrose 5 % (D5W) 250 mL IVPB, sodium phosphate 30 mmol in dextrose 5 % (D5W) 250 mL IVPB    Objective:     Vital Signs (Most Recent):  Temp: 98.8 °F (37.1 °C) (12/27/23 0700)  Pulse: 107 (12/27/23 1000)  Resp: (!) 20.8 (12/27/23 1000)  BP: 97/62 (12/27/23 1000)  SpO2: 99 % (12/27/23 1000) Vital Signs (24h Range):  Temp:  [97.8 °F (36.6 °C)-98.9 °F (37.2 °C)] 98.8 °F (37.1 °C)  Pulse:  [103-113] 107  Resp:  [18.1-35.3] 20.8  SpO2:  [99 %-100 %] 99 %  BP: ()/(61-74) 97/62     Weight: 98 kg (216 lb 0.8 oz)  Body mass index is 33.84 kg/m².     Physical Exam  Vitals and nursing note reviewed.   Constitutional:       Interventions: He is intubated.      Comments: Lying in bed, ill-appearing    Pulmonary:      Effort: He is intubated.      Comments: ETT in place, mechanically ventilated   Musculoskeletal:      Right lower leg: Edema present.      Left lower leg: Edema present.   Neurological:      Comments: Opens eyes spontaneous/intermittently and to name; lightly squeezes hand with some commands to do so but not all         Advance Care Planning  Advance Directives:   Living Will: Yes        Copy on chart: Yes    LaPOST: No (will need LAPOST prior to discharge)    Do Not Resuscitate Status: Yes (per discussion with son Saurabh, with Dr. Edward on 12/27)    Medical Power of : No      Decision  Making:  Family answered questions and Patient unable to communicate due to disease severity/cognitive impairment  Goals of Care: The family endorses that what is most important right now is to focus on symptom/pain control, quality of life, even if it means sacrificing a little time, and comfort and QOL     Accordingly, we have decided that the best plan to meet the patient's goals includes continuing with treatment with transition to DNR.  Would not wish for reintubation if respiratory status declines after extubation.       Significant Labs: All pertinent labs within the past 24 hours have been reviewed.  CBC:   Recent Labs   Lab 12/26/23  0556   WBC 5.93   HGB 10.9*   HCT 35.2*   MCV 72*        BMP:  Recent Labs   Lab 12/27/23  0511   *   *   K 4.0   CL 98   CO2 23   BUN 21   CREATININE 2.0*   CALCIUM 8.6*     LFT:  Lab Results   Component Value Date    AST 1,747 (H) 12/27/2023    GGT 42 08/05/2019    ALKPHOS 151 (H) 12/27/2023    BILITOT 1.6 (H) 12/27/2023     Albumin:   Albumin   Date Value Ref Range Status   12/27/2023 2.9 (L) 3.5 - 5.2 g/dL Final     Protein:   Total Protein   Date Value Ref Range Status   12/27/2023 7.0 6.0 - 8.4 g/dL Final     Lactic acid:   Lab Results   Component Value Date    LACTATE 1.8 12/24/2023    LACTATE 3.0 (H) 12/24/2023     Significant Imaging: I have reviewed all pertinent imaging results/findings within the past 24 hours.    Idalia Knapp NP  Palliative Medicine  [facility] [place] - Intensive Care

## 2023-12-27 NOTE — ASSESSMENT & PLAN NOTE
Creatinine was 1.1 in 2022  Suspect atn a/w hypotension  Appear volume overload on exam and ct  Stable despite negative fluid balance.

## 2023-12-28 PROBLEM — E87.20 LACTIC ACIDOSIS: Status: RESOLVED | Noted: 2023-12-24 | Resolved: 2023-12-28

## 2023-12-28 LAB
ALBUMIN SERPL BCP-MCNC: 2.8 G/DL (ref 3.5–5.2)
ALP SERPL-CCNC: 143 U/L (ref 55–135)
ALT SERPL W/O P-5'-P-CCNC: 1495 U/L (ref 10–44)
ANION GAP SERPL CALC-SCNC: 14 MMOL/L (ref 8–16)
AST SERPL-CCNC: 893 U/L (ref 10–40)
BILIRUB SERPL-MCNC: 1.3 MG/DL (ref 0.1–1)
BUN SERPL-MCNC: 28 MG/DL (ref 8–23)
CALCIUM SERPL-MCNC: 8.7 MG/DL (ref 8.7–10.5)
CHLORIDE SERPL-SCNC: 101 MMOL/L (ref 95–110)
CO2 SERPL-SCNC: 24 MMOL/L (ref 23–29)
CREAT SERPL-MCNC: 1.9 MG/DL (ref 0.5–1.4)
EST. GFR  (NO RACE VARIABLE): 37 ML/MIN/1.73 M^2
GLUCOSE SERPL-MCNC: 127 MG/DL (ref 70–110)
POTASSIUM SERPL-SCNC: 3.5 MMOL/L (ref 3.5–5.1)
PROT SERPL-MCNC: 7.1 G/DL (ref 6–8.4)
SODIUM SERPL-SCNC: 139 MMOL/L (ref 136–145)

## 2023-12-28 PROCEDURE — 25000242 PHARM REV CODE 250 ALT 637 W/ HCPCS: Performed by: INTERNAL MEDICINE

## 2023-12-28 PROCEDURE — 27000221 HC OXYGEN, UP TO 24 HOURS

## 2023-12-28 PROCEDURE — 80053 COMPREHEN METABOLIC PANEL: CPT | Performed by: INTERNAL MEDICINE

## 2023-12-28 PROCEDURE — 63600175 PHARM REV CODE 636 W HCPCS: Performed by: INTERNAL MEDICINE

## 2023-12-28 PROCEDURE — 94640 AIRWAY INHALATION TREATMENT: CPT

## 2023-12-28 PROCEDURE — 99291 CRITICAL CARE FIRST HOUR: CPT | Mod: ,,, | Performed by: INTERNAL MEDICINE

## 2023-12-28 PROCEDURE — A4216 STERILE WATER/SALINE, 10 ML: HCPCS | Performed by: EMERGENCY MEDICINE

## 2023-12-28 PROCEDURE — 99900035 HC TECH TIME PER 15 MIN (STAT)

## 2023-12-28 PROCEDURE — 20000000 HC ICU ROOM

## 2023-12-28 PROCEDURE — 94799 UNLISTED PULMONARY SVC/PX: CPT

## 2023-12-28 PROCEDURE — 94660 CPAP INITIATION&MGMT: CPT | Mod: XB

## 2023-12-28 PROCEDURE — 25000003 PHARM REV CODE 250: Performed by: EMERGENCY MEDICINE

## 2023-12-28 PROCEDURE — 94003 VENT MGMT INPAT SUBQ DAY: CPT

## 2023-12-28 PROCEDURE — 31720 CLEARANCE OF AIRWAYS: CPT

## 2023-12-28 PROCEDURE — 99900026 HC AIRWAY MAINTENANCE (STAT)

## 2023-12-28 PROCEDURE — 27000207 HC ISOLATION

## 2023-12-28 PROCEDURE — 25000003 PHARM REV CODE 250: Performed by: STUDENT IN AN ORGANIZED HEALTH CARE EDUCATION/TRAINING PROGRAM

## 2023-12-28 PROCEDURE — 99497 ADVNCD CARE PLAN 30 MIN: CPT | Mod: ,,, | Performed by: INTERNAL MEDICINE

## 2023-12-28 PROCEDURE — 25000003 PHARM REV CODE 250: Performed by: INTERNAL MEDICINE

## 2023-12-28 PROCEDURE — 94761 N-INVAS EAR/PLS OXIMETRY MLT: CPT

## 2023-12-28 PROCEDURE — 27000190 HC CPAP FULL FACE MASK W/VALVE

## 2023-12-28 RX ORDER — IPRATROPIUM BROMIDE AND ALBUTEROL SULFATE 2.5; .5 MG/3ML; MG/3ML
3 SOLUTION RESPIRATORY (INHALATION) EVERY 6 HOURS
Status: DISCONTINUED | OUTPATIENT
Start: 2023-12-28 | End: 2023-12-30

## 2023-12-28 RX ADMIN — IPRATROPIUM BROMIDE AND ALBUTEROL SULFATE 3 ML: 2.5; .5 SOLUTION RESPIRATORY (INHALATION) at 03:12

## 2023-12-28 RX ADMIN — Medication 10 ML: at 11:12

## 2023-12-28 RX ADMIN — ASPIRIN 81 MG CHEWABLE TABLET 81 MG: 81 TABLET CHEWABLE at 08:12

## 2023-12-28 RX ADMIN — FUROSEMIDE 40 MG: 10 INJECTION, SOLUTION INTRAVENOUS at 12:12

## 2023-12-28 RX ADMIN — IPRATROPIUM BROMIDE AND ALBUTEROL SULFATE 3 ML: 2.5; .5 SOLUTION RESPIRATORY (INHALATION) at 07:12

## 2023-12-28 RX ADMIN — FUROSEMIDE 40 MG: 10 INJECTION, SOLUTION INTRAVENOUS at 11:12

## 2023-12-28 RX ADMIN — FAMOTIDINE 20 MG: 10 INJECTION INTRAVENOUS at 08:12

## 2023-12-28 RX ADMIN — CLOPIDOGREL BISULFATE 75 MG: 75 TABLET ORAL at 08:12

## 2023-12-28 RX ADMIN — Medication 10 ML: at 12:12

## 2023-12-28 RX ADMIN — MUPIROCIN: 20 OINTMENT TOPICAL at 08:12

## 2023-12-28 RX ADMIN — FINASTERIDE 5 MG: 5 TABLET, FILM COATED ORAL at 08:12

## 2023-12-28 RX ADMIN — IPRATROPIUM BROMIDE AND ALBUTEROL SULFATE 3 ML: 2.5; .5 SOLUTION RESPIRATORY (INHALATION) at 08:12

## 2023-12-28 RX ADMIN — OSELTAMIVIR PHOSPHATE 30 MG: 30 CAPSULE ORAL at 08:12

## 2023-12-28 RX ADMIN — Medication 10 ML: at 06:12

## 2023-12-28 RX ADMIN — LACTULOSE 20 G: 20 SOLUTION ORAL at 03:12

## 2023-12-28 RX ADMIN — LACTULOSE 20 G: 20 SOLUTION ORAL at 08:12

## 2023-12-28 RX ADMIN — ENOXAPARIN SODIUM 40 MG: 40 INJECTION SUBCUTANEOUS at 04:12

## 2023-12-28 RX ADMIN — IPRATROPIUM BROMIDE AND ALBUTEROL SULFATE 3 ML: 2.5; .5 SOLUTION RESPIRATORY (INHALATION) at 12:12

## 2023-12-28 NOTE — ASSESSMENT & PLAN NOTE
Most likely hepatic congestion  Transaminase continue to increase.  Alkaline phosphatase normal  Doppler of liver 12/26 without evidence of liver artery stenosis  Numbers are trending down.

## 2023-12-28 NOTE — PLAN OF CARE
Pt remains in ICU on vent on CPAP mode. Pt opens eyes spontaneously. Levo weaned off per order, maintaining MAP >65 since. Trickle feeds @ 10cc/hr, tolerating well. Gonzalez in place, adequate urine output. One bowel movement this shift. Free of falls, injury, and skin breakdown. CHG bath given, linen & gown changed, catheter care performed. Plans of extubation today @ 0800 once pts son arrives.     Problem: Adult Inpatient Plan of Care  Goal: Plan of Care Review  Outcome: Ongoing, Progressing  Goal: Patient-Specific Goal (Individualized)  Outcome: Ongoing, Progressing  Goal: Absence of Hospital-Acquired Illness or Injury  Outcome: Ongoing, Progressing  Goal: Optimal Comfort and Wellbeing  Outcome: Ongoing, Progressing  Goal: Readiness for Transition of Care  Outcome: Ongoing, Progressing     Problem: Infection  Goal: Absence of Infection Signs and Symptoms  Outcome: Ongoing, Progressing     Problem: Adjustment to Illness (Sepsis/Septic Shock)  Goal: Optimal Coping  Outcome: Ongoing, Progressing     Problem: Bleeding (Sepsis/Septic Shock)  Goal: Absence of Bleeding  Outcome: Ongoing, Progressing     Problem: Glycemic Control Impaired (Sepsis/Septic Shock)  Goal: Blood Glucose Level Within Desired Range  Outcome: Ongoing, Progressing     Problem: Infection Progression (Sepsis/Septic Shock)  Goal: Absence of Infection Signs and Symptoms  Outcome: Ongoing, Progressing     Problem: Nutrition Impaired (Sepsis/Septic Shock)  Goal: Optimal Nutrition Intake  Outcome: Ongoing, Progressing     Problem: Fluid and Electrolyte Imbalance (Acute Kidney Injury/Impairment)  Goal: Fluid and Electrolyte Balance  Outcome: Ongoing, Progressing     Problem: Oral Intake Inadequate (Acute Kidney Injury/Impairment)  Goal: Optimal Nutrition Intake  Outcome: Ongoing, Progressing     Problem: Renal Function Impairment (Acute Kidney Injury/Impairment)  Goal: Effective Renal Function  Outcome: Ongoing, Progressing     Problem: Fluid Imbalance  (Pneumonia)  Goal: Fluid Balance  Outcome: Ongoing, Progressing     Problem: Infection (Pneumonia)  Goal: Resolution of Infection Signs and Symptoms  Outcome: Ongoing, Progressing     Problem: Respiratory Compromise (Pneumonia)  Goal: Effective Oxygenation and Ventilation  Outcome: Ongoing, Progressing     Problem: Fall Injury Risk  Goal: Absence of Fall and Fall-Related Injury  Outcome: Ongoing, Progressing     Problem: Restraint, Nonbehavioral (Nonviolent)  Goal: Absence of Harm or Injury  Outcome: Ongoing, Progressing     Problem: Skin Injury Risk Increased  Goal: Skin Health and Integrity  Outcome: Ongoing, Progressing     Problem: Impaired Wound Healing  Goal: Optimal Wound Healing  Outcome: Ongoing, Progressing     Problem: Coping Ineffective  Goal: Effective Coping  Outcome: Ongoing, Progressing

## 2023-12-28 NOTE — ASSESSMENT & PLAN NOTE
EKG in ED without ST elevation  Appreciate cards inputs.    Suspect demand ischemia  Troponin is trending dwon.  Plavix.  Held asa due love

## 2023-12-28 NOTE — PROGRESS NOTES
Pt was extubated to nasal cannula this morning. Some noises noted from airway, pt states he is not SOB, MD notified. Bipap placed and pt NT suctioned. Improvement noted with Bipap. Pt alert at times, but sleeping mostly. Moans when turned/repositioned.

## 2023-12-28 NOTE — ASSESSMENT & PLAN NOTE
Advance Care Planning   Palliative Care consulted.  Goals of care discussed with family and no reintubation after extubation.

## 2023-12-28 NOTE — ASSESSMENT & PLAN NOTE
Patient with Hypoxic Respiratory failure which is Acute.  he is not on home oxygen. Supplemental oxygen was provided and noted- Vent Mode: PS/CPAP  Oxygen Concentration (%):  [30] 30  Resp Rate Total:  [26 br/min-38 br/min] 30 br/min  PEEP/CPAP:  [7 cmH20] 7 cmH20  Pressure Support:  [5 cmH20] 5 cmH20  Mean Airway Pressure:  [8.6 cmH20-9.1 cmH20] 8.6 cmH20.   Signs/symptoms of respiratory failure include- tachypnea, increased work of breathing, respiratory distress, use of accessory muscles, wheezing, and stridor. Contributing diagnoses includes - Pleural effusion and Pneumonia Labs and images were reviewed. Patient Has not had a recent ABG. Will treat underlying causes and adjust management of respiratory failure as follows- Treat underlying etiologies of pneumonia and evaluate pleural effusion.  Intubated in ER.  Pulmonary consulted.  Respiratory failure secondary to flu/pneumonia/pleural effusion.  - possible extubation today.   The patient is a 36y Male complaining of shortness of breath.

## 2023-12-28 NOTE — ASSESSMENT & PLAN NOTE
"This patient does have evidence of infective focus  My overall impression is septic shock due to MAP < 65.  Source: Respiratory  Antibiotics given-   Antibiotics (72h ago, onward)      Start     Stop Route Frequency Ordered    12/25/23 1145  mupirocin 2 % ointment         12/30/23 0859 Nasl 2 times daily 12/25/23 1036          Latest lactate reviewed-  No results for input(s): "LACTATE" in the last 72 hours.    Organ dysfunction indicated by Acute liver injury and Acute respiratory failure  Source control achieved by:   Antibiotics.  -Given history of CVA and likely aspiration. Treated with Zosyn.  -Also has influenza A infection treated with Tamiflu.  - continue with supportive care  "

## 2023-12-28 NOTE — SUBJECTIVE & OBJECTIVE
Past Medical History:   Diagnosis Date    BPH (benign prostatic hyperplasia)     Dysarthria     HLD (hyperlipidemia)     Hypertension     Other and unspecified hyperlipidemia     Stroke        Past Surgical History:   Procedure Laterality Date    CATARACT EXTRACTION W/ INTRAOCULAR LENS  IMPLANT, BILATERAL         Review of patient's allergies indicates:  No Known Allergies    Family History       Problem Relation (Age of Onset)    Dementia Father    Hypertension Mother, Father    Seizures Mother    Stroke Mother          Tobacco Use    Smoking status: Former    Smokeless tobacco: Never   Substance and Sexual Activity    Alcohol use: Yes     Comment: rare, once a week or less    Drug use: No    Sexual activity: Never         Review of Systems   Unable to perform ROS: Intubated   Constitutional:  Negative for activity change, appetite change, fatigue and fever.   HENT:  Negative for congestion, dental problem, facial swelling, mouth sores and trouble swallowing.    Eyes:  Negative for pain, discharge and visual disturbance.   Respiratory:  Negative for apnea, cough, choking and wheezing.    Cardiovascular:  Negative for chest pain, palpitations and leg swelling.   Gastrointestinal:  Negative for abdominal distention, abdominal pain, constipation and diarrhea.   Genitourinary:  Negative for difficulty urinating and dysuria.   Neurological:  Positive for light-headedness. Negative for dizziness, tremors and speech difficulty.   Psychiatric/Behavioral:  Negative for agitation, behavioral problems, self-injury and suicidal ideas.    All other systems reviewed and are negative.    Objective:     Vital Signs (Most Recent):  Temp: 99 °F (37.2 °C) (12/28/23 0715)  Pulse: 105 (12/28/23 0905)  Resp: (!) 28.9 (12/28/23 0905)  BP: 93/63 (12/28/23 0900)  SpO2: 99 % (12/28/23 0905) Vital Signs (24h Range):  Temp:  [97.8 °F (36.6 °C)-100 °F (37.8 °C)] 99 °F (37.2 °C)  Pulse:  [104-112] 105  Resp:  [20.8-37.4] 28.9  SpO2:  [97 %-99  %] 99 %  BP: ()/(55-70) 93/63     Weight: 98 kg (216 lb 0.8 oz)  Body mass index is 33.84 kg/m².      Intake/Output Summary (Last 24 hours) at 12/28/2023 0939  Last data filed at 12/28/2023 0815  Gross per 24 hour   Intake 302.94 ml   Output 2675 ml   Net -2372.06 ml        Physical Exam  Constitutional:       Appearance: He is well-developed.   HENT:      Head: Normocephalic and atraumatic.      Mouth/Throat:      Comments: ETT in place  Eyes:      Conjunctiva/sclera: Conjunctivae normal.      Pupils: Pupils are equal, round, and reactive to light.   Cardiovascular:      Rate and Rhythm: Normal rate and regular rhythm.      Heart sounds: Normal heart sounds.   Pulmonary:      Effort: Pulmonary effort is normal.      Breath sounds: Normal breath sounds.      Comments: Decreased breath sound on the right.    Abdominal:      General: Bowel sounds are normal.      Palpations: Abdomen is soft.   Musculoskeletal:         General: Normal range of motion.      Cervical back: Normal range of motion and neck supple.      Right lower leg: Edema present.      Left lower leg: Edema present.   Skin:     General: Skin is warm.   Neurological:      Cranial Nerves: No cranial nerve deficit.      Comments: Open eyes to verbal command.  +cough.  No spontaneous movement.            Vents:  Vent Mode: PS/CPAP (12/28/23 0842)  Ventilator Initiated: Yes (12/23/23 2212)  Set Rate: 15 BPM (12/27/23 0829)  Vt Set: 500 mL (12/26/23 0816)  Pressure Support: 5 cmH20 (12/28/23 0842)  PEEP/CPAP: 7 cmH20 (12/28/23 0842)  Oxygen Concentration (%): 30 (12/28/23 0905)  Peak Airway Pressure: 13.1 cmH20 (12/28/23 0842)  Total Ve: 9.38 L/m (12/28/23 0842)  F/VT Ratio<105 (RSBI): (!) 68.7 (12/28/23 0842)    Lines/Drains/Airways       Peripherally Inserted Central Catheter Line  Duration             PICC Triple Lumen 12/23/23 2320 left basilic 4 days              Drain  Duration                  NG/OG Tube 12/23/23 2146 orogastric 16 Fr. Center  "mouth 4 days         Urethral Catheter 12/23/23 2231 Straight-tip 16 Fr. 4 days              Airway  Duration                  Airway - Non-Surgical 12/23/23 2146 Endotracheal Tube 4 days                    Significant Labs:    CBC/Anemia Profile:  No results for input(s): "WBC", "HGB", "HCT", "PLT", "MCV", "RDW", "IRON", "FERRITIN", "RETIC", "FOLATE", "[id number]", "OCCULTBLOOD" in the last 48 hours.     Chemistries:  Recent Labs   Lab 12/27/23  0511 12/28/23  0602   * 139   K 4.0 3.5   CL 98 101   CO2 23 24   BUN 21 28*   CREATININE 2.0* 1.9*   CALCIUM 8.6* 8.7   ALBUMIN 2.9* 2.8*   PROT 7.0 7.1   BILITOT 1.6* 1.3*   ALKPHOS 151* 143*   ALT 2,246* 1,495*   AST 1,747* 893*       ABGs:   Recent Labs   Lab 12/26/23  1103   PH 7.489*   PCO2 31.1*   HCO3 23.6*   POCSATURATED 78   BE 1       Significant Imaging:   I have reviewed all pertinent imaging results/findings within the past 24 hours.  CXR: I have reviewed all pertinent results/findings within the past 24 hours and my personal findings are:  improve aeration on left and right effusion  "

## 2023-12-28 NOTE — ASSESSMENT & PLAN NOTE
Bilateral effusion on ct.  Rt>Lt  Moderate right effusion confirmed via pocus 12/24  DDx: parapneumonic vs volume overload  Given normal wbc and borderline procal, will monitor effusion with diuresis.  Improve with diuresis

## 2023-12-28 NOTE — ASSESSMENT & PLAN NOTE
12/25/23 Spoken with sister, Idalia.  Patient has been living in nursing home since 2018.  Patient is not  but has a son.  Idalia is in agreement with current poc and is agreeable to resuscitation via cpr and cardioversion if indicated.    12/27/23.  Spoken with son, Saurabh, along with NP Ra.  All questions answered.  Son is aware that patient remained very weak despite improvement in oxygenation.  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  Per Saurabh, patient is chair dependent and require assistant with feeding while residing at nursing home. Saurabh does not believe that reintubation, CPR, or peg/trach would be in alignment with patient's wish.  Saurabh also in agreement with changing code status to DNR to better align with patient's GOC.  D/w Dr. Aguilar and she is in agreement.     12/28/23 spoken with son Saurabh and sister Idalia.  All questions answered.  DNR reconfirmed.

## 2023-12-28 NOTE — PROGRESS NOTES
[facility] Medicine  Progress Note    Patient Name: Alexsander Tafoya  MRN: [id number]  Patient Class: IP- Inpatient   Admission Date: 12/23/2023  Length of Stay: 5 days  Attending Physician: Sunny Tsang MD  Primary Care Provider: Helio Farr MD        Subjective:     Principal Problem:Acute hypoxic respiratory failure        HPI:  Mr Tafoya is a 73-year-old male being admitted for acute hypoxic respiratory failure.  His medical history significant for BPH, dyslipidemia, chronic CVA with resultant aphasia, and right-sided hemiparesis.  During my evaluation the patient is in severe respiratory distress, on BiPAP and unable to provide any meaningful detailed history.  Seems that he came from [facility] due to worsening dyspnea, and EMS noted low oxygen saturations. in the ER, he was noted to have Tmax of 103.1 °F, persistently tachycardic in 130s, hypotensive 84/52, and was initially on nonrebreather and subsequently placed on BiPAP.  His CBC shows normal white count, microcytic anemia 10.5 and normal platelets.  INR 1.4.  CBC with metabolic acidosis bicarb 16, creatinine 1.9 (appears to have previous creatinine of 1.09 in September 2022 with baseline of about 1.1-1.3,), hypomagnesemia 1.5, transaminitis AST: ALT-192: 152, lactic acidosis 3.1, elevated procalcitonin 0.26.  His influenza a swab is positive.  His chest x-ray suggests moderate right-sided pleural effusion with compressive atelectasis and/or lower lobe consolidation.    After evaluating the patient, discussed with ER physician that the patient may need to be intubated given severe respiratory distress tachycardia, respiratory rate in 50s and inability to protect airway with previous stroke.  Patient will be placed on mechanical ventilation.  He has so far received breathing treatments, Rocephin and Zithromax along with Tamiflu.  Admission has been requested for further evaluation and treatment.    Overview/Hospital Course:  73  y/o male presents with SOB. In the ER, he was noted to have Tmax of 103.1 °F, persistently tachycardic in 130s, hypotensive 84/52, and was initially on nonrebreather and subsequently placed on BiPAP.  Respiratory status worsened and he was then intubated.  Flu positive and started on Tamiflu. His chest x-ray suggests moderate right-sided pleural effusion with compressive atelectasis and/or lower lobe consolidation.  Started on broad spectrum ABX's. Diuresis. Noted to have shock liver with LFTs in 1000s. Developed episodes of VT overnight, started on lidocaine drip per Cardiology recommendations. SAT/SBT ongoing. Palliative care following. Started on lactulose for elevated ammonia.   Now off Lidocaine drip without any further VT.  Goals of care discussed with family and no reintubation once extubated.    Interval History: no acute events overnight.    Review of Systems   Unable to perform ROS: Intubated     Objective:     Vital Signs (Most Recent):  Temp: 99 °F (37.2 °C) (12/28/23 0715)  Pulse: 107 (12/28/23 1100)  Resp: (!) 31 (12/28/23 1100)  BP: (!) 90/59 (12/28/23 1100)  SpO2: 99 % (12/28/23 1100) Vital Signs (24h Range):  Temp:  [97.8 °F (36.6 °C)-100 °F (37.8 °C)] 99 °F (37.2 °C)  Pulse:  [104-112] 107  Resp:  [22.9-37.5] 31  SpO2:  [97 %-100 %] 99 %  BP: ()/(55-70) 90/59     Weight: 98 kg (216 lb 0.8 oz)  Body mass index is 33.84 kg/m².    Intake/Output Summary (Last 24 hours) at 12/28/2023 1130  Last data filed at 12/28/2023 0815  Gross per 24 hour   Intake 247.18 ml   Output 2675 ml   Net -2427.82 ml         Physical Exam  Constitutional:       Appearance: He is well-developed.   HENT:      Head: Normocephalic and atraumatic.      Mouth/Throat:      Comments: ETT in place  Eyes:      Conjunctiva/sclera: Conjunctivae normal.      Pupils: Pupils are equal, round, and reactive to light.   Cardiovascular:      Rate and Rhythm: Normal rate and regular rhythm.      Heart sounds: Normal heart sounds.  "  Pulmonary:      Effort: Pulmonary effort is normal.      Breath sounds: Normal breath sounds.      Comments: Decreased breath sound on the right.    Abdominal:      General: Bowel sounds are normal.      Palpations: Abdomen is soft.   Musculoskeletal:         General: Normal range of motion.      Cervical back: Normal range of motion and neck supple.      Right lower leg: Edema present.      Left lower leg: Edema present.   Skin:     General: Skin is warm.   Neurological:      Cranial Nerves: No cranial nerve deficit.      Comments: Calm, awakes to stimuli             Significant Labs: All pertinent labs within the past 24 hours have been reviewed.  BMP:   Recent Labs   Lab 12/28/23  0602   *      K 3.5      CO2 24   BUN 28*   CREATININE 1.9*   CALCIUM 8.7     CBC: No results for input(s): "WBC", "HGB", "HCT", "PLT" in the last 48 hours.    Significant Imaging: I have reviewed all pertinent imaging results/findings within the past 24 hours.    Assessment/Plan:      * Acute hypoxic respiratory failure  Patient with Hypoxic Respiratory failure which is Acute.  he is not on home oxygen. Supplemental oxygen was provided and noted- Vent Mode: PS/CPAP  Oxygen Concentration (%):  [30] 30  Resp Rate Total:  [26 br/min-38 br/min] 30 br/min  PEEP/CPAP:  [7 cmH20] 7 cmH20  Pressure Support:  [5 cmH20] 5 cmH20  Mean Airway Pressure:  [8.6 cmH20-9.1 cmH20] 8.6 cmH20.   Signs/symptoms of respiratory failure include- tachypnea, increased work of breathing, respiratory distress, use of accessory muscles, wheezing, and stridor. Contributing diagnoses includes - Pleural effusion and Pneumonia Labs and images were reviewed. Patient Has not had a recent ABG. Will treat underlying causes and adjust management of respiratory failure as follows- Treat underlying etiologies of pneumonia and evaluate pleural effusion.  Intubated in ER.  Pulmonary consulted.  Respiratory failure secondary to flu/pneumonia/pleural " "effusion.  - possible extubation today.    Septic shock  This patient does have evidence of infective focus  My overall impression is septic shock due to MAP < 65.  Source: Respiratory  Antibiotics given-   Antibiotics (72h ago, onward)      Start     Stop Route Frequency Ordered    12/25/23 1145  mupirocin 2 % ointment         12/30/23 0859 Nasl 2 times daily 12/25/23 1036          Latest lactate reviewed-  No results for input(s): "LACTATE" in the last 72 hours.    Organ dysfunction indicated by Acute liver injury and Acute respiratory failure  Source control achieved by:   Antibiotics.  -Given history of CVA and likely aspiration. Treated with Zosyn.  -Also has influenza A infection treated with Tamiflu.  - continue with supportive care    Acute on chronic combined systolic and diastolic congestive heart failure  Patient is identified as having Systolic (HFrEF) heart failure that is Acute on chronic. CHF is currently controlled. Latest ECHO performed and demonstrates- Results for orders placed during the hospital encounter of 12/23/23    Echo Saline Bubble? No    Interpretation Summary    Left Ventricle: The left ventricle is normal in size. There is concentric hypertrophy. There is severely reduced systolic function with a visually estimated ejection fraction of 25 - 30%.    Right Ventricle: Mild right ventricular enlargement. Systolic function is normal.    Left Atrium: Left atrium is moderately dilated.    Right Atrium: Right atrium is mildly dilated.    Tricuspid Valve: There is trace regurgitation. The estimated PA systolic pressure is at least 21 mmHg.    IVC/SVC: Patient is ventilated, cannot use IVC diameter to estimate right atrial pressure.  . Continue Furosemide and monitor clinical status closely. Monitor on telemetry. Patient is off CHF pathway.  Monitor strict Is&Os and daily weights.  Place on fluid restriction of 1.5 L. Cardiology has been consulted. Continue to stress to patient importance of " self efficacy and  on diet for CHF. Last BNP reviewed- and noted below   Recent Labs   Lab 12/23/23 1914   *     -Diuresing well     NSVT (nonsustained ventricular tachycardia)  Started on Lidocaine.  No amio secondary to liver failure.  Now off Lidocaine.      Dilated cardiomyopathy        Goals of care, counseling/discussion  Advance Care Planning  Palliative Care consulted.  Goals of care discussed with family and no reintubation after extubation.         Shock  Septic shock as above.      Pleural effusion  -Moderate effusion on x-ray.  Pulmonary following.  Treated with diuresis.      Influenza A  -Treated with Tamiflu.      NSTEMI (non-ST elevated myocardial infarction)        Acute renal failure  Patient with acute kidney injury/acute renal failure likely due to acute tubular necrosis caused by septic shock  SHAYY is currently worsening. Baseline creatinine  ranging from 1 to 1.7  - Labs reviewed- Renal function/electrolytes with Estimated Creatinine Clearance: 38.6 mL/min (A) (based on SCr of 1.9 mg/dL (H)). according to latest data. Monitor urine output and serial BMP and adjust therapy as needed. Avoid nephrotoxins and renally dose meds for GFR listed above.  Treat shock.  Continue pressors.  Closely monitor renal function.  Nephrology consult if any further worsening.  - relatively stable     Elevated troponin  Significantly elevated Troponin consistent with NSTEMI.  Probably demand ischemia from shock.  On ASA and Plavix.  Hold Statin with shock liver.  Cardiology consulted.  - recommend to treat conservatively      Transaminitis  Significant increase in LFT's, consistent with shock liver.  Continue to monitor.  - slowly improving.  - continue with supportive care for blood pressure support  - ammonia elevated, started lactulose      BPH (benign prostatic hyperplasia)  -Continue Proscar.      Hyperlipidemia  -On statin.  Hold Statin with shock liver.    Essential  hypertension  -antihypertensives on hold due to septic shock.    Aphasia as late effect of cerebrovascular accident  Per notes.      Hemiplegia affecting right side in right-dominant patient as late effect of cerebrovascular disease  -Chronic CVA with previously noted resultant right-sided hemiparesis and aphasia.      VTE Risk Mitigation (From admission, onward)           Ordered     enoxaparin injection 40 mg  Daily         12/23/23 2143     IP VTE HIGH RISK PATIENT  Once         12/23/23 2143     Place sequential compression device  Until discontinued         12/23/23 2143                    Discharge Planning   THAIS:      Code Status: DNR   Is the patient medically ready for discharge?:     Reason for patient still in hospital (select all that apply): Patient unstable  Discharge Plan A: Assisted Living ([facility]-return)            Critical care time spent on the evaluation and treatment of severe organ dysfunction, review of pertinent labs and imaging studies, discussions with consulting providers and discussions with patient/family: 40 minutes.      Sunny Tsang MD  Department of Hospital Medicine   [facility] - [place] - Intensive Care

## 2023-12-28 NOTE — ASSESSMENT & PLAN NOTE
Significant increase in LFT's, consistent with shock liver.  Continue to monitor.  - slowly improving.  - continue with supportive care for blood pressure support  - ammonia elevated, started lactulose

## 2023-12-28 NOTE — SUBJECTIVE & OBJECTIVE
Interval History: no acute events overnight.    Review of Systems   Unable to perform ROS: Intubated     Objective:     Vital Signs (Most Recent):  Temp: 99 °F (37.2 °C) (12/28/23 0715)  Pulse: 107 (12/28/23 1100)  Resp: (!) 31 (12/28/23 1100)  BP: (!) 90/59 (12/28/23 1100)  SpO2: 99 % (12/28/23 1100) Vital Signs (24h Range):  Temp:  [97.8 °F (36.6 °C)-100 °F (37.8 °C)] 99 °F (37.2 °C)  Pulse:  [104-112] 107  Resp:  [22.9-37.5] 31  SpO2:  [97 %-100 %] 99 %  BP: ()/(55-70) 90/59     Weight: 98 kg (216 lb 0.8 oz)  Body mass index is 33.84 kg/m².    Intake/Output Summary (Last 24 hours) at 12/28/2023 1130  Last data filed at 12/28/2023 0815  Gross per 24 hour   Intake 247.18 ml   Output 2675 ml   Net -2427.82 ml         Physical Exam  Constitutional:       Appearance: He is well-developed.   HENT:      Head: Normocephalic and atraumatic.      Mouth/Throat:      Comments: ETT in place  Eyes:      Conjunctiva/sclera: Conjunctivae normal.      Pupils: Pupils are equal, round, and reactive to light.   Cardiovascular:      Rate and Rhythm: Normal rate and regular rhythm.      Heart sounds: Normal heart sounds.   Pulmonary:      Effort: Pulmonary effort is normal.      Breath sounds: Normal breath sounds.      Comments: Decreased breath sound on the right.    Abdominal:      General: Bowel sounds are normal.      Palpations: Abdomen is soft.   Musculoskeletal:         General: Normal range of motion.      Cervical back: Normal range of motion and neck supple.      Right lower leg: Edema present.      Left lower leg: Edema present.   Skin:     General: Skin is warm.   Neurological:      Cranial Nerves: No cranial nerve deficit.      Comments: Calm, awakes to stimuli             Significant Labs: All pertinent labs within the past 24 hours have been reviewed.  BMP:   Recent Labs   Lab 12/28/23  0602   *      K 3.5      CO2 24   BUN 28*   CREATININE 1.9*   CALCIUM 8.7     CBC: No results for input(s):  ""WBC", "HGB", "HCT", "PLT" in the last 48 hours.    Significant Imaging: I have reviewed all pertinent imaging results/findings within the past 24 hours.  "

## 2023-12-28 NOTE — ASSESSMENT & PLAN NOTE
Patient with Hypoxic Respiratory failure which is Acute.  he is not on home oxygen. Supplemental oxygen was provided and noted- Vent Mode: PS/CPAP  Oxygen Concentration (%):  [30] 30  Resp Rate Total:  [26 br/min-38 br/min] 30 br/min  PEEP/CPAP:  [7 cmH20] 7 cmH20  Pressure Support:  [5 cmH20] 5 cmH20  Mean Airway Pressure:  [8.6 cmH20-9.1 cmH20] 9.1 cmH20    .   Signs/symptoms of respiratory failure include- tachypnea, respiratory distress, and lethargy. Contributing diagnoses includes - ARDS vs CHF Labs and images were reviewed. Patient Has recent ABG, which has been reviewed. Will treat underlying causes and adjust management of respiratory failure as follows-   Ct with bilateral effusion and compressive atelectasis of left lung  + Influenza with borderline procal and normal wbc  Ef 25%     diurese  SBT and possible extubation  No plan to reintubate based on conversation with sister and son.    Tamiflu + broad spectrum antibiotics  Procal is normal + culture is nonrevealing.  Recommend stopping antibiotics (vanc/zosyn)

## 2023-12-28 NOTE — PLAN OF CARE
Patient from [facility]. Patient extubated today with no plan to reintubate. Family thinking about home with hospice. SW to follow up with family tomorrow.        12/28/23 3736   Discharge Reassessment   Assessment Type Discharge Planning Reassessment   Communicated THAIS with patient/caregiver Date not available/Unable to determine   Discharge Plan A Assisted Living   Discharge Plan B Hospice/home   DME Needed Upon Discharge  none   Transition of Care Barriers None   Post-Acute Status   Post-Acute Authorization Other   Other Status No Post-Acute Service Needs   Discharge Delays None known at this time

## 2023-12-28 NOTE — PROGRESS NOTES
[facility] - Intensive Care  Critical Care Medicine  Progress Note    Patient Name: Alexsander Tafoya  MRN: [id number]  Admission Date: 12/23/2023  Hospital Length of Stay: 5 days  Code Status: DNR  Attending Provider: Sunny Tsang MD  Primary Care Provider: Helio Farr MD   Principal Problem: Acute hypoxic respiratory failure    Subjective:     HPI:  Patient is 73 y.o. male  has a past medical history of BPH (benign prostatic hyperplasia), Dysarthria, HLD (hyperlipidemia), Hypertension, Other and unspecified hyperlipidemia, and Stroke. presented to [facility] on 12/23/23 with cough and sob.   In the ER, he was noted to have Tmax of 103.1 °F, persistently tachycardic in 130s, hypotensive 84/52, and was initially on nonrebreather and subsequently placed on BiPAP.  His CBC shows normal white count, microcytic anemia 10.5 and normal platelets.  INR 1.4.  CBC with metabolic acidosis bicarb 16, creatinine 1.9 (appears to have previous creatinine of 1.09 in September 2022 with baseline of about 1.1-1.3,), hypomagnesemia 1.5, transaminitis AST: ALT-192: 152, lactic acidosis 3.1, elevated procalcitonin 0.26.  His influenza a swab is positive.  His chest x-ray suggests moderate right-sided pleural effusion with compressive atelectasis and/or lower lobe consolidation.  Patient was intubated in ED and pulmonary was consulted for further inputs.      During my initial evaluation, patient was intubated and sedated with propofol.  Patient sat was 100% with 70% Fio2 and 5 peep.  Vss stable with 0.08 mcg/kg/min.      Hospital/ICU Course:  No notes on file    Past Medical History:   Diagnosis Date    BPH (benign prostatic hyperplasia)     Dysarthria     HLD (hyperlipidemia)     Hypertension     Other and unspecified hyperlipidemia     Stroke        Past Surgical History:   Procedure Laterality Date    CATARACT EXTRACTION W/ INTRAOCULAR LENS  IMPLANT, BILATERAL         Review of patient's allergies indicates:  No Known  Allergies    Family History       Problem Relation (Age of Onset)    Dementia Father    Hypertension Mother, Father    Seizures Mother    Stroke Mother          Tobacco Use    Smoking status: Former    Smokeless tobacco: Never   Substance and Sexual Activity    Alcohol use: Yes     Comment: rare, once a week or less    Drug use: No    Sexual activity: Never         Review of Systems   Unable to perform ROS: Intubated   Constitutional:  Negative for activity change, appetite change, fatigue and fever.   HENT:  Negative for congestion, dental problem, facial swelling, mouth sores and trouble swallowing.    Eyes:  Negative for pain, discharge and visual disturbance.   Respiratory:  Negative for apnea, cough, choking and wheezing.    Cardiovascular:  Negative for chest pain, palpitations and leg swelling.   Gastrointestinal:  Negative for abdominal distention, abdominal pain, constipation and diarrhea.   Genitourinary:  Negative for difficulty urinating and dysuria.   Neurological:  Positive for light-headedness. Negative for dizziness, tremors and speech difficulty.   Psychiatric/Behavioral:  Negative for agitation, behavioral problems, self-injury and suicidal ideas.    All other systems reviewed and are negative.    Objective:     Vital Signs (Most Recent):  Temp: 99 °F (37.2 °C) (12/28/23 0715)  Pulse: 105 (12/28/23 0905)  Resp: (!) 28.9 (12/28/23 0905)  BP: 93/63 (12/28/23 0900)  SpO2: 99 % (12/28/23 0905) Vital Signs (24h Range):  Temp:  [97.8 °F (36.6 °C)-100 °F (37.8 °C)] 99 °F (37.2 °C)  Pulse:  [104-112] 105  Resp:  [20.8-37.4] 28.9  SpO2:  [97 %-99 %] 99 %  BP: ()/(55-70) 93/63     Weight: 98 kg (216 lb 0.8 oz)  Body mass index is 33.84 kg/m².      Intake/Output Summary (Last 24 hours) at 12/28/2023 0939  Last data filed at 12/28/2023 0815  Gross per 24 hour   Intake 302.94 ml   Output 2675 ml   Net -2372.06 ml        Physical Exam  Constitutional:       Appearance: He is well-developed.   HENT:       "Head: Normocephalic and atraumatic.      Mouth/Throat:      Comments: ETT in place  Eyes:      Conjunctiva/sclera: Conjunctivae normal.      Pupils: Pupils are equal, round, and reactive to light.   Cardiovascular:      Rate and Rhythm: Normal rate and regular rhythm.      Heart sounds: Normal heart sounds.   Pulmonary:      Effort: Pulmonary effort is normal.      Breath sounds: Normal breath sounds.      Comments: Decreased breath sound on the right.    Abdominal:      General: Bowel sounds are normal.      Palpations: Abdomen is soft.   Musculoskeletal:         General: Normal range of motion.      Cervical back: Normal range of motion and neck supple.      Right lower leg: Edema present.      Left lower leg: Edema present.   Skin:     General: Skin is warm.   Neurological:      Cranial Nerves: No cranial nerve deficit.      Comments: Open eyes to verbal command.  +cough.  No spontaneous movement.            Vents:  Vent Mode: PS/CPAP (12/28/23 0842)  Ventilator Initiated: Yes (12/23/23 2212)  Set Rate: 15 BPM (12/27/23 0829)  Vt Set: 500 mL (12/26/23 0816)  Pressure Support: 5 cmH20 (12/28/23 0842)  PEEP/CPAP: 7 cmH20 (12/28/23 0842)  Oxygen Concentration (%): 30 (12/28/23 0905)  Peak Airway Pressure: 13.1 cmH20 (12/28/23 0842)  Total Ve: 9.38 L/m (12/28/23 0842)  F/VT Ratio<105 (RSBI): (!) 68.7 (12/28/23 0842)    Lines/Drains/Airways       Peripherally Inserted Central Catheter Line  Duration             PICC Triple Lumen 12/23/23 2320 left basilic 4 days              Drain  Duration                  NG/OG Tube 12/23/23 2146 orogastric 16 Fr. Center mouth 4 days         Urethral Catheter 12/23/23 2231 Straight-tip 16 Fr. 4 days              Airway  Duration                  Airway - Non-Surgical 12/23/23 2146 Endotracheal Tube 4 days                    Significant Labs:    CBC/Anemia Profile:  No results for input(s): "WBC", "HGB", "HCT", "PLT", "MCV", "RDW", "IRON", "FERRITIN", "RETIC", "FOLATE", "[id number]", " ""OCCULTBLOOD" in the last 48 hours.     Chemistries:  Recent Labs   Lab 12/27/23  0511 12/28/23  0602   * 139   K 4.0 3.5   CL 98 101   CO2 23 24   BUN 21 28*   CREATININE 2.0* 1.9*   CALCIUM 8.6* 8.7   ALBUMIN 2.9* 2.8*   PROT 7.0 7.1   BILITOT 1.6* 1.3*   ALKPHOS 151* 143*   ALT 2,246* 1,495*   AST 1,747* 893*       ABGs:   Recent Labs   Lab 12/26/23  1103   PH 7.489*   PCO2 31.1*   HCO3 23.6*   POCSATURATED 78   BE 1       Significant Imaging:   I have reviewed all pertinent imaging results/findings within the past 24 hours.  CXR: I have reviewed all pertinent results/findings within the past 24 hours and my personal findings are:  improve aeration on left and right effusion    ABG  Recent Labs   Lab 12/26/23  1103   PH 7.489*   PO2 38*   PCO2 31.1*   HCO3 23.6*   BE 1     Assessment/Plan:     Pulmonary  Pleural effusion  Bilateral effusion on ct.  Rt>Lt  Moderate right effusion confirmed via pocus 12/24  DDx: parapneumonic vs volume overload  Given normal wbc and borderline procal, will monitor effusion with diuresis.  Improve with diuresis    Cardiac/Vascular  Acute on chronic combined systolic and diastolic congestive heart failure  Appreciate card inputs  H/o amyloidosis  Diurese as tolerated.   EF20%.  Chronic per Dr. Morgan.  Per Dr. Morgan, patient had refused AICD in the past    Recent Labs   Lab 12/23/23  1914   *         Shock  Suspect mixed with distributive and cardiogenic  EF 25% + sirs from influenza  Off pressor since 12/27    Elevated troponin  EKG in ED without ST elevation  Appreciate cards inputs.    Suspect demand ischemia  Troponin is trending dwon.  Plavix.  Held asa due love    Renal/  Acute renal failure  Creatinine was 1.1 in 2022  Suspect atn a/w hypotension  Appear volume overload on exam and ct  Stable despite negative fluid balance.      ID  Influenza A  Tamiflu adjusted to renal function  Already with 5 days course.  Ok to stop    GI  Transaminitis  Most likely hepatic " congestion  Transaminase continue to increase.  Alkaline phosphatase normal  Doppler of liver 12/26 without evidence of liver artery stenosis  Numbers are trending down.      Palliative Care  Goals of care, counseling/discussion  12/25/23 Spoken with sister, Idalia.  Patient has been living in nursing home since 2018.  Patient is not  but has a son.  Idalia is in agreement with current poc and is agreeable to resuscitation via cpr and cardioversion if indicated.    12/27/23.  Spoken with son, Saurabh, along with NP Ra.  All questions answered.  Son is aware that patient remained very weak despite improvement in oxygenation.  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  Per Saurabh, patient is chair dependent and require assistant with feeding while residing at nursing home. Saurabh does not believe that reintubation, CPR, or peg/trach would be in alignment with patient's wish.  Saurabh also in agreement with changing code status to DNR to better align with patient's GOC.  D/w Dr. Aguilar and she is in agreement.     12/28/23 spoken with son Saurabh and sister Idalia.  All questions answered.  DNR reconfirmed.        Other  * Acute hypoxic respiratory failure  Patient with Hypoxic Respiratory failure which is Acute.  he is not on home oxygen. Supplemental oxygen was provided and noted- Vent Mode: PS/CPAP  Oxygen Concentration (%):  [30] 30  Resp Rate Total:  [26 br/min-38 br/min] 30 br/min  PEEP/CPAP:  [7 cmH20] 7 cmH20  Pressure Support:  [5 cmH20] 5 cmH20  Mean Airway Pressure:  [8.6 cmH20-9.1 cmH20] 9.1 cmH20    .   Signs/symptoms of respiratory failure include- tachypnea, respiratory distress, and lethargy. Contributing diagnoses includes - ARDS vs CHF Labs and images were reviewed. Patient Has recent ABG, which has been reviewed. Will treat underlying causes and adjust management of respiratory failure as follows-   Ct with bilateral  effusion and compressive atelectasis of left lung  + Influenza with borderline procal and normal wbc  Ef 25%     diurese  SBT and possible extubation  No plan to reintubate based on conversation with sister and son.    Tamiflu + broad spectrum antibiotics  Procal is normal + culture is nonrevealing.  Recommend stopping antibiotics (vanc/zosyn)        Critical Care Time: 45 minutes  Critical secondary to Patient has a condition that poses threat to life and bodily function: respiratory failure      Critical care was time spent personally by me on the following activities: development of treatment plan with patient or surrogate and bedside caregivers, discussions with consultants, evaluation of patient's response to treatment, examination of patient, ordering and performing treatments and interventions, ordering and review of laboratory studies, ordering and review of radiographic studies, pulse oximetry, re-evaluation of patient's condition. This critical care time did not overlap with that of any other provider or involve time for any procedures.     Brijseh Edward MD  Critical Care Medicine  [facility] - [place] - Intensive Care

## 2023-12-28 NOTE — ACP (ADVANCE CARE PLANNING)
Advance Care Planning     Date: 12/28/2023    Today a voluntary meeting took place: outside of room     Patient Participation: Patient is unable to participate     Attendees (Name and  Relationship to patient): Patient's sister Idalia, and son Saurabh     Staff attendees (Name and  Role): Dr Edward ([facility]), and myself     ACP Conversation (General): Chart and interval history reviewed; discussed pt during MDT rounds. Along with Dr Edward, met with family outside of room. Discussed plan moving forward following extubation. As family agrees that a trach and PEG would not constitute a good quality of life for patient, confirmed that we will not be reintubating in event of respiratory failure. While we will continue with medical treatment following extubation in hopes of improvement, plan is to transition to comfort-focused care in event he develops respiratory distress (rather than reintubation).      Length of ACP   conversation in minutes: 16 minutes

## 2023-12-28 NOTE — ASSESSMENT & PLAN NOTE
Suspect mixed with distributive and cardiogenic  EF 25% + sirs from influenza  Off pressor since 12/27

## 2023-12-28 NOTE — NURSING
[facility]  Nurses Note -- 4 Eyes      12/27/2023       Skin assessed on: Q Shift      [] No Pressure Injuries Present    []Prevention Measures Documented    [x] Yes LDA  for Pressure Injury Previously documented     [] Yes New Pressure Injury Discovered   [] LDA for New Pressure Injury Added      Attending RN:  Prema Browne RN     Second RN:  DAVE Peterson

## 2023-12-29 LAB
ALBUMIN SERPL BCP-MCNC: 3 G/DL (ref 3.5–5.2)
ALP SERPL-CCNC: 154 U/L (ref 55–135)
ALT SERPL W/O P-5'-P-CCNC: 1007 U/L (ref 10–44)
ANION GAP SERPL CALC-SCNC: 13 MMOL/L (ref 8–16)
AST SERPL-CCNC: 505 U/L (ref 10–40)
BASOPHILS # BLD AUTO: 0.02 K/UL (ref 0–0.2)
BASOPHILS NFR BLD: 0.4 % (ref 0–1.9)
BILIRUB SERPL-MCNC: 1.2 MG/DL (ref 0.1–1)
BUN SERPL-MCNC: 26 MG/DL (ref 8–23)
CALCIUM SERPL-MCNC: 8.8 MG/DL (ref 8.7–10.5)
CHLORIDE SERPL-SCNC: 103 MMOL/L (ref 95–110)
CO2 SERPL-SCNC: 25 MMOL/L (ref 23–29)
CREAT SERPL-MCNC: 1.6 MG/DL (ref 0.5–1.4)
DIFFERENTIAL METHOD BLD: ABNORMAL
EOSINOPHIL # BLD AUTO: 0.1 K/UL (ref 0–0.5)
EOSINOPHIL NFR BLD: 1 % (ref 0–8)
ERYTHROCYTE [DISTWIDTH] IN BLOOD BY AUTOMATED COUNT: 17.2 % (ref 11.5–14.5)
EST. GFR  (NO RACE VARIABLE): 45 ML/MIN/1.73 M^2
GLUCOSE SERPL-MCNC: 99 MG/DL (ref 70–110)
HCT VFR BLD AUTO: 35.3 % (ref 40–54)
HGB BLD-MCNC: 10.3 G/DL (ref 14–18)
IMM GRANULOCYTES # BLD AUTO: 0.02 K/UL (ref 0–0.04)
IMM GRANULOCYTES NFR BLD AUTO: 0.4 % (ref 0–0.5)
LYMPHOCYTES # BLD AUTO: 1.1 K/UL (ref 1–4.8)
LYMPHOCYTES NFR BLD: 22.5 % (ref 18–48)
MCH RBC QN AUTO: 21.3 PG (ref 27–31)
MCHC RBC AUTO-ENTMCNC: 29.2 G/DL (ref 32–36)
MCV RBC AUTO: 73 FL (ref 82–98)
MONOCYTES # BLD AUTO: 0.5 K/UL (ref 0.3–1)
MONOCYTES NFR BLD: 9 % (ref 4–15)
NEUTROPHILS # BLD AUTO: 3.3 K/UL (ref 1.8–7.7)
NEUTROPHILS NFR BLD: 66.7 % (ref 38–73)
NRBC BLD-RTO: 0 /100 WBC
PLATELET # BLD AUTO: 224 K/UL (ref 150–450)
PMV BLD AUTO: 11.1 FL (ref 9.2–12.9)
POTASSIUM SERPL-SCNC: 3.4 MMOL/L (ref 3.5–5.1)
PROT SERPL-MCNC: 7.3 G/DL (ref 6–8.4)
RBC # BLD AUTO: 4.83 M/UL (ref 4.6–6.2)
SODIUM SERPL-SCNC: 141 MMOL/L (ref 136–145)
WBC # BLD AUTO: 4.98 K/UL (ref 3.9–12.7)

## 2023-12-29 PROCEDURE — 27000207 HC ISOLATION

## 2023-12-29 PROCEDURE — 27000221 HC OXYGEN, UP TO 24 HOURS

## 2023-12-29 PROCEDURE — 99900035 HC TECH TIME PER 15 MIN (STAT)

## 2023-12-29 PROCEDURE — 85025 COMPLETE CBC W/AUTO DIFF WBC: CPT | Performed by: HOSPITALIST

## 2023-12-29 PROCEDURE — 94660 CPAP INITIATION&MGMT: CPT

## 2023-12-29 PROCEDURE — 25000003 PHARM REV CODE 250: Performed by: INTERNAL MEDICINE

## 2023-12-29 PROCEDURE — 80053 COMPREHEN METABOLIC PANEL: CPT | Performed by: HOSPITALIST

## 2023-12-29 PROCEDURE — 94640 AIRWAY INHALATION TREATMENT: CPT

## 2023-12-29 PROCEDURE — 92610 EVALUATE SWALLOWING FUNCTION: CPT

## 2023-12-29 PROCEDURE — 94760 N-INVAS EAR/PLS OXIMETRY 1: CPT

## 2023-12-29 PROCEDURE — A4216 STERILE WATER/SALINE, 10 ML: HCPCS | Performed by: EMERGENCY MEDICINE

## 2023-12-29 PROCEDURE — 97535 SELF CARE MNGMENT TRAINING: CPT

## 2023-12-29 PROCEDURE — 99291 CRITICAL CARE FIRST HOUR: CPT | Mod: ,,, | Performed by: INTERNAL MEDICINE

## 2023-12-29 PROCEDURE — 63600175 PHARM REV CODE 636 W HCPCS: Performed by: INTERNAL MEDICINE

## 2023-12-29 PROCEDURE — 25000242 PHARM REV CODE 250 ALT 637 W/ HCPCS: Performed by: INTERNAL MEDICINE

## 2023-12-29 PROCEDURE — 21400001 HC TELEMETRY ROOM

## 2023-12-29 PROCEDURE — 25000003 PHARM REV CODE 250: Performed by: EMERGENCY MEDICINE

## 2023-12-29 PROCEDURE — 94761 N-INVAS EAR/PLS OXIMETRY MLT: CPT

## 2023-12-29 PROCEDURE — 25000003 PHARM REV CODE 250: Performed by: HOSPITALIST

## 2023-12-29 RX ORDER — LACTULOSE 10 G/15ML
20 SOLUTION ORAL 2 TIMES DAILY
Status: DISCONTINUED | OUTPATIENT
Start: 2023-12-29 | End: 2024-01-01

## 2023-12-29 RX ADMIN — FAMOTIDINE 20 MG: 10 INJECTION INTRAVENOUS at 09:12

## 2023-12-29 RX ADMIN — FUROSEMIDE 40 MG: 10 INJECTION, SOLUTION INTRAVENOUS at 12:12

## 2023-12-29 RX ADMIN — Medication 10 ML: at 12:12

## 2023-12-29 RX ADMIN — IPRATROPIUM BROMIDE AND ALBUTEROL SULFATE 3 ML: 2.5; .5 SOLUTION RESPIRATORY (INHALATION) at 08:12

## 2023-12-29 RX ADMIN — MUPIROCIN: 20 OINTMENT TOPICAL at 08:12

## 2023-12-29 RX ADMIN — IPRATROPIUM BROMIDE AND ALBUTEROL SULFATE 3 ML: 2.5; .5 SOLUTION RESPIRATORY (INHALATION) at 01:12

## 2023-12-29 RX ADMIN — Medication 10 ML: at 05:12

## 2023-12-29 RX ADMIN — FUROSEMIDE 40 MG: 10 INJECTION, SOLUTION INTRAVENOUS at 11:12

## 2023-12-29 RX ADMIN — LACTULOSE 20 G: 20 SOLUTION ORAL at 08:12

## 2023-12-29 RX ADMIN — MUPIROCIN: 20 OINTMENT TOPICAL at 09:12

## 2023-12-29 RX ADMIN — ENOXAPARIN SODIUM 40 MG: 40 INJECTION SUBCUTANEOUS at 05:12

## 2023-12-29 RX ADMIN — Medication 10 ML: at 06:12

## 2023-12-29 RX ADMIN — POTASSIUM CHLORIDE 60 MEQ: 14.9 INJECTION, SOLUTION INTRAVENOUS at 06:12

## 2023-12-29 RX ADMIN — Medication 10 ML: at 11:12

## 2023-12-29 NOTE — PROGRESS NOTES
[facility] Medicine  Progress Note    Patient Name: Alexsander Tafoya  MRN: [id number]  Patient Class: IP- Inpatient   Admission Date: 12/23/2023  Length of Stay: 6 days  Attending Physician: Sunny Tsang MD  Primary Care Provider: Helio Farr MD        Subjective:     Principal Problem:Acute hypoxic respiratory failure        HPI:  Mr Tafoya is a 73-year-old male being admitted for acute hypoxic respiratory failure.  His medical history significant for BPH, dyslipidemia, chronic CVA with resultant aphasia, and right-sided hemiparesis.  During my evaluation the patient is in severe respiratory distress, on BiPAP and unable to provide any meaningful detailed history.  Seems that he came from [facility] due to worsening dyspnea, and EMS noted low oxygen saturations. in the ER, he was noted to have Tmax of 103.1 °F, persistently tachycardic in 130s, hypotensive 84/52, and was initially on nonrebreather and subsequently placed on BiPAP.  His CBC shows normal white count, microcytic anemia 10.5 and normal platelets.  INR 1.4.  CBC with metabolic acidosis bicarb 16, creatinine 1.9 (appears to have previous creatinine of 1.09 in September 2022 with baseline of about 1.1-1.3,), hypomagnesemia 1.5, transaminitis AST: ALT-192: 152, lactic acidosis 3.1, elevated procalcitonin 0.26.  His influenza a swab is positive.  His chest x-ray suggests moderate right-sided pleural effusion with compressive atelectasis and/or lower lobe consolidation.    After evaluating the patient, discussed with ER physician that the patient may need to be intubated given severe respiratory distress tachycardia, respiratory rate in 50s and inability to protect airway with previous stroke.  Patient will be placed on mechanical ventilation.  He has so far received breathing treatments, Rocephin and Zithromax along with Tamiflu.  Admission has been requested for further evaluation and treatment.    Overview/Hospital Course:  73  y/o male presents with SOB. In the ER, he was noted to have Tmax of 103.1 °F, persistently tachycardic in 130s, hypotensive 84/52, and was initially on nonrebreather and subsequently placed on BiPAP.  Respiratory status worsened and he was then intubated.  Flu positive and started on Tamiflu. His chest x-ray suggests moderate right-sided pleural effusion with compressive atelectasis and/or lower lobe consolidation.  Started on broad spectrum ABX's. Diuresis. Noted to have shock liver with LFTs in 1000s. Developed episodes of VT overnight, started on lidocaine drip per Cardiology recommendations. SAT/SBT ongoing. Palliative care following. Started on lactulose for elevated ammonia.   Now off Lidocaine drip without any further VT.  Goals of care discussed with family and no reintubation once extubated.  Extubated on 12/28.    Interval History: extubated yesterday and doing well on NC today.  No complaints.    Review of Systems   HENT:  Negative for ear discharge and ear pain.    Eyes:  Negative for discharge and itching.   Endocrine: Negative for cold intolerance and heat intolerance.   Neurological:  Negative for seizures and syncope.     Objective:     Vital Signs (Most Recent):  Temp: 97.8 °F (36.6 °C) (12/29/23 1130)  Pulse: 103 (12/29/23 1220)  Resp: (!) 25 (12/29/23 1220)  BP: 94/63 (12/29/23 1220)  SpO2: 98 % (12/29/23 1220) Vital Signs (24h Range):  Temp:  [97.8 °F (36.6 °C)-98.9 °F (37.2 °C)] 97.8 °F (36.6 °C)  Pulse:  [] 103  Resp:  [16-33] 25  SpO2:  [97 %-100 %] 98 %  BP: (83-98)/(55-69) 94/63     Weight: 98 kg (216 lb 0.8 oz)  Body mass index is 33.84 kg/m².    Intake/Output Summary (Last 24 hours) at 12/29/2023 1339  Last data filed at 12/29/2023 0628  Gross per 24 hour   Intake 1.54 ml   Output 1475 ml   Net -1473.46 ml         Physical Exam  Constitutional:       Appearance: He is well-developed.   HENT:      Head: Normocephalic and atraumatic.      Mouth/Throat:      Pharynx: Oropharynx is  "clear. No oropharyngeal exudate or posterior oropharyngeal erythema.   Eyes:      Conjunctiva/sclera: Conjunctivae normal.      Pupils: Pupils are equal, round, and reactive to light.   Cardiovascular:      Rate and Rhythm: Normal rate and regular rhythm.      Heart sounds: Normal heart sounds.   Pulmonary:      Effort: Pulmonary effort is normal.      Breath sounds: Normal breath sounds.      Comments: Decreased breath sound on the right.    Abdominal:      General: Bowel sounds are normal.      Palpations: Abdomen is soft.   Musculoskeletal:         General: Normal range of motion.      Cervical back: Normal range of motion and neck supple.      Right lower leg: Edema present.      Left lower leg: Edema present.   Skin:     General: Skin is warm.   Neurological:      Cranial Nerves: No cranial nerve deficit.      Comments: Awake.  Answers simple questions and follows commands.             Significant Labs: All pertinent labs within the past 24 hours have been reviewed.  BMP:   Recent Labs   Lab 12/29/23  0336   GLU 99      K 3.4*      CO2 25   BUN 26*   CREATININE 1.6*   CALCIUM 8.8     CBC: No results for input(s): "WBC", "HGB", "HCT", "PLT" in the last 48 hours.    Significant Imaging: I have reviewed all pertinent imaging results/findings within the past 24 hours.    Assessment/Plan:      * Acute hypoxic respiratory failure  Patient with Hypoxic Respiratory failure which is Acute.  he is not on home oxygen. Supplemental oxygen was provided and noted- Oxygen Concentration (%):  [25-30] 25.   Signs/symptoms of respiratory failure include- tachypnea, increased work of breathing, respiratory distress, use of accessory muscles, wheezing, and stridor. Contributing diagnoses includes - Pleural effusion and Pneumonia Labs and images were reviewed. Patient Has not had a recent ABG. Will treat underlying causes and adjust management of respiratory failure as follows- Treat underlying etiologies of pneumonia " "and evaluate pleural effusion.  Intubated in ER.  Pulmonary consulted.  Respiratory failure secondary to flu/pneumonia/pleural effusion.  Extubated on 12/28.  Currently doing well on NC.    Septic shock  This patient does have evidence of infective focus  My overall impression is septic shock due to MAP < 65.  Source: Respiratory  Antibiotics given-   Antibiotics (72h ago, onward)      Start     Stop Route Frequency Ordered    12/25/23 1145  mupirocin 2 % ointment         12/30/23 0859 Nasl 2 times daily 12/25/23 1036          Latest lactate reviewed-  No results for input(s): "LACTATE" in the last 72 hours.    Organ dysfunction indicated by Acute liver injury and Acute respiratory failure  Source control achieved by:   Antibiotics.  -Given history of CVA and likely aspiration. Treated with Zosyn.  -Also has influenza A infection treated with Tamiflu.  - continue with supportive care  Now off pressors and ABX's.    Acute on chronic combined systolic and diastolic congestive heart failure  Patient is identified as having Systolic (HFrEF) heart failure that is Acute on chronic. CHF is currently controlled. Latest ECHO performed and demonstrates- Results for orders placed during the hospital encounter of 12/23/23    Echo Saline Bubble? No    Interpretation Summary    Left Ventricle: The left ventricle is normal in size. There is concentric hypertrophy. There is severely reduced systolic function with a visually estimated ejection fraction of 25 - 30%.    Right Ventricle: Mild right ventricular enlargement. Systolic function is normal.    Left Atrium: Left atrium is moderately dilated.    Right Atrium: Right atrium is mildly dilated.    Tricuspid Valve: There is trace regurgitation. The estimated PA systolic pressure is at least 21 mmHg.    IVC/SVC: Patient is ventilated, cannot use IVC diameter to estimate right atrial pressure.  . Continue Furosemide and monitor clinical status closely. Monitor on telemetry. Patient " is off CHF pathway.  Monitor strict Is&Os and daily weights.  Place on fluid restriction of 1.5 L. Cardiology has been consulted. Continue to stress to patient importance of self efficacy and  on diet for CHF. Last BNP reviewed- and noted below   Recent Labs   Lab 12/23/23 1914   *     -Diuresing well     NSVT (nonsustained ventricular tachycardia)  Started on Lidocaine.  No amio secondary to liver failure.  Now off Lidocaine.      Dilated cardiomyopathy        Goals of care, counseling/discussion  Advance Care Planning  Palliative Care consulted.  Goals of care discussed with family and no reintubation after extubation.  Patient is DNR         Shock  Septic shock as above.      Pleural effusion  -Moderate effusion on x-ray.  Pulmonary following.  Treated with diuresis.      Influenza A  -Treated with Tamiflu.      NSTEMI (non-ST elevated myocardial infarction)        Acute renal failure  Patient with acute kidney injury/acute renal failure likely due to acute tubular necrosis caused by septic shock  SHAYY is currently worsening. Baseline creatinine  ranging from 1 to 1.7  - Labs reviewed- Renal function/electrolytes with Estimated Creatinine Clearance: 45.9 mL/min (A) (based on SCr of 1.6 mg/dL (H)). according to latest data. Monitor urine output and serial BMP and adjust therapy as needed. Avoid nephrotoxins and renally dose meds for GFR listed above.  Treat shock.  Continue pressors.  Closely monitor renal function.  Nephrology consult if any further worsening.  Creat continues to improve.    Elevated troponin  Significantly elevated Troponin consistent with NSTEMI.  Probably demand ischemia from shock.  On ASA and Plavix.  Hold Statin with shock liver.  Cardiology consulted.  - recommend to treat conservatively      Transaminitis  Significant increase in LFT's, consistent with shock liver.  Continue to monitor.  - slowly improving.  - continue with supportive care for blood pressure support  -  ammonia elevated, started lactulose      BPH (benign prostatic hyperplasia)  -Continue Proscar.      Hyperlipidemia  -On statin.  Hold Statin with shock liver.    Essential hypertension  -antihypertensives on hold due to shock.    Aphasia as late effect of cerebrovascular accident  Per notes.      Hemiplegia affecting right side in right-dominant patient as late effect of cerebrovascular disease  -Chronic CVA with previously noted resultant right-sided hemiparesis and aphasia.      VTE Risk Mitigation (From admission, onward)           Ordered     enoxaparin injection 40 mg  Daily         12/23/23 2143     IP VTE HIGH RISK PATIENT  Once         12/23/23 2143     Place sequential compression device  Until discontinued         12/23/23 2143                    Discharge Planning   THAIS:      Code Status: DNR   Is the patient medically ready for discharge?:     Reason for patient still in hospital (select all that apply): Patient trending condition  Discharge Plan A: Assisted Living   Discharge Delays: None known at this time        Critical care time spent on the evaluation and treatment of severe organ dysfunction, review of pertinent labs and imaging studies, discussions with consulting providers and discussions with patient/family: 40 minutes.      Sunny Tsang MD  Department of Hospital Medicine   [facility] - [place] - Intensive Care

## 2023-12-29 NOTE — PT/OT/SLP PROGRESS
Occupational Therapy      Patient Name:  Alexsander Tafoya   MRN:  [id number]    Initial OT eval order received. OT d/w pt's sister, Idalia, to obtain PLOF. Pt lives at the [facility] with 24 hour care. Pt sleeps in a hospital bed and requires total care with all ADLs. Pt is a dependent transfer to his wheelchair.     OT rec grupo lift with continued 24 hour care at d/c. Pt is a level 1 progressive mobility; pt is at his baseline and not appropriate for OT services. Thank you.      12/29/2023

## 2023-12-29 NOTE — ASSESSMENT & PLAN NOTE
Advance Care Planning   Palliative Care consulted.  Goals of care discussed with family and no reintubation after extubation.  Patient is DNR

## 2023-12-29 NOTE — PLAN OF CARE
Recommendation:   1. Encourage intake of pureed diet as tolerated.  2. Add Boost Plus BID  3. Monitor weight/labs  4. RD to follow to monitor diet tolerance     Goals: Pt will tolerate diet with at least 50% intake at meals by RD follow up     Nutrition Goal Status: new  Communication of RD Recs: other (comment) (POC)

## 2023-12-29 NOTE — ASSESSMENT & PLAN NOTE
Creatinine was 1.1 in 2022  Suspect atn a/w hypotension  Appear volume overload on exam and ct  Improve despite negative fluid balance

## 2023-12-29 NOTE — PROGRESS NOTES
[facility]  Pulmonology  Progress Note    Patient Name: Alexsander Tafoya  MRN: [id number]  Admission Date: 12/23/2023  Hospital Length of Stay: 6 days  Code Status: DNR  Attending Provider: Sunny Tsang MD  Primary Care Provider: Helio Farr MD   Principal Problem: Acute hypoxic respiratory failure    Subjective:     Past Medical History:   Diagnosis Date    BPH (benign prostatic hyperplasia)     Dysarthria     HLD (hyperlipidemia)     Hypertension     Other and unspecified hyperlipidemia     Stroke        Past Surgical History:   Procedure Laterality Date    CATARACT EXTRACTION W/ INTRAOCULAR LENS  IMPLANT, BILATERAL         Review of patient's allergies indicates:  No Known Allergies    Family History       Problem Relation (Age of Onset)    Dementia Father    Hypertension Mother, Father    Seizures Mother    Stroke Mother          Tobacco Use    Smoking status: Former    Smokeless tobacco: Never   Substance and Sexual Activity    Alcohol use: Yes     Comment: rare, once a week or less    Drug use: No    Sexual activity: Never         Review of Systems  Objective:     Vital Signs (Most Recent):  Temp: 98 °F (36.7 °C) (12/29/23 0900)  Pulse: 101 (12/29/23 0930)  Resp: (!) 29 (12/29/23 0930)  BP: (!) 89/59 (12/29/23 0930)  SpO2: 98 % (12/29/23 0930) Vital Signs (24h Range):  Temp:  [97.9 °F (36.6 °C)-98.9 °F (37.2 °C)] 98 °F (36.7 °C)  Pulse:  [] 101  Resp:  [16-37.5] 29  SpO2:  [89 %-100 %] 98 %  BP: ()/(50-70) 89/59     Weight: 98 kg (216 lb 0.8 oz)  Body mass index is 33.84 kg/m².      Intake/Output Summary (Last 24 hours) at 12/29/2023 0949  Last data filed at 12/29/2023 0628  Gross per 24 hour   Intake 1.54 ml   Output 1725 ml   Net -1723.46 ml          Physical Exam  Constitutional:       Appearance: He is well-developed.   HENT:      Head: Normocephalic and atraumatic.   Eyes:      Conjunctiva/sclera: Conjunctivae normal.      Pupils: Pupils are equal, round, and reactive  "to light.   Cardiovascular:      Rate and Rhythm: Normal rate and regular rhythm.      Heart sounds: Normal heart sounds.   Pulmonary:      Effort: Pulmonary effort is normal.      Breath sounds: Normal breath sounds.   Abdominal:      General: Bowel sounds are normal.      Palpations: Abdomen is soft.   Musculoskeletal:         General: Normal range of motion.      Cervical back: Normal range of motion and neck supple.      Right lower leg: Edema present.      Left lower leg: Edema present.   Skin:     General: Skin is warm.   Neurological:      Cranial Nerves: No cranial nerve deficit.      Comments: Open eyes to verbal command.  +cough.  No spontaneous movement.            Vents:  Vent Mode: PS/CPAP (12/28/23 1019)  Ventilator Initiated: Yes (12/23/23 2212)  Set Rate: 5736.1 BPM (12/28/23 1025)  Vt Set: 500 mL (12/26/23 0816)  Pressure Support: 5 cmH20 (12/28/23 1019)  PEEP/CPAP: 7 cmH20 (12/28/23 1019)  Oxygen Concentration (%): 25 (12/29/23 0737)  Peak Airway Pressure: 12.6 cmH20 (12/28/23 1019)  Total Ve: 10.81 L/m (12/28/23 1019)  F/VT Ratio<105 (RSBI): (!) 68.66 (12/28/23 1019)    Lines/Drains/Airways       Peripherally Inserted Central Catheter Line  Duration             PICC Triple Lumen 12/23/23 2320 left basilic 5 days              Drain  Duration                  NG/OG Tube 12/23/23 2146 orogastric 16 Fr. Center mouth 5 days         Urethral Catheter 12/23/23 2231 Straight-tip 16 Fr. 5 days                    Significant Labs:    CBC/Anemia Profile:  No results for input(s): "WBC", "HGB", "HCT", "PLT", "MCV", "RDW", "IRON", "FERRITIN", "RETIC", "FOLATE", "SFLBCPQC82", "OCCULTBLOOD" in the last 48 hours.     Chemistries:  Recent Labs   Lab 12/28/23  0602 12/29/23  0336    141   K 3.5 3.4*    103   CO2 24 25   BUN 28* 26*   CREATININE 1.9* 1.6*   CALCIUM 8.7 8.8   ALBUMIN 2.8* 3.0*   PROT 7.1 7.3   BILITOT 1.3* 1.2*   ALKPHOS 143* 154*   ALT 1,495* 1,007*   * 505*         ABGs:   No " "results for input(s): "PH", "PCO2", "HCO3", "POCSATURATED", "BE" in the last 48 hours.      Significant Imaging:   I have reviewed all pertinent imaging results/findings within the past 24 hours.  CXR: I have reviewed all pertinent results/findings within the past 24 hours and my personal findings are:  improve aeration on left and right effusion    ABG  Recent Labs   Lab 12/26/23  1103   PH 7.489*   PO2 38*   PCO2 31.1*   HCO3 23.6*   BE 1     Assessment/Plan:     Neuro  Hemiplegia affecting right side in right-dominant patient as late effect of cerebrovascular disease  Pt/ot    Pulmonary  Pleural effusion  Bilateral effusion on ct.  Rt>Lt  Moderate right effusion confirmed via pocus 12/24  DDx: parapneumonic vs volume overload  Given normal wbc and borderline procal, will monitor effusion with diuresis.  Improve with diuresis    Cardiac/Vascular  Shock  Suspect mixed with distributive and cardiogenic  EF 25% + sirs from influenza  Off pressor since 12/27    Elevated troponin  EKG in ED without ST elevation  Appreciate cards inputs.    Suspect demand ischemia  Troponin is trending dwon.  Plavix.  Held asa due love    Renal/  Acute renal failure  Creatinine was 1.1 in 2022  Suspect atn a/w hypotension  Appear volume overload on exam and ct  Improve despite negative fluid balance    ID  Influenza A  Tamiflu adjusted to renal function  Already with 5 days course.    Tamiflu stopped    GI  Transaminitis  Most likely hepatic congestion  Transaminase continue to increase.  Alkaline phosphatase normal  Doppler of liver 12/26 without evidence of liver artery stenosis  Numbers are trending down.      Palliative Care  Goals of care, counseling/discussion  12/25/23 Spoken with sister, Idalia.  Patient has been living in nursing home since 2018.  Patient is not  but has a son.  Idalia is in agreement with current poc and is agreeable to resuscitation via cpr and cardioversion if indicated.    12/27/23.  Spoken with " son, Saurabh, along with NP Ra.  All questions answered.  Son is aware that patient remained very weak despite improvement in oxygenation.  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  Per Saurabh, patient is chair dependent and require assistant with feeding while residing at nursing home. Saurabh does not believe that reintubation, CPR, or peg/trach would be in alignment with patient's wish.  Saurabh also in agreement with changing code status to DNR to better align with patient's GOC.  D/w Dr. Aguilar and she is in agreement.     12/28/23 spoken with son Saurabh and sister Idalia.  All questions answered.  DNR reconfirmed.        Other  * Acute hypoxic respiratory failure  Patient with Hypoxic Respiratory failure which is Acute.  he is not on home oxygen. Supplemental oxygen was provided and noted- Vent Mode: PS/CPAP  Oxygen Concentration (%):  [25-35] 25  PEEP/CPAP:  [7 cmH20] 7 cmH20  Pressure Support:  [5 cmH20] 5 cmH20  Mean Airway Pressure:  [8.6 cmH20] 8.6 cmH20    .   Signs/symptoms of respiratory failure include- tachypnea, respiratory distress, and lethargy. Contributing diagnoses includes - ARDS vs CHF Labs and images were reviewed. Patient Has recent ABG, which has been reviewed. Will treat underlying causes and adjust management of respiratory failure as follows-   Ct with bilateral effusion and compressive atelectasis of left lung  + Influenza with borderline procal and normal wbc  Ef 25%     diurese  SBT and possible extubation  No plan to reintubate based on conversation with sister and son.    Tamiflu + broad spectrum antibiotics  Procal is normal + culture is nonrevealing.    Off antibiotics and tamiflu           Brijesh Edward MD  Pulmonology  [facility] [place] - Intensive Care    Will be available upon request

## 2023-12-29 NOTE — PROGRESS NOTES
"[facility] [place] [facility]  Adult Nutrition  Progress Note    SUMMARY       Recommendations  Recommendation:   1. Encourage intake of pureed diet as tolerated.  2. Add Boost Plus BID  3. Monitor weight/labs  4. RD to follow to monitor diet tolerance    Goals: Pt will tolerate diet with at least 50% intake at meals by RD follow up    Nutrition Goal Status: new  Communication of RD Recs: other (comment) (POC)    Assessment and Plan  Nutrition Problem  Inadequate energy Intake    Related to (etiology):   Acute hypoxic respiratory failure    Signs and Symptoms (as evidenced by):   S/p extubation    Interventions:  Collaboration with other providers    Nutrition Diagnosis Status:   New    Reason for Assessment  Reason For Assessment: NPO/clear liquids x 5 days (Day 5)  Diagnosis: pulmonary disease (Acute hypoxic respiratory failure)  Relevant Medical History: HTN, HLD, stroke, BPD, Dysarthria, chronic CVA  Interdisciplinary Rounds: did not attend  General Information Comments: Pt is currently NPO. Pt was previously on a TF of Novasource Renal @ trickle feeds of 10mL/hr. Pt was intubated on 12/23 and extubated 12/28. Pt had a NG/OG tube inserted 12/23 and removed upon extubated on 12/28. Pt currently has no TF running at this time.. Carlos=13/upper perirectal. Unable to conduct NFPE d/t pt being on isolation. No weight history for 2023 noted in chart. Pt not currently on dialysis  Nutrition Discharge Planning: d/c diet to be determined    Nutrition Risk Screen  Nutrition Risk Screen: no indicators present    Nutrition/Diet History  Patient Reported Diet/Restrictions/Preferences: no oral intake  Spiritual, Cultural Beliefs, Faith Practices, Values that Affect Care:  (unable to assess at this time)  Factors Affecting Nutritional Intake: None identified at this time    Anthropometrics  Temp: 98 °F (36.7 °C)  Height Method: Stated  Height: 5' 7" (170.2 cm)  Height (inches): 67 in  Weight Method: Bed Scale  Weight: 98 kg " (216 lb 0.8 oz)  Weight (lb): 216.05 lb  Ideal Body Weight (IBW), Male: 148 lb  % Ideal Body Weight, Male (lb): 145.98 %  BMI (Calculated): 33.8  BMI Grade: 30 - 34.9- obesity - grade I  Usual Body Weight (UBW), kg:  (No weight history for 2023)     Lab/Procedures/Meds  Pertinent Labs Reviewed: reviewed  Pertinent Labs Comments: hgb 10.9L, hct 35.9L, K 3.4L, BUN 26H, Creatinine 1.6H, GFR 45L, albumin 3L  Pertinent Medications Reviewed: reviewed  Pertinent Medications Comments: enoxaparin, famotidine, furosemide    Estimated/Assessed Needs  Weight Used For Calorie Calculations: 67.3 kg (148 lb 5.9 oz) (IBW)  Energy Calorie Requirements (kcal): 2019-2355kcals (30-35kcals/kg)  Energy Need Method: Kcal/kg  Protein Requirements: 67g (1.0g/kg)  Weight Used For Protein Calculations: 67.3 kg (148 lb 5.9 oz) (IBW)     Estimated Fluid Requirement Method: RDA Method  RDA Method (mL): 2019       Nutrition Prescription Ordered  Current Diet Order: pureed    Evaluation of Received Nutrient/Fluid Intake  Energy Calories Required: not meeting needs  Protein Required: not meeting needs  Fluid Required: not meeting needs  Comments: LBM-12/18  Tolerance: not tolerating  % Intake of Estimated Energy Needs: Other: diet just started  % Meal Intake: Other: diet just started    Nutrition Risk  Level of Risk/Frequency of Follow-up: moderate - high (2x/weekly)     Monitor and Evaluation  Food and Nutrient Intake: energy intake, food and beverage intake  Food and Nutrient Adminstration: diet order  Anthropometric Measurements: weight, weight change, body mass index  Biochemical Data, Medical Tests and Procedures: electrolyte and renal panel, gastrointestinal profile, lipid profile     Nutrition Follow-Up  RD Follow-up?: Yes

## 2023-12-29 NOTE — SUBJECTIVE & OBJECTIVE
Past Medical History:   Diagnosis Date    BPH (benign prostatic hyperplasia)     Dysarthria     HLD (hyperlipidemia)     Hypertension     Other and unspecified hyperlipidemia     Stroke        Past Surgical History:   Procedure Laterality Date    CATARACT EXTRACTION W/ INTRAOCULAR LENS  IMPLANT, BILATERAL         Review of patient's allergies indicates:  No Known Allergies    Family History       Problem Relation (Age of Onset)    Dementia Father    Hypertension Mother, Father    Seizures Mother    Stroke Mother          Tobacco Use    Smoking status: Former    Smokeless tobacco: Never   Substance and Sexual Activity    Alcohol use: Yes     Comment: rare, once a week or less    Drug use: No    Sexual activity: Never         Review of Systems  Objective:     Vital Signs (Most Recent):  Temp: 98 °F (36.7 °C) (12/29/23 0900)  Pulse: 101 (12/29/23 0930)  Resp: (!) 29 (12/29/23 0930)  BP: (!) 89/59 (12/29/23 0930)  SpO2: 98 % (12/29/23 0930) Vital Signs (24h Range):  Temp:  [97.9 °F (36.6 °C)-98.9 °F (37.2 °C)] 98 °F (36.7 °C)  Pulse:  [] 101  Resp:  [16-37.5] 29  SpO2:  [89 %-100 %] 98 %  BP: ()/(50-70) 89/59     Weight: 98 kg (216 lb 0.8 oz)  Body mass index is 33.84 kg/m².      Intake/Output Summary (Last 24 hours) at 12/29/2023 0949  Last data filed at 12/29/2023 0628  Gross per 24 hour   Intake 1.54 ml   Output 1725 ml   Net -1723.46 ml          Physical Exam  Constitutional:       Appearance: He is well-developed.   HENT:      Head: Normocephalic and atraumatic.   Eyes:      Conjunctiva/sclera: Conjunctivae normal.      Pupils: Pupils are equal, round, and reactive to light.   Cardiovascular:      Rate and Rhythm: Normal rate and regular rhythm.      Heart sounds: Normal heart sounds.   Pulmonary:      Effort: Pulmonary effort is normal.      Breath sounds: Normal breath sounds.   Abdominal:      General: Bowel sounds are normal.      Palpations: Abdomen is soft.   Musculoskeletal:         General:  "Normal range of motion.      Cervical back: Normal range of motion and neck supple.      Right lower leg: Edema present.      Left lower leg: Edema present.   Skin:     General: Skin is warm.   Neurological:      Cranial Nerves: No cranial nerve deficit.      Comments: Open eyes to verbal command.  +cough.  No spontaneous movement.            Vents:  Vent Mode: PS/CPAP (12/28/23 1019)  Ventilator Initiated: Yes (12/23/23 2212)  Set Rate: 5736.1 BPM (12/28/23 1025)  Vt Set: 500 mL (12/26/23 0816)  Pressure Support: 5 cmH20 (12/28/23 1019)  PEEP/CPAP: 7 cmH20 (12/28/23 1019)  Oxygen Concentration (%): 25 (12/29/23 0737)  Peak Airway Pressure: 12.6 cmH20 (12/28/23 1019)  Total Ve: 10.81 L/m (12/28/23 1019)  F/VT Ratio<105 (RSBI): (!) 68.66 (12/28/23 1019)    Lines/Drains/Airways       Peripherally Inserted Central Catheter Line  Duration             PICC Triple Lumen 12/23/23 2320 left basilic 5 days              Drain  Duration                  NG/OG Tube 12/23/23 2146 orogastric 16 Fr. Center mouth 5 days         Urethral Catheter 12/23/23 2231 Straight-tip 16 Fr. 5 days                    Significant Labs:    CBC/Anemia Profile:  No results for input(s): "WBC", "HGB", "HCT", "PLT", "MCV", "RDW", "IRON", "FERRITIN", "RETIC", "FOLATE", "[id number]", "OCCULTBLOOD" in the last 48 hours.     Chemistries:  Recent Labs   Lab 12/28/23  0602 12/29/23  0336    141   K 3.5 3.4*    103   CO2 24 25   BUN 28* 26*   CREATININE 1.9* 1.6*   CALCIUM 8.7 8.8   ALBUMIN 2.8* 3.0*   PROT 7.1 7.3   BILITOT 1.3* 1.2*   ALKPHOS 143* 154*   ALT 1,495* 1,007*   * 505*         ABGs:   No results for input(s): "PH", "PCO2", "HCO3", "POCSATURATED", "BE" in the last 48 hours.      Significant Imaging:   I have reviewed all pertinent imaging results/findings within the past 24 hours.  CXR: I have reviewed all pertinent results/findings within the past 24 hours and my personal findings are:  improve aeration on left and right " effusion

## 2023-12-29 NOTE — NURSING
[facility]  Nurses Note -- 4 Eyes      12/28/2023       Skin assessed on: Q Shift      [] No Pressure Injuries Present    []Prevention Measures Documented    [x] Yes LDA  for Pressure Injury Previously documented     [] Yes New Pressure Injury Discovered   [] LDA for New Pressure Injury Added      Attending RN:  Huber Terrell RN     Second RN:  DAVE Manning

## 2023-12-29 NOTE — ASSESSMENT & PLAN NOTE
"This patient does have evidence of infective focus  My overall impression is septic shock due to MAP < 65.  Source: Respiratory  Antibiotics given-   Antibiotics (72h ago, onward)      Start     Stop Route Frequency Ordered    12/25/23 1145  mupirocin 2 % ointment         12/30/23 0859 Nasl 2 times daily 12/25/23 1036          Latest lactate reviewed-  No results for input(s): "LACTATE" in the last 72 hours.    Organ dysfunction indicated by Acute liver injury and Acute respiratory failure  Source control achieved by:   Antibiotics.  -Given history of CVA and likely aspiration. Treated with Zosyn.  -Also has influenza A infection treated with Tamiflu.  - continue with supportive care  Now off pressors and ABX's.  "

## 2023-12-29 NOTE — PT/OT/SLP EVAL
Speech Language Pathology Evaluation  Bedside Swallow    Patient Name:  Alexsander Tafoya   MRN:  [id number]  Admitting Diagnosis: Acute hypoxic respiratory failure    Recommendations:                 General Recommendations:  Dysphagia therapy  Diet recommendations:  Puree Diet - IDDSI Level 4, Thin liquids - IDDSI Level 0   Aspiration Precautions: 1 bite/sip at a time, Alternating bites/sips, Assistance with meals and Assistance with thickening liquids, Feed only when awake/alert, Frequent oral care, Meds crushed in puree, and Small bites/sips   General Precautions: Standard, aspiration, contact, droplet, pureed diet  Communication strategies:  provide increased time to answer    Assessment:     Alexsander Tafoya is a 73 y.o. male with a dx of Acute hypoxic respiratory failure. Pt presents with oropharyngeal dysphagia negatively impacted by variable GISSELL and previous hx of dysphagia.  ST will follow.  History:     Past Medical History:   Diagnosis Date    BPH (benign prostatic hyperplasia)     Dysarthria     HLD (hyperlipidemia)     Hypertension     Other and unspecified hyperlipidemia     Stroke        Past Surgical History:   Procedure Laterality Date    CATARACT EXTRACTION W/ INTRAOCULAR LENS  IMPLANT, BILATERAL         Social History: Patient lives with assisted living facility with 24 hr care.    Prior Intubation HX:  intubated 12/23/23; extubated 12/28/23    Modified Barium Swallow: none on file    Chest X-Rays: 12/27/23: The ET tube is in satisfactory position with the tip below the level of the clavicular heads and approximately 6 cm above the kim.  There is an enteric tube with the tip descending well below the diaphragm.  There is a left upper extremity PICC line with tip near the junction of the superior vena cava and right atrium.  Heart and mediastinal structures appear unchanged.  There is a moderate layering right sided pleural effusion present with associated atelectasis/consolidation at the right lung  base, unchanged.  Left lung is essentially clear.  There is no evidence for pneumothorax.  Bony structures appear intact.     Impression:     ET tube, enteric tube, and left upper extremity PICC line appear in satisfactory position.     Moderate layering right pleural effusion.     No detrimental interval change noted.      Prior diet: soft foods with thickened liquids; good appetite per sister Idalia per phone conversation    Subjective   Obtained clearance from DAVE Dick to complete swallow eval at bedside. Pt lethargic but oriented to self and agreeable to accept PO trials. No family present at time of eval, however, spoke with family after the swallow eval who reported pt was on thickened liquids at assisted living facility      Pain/Comfort:  Pain Rating 1: 0/10    Respiratory Status: Nasal cannula, flow 2 L/min    Objective:     Oral Musculature Evaluation  Oral Musculature: unable to assess due to poor participation/comprehension  Secretion Management: adequate  Voice Prior to PO Intake: low volume; clear quality    Bedside Swallow Eval:   Consistencies Assessed:  Thin liquids via spoon x3 and straw x1  Nectar thick liquids via spoon x1; straw x4  Puree x3 trials      Oral Phase:   Excess chewing  Prolonged mastication  Slow oral transit time    Pharyngeal Phase:   coughing/choking- thin liquids  delayed swallow initiation- across trials/consistencies    Compensatory Strategies  None    Treatment: Rec: Pt begin PO diet of pureed consistency with nectar thick liquids.    Please note silent aspiration cannot be ruled out at bedside.    Education: Patient and family educated on aspiration precautions...  DAVE Dick and   notified regarding diet recs. White board update in patient's room regarding diet recs.    Pt  educated on thickening liquids to nectar thick consistency to help prevent aspiration. Pt will require ongoing education 2/2 variable GISSELL .    Handouts attached to [facility] regarding diet  modifications.      Goals:   Multidisciplinary Problems       SLP Goals          Problem: SLP    Goal Priority Disciplines Outcome   SLP Goal     SLP Ongoing, Progressing   Description: ST. Pt will tolerate PO diet of pureed consistency with nectar thick liquids without overt s/s of aspiration.                         Plan:     Patient to be seen:  2 x/week   Plan of Care expires:  24  Plan of Care reviewed with:  patient   SLP Follow-Up:  Yes       Discharge recommendations:  No Therapy Indicated   Barriers to Discharge:  None    Time Tracking:     SLP Treatment Date:      Speech Start Time:  1001  Speech Stop Time:  1024     Speech Total Time (min):  23 min    Billable Minutes: Eval Swallow and Oral Function 15 min and Self Care/Home Management Training 8 min    2023

## 2023-12-29 NOTE — ASSESSMENT & PLAN NOTE
Patient with Hypoxic Respiratory failure which is Acute.  he is not on home oxygen. Supplemental oxygen was provided and noted- Vent Mode: PS/CPAP  Oxygen Concentration (%):  [25-35] 25  PEEP/CPAP:  [7 cmH20] 7 cmH20  Pressure Support:  [5 cmH20] 5 cmH20  Mean Airway Pressure:  [8.6 cmH20] 8.6 cmH20    .   Signs/symptoms of respiratory failure include- tachypnea, respiratory distress, and lethargy. Contributing diagnoses includes - ARDS vs CHF Labs and images were reviewed. Patient Has recent ABG, which has been reviewed. Will treat underlying causes and adjust management of respiratory failure as follows-   Ct with bilateral effusion and compressive atelectasis of left lung  + Influenza with borderline procal and normal wbc  Ef 25%     diurese  SBT and possible extubation  No plan to reintubate based on conversation with sister and son.    Tamiflu + broad spectrum antibiotics  Procal is normal + culture is nonrevealing.    Off antibiotics and tamiflu

## 2023-12-29 NOTE — NURSING
[facility]  ICU Shift Summary  Date: 12/29/2023      Prehospitalization: Home  Admit Date / LOS : 12/23/2023/ 6 days    Diagnosis: Acute hypoxic respiratory failure    Consults:        Active: Cardio, Palliative, and Pulm CC       Needed: N/A     Code Status: DNR   Advanced Directive: Not Received    LDA:  Lines/Drains/Airways       Peripherally Inserted Central Catheter Line  Duration             PICC Triple Lumen 12/23/23 2320 left basilic 5 days              Drain  Duration                  NG/OG Tube 12/23/23 2146 orogastric 16 Fr. Center mouth 5 days         Urethral Catheter 12/23/23 2231 Straight-tip 16 Fr. 5 days                  Central Lines/Site/Justification:Multiple GTTS  Urinary Cath/Order/Justification:Critically Ill in the ICU and requiring intensive monitoring    Vasopressors/Infusions:    NORepinephrine bitartrate-D5W Stopped (12/27/23 2148)          GOALS: Volume/ Hemodynamic: MAP >                     RASS: 0  alert and calm    Pain Management: IV       Pain Controlled: not applicable     Rhythm: ST    Respiratory Device: Bipap    Vent Mode: PS/CPAP  Oxygen Concentration (%):  [25-35] 25  PEEP/CPAP:  [7 cmH20] 7 cmH20  Pressure Support:  [5 cmH20] 5 cmH20  Mean Airway Pressure:  [8.6 cmH20-9.1 cmH20] 8.6 cmH20                 Most Recent SBT/ SAT: N/A       MOVE Screen: FAIL  ICU Liberation: no    VTE Prophylaxis: Mechanical  Mobility: Bedrest  Stress Ulcer Prophylaxis: No    Isolation: Droplet    Dietary:   Current Diet Order   Procedures    Diet NPO      Tolerance: not applicable  Advancement: no    I & O (24h):    Intake/Output Summary (Last 24 hours) at 12/29/2023 0614  Last data filed at 12/29/2023 0500  Gross per 24 hour   Intake 90 ml   Output 2100 ml   Net -2010 ml        Restraints: No    Significant Dates:  Post Op Date: N/A  Rescue Date: N/A  Imaging/ Diagnostics: N/A    Noteworthy Labs:  see labs    COVID Test: (--)  CBC/Anemia Labs: Coags:    Recent Labs   Lab  "12/25/23  0607 12/26/23  0556   WBC 4.88 5.93   HGB 10.6* 10.9*   HCT 34.4* 35.2*    245   MCV 72* 72*   RDW 17.4* 17.4*    Recent Labs   Lab 12/23/23  1914 12/26/23  1040   INR 1.4* 1.2        Chemistries:   Recent Labs   Lab 12/23/23 1914 12/24/23  0509 12/28/23  0602 12/29/23  0336      < > 139 141   K 4.5   < > 3.5 3.4*      < > 101 103   CO2 16*   < > 24 25   BUN 17   < > 28* 26*   CREATININE 1.9*   < > 1.9* 1.6*   CALCIUM 9.3   < > 8.7 8.8   PROT 7.7   < > 7.1 7.3   BILITOT 1.4*   < > 1.3* 1.2*   ALKPHOS 102   < > 143* 154*   *   < > 1,495* 1,007*   *   < > 893* 505*   MG 1.5*  --   --   --    PHOS 4.6*  --   --   --     < > = values in this interval not displayed.        Cardiac Enzymes: Ejection Fractions:    No results for input(s): "CPK", "CPKMB", "MB", "TROPONINI" in the last 72 hours. EF   Date Value Ref Range Status   05/11/2022 55 % Final        POCT Glucose: HbA1c:    No results for input(s): "POCTGLUCOSE" in the last 168 hours. No results found for: "HGBA1C"        ICU LOS 5d 6h  Level of Care: Critical Care    Chart Check: 12 HR Done  See care plan  "

## 2023-12-29 NOTE — PLAN OF CARE
Pt remains in ICU, awake but confused. Pt in 30%oxygen via Bipap saturating@ 98%. Gonzalez in place. Lasix given. No falls, injuries or skin breakdown during this shift.  Problem: Adult Inpatient Plan of Care  Goal: Plan of Care Review  Outcome: Ongoing, Progressing  Goal: Patient-Specific Goal (Individualized)  Outcome: Ongoing, Progressing  Goal: Absence of Hospital-Acquired Illness or Injury  Outcome: Ongoing, Progressing  Goal: Optimal Comfort and Wellbeing  Outcome: Ongoing, Progressing  Goal: Readiness for Transition of Care  Outcome: Ongoing, Progressing     Problem: Infection  Goal: Absence of Infection Signs and Symptoms  Outcome: Ongoing, Progressing     Problem: Adjustment to Illness (Sepsis/Septic Shock)  Goal: Optimal Coping  Outcome: Ongoing, Progressing     Problem: Bleeding (Sepsis/Septic Shock)  Goal: Absence of Bleeding  Outcome: Ongoing, Progressing     Problem: Glycemic Control Impaired (Sepsis/Septic Shock)  Goal: Blood Glucose Level Within Desired Range  Outcome: Ongoing, Progressing     Problem: Infection Progression (Sepsis/Septic Shock)  Goal: Absence of Infection Signs and Symptoms  Outcome: Ongoing, Progressing     Problem: Nutrition Impaired (Sepsis/Septic Shock)  Goal: Optimal Nutrition Intake  Outcome: Ongoing, Progressing     Problem: Fluid and Electrolyte Imbalance (Acute Kidney Injury/Impairment)  Goal: Fluid and Electrolyte Balance  Outcome: Ongoing, Progressing     Problem: Oral Intake Inadequate (Acute Kidney Injury/Impairment)  Goal: Optimal Nutrition Intake  Outcome: Ongoing, Progressing     Problem: Renal Function Impairment (Acute Kidney Injury/Impairment)  Goal: Effective Renal Function  Outcome: Ongoing, Progressing     Problem: Fluid Imbalance (Pneumonia)  Goal: Fluid Balance  Outcome: Ongoing, Progressing     Problem: Infection (Pneumonia)  Goal: Resolution of Infection Signs and Symptoms  Outcome: Ongoing, Progressing     Problem: Respiratory Compromise (Pneumonia)  Goal:  Effective Oxygenation and Ventilation  Outcome: Ongoing, Progressing     Problem: Fall Injury Risk  Goal: Absence of Fall and Fall-Related Injury  Outcome: Ongoing, Progressing     Problem: Restraint, Nonbehavioral (Nonviolent)  Goal: Absence of Harm or Injury  Outcome: Ongoing, Progressing     Problem: Skin Injury Risk Increased  Goal: Skin Health and Integrity  Outcome: Ongoing, Progressing     Problem: Impaired Wound Healing  Goal: Optimal Wound Healing  Outcome: Ongoing, Progressing     Problem: Coping Ineffective  Goal: Effective Coping  Outcome: Ongoing, Progressing

## 2023-12-29 NOTE — PLAN OF CARE
Problem: SLP  Goal: SLP Goal  Description: ST. Pt will tolerate PO diet of pureed consistency with nectar thick liquids without overt s/s of aspiration.    Outcome: Ongoing, Progressing      B/s swallow eval completed. ST recs pureed diet and nectar thick liquids. Crushed meds. ST following.

## 2023-12-29 NOTE — PROVIDER TRANSFER
Transfer Note    72 y/o male presents with SOB. In the ER, he was noted to have Tmax of 103.1 °F, persistently tachycardic in 130s, hypotensive 84/52, and was initially on nonrebreather and subsequently placed on BiPAP.  Respiratory status worsened and he was then intubated.  Flu positive and started on Tamiflu. His chest x-ray suggests moderate right-sided pleural effusion with compressive atelectasis and/or lower lobe consolidation.  Started on broad spectrum ABX's. Diuresis. Noted to have shock liver with LFTs in 1000s. Developed episodes of VT overnight, started on lidocaine drip per Cardiology recommendations. Hx of CVA with significant residual deficits and mostly wheelchair bound.  Palliative Care consulted. Started on lactulose for elevated ammonia.   Now off Lidocaine drip without any further VT.  Goals of care discussed with family and no reintubation once extubated.  Patient is DNR.  Extubated on 12/28 and currently doing well on NC.  ST consulted and recommending pureed diet.  Do not think he needs PT/OT as probably at baseline (wheelchair bound).  He has remained on the hypotensive side, but currently not requiring any pressors.  Continue weaning down oxygen.  SW/CM consult for discharge disposition.

## 2023-12-29 NOTE — ASSESSMENT & PLAN NOTE
Patient with Hypoxic Respiratory failure which is Acute.  he is not on home oxygen. Supplemental oxygen was provided and noted- Oxygen Concentration (%):  [25-30] 25.   Signs/symptoms of respiratory failure include- tachypnea, increased work of breathing, respiratory distress, use of accessory muscles, wheezing, and stridor. Contributing diagnoses includes - Pleural effusion and Pneumonia Labs and images were reviewed. Patient Has not had a recent ABG. Will treat underlying causes and adjust management of respiratory failure as follows- Treat underlying etiologies of pneumonia and evaluate pleural effusion.  Intubated in ER.  Pulmonary consulted.  Respiratory failure secondary to flu/pneumonia/pleural effusion.  Extubated on 12/28.  Currently doing well on NC.

## 2023-12-29 NOTE — ASSESSMENT & PLAN NOTE
Patient with acute kidney injury/acute renal failure likely due to acute tubular necrosis caused by septic shock  SHAYY is currently worsening. Baseline creatinine  ranging from 1 to 1.7  - Labs reviewed- Renal function/electrolytes with Estimated Creatinine Clearance: 45.9 mL/min (A) (based on SCr of 1.6 mg/dL (H)). according to latest data. Monitor urine output and serial BMP and adjust therapy as needed. Avoid nephrotoxins and renally dose meds for GFR listed above.  Treat shock.  Continue pressors.  Closely monitor renal function.  Nephrology consult if any further worsening.  Creat continues to improve.

## 2023-12-29 NOTE — PT/OT/SLP PROGRESS
Physical Therapy      Patient Name:  Alexsander Tafoya   MRN:  [id number]    Patient not seen today secondary to  (Pt functioning at prior level, Dependent for all mobility and ADL's.  PT to sign off.  Pt may benefit from a Carolann lift at time of D/C, Hospital bed, W/C, and 24hr care already in place.).

## 2023-12-29 NOTE — SUBJECTIVE & OBJECTIVE
Interval History: extubated yesterday and doing well on NC today.  No complaints.    Review of Systems   HENT:  Negative for ear discharge and ear pain.    Eyes:  Negative for discharge and itching.   Endocrine: Negative for cold intolerance and heat intolerance.   Neurological:  Negative for seizures and syncope.     Objective:     Vital Signs (Most Recent):  Temp: 97.8 °F (36.6 °C) (12/29/23 1130)  Pulse: 103 (12/29/23 1220)  Resp: (!) 25 (12/29/23 1220)  BP: 94/63 (12/29/23 1220)  SpO2: 98 % (12/29/23 1220) Vital Signs (24h Range):  Temp:  [97.8 °F (36.6 °C)-98.9 °F (37.2 °C)] 97.8 °F (36.6 °C)  Pulse:  [] 103  Resp:  [16-33] 25  SpO2:  [97 %-100 %] 98 %  BP: (83-98)/(55-69) 94/63     Weight: 98 kg (216 lb 0.8 oz)  Body mass index is 33.84 kg/m².    Intake/Output Summary (Last 24 hours) at 12/29/2023 1339  Last data filed at 12/29/2023 0628  Gross per 24 hour   Intake 1.54 ml   Output 1475 ml   Net -1473.46 ml         Physical Exam  Constitutional:       Appearance: He is well-developed.   HENT:      Head: Normocephalic and atraumatic.      Mouth/Throat:      Pharynx: Oropharynx is clear. No oropharyngeal exudate or posterior oropharyngeal erythema.   Eyes:      Conjunctiva/sclera: Conjunctivae normal.      Pupils: Pupils are equal, round, and reactive to light.   Cardiovascular:      Rate and Rhythm: Normal rate and regular rhythm.      Heart sounds: Normal heart sounds.   Pulmonary:      Effort: Pulmonary effort is normal.      Breath sounds: Normal breath sounds.      Comments: Decreased breath sound on the right.    Abdominal:      General: Bowel sounds are normal.      Palpations: Abdomen is soft.   Musculoskeletal:         General: Normal range of motion.      Cervical back: Normal range of motion and neck supple.      Right lower leg: Edema present.      Left lower leg: Edema present.   Skin:     General: Skin is warm.   Neurological:      Cranial Nerves: No cranial nerve deficit.      Comments:  "Awake.  Answers simple questions and follows commands.             Significant Labs: All pertinent labs within the past 24 hours have been reviewed.  BMP:   Recent Labs   Lab 12/29/23  0336   GLU 99      K 3.4*      CO2 25   BUN 26*   CREATININE 1.6*   CALCIUM 8.8     CBC: No results for input(s): "WBC", "HGB", "HCT", "PLT" in the last 48 hours.    Significant Imaging: I have reviewed all pertinent imaging results/findings within the past 24 hours.  "

## 2023-12-30 PROBLEM — D50.9 MICROCYTIC ANEMIA: Status: ACTIVE | Noted: 2019-08-05

## 2023-12-30 PROBLEM — N17.0 ATN (ACUTE TUBULAR NECROSIS): Status: ACTIVE | Noted: 2022-05-10

## 2023-12-30 PROBLEM — E83.42 HYPOMAGNESEMIA: Status: RESOLVED | Noted: 2019-08-05 | Resolved: 2023-12-30

## 2023-12-30 PROBLEM — I21.4 NSTEMI (NON-ST ELEVATED MYOCARDIAL INFARCTION): Status: ACTIVE | Noted: 2019-09-01

## 2023-12-30 PROBLEM — R82.90 ABNORMAL URINALYSIS: Status: RESOLVED | Noted: 2022-05-11 | Resolved: 2023-12-30

## 2023-12-30 PROBLEM — R50.9 FEVER: Status: RESOLVED | Noted: 2019-08-05 | Resolved: 2023-12-30

## 2023-12-30 PROBLEM — I50.21 ACUTE SYSTOLIC CONGESTIVE HEART FAILURE: Status: ACTIVE | Noted: 2023-12-30

## 2023-12-30 PROBLEM — I21.4 NSTEMI (NON-ST ELEVATED MYOCARDIAL INFARCTION): Status: RESOLVED | Noted: 2022-05-11 | Resolved: 2023-12-30

## 2023-12-30 LAB
ALBUMIN SERPL BCP-MCNC: 3 G/DL (ref 3.5–5.2)
ALP SERPL-CCNC: 149 U/L (ref 55–135)
ALT SERPL W/O P-5'-P-CCNC: 690 U/L (ref 10–44)
ANION GAP SERPL CALC-SCNC: 13 MMOL/L (ref 8–16)
AST SERPL-CCNC: 301 U/L (ref 10–40)
BILIRUB SERPL-MCNC: 0.9 MG/DL (ref 0.1–1)
BUN SERPL-MCNC: 31 MG/DL (ref 8–23)
CALCIUM SERPL-MCNC: 8.8 MG/DL (ref 8.7–10.5)
CHLORIDE SERPL-SCNC: 101 MMOL/L (ref 95–110)
CO2 SERPL-SCNC: 25 MMOL/L (ref 23–29)
CREAT SERPL-MCNC: 1.6 MG/DL (ref 0.5–1.4)
EST. GFR  (NO RACE VARIABLE): 45 ML/MIN/1.73 M^2
GLUCOSE SERPL-MCNC: 107 MG/DL (ref 70–110)
POTASSIUM SERPL-SCNC: 3.5 MMOL/L (ref 3.5–5.1)
PROT SERPL-MCNC: 7.5 G/DL (ref 6–8.4)
SODIUM SERPL-SCNC: 139 MMOL/L (ref 136–145)

## 2023-12-30 PROCEDURE — 11000001 HC ACUTE MED/SURG PRIVATE ROOM

## 2023-12-30 PROCEDURE — 94640 AIRWAY INHALATION TREATMENT: CPT

## 2023-12-30 PROCEDURE — 25000242 PHARM REV CODE 250 ALT 637 W/ HCPCS: Performed by: HOSPITALIST

## 2023-12-30 PROCEDURE — 25000003 PHARM REV CODE 250: Performed by: INTERNAL MEDICINE

## 2023-12-30 PROCEDURE — 27000221 HC OXYGEN, UP TO 24 HOURS

## 2023-12-30 PROCEDURE — A4216 STERILE WATER/SALINE, 10 ML: HCPCS | Performed by: HOSPITALIST

## 2023-12-30 PROCEDURE — 63600175 PHARM REV CODE 636 W HCPCS: Performed by: HOSPITALIST

## 2023-12-30 PROCEDURE — 80053 COMPREHEN METABOLIC PANEL: CPT | Performed by: HOSPITALIST

## 2023-12-30 PROCEDURE — 25000003 PHARM REV CODE 250: Performed by: HOSPITALIST

## 2023-12-30 PROCEDURE — 25000242 PHARM REV CODE 250 ALT 637 W/ HCPCS: Performed by: INTERNAL MEDICINE

## 2023-12-30 PROCEDURE — 99900035 HC TECH TIME PER 15 MIN (STAT)

## 2023-12-30 PROCEDURE — 94660 CPAP INITIATION&MGMT: CPT

## 2023-12-30 PROCEDURE — 94761 N-INVAS EAR/PLS OXIMETRY MLT: CPT

## 2023-12-30 RX ORDER — IPRATROPIUM BROMIDE AND ALBUTEROL SULFATE 2.5; .5 MG/3ML; MG/3ML
3 SOLUTION RESPIRATORY (INHALATION)
Status: DISCONTINUED | OUTPATIENT
Start: 2023-12-30 | End: 2024-01-04

## 2023-12-30 RX ADMIN — Medication 10 ML: at 11:12

## 2023-12-30 RX ADMIN — FUROSEMIDE 40 MG: 10 INJECTION, SOLUTION INTRAVENOUS at 11:12

## 2023-12-30 RX ADMIN — FAMOTIDINE 20 MG: 10 INJECTION INTRAVENOUS at 08:12

## 2023-12-30 RX ADMIN — ASPIRIN 81 MG CHEWABLE TABLET 81 MG: 81 TABLET CHEWABLE at 08:12

## 2023-12-30 RX ADMIN — IPRATROPIUM BROMIDE AND ALBUTEROL SULFATE 3 ML: 2.5; .5 SOLUTION RESPIRATORY (INHALATION) at 07:12

## 2023-12-30 RX ADMIN — IPRATROPIUM BROMIDE AND ALBUTEROL SULFATE 3 ML: 2.5; .5 SOLUTION RESPIRATORY (INHALATION) at 12:12

## 2023-12-30 RX ADMIN — Medication 10 ML: at 05:12

## 2023-12-30 RX ADMIN — FINASTERIDE 5 MG: 5 TABLET, FILM COATED ORAL at 08:12

## 2023-12-30 RX ADMIN — CLOPIDOGREL BISULFATE 75 MG: 75 TABLET ORAL at 08:12

## 2023-12-30 RX ADMIN — ENOXAPARIN SODIUM 40 MG: 40 INJECTION SUBCUTANEOUS at 05:12

## 2023-12-30 RX ADMIN — LACTULOSE 20 G: 20 SOLUTION ORAL at 08:12

## 2023-12-30 RX ADMIN — IPRATROPIUM BROMIDE AND ALBUTEROL SULFATE 3 ML: 2.5; .5 SOLUTION RESPIRATORY (INHALATION) at 09:12

## 2023-12-30 NOTE — NURSING
[facility] [place]  Nurses Note -- 4 Eyes      12/30/2023       Skin assessed on: Q Shift      [] No Pressure Injuries Present    []Prevention Measures Documented    [x] Yes LDA  for Pressure Injury Previously documented     [] Yes New Pressure Injury Discovered   [] LDA for New Pressure Injury Added      Attending RN:  Meghan Farah RN     Second RN:  DAVE Grey

## 2023-12-30 NOTE — ASSESSMENT & PLAN NOTE
Significant increase in LFT's, consistent with shock liver.  - slowly improving.  - ammonia elevated, started lactulose  - continue to hold statin  - monitor CMP daily

## 2023-12-30 NOTE — ASSESSMENT & PLAN NOTE
This patient does have evidence of infective focus. My overall impression was septic shock due to MAP < 65. Source: Respiratory- pneumonia from influenza A. All cultures NGTD.   - shock has resolved but BP is borderline low. WBC normalized   - no longer on antibiotics  - influenza treated with tamiflu  - sepsis is resolved.

## 2023-12-30 NOTE — ASSESSMENT & PLAN NOTE
Patient is identified as having Systolic (HFrEF) heart failure that is Acute on chronic. CHF is currently controlled. Latest ECHO performed and demonstrates- Results for orders placed during the hospital encounter of 12/23/23    Echo Saline Bubble? No    Interpretation Summary    Left Ventricle: The left ventricle is normal in size. There is concentric hypertrophy. There is severely reduced systolic function with a visually estimated ejection fraction of 25 - 30%.    Right Ventricle: Mild right ventricular enlargement. Systolic function is normal.    Left Atrium: Left atrium is moderately dilated.    Right Atrium: Right atrium is mildly dilated.    Tricuspid Valve: There is trace regurgitation. The estimated PA systolic pressure is at least 21 mmHg.    IVC/SVC: Patient is ventilated, cannot use IVC diameter to estimate right atrial pressure.  . Continue Furosemide and monitor clinical status closely. Monitor on telemetry. Patient is off CHF pathway.  Monitor strict Is&Os and daily weights.  Place on fluid restriction of 1.5 L. Cardiology has been consulted. Continue to stress to patient importance of self efficacy and  on diet for CHF. Last BNP reviewed- and noted below   Recent Labs   Lab 12/23/23  1914   *     -Diuresing well   - continue lasix 40mg IV BID   - BP is not appropriate for BB/entresto/aldactone currently

## 2023-12-30 NOTE — PROGRESS NOTES
[facility] Medicine  Progress Note    Patient Name: Alexsander Tafoya  MRN: [id number]  Patient Class: IP- Inpatient   Admission Date: 12/23/2023  Length of Stay: 7 days  Attending Physician: Jennifer Méndez MD  Primary Care Provider: Helio Farr MD        Subjective:     Principal Problem:Acute hypoxic respiratory failure        HPI:  Mr Tafoya is a 73-year-old male being admitted for acute hypoxic respiratory failure.  His medical history significant for BPH, dyslipidemia, chronic CVA with resultant aphasia, and right-sided hemiparesis.  During my evaluation the patient is in severe respiratory distress, on BiPAP and unable to provide any meaningful detailed history.  Seems that he came from care home due to worsening dyspnea, and EMS noted low oxygen saturations. in the ER, he was noted to have Tmax of 103.1 °F, persistently tachycardic in 130s, hypotensive 84/52, and was initially on nonrebreather and subsequently placed on BiPAP.  His CBC shows normal white count, microcytic anemia 10.5 and normal platelets.  INR 1.4.  CBC with metabolic acidosis bicarb 16, creatinine 1.9 (appears to have previous creatinine of 1.09 in September 2022 with baseline of about 1.1-1.3,), hypomagnesemia 1.5, transaminitis AST: ALT-192: 152, lactic acidosis 3.1, elevated procalcitonin 0.26.  His influenza a swab is positive.  His chest x-ray suggests moderate right-sided pleural effusion with compressive atelectasis and/or lower lobe consolidation.    After evaluating the patient, discussed with ER physician that the patient may need to be intubated given severe respiratory distress tachycardia, respiratory rate in 50s and inability to protect airway with previous stroke.  Patient will be placed on mechanical ventilation.  He has so far received breathing treatments, Rocephin and Zithromax along with Tamiflu.  Admission has been requested for further evaluation and treatment.    Overview/Hospital Course:  Mr Alexsander HARRELL  Lottie was admitted with acute hypoxic respiratory failure and septic shock due to R sided pneumonia and influenza. Suspect also CHF contributing- new EF 25-30%. Intubated in ED. Started antibiotics, diuretics, tamiflu. He also had shock liver on admission. Developed episodes of VT and started on lidocaine gtt. Started lactulose for elevated ammonia associated with shock liver. He did improve and was extubated on 12/28. Stepped down to floor. Palliative following- DNR code status. He is at functional baseline (bedbound) but is confused.     Interval History: Sitting in bed. Difficult to understand speech. Initially just making noises, but then able to speak somewhat. He denies pain. He thinks he is at [facility].     Review of Systems   Unable to perform ROS: Mental status change     Objective:     Vital Signs (Most Recent):  Temp: 98.6 °F (37 °C) (12/30/23 0825)  Pulse: 96 (12/30/23 0825)  Resp: 20 (12/30/23 0825)  BP: 93/61 (12/30/23 0825)  SpO2: 98 % (12/30/23 0825) Vital Signs (24h Range):  Temp:  [98 °F (36.7 °C)-99.1 °F (37.3 °C)] 98.6 °F (37 °C)  Pulse:  [] 96  Resp:  [13-36] 20  SpO2:  [96 %-100 %] 98 %  BP: (88-98)/(56-67) 93/61     Weight: 98 kg (216 lb 0.8 oz)  Body mass index is 33.84 kg/m².    Intake/Output Summary (Last 24 hours) at 12/30/2023 1221  Last data filed at 12/29/2023 1905  Gross per 24 hour   Intake 0 ml   Output 400 ml   Net -400 ml         Physical Exam  Vitals and nursing note reviewed.   Constitutional:       Appearance: He is ill-appearing.   HENT:      Head: Normocephalic and atraumatic.   Cardiovascular:      Rate and Rhythm: Normal rate and regular rhythm.   Pulmonary:      Effort: Pulmonary effort is normal.      Breath sounds: Normal breath sounds.      Comments: 2L NC  Abdominal:      General: Bowel sounds are normal. There is distension.      Palpations: Abdomen is soft.   Genitourinary:     Comments: Gonzalez in place  Musculoskeletal:      Right lower leg: No edema.      Left  lower leg: No edema.   Skin:     General: Skin is warm and dry.   Neurological:      Mental Status: He is disoriented.             Significant Labs: All pertinent labs within the past 24 hours have been reviewed.    Significant Imaging: I have reviewed all pertinent imaging results/findings within the past 24 hours.    Assessment/Plan:      * Acute hypoxic respiratory failure  Patient with Hypoxic Respiratory failure which is Acute.  he is not on home oxygen. Supplemental oxygen was provided and noted- Oxygen Concentration (%):  [25] 25. Signs/symptoms of respiratory failure include- tachypnea, increased work of breathing, respiratory distress, use of accessory muscles, wheezing, and stridor. Contributing diagnoses includes - Pleural effusion and Pneumonia Labs and images were reviewed. Patient Has not had a recent ABG. Will treat underlying causes and adjust management of respiratory failure as follows- Treat underlying etiologies of pneumonia and evaluate pleural effusion.  - Intubated in ER.  Pulmonary consulted.  - Respiratory failure secondary to flu/pneumonia/pleural effusion.  - Extubated on 12/28.  Currently doing well on NC.  - wean to room air    Acute on chronic combined systolic and diastolic congestive heart failure  Patient is identified as having Systolic (HFrEF) heart failure that is Acute on chronic. CHF is currently controlled. Latest ECHO performed and demonstrates- Results for orders placed during the hospital encounter of 12/23/23    Echo Saline Bubble? No    Interpretation Summary    Left Ventricle: The left ventricle is normal in size. There is concentric hypertrophy. There is severely reduced systolic function with a visually estimated ejection fraction of 25 - 30%.    Right Ventricle: Mild right ventricular enlargement. Systolic function is normal.    Left Atrium: Left atrium is moderately dilated.    Right Atrium: Right atrium is mildly dilated.    Tricuspid Valve: There is trace  regurgitation. The estimated PA systolic pressure is at least 21 mmHg.    IVC/SVC: Patient is ventilated, cannot use IVC diameter to estimate right atrial pressure.  . Continue Furosemide and monitor clinical status closely. Monitor on telemetry. Patient is off CHF pathway.  Monitor strict Is&Os and daily weights.  Place on fluid restriction of 1.5 L. Cardiology has been consulted. Continue to stress to patient importance of self efficacy and  on diet for CHF. Last BNP reviewed- and noted below   Recent Labs   Lab 12/23/23 1914   *     -Diuresing well   - continue lasix 40mg IV BID   - BP is not appropriate for BB/entresto/aldactone currently     NSVT (nonsustained ventricular tachycardia)  Started on Lidocaine.  No amio secondary to liver failure.  Now off Lidocaine.  - continue to monitor on telemetry       Dilated cardiomyopathy  See CHF      Goals of care, counseling/discussion  Advance Care Planning  Palliative Care consulted.  Goals of care discussed with family and no reintubation after extubation.  Patient is DNR        Pleural effusion  -Moderate effusion on x-ray most likely secondary to CHF  - Treated with diuresis.      Influenza A  -Treated with Tamiflu.      ATN (acute tubular necrosis)  Patient with acute kidney injury/acute renal failure likely due to acute tubular necrosis caused by septic shock  SHAYY is currently worsening. Baseline creatinine  ranging from 1 to 1.7  - Labs reviewed- Renal function/electrolytes with Estimated Creatinine Clearance: 45.9 mL/min (A) (based on SCr of 1.6 mg/dL (H)). according to latest data. Monitor urine output and serial BMP and adjust therapy as needed. Avoid nephrotoxins and renally dose meds for GFR listed above.  - due to septic shock   - improving but now stable around 1.6  - CMP in AM    NSTEMI (non-ST elevated myocardial infarction)  Significantly elevated Troponin consistent with NSTEMI.  Probably demand ischemia from shock.  On ASA and  Plavix.  Hold Statin with shock liver.  Cardiology consulted.  - recommend to treat conservatively       Septic shock  This patient does have evidence of infective focus. My overall impression was septic shock due to MAP < 65. Source: Respiratory- pneumonia from influenza A. All cultures NGTD.   - shock has resolved but BP is borderline low. WBC normalized   - no longer on antibiotics  - influenza treated with tamiflu  - sepsis is resolved.     Transaminitis  Significant increase in LFT's, consistent with shock liver.  - slowly improving.  - ammonia elevated, started lactulose  - continue to hold statin  - monitor CMP daily       Microcytic anemia  Patient's anemia is currently controlled. Has not received any PRBCs to date. Etiology likely d/t Iron deficiency noted previously in 2019, no repeat studies. No active bleeding noted.     Current CBC reviewed-   Lab Results   Component Value Date    HGB 10.3 (L) 12/29/2023    HCT 35.3 (L) 12/29/2023     Monitor serial CBC and transfuse if patient becomes hemodynamically unstable, symptomatic or H/H drops below 7/21.    BPH (benign prostatic hyperplasia)  -Continue Proscar.  - Gonzalez is currently in place-- voiding trial today     Hyperlipidemia  Hold Statin with shock liver.  - resume when AST/ALT normalize    Essential hypertension  Antihypertensives initially on hold due to shock.  - BP is still borderline low 90s/60s  - continue to hold BP meds    Aphasia as late effect of cerebrovascular accident  Per notes.      Hemiplegia affecting right side in right-dominant patient as late effect of cerebrovascular disease  - Chronic CVA with previously noted resultant right-sided hemiparesis and aphasia.  - Bed bound at baseline  - PT OT signed off as he is at his functional baseline  - Speech consulted- on puree diet       VTE Risk Mitigation (From admission, onward)           Ordered     enoxaparin injection 40 mg  Daily         12/23/23 2143     IP VTE HIGH RISK PATIENT  Once          12/23/23 2143     Place sequential compression device  Until discontinued         12/23/23 2143                    Discharge Planning   THAIS:      Code Status: DNR   Is the patient medically ready for discharge?:     Reason for patient still in hospital (select all that apply): Patient trending condition  Discharge Plan A: Assisted Living   Discharge Delays: None known at this time        4:06 PM  Met with family (sister and her ) at bedside. Discussed his progress. They feel he is doing well. They would like for him to transition to [facility] nursing home upon discharge. He is currently in assisted living. They are OK with him discharging to assisted living and working on nursing home placement as an outpatient if he improves before nursing home can be arranged as inpatient.       Jennifer Méndez MD  Department of Hospital Medicine   [facility] - Med Surg

## 2023-12-30 NOTE — ASSESSMENT & PLAN NOTE
Started on Lidocaine.  No amio secondary to liver failure.  Now off Lidocaine.  - continue to monitor on telemetry

## 2023-12-30 NOTE — NURSING TRANSFER
Nursing Transfer Note      12/30/2023   3:39 AM    Nurse giving handoff:DAVE Church  Nurse receiving handoff: Med-surg Nurse    Reason patient is being transferred: Need less acuity care    Transfer From: ICU    Transfer via bed    Transfer with  to O2, cardiac monitoring    Transported by ANNITARN    Transfer Vital Signs:  Blood Pressure:93/60  Heart Rate:95  O2:2L via nasal cannula  Temperature:98.7  Respirations:22    Telemetry: Box Number [id number]  Order for Tele Monitor? Yes    Additional Lines: Oxygen and Gonzalez Catheter    Medicines sent: None    Any special needs or follow-up needed: Monitor BP and oxygen saturation  Patient belongings transferred with patient: Yes    Chart send with patient: Yes    Upon arrival to floor: patient oriented to room and bed in lowest position

## 2023-12-30 NOTE — ASSESSMENT & PLAN NOTE
Patient with Hypoxic Respiratory failure which is Acute.  he is not on home oxygen. Supplemental oxygen was provided and noted- Oxygen Concentration (%):  [25] 25. Signs/symptoms of respiratory failure include- tachypnea, increased work of breathing, respiratory distress, use of accessory muscles, wheezing, and stridor. Contributing diagnoses includes - Pleural effusion and Pneumonia Labs and images were reviewed. Patient Has not had a recent ABG. Will treat underlying causes and adjust management of respiratory failure as follows- Treat underlying etiologies of pneumonia and evaluate pleural effusion.  - Intubated in ER.  Pulmonary consulted.  - Respiratory failure secondary to flu/pneumonia/pleural effusion.  - Extubated on 12/28.  Currently doing well on NC.  - wean to room air

## 2023-12-30 NOTE — SUBJECTIVE & OBJECTIVE
Interval History: Sitting in bed. Difficult to understand speech. Initially just making noises, but then able to speak somewhat. He denies pain. He thinks he is at [facility].     Review of Systems   Unable to perform ROS: Mental status change     Objective:     Vital Signs (Most Recent):  Temp: 98.6 °F (37 °C) (12/30/23 0825)  Pulse: 96 (12/30/23 0825)  Resp: 20 (12/30/23 0825)  BP: 93/61 (12/30/23 0825)  SpO2: 98 % (12/30/23 0825) Vital Signs (24h Range):  Temp:  [98 °F (36.7 °C)-99.1 °F (37.3 °C)] 98.6 °F (37 °C)  Pulse:  [] 96  Resp:  [13-36] 20  SpO2:  [96 %-100 %] 98 %  BP: (88-98)/(56-67) 93/61     Weight: 98 kg (216 lb 0.8 oz)  Body mass index is 33.84 kg/m².    Intake/Output Summary (Last 24 hours) at 12/30/2023 1221  Last data filed at 12/29/2023 1905  Gross per 24 hour   Intake 0 ml   Output 400 ml   Net -400 ml         Physical Exam  Vitals and nursing note reviewed.   Constitutional:       Appearance: He is ill-appearing.   HENT:      Head: Normocephalic and atraumatic.   Cardiovascular:      Rate and Rhythm: Normal rate and regular rhythm.   Pulmonary:      Effort: Pulmonary effort is normal.      Breath sounds: Normal breath sounds.      Comments: 2L NC  Abdominal:      General: Bowel sounds are normal. There is distension.      Palpations: Abdomen is soft.   Genitourinary:     Comments: Gonzalez in place  Musculoskeletal:      Right lower leg: No edema.      Left lower leg: No edema.   Skin:     General: Skin is warm and dry.   Neurological:      Mental Status: He is disoriented.             Significant Labs: All pertinent labs within the past 24 hours have been reviewed.    Significant Imaging: I have reviewed all pertinent imaging results/findings within the past 24 hours.

## 2023-12-30 NOTE — NURSING
[facility] [place]  Nurses Note -- 4 Eyes      12/29/2023       Skin assessed on: Q Shift      [] No Pressure Injuries Present    []Prevention Measures Documented    [x] Yes LDA  for Pressure Injury Previously documented     [] Yes New Pressure Injury Discovered   [] LDA for New Pressure Injury Added      Attending RN:  Huber Terrell RN     Second RN:  DAVE Dick

## 2023-12-30 NOTE — PLAN OF CARE
Problem: Adult Inpatient Plan of Care  Goal: Plan of Care Review  Outcome: Ongoing, Progressing  Flowsheets (Taken 12/30/2023 1703)  Plan of Care Reviewed With: patient  Goal: Patient-Specific Goal (Individualized)  Outcome: Ongoing, Progressing  Goal: Absence of Hospital-Acquired Illness or Injury  Outcome: Ongoing, Progressing  Intervention: Prevent Infection  Flowsheets (Taken 12/30/2023 1703)  Infection Prevention:   equipment surfaces disinfected   hand hygiene promoted   personal protective equipment utilized   rest/sleep promoted   single patient room provided  Goal: Optimal Comfort and Wellbeing  Outcome: Ongoing, Progressing  Intervention: Monitor Pain and Promote Comfort  Flowsheets (Taken 12/30/2023 1703)  Pain Management Interventions:   care clustered   pillow support provided   quiet environment facilitated  Intervention: Provide Person-Centered Care  Flowsheets (Taken 12/30/2023 1703)  Trust Relationship/Rapport:   care explained   choices provided   emotional support provided   empathic listening provided   questions answered   questions encouraged   reassurance provided   thoughts/feelings acknowledged  Goal: Readiness for Transition of Care  Outcome: Ongoing, Progressing     Problem: Infection  Goal: Absence of Infection Signs and Symptoms  Outcome: Ongoing, Progressing  Intervention: Prevent or Manage Infection  Flowsheets (Taken 12/30/2023 1703)  Infection Management: aseptic technique maintained  Isolation Precautions:   precautions maintained   droplet     Problem: Adjustment to Illness (Sepsis/Septic Shock)  Goal: Optimal Coping  Outcome: Ongoing, Progressing     Problem: Bleeding (Sepsis/Septic Shock)  Goal: Absence of Bleeding  Outcome: Ongoing, Progressing     Problem: Glycemic Control Impaired (Sepsis/Septic Shock)  Goal: Blood Glucose Level Within Desired Range  Outcome: Ongoing, Progressing     Problem: Infection Progression (Sepsis/Septic Shock)  Goal: Absence of Infection Signs and  Symptoms  Outcome: Ongoing, Progressing     Problem: Nutrition Impaired (Sepsis/Septic Shock)  Goal: Optimal Nutrition Intake  Outcome: Ongoing, Progressing     Problem: Fluid and Electrolyte Imbalance (Acute Kidney Injury/Impairment)  Goal: Fluid and Electrolyte Balance  Outcome: Ongoing, Progressing     Problem: Oral Intake Inadequate (Acute Kidney Injury/Impairment)  Goal: Optimal Nutrition Intake  Outcome: Ongoing, Progressing     Problem: Renal Function Impairment (Acute Kidney Injury/Impairment)  Goal: Effective Renal Function  Outcome: Ongoing, Progressing     Problem: Fluid Imbalance (Pneumonia)  Goal: Fluid Balance  Outcome: Ongoing, Progressing     Problem: Infection (Pneumonia)  Goal: Resolution of Infection Signs and Symptoms  Outcome: Ongoing, Progressing     Problem: Respiratory Compromise (Pneumonia)  Goal: Effective Oxygenation and Ventilation  Outcome: Ongoing, Progressing     Problem: Fall Injury Risk  Goal: Absence of Fall and Fall-Related Injury  Outcome: Ongoing, Progressing     Problem: Skin Injury Risk Increased  Goal: Skin Health and Integrity  Outcome: Ongoing, Progressing     Problem: Impaired Wound Healing  Goal: Optimal Wound Healing  Outcome: Ongoing, Progressing  Intervention: Promote Wound Healing  Flowsheets (Taken 12/30/2023 1703)  Sleep/Rest Enhancement:   regular sleep/rest pattern promoted   relaxation techniques promoted  Activity Management:   Rolling - L1   Patient unable to perform activities  Pain Management Interventions:   care clustered   pillow support provided   quiet environment facilitated     Problem: Coping Ineffective  Goal: Effective Coping  Outcome: Ongoing, Progressing

## 2023-12-30 NOTE — ASSESSMENT & PLAN NOTE
Antihypertensives initially on hold due to shock.  - BP is still borderline low 90s/60s  - continue to hold BP meds

## 2023-12-30 NOTE — ASSESSMENT & PLAN NOTE
Patient's anemia is currently controlled. Has not received any PRBCs to date. Etiology likely d/t Iron deficiency noted previously in 2019, no repeat studies. No active bleeding noted.     Current CBC reviewed-   Lab Results   Component Value Date    HGB 10.3 (L) 12/29/2023    HCT 35.3 (L) 12/29/2023     Monitor serial CBC and transfuse if patient becomes hemodynamically unstable, symptomatic or H/H drops below 7/21.

## 2023-12-30 NOTE — ASSESSMENT & PLAN NOTE
Patient with acute kidney injury/acute renal failure likely due to acute tubular necrosis caused by septic shock  SHAYY is currently worsening. Baseline creatinine  ranging from 1 to 1.7  - Labs reviewed- Renal function/electrolytes with Estimated Creatinine Clearance: 45.9 mL/min (A) (based on SCr of 1.6 mg/dL (H)). according to latest data. Monitor urine output and serial BMP and adjust therapy as needed. Avoid nephrotoxins and renally dose meds for GFR listed above.  - due to septic shock   - improving but now stable around 1.6  - CMP in AM

## 2023-12-30 NOTE — ASSESSMENT & PLAN NOTE
- Chronic CVA with previously noted resultant right-sided hemiparesis and aphasia.  - Bed bound at baseline  - PT OT signed off as he is at his functional baseline  - Speech consulted- on puree diet

## 2023-12-31 LAB
ALBUMIN SERPL BCP-MCNC: 2.9 G/DL (ref 3.5–5.2)
ALP SERPL-CCNC: 140 U/L (ref 55–135)
ALT SERPL W/O P-5'-P-CCNC: 469 U/L (ref 10–44)
ANION GAP SERPL CALC-SCNC: 12 MMOL/L (ref 8–16)
AST SERPL-CCNC: 194 U/L (ref 10–40)
BASOPHILS # BLD AUTO: 0.01 K/UL (ref 0–0.2)
BASOPHILS NFR BLD: 0.2 % (ref 0–1.9)
BILIRUB SERPL-MCNC: 0.9 MG/DL (ref 0.1–1)
BUN SERPL-MCNC: 29 MG/DL (ref 8–23)
CALCIUM SERPL-MCNC: 8.9 MG/DL (ref 8.7–10.5)
CHLORIDE SERPL-SCNC: 102 MMOL/L (ref 95–110)
CO2 SERPL-SCNC: 24 MMOL/L (ref 23–29)
CREAT SERPL-MCNC: 1.4 MG/DL (ref 0.5–1.4)
DIFFERENTIAL METHOD BLD: ABNORMAL
EOSINOPHIL # BLD AUTO: 0.1 K/UL (ref 0–0.5)
EOSINOPHIL NFR BLD: 1.7 % (ref 0–8)
ERYTHROCYTE [DISTWIDTH] IN BLOOD BY AUTOMATED COUNT: 17.2 % (ref 11.5–14.5)
EST. GFR  (NO RACE VARIABLE): 53 ML/MIN/1.73 M^2
GLUCOSE SERPL-MCNC: 111 MG/DL (ref 70–110)
HCT VFR BLD AUTO: 37.6 % (ref 40–54)
HGB BLD-MCNC: 11.2 G/DL (ref 14–18)
IMM GRANULOCYTES # BLD AUTO: 0.01 K/UL (ref 0–0.04)
IMM GRANULOCYTES NFR BLD AUTO: 0.2 % (ref 0–0.5)
LYMPHOCYTES # BLD AUTO: 1.1 K/UL (ref 1–4.8)
LYMPHOCYTES NFR BLD: 23.4 % (ref 18–48)
MAGNESIUM SERPL-MCNC: 2.2 MG/DL (ref 1.6–2.6)
MCH RBC QN AUTO: 21.6 PG (ref 27–31)
MCHC RBC AUTO-ENTMCNC: 29.8 G/DL (ref 32–36)
MCV RBC AUTO: 72 FL (ref 82–98)
MONOCYTES # BLD AUTO: 0.6 K/UL (ref 0.3–1)
MONOCYTES NFR BLD: 12.4 % (ref 4–15)
NEUTROPHILS # BLD AUTO: 3 K/UL (ref 1.8–7.7)
NEUTROPHILS NFR BLD: 62.1 % (ref 38–73)
NRBC BLD-RTO: 0 /100 WBC
PLATELET # BLD AUTO: 254 K/UL (ref 150–450)
PMV BLD AUTO: 10.8 FL (ref 9.2–12.9)
POTASSIUM SERPL-SCNC: 3.4 MMOL/L (ref 3.5–5.1)
PROT SERPL-MCNC: 7.5 G/DL (ref 6–8.4)
RBC # BLD AUTO: 5.19 M/UL (ref 4.6–6.2)
SODIUM SERPL-SCNC: 138 MMOL/L (ref 136–145)
WBC # BLD AUTO: 4.75 K/UL (ref 3.9–12.7)

## 2023-12-31 PROCEDURE — 63600175 PHARM REV CODE 636 W HCPCS: Performed by: HOSPITALIST

## 2023-12-31 PROCEDURE — 25000003 PHARM REV CODE 250: Performed by: HOSPITALIST

## 2023-12-31 PROCEDURE — 94660 CPAP INITIATION&MGMT: CPT

## 2023-12-31 PROCEDURE — 94799 UNLISTED PULMONARY SVC/PX: CPT

## 2023-12-31 PROCEDURE — 36415 COLL VENOUS BLD VENIPUNCTURE: CPT | Performed by: HOSPITALIST

## 2023-12-31 PROCEDURE — 80053 COMPREHEN METABOLIC PANEL: CPT | Performed by: HOSPITALIST

## 2023-12-31 PROCEDURE — 94761 N-INVAS EAR/PLS OXIMETRY MLT: CPT

## 2023-12-31 PROCEDURE — A4216 STERILE WATER/SALINE, 10 ML: HCPCS | Performed by: HOSPITALIST

## 2023-12-31 PROCEDURE — 83735 ASSAY OF MAGNESIUM: CPT | Performed by: HOSPITALIST

## 2023-12-31 PROCEDURE — 99900035 HC TECH TIME PER 15 MIN (STAT)

## 2023-12-31 PROCEDURE — 25000242 PHARM REV CODE 250 ALT 637 W/ HCPCS: Performed by: HOSPITALIST

## 2023-12-31 PROCEDURE — 85025 COMPLETE CBC W/AUTO DIFF WBC: CPT | Performed by: HOSPITALIST

## 2023-12-31 PROCEDURE — 94640 AIRWAY INHALATION TREATMENT: CPT

## 2023-12-31 PROCEDURE — 11000001 HC ACUTE MED/SURG PRIVATE ROOM

## 2023-12-31 PROCEDURE — 27000221 HC OXYGEN, UP TO 24 HOURS

## 2023-12-31 RX ADMIN — LACTULOSE 20 G: 20 SOLUTION ORAL at 08:12

## 2023-12-31 RX ADMIN — IPRATROPIUM BROMIDE AND ALBUTEROL SULFATE 3 ML: 2.5; .5 SOLUTION RESPIRATORY (INHALATION) at 02:12

## 2023-12-31 RX ADMIN — FINASTERIDE 5 MG: 5 TABLET, FILM COATED ORAL at 09:12

## 2023-12-31 RX ADMIN — LACTULOSE 20 G: 20 SOLUTION ORAL at 09:12

## 2023-12-31 RX ADMIN — IPRATROPIUM BROMIDE AND ALBUTEROL SULFATE 3 ML: 2.5; .5 SOLUTION RESPIRATORY (INHALATION) at 07:12

## 2023-12-31 RX ADMIN — IPRATROPIUM BROMIDE AND ALBUTEROL SULFATE 3 ML: 2.5; .5 SOLUTION RESPIRATORY (INHALATION) at 08:12

## 2023-12-31 RX ADMIN — Medication 10 ML: at 06:12

## 2023-12-31 RX ADMIN — Medication 10 ML: at 12:12

## 2023-12-31 RX ADMIN — POTASSIUM BICARBONATE 50 MEQ: 977.5 TABLET, EFFERVESCENT ORAL at 10:12

## 2023-12-31 RX ADMIN — FUROSEMIDE 40 MG: 10 INJECTION, SOLUTION INTRAVENOUS at 12:12

## 2023-12-31 RX ADMIN — ENOXAPARIN SODIUM 40 MG: 40 INJECTION SUBCUTANEOUS at 05:12

## 2023-12-31 RX ADMIN — Medication 10 ML: at 05:12

## 2023-12-31 RX ADMIN — ASPIRIN 81 MG CHEWABLE TABLET 81 MG: 81 TABLET CHEWABLE at 09:12

## 2023-12-31 RX ADMIN — CLOPIDOGREL BISULFATE 75 MG: 75 TABLET ORAL at 09:12

## 2023-12-31 NOTE — PROGRESS NOTES
[facility] Medicine  Progress Note    Patient Name: Alexsander Tafoya  MRN: [id number]  Patient Class: IP- Inpatient   Admission Date: 12/23/2023  Length of Stay: 8 days  Attending Physician: Jennifer Méndez MD  Primary Care Provider: Helio Farr MD        Subjective:     Principal Problem:Acute hypoxic respiratory failure        HPI:  Mr Tafoya is a 73-year-old male being admitted for acute hypoxic respiratory failure.  His medical history significant for BPH, dyslipidemia, chronic CVA with resultant aphasia, and right-sided hemiparesis.  During my evaluation the patient is in severe respiratory distress, on BiPAP and unable to provide any meaningful detailed history.  Seems that he came from [facility] due to worsening dyspnea, and EMS noted low oxygen saturations. in the ER, he was noted to have Tmax of 103.1 °F, persistently tachycardic in 130s, hypotensive 84/52, and was initially on nonrebreather and subsequently placed on BiPAP.  His CBC shows normal white count, microcytic anemia 10.5 and normal platelets.  INR 1.4.  CBC with metabolic acidosis bicarb 16, creatinine 1.9 (appears to have previous creatinine of 1.09 in September 2022 with baseline of about 1.1-1.3,), hypomagnesemia 1.5, transaminitis AST: ALT-192: 152, lactic acidosis 3.1, elevated procalcitonin 0.26.  His influenza a swab is positive.  His chest x-ray suggests moderate right-sided pleural effusion with compressive atelectasis and/or lower lobe consolidation.    After evaluating the patient, discussed with ER physician that the patient may need to be intubated given severe respiratory distress tachycardia, respiratory rate in 50s and inability to protect airway with previous stroke.  Patient will be placed on mechanical ventilation.  He has so far received breathing treatments, Rocephin and Zithromax along with Tamiflu.  Admission has been requested for further evaluation and treatment.    Overview/Hospital Course:  Mr Alexsander HARRELL  Lottie was admitted with acute hypoxic respiratory failure and septic shock due to R sided pneumonia and influenza. Suspect also CHF contributing- new EF 25-30%. Intubated in ED. Started antibiotics, diuretics, tamiflu. He also had shock liver on admission. Developed episodes of VT and started on lidocaine gtt. Started lactulose for elevated ammonia associated with shock liver. He did improve and was extubated on 12/28. Stepped down to floor. Palliative following- DNR code status. He is at functional baseline- bedbound with aphasia.     Interval History: Lying in bed visiting with family, then sleeping later today.     Review of Systems   Unable to perform ROS: Mental status change     Objective:     Vital Signs (Most Recent):  Temp: 97.4 °F (36.3 °C) (12/31/23 1257)  Pulse: 99 (12/31/23 1420)  Resp: 20 (12/31/23 1420)  BP: 97/64 (12/31/23 1257)  SpO2: 97 % (12/31/23 1420) Vital Signs (24h Range):  Temp:  [97 °F (36.1 °C)-98.2 °F (36.8 °C)] 97.4 °F (36.3 °C)  Pulse:  [91-99] 99  Resp:  [17-24] 20  SpO2:  [95 %-100 %] 97 %  BP: ()/(54-64) 97/64     Weight: 98 kg (216 lb 0.8 oz)  Body mass index is 33.84 kg/m².    Intake/Output Summary (Last 24 hours) at 12/31/2023 1537  Last data filed at 12/31/2023 1230  Gross per 24 hour   Intake 240 ml   Output 600 ml   Net -360 ml           Physical Exam  Vitals and nursing note reviewed.   Constitutional:       Appearance: He is ill-appearing.   HENT:      Head: Normocephalic and atraumatic.   Cardiovascular:      Rate and Rhythm: Normal rate and regular rhythm.   Pulmonary:      Effort: Pulmonary effort is normal.      Breath sounds: Normal breath sounds.      Comments: 1L NC  Abdominal:      General: Bowel sounds are normal. There is distension.      Palpations: Abdomen is soft.   Musculoskeletal:      Right lower leg: No edema.      Left lower leg: No edema.   Skin:     General: Skin is warm and dry.   Neurological:      Mental Status: He is disoriented.              Significant Labs: All pertinent labs within the past 24 hours have been reviewed.    Significant Imaging: I have reviewed all pertinent imaging results/findings within the past 24 hours.    Assessment/Plan:      * Acute hypoxic respiratory failure  Patient with Hypoxic Respiratory failure which is Acute.  he is not on home oxygen. Supplemental oxygen was provided and noted- Oxygen Concentration (%):  [25] 25. Signs/symptoms of respiratory failure include- tachypnea, increased work of breathing, respiratory distress, use of accessory muscles, wheezing, and stridor. Contributing diagnoses includes - Pleural effusion and Pneumonia Labs and images were reviewed. Patient Has not had a recent ABG. Will treat underlying causes and adjust management of respiratory failure as follows- Treat underlying etiologies of pneumonia and evaluate pleural effusion.  - Intubated in ER.  Pulmonary consulted.  - Respiratory failure secondary to flu/pneumonia/pleural effusion.  - Extubated on 12/28.  Currently doing well on NC.  - wean to room air    Acute on chronic combined systolic and diastolic congestive heart failure  Patient is identified as having Systolic (HFrEF) heart failure that is Acute on chronic. CHF is currently controlled. Latest ECHO performed and demonstrates- Results for orders placed during the hospital encounter of 12/23/23    Echo Saline Bubble? No    Interpretation Summary    Left Ventricle: The left ventricle is normal in size. There is concentric hypertrophy. There is severely reduced systolic function with a visually estimated ejection fraction of 25 - 30%.    Right Ventricle: Mild right ventricular enlargement. Systolic function is normal.    Left Atrium: Left atrium is moderately dilated.    Right Atrium: Right atrium is mildly dilated.    Tricuspid Valve: There is trace regurgitation. The estimated PA systolic pressure is at least 21 mmHg.    IVC/SVC: Patient is ventilated, cannot use IVC diameter to  "estimate right atrial pressure.  . Continue Furosemide and monitor clinical status closely. Monitor on telemetry. Patient is off CHF pathway.  Monitor strict Is&Os and daily weights.  Place on fluid restriction of 1.5 L. Cardiology has been consulted. Continue to stress to patient importance of self efficacy and  on diet for CHF. Last BNP reviewed- and noted below   No results for input(s): "BNP", "BNPTRIAGEBLO" in the last 168 hours.  -Diuresing well   - continue lasix 40mg IV BID   - BP is not appropriate for BB/entresto/aldactone currently     NSVT (nonsustained ventricular tachycardia)  Started on Lidocaine.  No amio secondary to liver failure.  Now off Lidocaine.  - continue to monitor on telemetry   - will start BB when BP allows       Dilated cardiomyopathy  See CHF      Goals of care, counseling/discussion  Advance Care Planning  Palliative Care consulted.  Goals of care discussed with family and no reintubation after extubation.  Patient is DNR        Pleural effusion  -Moderate effusion on x-ray most likely secondary to CHF  - Treated with diuresis.      Influenza A  -Treated with Tamiflu.      ATN (acute tubular necrosis)  Patient with acute kidney injury/acute renal failure likely due to acute tubular necrosis caused by septic shock  SHAYY is currently worsening. Baseline creatinine  ranging from 1 to 1.7  - Labs reviewed- Renal function/electrolytes with Estimated Creatinine Clearance: 52.4 mL/min (based on SCr of 1.4 mg/dL). according to latest data. Monitor urine output and serial BMP and adjust therapy as needed. Avoid nephrotoxins and renally dose meds for GFR listed above.  - due to septic shock   - improving   - CMP in AM    NSTEMI (non-ST elevated myocardial infarction)  Significantly elevated Troponin consistent with NSTEMI.  Probably demand ischemia from shock.  On ASA and Plavix.  Hold Statin with shock liver.  Cardiology consulted.  - recommend to treat conservatively       Septic " shock  This patient does have evidence of infective focus. My overall impression was septic shock due to MAP < 65. Source: Respiratory- pneumonia from influenza A. All cultures NGTD.   - shock has resolved but BP is borderline low. WBC normalized   - no longer on antibiotics  - influenza treated with tamiflu  - sepsis is resolved.     Transaminitis  Significant increase in LFT's, consistent with shock liver.  - slowly improving.  - ammonia elevated, started lactulose  - continue to hold statin  - monitor CMP daily       Microcytic anemia  Patient's anemia is currently controlled. Has not received any PRBCs to date. Etiology likely d/t Iron deficiency noted previously in 2019, no repeat studies. No active bleeding noted.     Current CBC reviewed-   Lab Results   Component Value Date    HGB 11.2 (L) 12/31/2023    HCT 37.6 (L) 12/31/2023     Monitor serial CBC and transfuse if patient becomes hemodynamically unstable, symptomatic or H/H drops below 7/21.    BPH (benign prostatic hyperplasia)  -Continue Proscar.  - voiding trial successful    Hyperlipidemia  Hold Statin with shock liver.  - resume when AST/ALT normalize    Essential hypertension  Antihypertensives initially on hold due to shock.  - BP is still borderline low 90s/60s  - continue to hold BP meds    Aphasia as late effect of cerebrovascular accident  Per notes.      Hemiplegia affecting right side in right-dominant patient as late effect of cerebrovascular disease  - Chronic CVA with previously noted resultant right-sided hemiparesis and aphasia.  - Bed bound at baseline  - PT OT signed off as he is at his functional baseline  - Speech consulted- on puree diet       VTE Risk Mitigation (From admission, onward)           Ordered     enoxaparin injection 40 mg  Daily         12/23/23 2143     IP VTE HIGH RISK PATIENT  Once         12/23/23 2143     Place sequential compression device  Until discontinued         12/23/23 2143                    Discharge  Planning   THAIS:      Code Status: DNR   Is the patient medically ready for discharge?:     Reason for patient still in hospital (select all that apply): Patient trending condition  Discharge Plan A: Assisted Living   Discharge Delays: None known at this time      3:40 PM Called sister Idalia Jordan to update her.         Jennifer Méndez MD  Department of [facility]   [facility]

## 2023-12-31 NOTE — NURSING
[facility], [facility]  Nurses Note -- 4 Eyes      12/31/2023       Skin assessed on: Q Shift      [] No Pressure Injuries Present    []Prevention Measures Documented    [x] Yes LDA  for Pressure Injury Previously documented     [] Yes New Pressure Injury Discovered   [] LDA for New Pressure Injury Added      Attending RN:  Sarika Thompson, RN     Second RN:  Ofelia

## 2023-12-31 NOTE — ASSESSMENT & PLAN NOTE
"Patient is identified as having Systolic (HFrEF) heart failure that is Acute on chronic. CHF is currently controlled. Latest ECHO performed and demonstrates- Results for orders placed during the hospital encounter of 12/23/23    Echo Saline Bubble? No    Interpretation Summary    Left Ventricle: The left ventricle is normal in size. There is concentric hypertrophy. There is severely reduced systolic function with a visually estimated ejection fraction of 25 - 30%.    Right Ventricle: Mild right ventricular enlargement. Systolic function is normal.    Left Atrium: Left atrium is moderately dilated.    Right Atrium: Right atrium is mildly dilated.    Tricuspid Valve: There is trace regurgitation. The estimated PA systolic pressure is at least 21 mmHg.    IVC/SVC: Patient is ventilated, cannot use IVC diameter to estimate right atrial pressure.  . Continue Furosemide and monitor clinical status closely. Monitor on telemetry. Patient is off CHF pathway.  Monitor strict Is&Os and daily weights.  Place on fluid restriction of 1.5 L. Cardiology has been consulted. Continue to stress to patient importance of self efficacy and  on diet for CHF. Last BNP reviewed- and noted below   No results for input(s): "BNP", "BNPTRIAGEBLO" in the last 168 hours.  -Diuresing well   - continue lasix 40mg IV BID   - BP is not appropriate for BB/entresto/aldactone currently   "

## 2023-12-31 NOTE — PLAN OF CARE
Problem: Adult Inpatient Plan of Care  Goal: Plan of Care Review  Outcome: Ongoing, Progressing  Goal: Patient-Specific Goal (Individualized)  Outcome: Ongoing, Progressing  Goal: Optimal Comfort and Wellbeing  Outcome: Ongoing, Progressing  Goal: Readiness for Transition of Care  Outcome: Ongoing, Progressing     Problem: Infection  Goal: Absence of Infection Signs and Symptoms  Outcome: Ongoing, Progressing     Problem: Nutrition Impaired (Sepsis/Septic Shock)  Goal: Optimal Nutrition Intake  Outcome: Ongoing, Progressing     Problem: Fluid and Electrolyte Imbalance (Acute Kidney Injury/Impairment)  Goal: Fluid and Electrolyte Balance  Outcome: Ongoing, Progressing     Problem: Infection (Pneumonia)  Goal: Resolution of Infection Signs and Symptoms  Outcome: Ongoing, Progressing     Problem: Respiratory Compromise (Pneumonia)  Goal: Effective Oxygenation and Ventilation  Outcome: Ongoing, Progressing     Problem: Fall Injury Risk  Goal: Absence of Fall and Fall-Related Injury  Outcome: Ongoing, Progressing     Problem: Coping Ineffective  Goal: Effective Coping  Outcome: Ongoing, Progressing

## 2023-12-31 NOTE — SUBJECTIVE & OBJECTIVE
Interval History: Lying in bed visiting with family, then sleeping later today.     Review of Systems   Unable to perform ROS: Mental status change     Objective:     Vital Signs (Most Recent):  Temp: 97.4 °F (36.3 °C) (12/31/23 1257)  Pulse: 99 (12/31/23 1420)  Resp: 20 (12/31/23 1420)  BP: 97/64 (12/31/23 1257)  SpO2: 97 % (12/31/23 1420) Vital Signs (24h Range):  Temp:  [97 °F (36.1 °C)-98.2 °F (36.8 °C)] 97.4 °F (36.3 °C)  Pulse:  [91-99] 99  Resp:  [17-24] 20  SpO2:  [95 %-100 %] 97 %  BP: ()/(54-64) 97/64     Weight: 98 kg (216 lb 0.8 oz)  Body mass index is 33.84 kg/m².    Intake/Output Summary (Last 24 hours) at 12/31/2023 1537  Last data filed at 12/31/2023 1230  Gross per 24 hour   Intake 240 ml   Output 600 ml   Net -360 ml           Physical Exam  Vitals and nursing note reviewed.   Constitutional:       Appearance: He is ill-appearing.   HENT:      Head: Normocephalic and atraumatic.   Cardiovascular:      Rate and Rhythm: Normal rate and regular rhythm.   Pulmonary:      Effort: Pulmonary effort is normal.      Breath sounds: Normal breath sounds.      Comments: 1L NC  Abdominal:      General: Bowel sounds are normal. There is distension.      Palpations: Abdomen is soft.   Musculoskeletal:      Right lower leg: No edema.      Left lower leg: No edema.   Skin:     General: Skin is warm and dry.   Neurological:      Mental Status: He is disoriented.             Significant Labs: All pertinent labs within the past 24 hours have been reviewed.    Significant Imaging: I have reviewed all pertinent imaging results/findings within the past 24 hours.

## 2023-12-31 NOTE — NURSING
[facility], [facility]  Nurses Note -- 4 Eyes    12-30-23      Skin assessed on: Q Shift      [] No Pressure Injuries Present    [x]Prevention Measures Documented    [x] Yes LDA  for Pressure Injury Previously documented     [] Yes New Pressure Injury Discovered   [] LDA for New Pressure Injury Added      Attending RN:  DAVE Willson RN

## 2023-12-31 NOTE — ASSESSMENT & PLAN NOTE
Patient's anemia is currently controlled. Has not received any PRBCs to date. Etiology likely d/t Iron deficiency noted previously in 2019, no repeat studies. No active bleeding noted.     Current CBC reviewed-   Lab Results   Component Value Date    HGB 11.2 (L) 12/31/2023    HCT 37.6 (L) 12/31/2023     Monitor serial CBC and transfuse if patient becomes hemodynamically unstable, symptomatic or H/H drops below 7/21.

## 2023-12-31 NOTE — ASSESSMENT & PLAN NOTE
Started on Lidocaine.  No amio secondary to liver failure.  Now off Lidocaine.  - continue to monitor on telemetry   - will start BB when BP allows

## 2023-12-31 NOTE — ASSESSMENT & PLAN NOTE
Patient with acute kidney injury/acute renal failure likely due to acute tubular necrosis caused by septic shock  SHAYY is currently worsening. Baseline creatinine  ranging from 1 to 1.7  - Labs reviewed- Renal function/electrolytes with Estimated Creatinine Clearance: 52.4 mL/min (based on SCr of 1.4 mg/dL). according to latest data. Monitor urine output and serial BMP and adjust therapy as needed. Avoid nephrotoxins and renally dose meds for GFR listed above.  - due to septic shock   - improving   - CMP in AM

## 2024-01-01 LAB
ALBUMIN SERPL BCP-MCNC: 3.3 G/DL (ref 3.5–5.2)
ALP SERPL-CCNC: 146 U/L (ref 55–135)
ALT SERPL W/O P-5'-P-CCNC: 385 U/L (ref 10–44)
ANION GAP SERPL CALC-SCNC: 15 MMOL/L (ref 8–16)
AST SERPL-CCNC: 157 U/L (ref 10–40)
BASOPHILS # BLD AUTO: 0.04 K/UL (ref 0–0.2)
BASOPHILS NFR BLD: 0.9 % (ref 0–1.9)
BILIRUB SERPL-MCNC: 0.9 MG/DL (ref 0.1–1)
BUN SERPL-MCNC: 27 MG/DL (ref 8–23)
CALCIUM SERPL-MCNC: 9.4 MG/DL (ref 8.7–10.5)
CHLORIDE SERPL-SCNC: 103 MMOL/L (ref 95–110)
CO2 SERPL-SCNC: 24 MMOL/L (ref 23–29)
CREAT SERPL-MCNC: 1.4 MG/DL (ref 0.5–1.4)
DIFFERENTIAL METHOD BLD: ABNORMAL
EOSINOPHIL # BLD AUTO: 0.1 K/UL (ref 0–0.5)
EOSINOPHIL NFR BLD: 1.7 % (ref 0–8)
ERYTHROCYTE [DISTWIDTH] IN BLOOD BY AUTOMATED COUNT: 18.6 % (ref 11.5–14.5)
EST. GFR  (NO RACE VARIABLE): 53 ML/MIN/1.73 M^2
GLUCOSE SERPL-MCNC: 94 MG/DL (ref 70–110)
HCT VFR BLD AUTO: 46.2 % (ref 40–54)
HGB BLD-MCNC: 13.4 G/DL (ref 14–18)
IMM GRANULOCYTES # BLD AUTO: 0.02 K/UL (ref 0–0.04)
IMM GRANULOCYTES NFR BLD AUTO: 0.4 % (ref 0–0.5)
LYMPHOCYTES # BLD AUTO: 1.2 K/UL (ref 1–4.8)
LYMPHOCYTES NFR BLD: 26.3 % (ref 18–48)
MCH RBC QN AUTO: 21.7 PG (ref 27–31)
MCHC RBC AUTO-ENTMCNC: 29 G/DL (ref 32–36)
MCV RBC AUTO: 75 FL (ref 82–98)
MONOCYTES # BLD AUTO: 0.7 K/UL (ref 0.3–1)
MONOCYTES NFR BLD: 15.1 % (ref 4–15)
NEUTROPHILS # BLD AUTO: 2.6 K/UL (ref 1.8–7.7)
NEUTROPHILS NFR BLD: 55.6 % (ref 38–73)
NRBC BLD-RTO: 0 /100 WBC
PLATELET # BLD AUTO: 304 K/UL (ref 150–450)
PMV BLD AUTO: 11 FL (ref 9.2–12.9)
POTASSIUM SERPL-SCNC: 3.7 MMOL/L (ref 3.5–5.1)
PROT SERPL-MCNC: 8.3 G/DL (ref 6–8.4)
RBC # BLD AUTO: 6.17 M/UL (ref 4.6–6.2)
SODIUM SERPL-SCNC: 142 MMOL/L (ref 136–145)
WBC # BLD AUTO: 4.63 K/UL (ref 3.9–12.7)

## 2024-01-01 PROCEDURE — 36415 COLL VENOUS BLD VENIPUNCTURE: CPT | Performed by: HOSPITALIST

## 2024-01-01 PROCEDURE — 94660 CPAP INITIATION&MGMT: CPT

## 2024-01-01 PROCEDURE — 94640 AIRWAY INHALATION TREATMENT: CPT

## 2024-01-01 PROCEDURE — 99900035 HC TECH TIME PER 15 MIN (STAT)

## 2024-01-01 PROCEDURE — 80053 COMPREHEN METABOLIC PANEL: CPT | Performed by: HOSPITALIST

## 2024-01-01 PROCEDURE — 63600175 PHARM REV CODE 636 W HCPCS: Performed by: HOSPITALIST

## 2024-01-01 PROCEDURE — 85025 COMPLETE CBC W/AUTO DIFF WBC: CPT | Performed by: HOSPITALIST

## 2024-01-01 PROCEDURE — 25000003 PHARM REV CODE 250: Performed by: HOSPITALIST

## 2024-01-01 PROCEDURE — 27000221 HC OXYGEN, UP TO 24 HOURS

## 2024-01-01 PROCEDURE — 11000001 HC ACUTE MED/SURG PRIVATE ROOM

## 2024-01-01 PROCEDURE — 94761 N-INVAS EAR/PLS OXIMETRY MLT: CPT

## 2024-01-01 PROCEDURE — A4216 STERILE WATER/SALINE, 10 ML: HCPCS | Performed by: HOSPITALIST

## 2024-01-01 PROCEDURE — 25000242 PHARM REV CODE 250 ALT 637 W/ HCPCS: Performed by: HOSPITALIST

## 2024-01-01 RX ORDER — FUROSEMIDE 40 MG/1
40 TABLET ORAL 2 TIMES DAILY
Status: DISCONTINUED | OUTPATIENT
Start: 2024-01-01 | End: 2024-01-04 | Stop reason: HOSPADM

## 2024-01-01 RX ORDER — FUROSEMIDE 40 MG/1
40 TABLET ORAL DAILY
Status: DISCONTINUED | OUTPATIENT
Start: 2024-01-02 | End: 2024-01-01

## 2024-01-01 RX ADMIN — IPRATROPIUM BROMIDE AND ALBUTEROL SULFATE 3 ML: 2.5; .5 SOLUTION RESPIRATORY (INHALATION) at 08:01

## 2024-01-01 RX ADMIN — Medication 10 ML: at 12:01

## 2024-01-01 RX ADMIN — IPRATROPIUM BROMIDE AND ALBUTEROL SULFATE 3 ML: 2.5; .5 SOLUTION RESPIRATORY (INHALATION) at 02:01

## 2024-01-01 RX ADMIN — ASPIRIN 81 MG CHEWABLE TABLET 81 MG: 81 TABLET CHEWABLE at 09:01

## 2024-01-01 RX ADMIN — CLOPIDOGREL BISULFATE 75 MG: 75 TABLET ORAL at 09:01

## 2024-01-01 RX ADMIN — FUROSEMIDE 40 MG: 40 TABLET ORAL at 05:01

## 2024-01-01 RX ADMIN — FUROSEMIDE 40 MG: 10 INJECTION, SOLUTION INTRAVENOUS at 12:01

## 2024-01-01 RX ADMIN — ENOXAPARIN SODIUM 40 MG: 40 INJECTION SUBCUTANEOUS at 04:01

## 2024-01-01 RX ADMIN — FINASTERIDE 5 MG: 5 TABLET, FILM COATED ORAL at 09:01

## 2024-01-01 NOTE — NURSING
Report received and care assumed. Discussed plan of care and safety with patient . Reviewed call system. [facility]  [facility]es [facility] -- 4 Eyes      1/1/2024       Skin assessed on: Q Shift      [] No Pressure Injuries Present    []Prevention Measures Documented    [x] Yes LDA  for Pressure Injury Previously documented     [] Yes New Pressure Injury Discovered   [] LDA for New Pressure Injury Added      Attending RN:  Helena Luna RN     Second RN:  Matilda Herrmann RN

## 2024-01-01 NOTE — NURSING
Doctor informed of Map is 69. States to given lasix as ordered.  Aware b/p is 95/52. Asymptomatic

## 2024-01-01 NOTE — ASSESSMENT & PLAN NOTE
Antihypertensives initially on hold due to shock.  - BP is slowly improving  - continue to hold BP meds

## 2024-01-01 NOTE — ASSESSMENT & PLAN NOTE
Patient's anemia is currently controlled. Has not received any PRBCs to date. Etiology likely d/t Iron deficiency noted previously in 2019, no repeat studies. No active bleeding noted.     Current CBC reviewed-   Lab Results   Component Value Date    HGB 13.4 (L) 01/01/2024    HCT 46.2 01/01/2024     Monitor serial CBC and transfuse if patient becomes hemodynamically unstable, symptomatic or H/H drops below 7/21.

## 2024-01-01 NOTE — ACP (ADVANCE CARE PLANNING)
Advance Care Planning     Date: 01/01/2024  Met with sister Idalia Cornell. Son Saurabh Tafoya on the phone. They are joint HCPOA. LAPOST completed. DNR code status. Selective treatment- ok for ICU, vasopressors, antibiotics. No artificial nutrition. Both are in agreement. Signed by sister Idalia. Paper LAPOST placed in chart to be signed into the system.     ACP Time spent= 20 minutes       Jennifer Méndez MD  01/01/2024 1:52 PM

## 2024-01-01 NOTE — NURSING
[facility], [facility]  Nurses Note -- 4 Eyes      12/31/2023       Skin assessed on: Q Shift      [] No Pressure Injuries Present    []Prevention Measures Documented    [x] Yes LDA  for Pressure Injury Previously documented     [] Yes New Pressure Injury Discovered   [] LDA for New Pressure Injury Added      Attending RN:  Matilda Herrmann RN     Second RN:  DAVE Baum

## 2024-01-01 NOTE — SUBJECTIVE & OBJECTIVE
Interval History: Coughing spasm this AM. Otherwise at baseline    Review of Systems   Unable to perform ROS: Other   Difficult to understand due to aphasia  Objective:     Vital Signs (Most Recent):  Temp: 98.2 °F (36.8 °C) (01/01/24 0804)  Pulse: 94 (01/01/24 0804)  Resp: 19 (01/01/24 0804)  BP: 92/62 (01/01/24 0804)  SpO2: 95 % (01/01/24 0804) Vital Signs (24h Range):  Temp:  [97.4 °F (36.3 °C)-98.2 °F (36.8 °C)] 98.2 °F (36.8 °C)  Pulse:  [] 94  Resp:  [18-22] 19  SpO2:  [95 %-100 %] 95 %  BP: ()/(62-71) 92/62     Weight: 98 kg (216 lb 0.8 oz)  Body mass index is 33.84 kg/m².    Intake/Output Summary (Last 24 hours) at 1/1/2024 1129  Last data filed at 12/31/2023 1738  Gross per 24 hour   Intake 120 ml   Output 175 ml   Net -55 ml           Physical Exam  Vitals and nursing note reviewed.   Constitutional:       General: He is not in acute distress.     Appearance: He is not ill-appearing.   HENT:      Head: Normocephalic and atraumatic.   Cardiovascular:      Rate and Rhythm: Normal rate and regular rhythm.   Pulmonary:      Effort: Pulmonary effort is normal.      Breath sounds: Normal breath sounds.      Comments: 1L NC  Abdominal:      General: Bowel sounds are normal. There is no distension.      Palpations: Abdomen is soft.   Musculoskeletal:      Right lower leg: No edema.      Left lower leg: No edema.   Skin:     General: Skin is warm and dry.   Neurological:      Mental Status: He is disoriented.             Significant Labs: All pertinent labs within the past 24 hours have been reviewed.    Significant Imaging: I have reviewed all pertinent imaging results/findings within the past 24 hours.

## 2024-01-01 NOTE — ASSESSMENT & PLAN NOTE
Patient is identified as having Systolic (HFrEF) heart failure that is Acute on chronic. CHF is currently controlled. Latest ECHO performed and demonstrates- Results for orders placed during the hospital encounter of 12/23/23    Echo Saline Bubble? No    Interpretation Summary    Left Ventricle: The left ventricle is normal in size. There is concentric hypertrophy. There is severely reduced systolic function with a visually estimated ejection fraction of 25 - 30%.    Right Ventricle: Mild right ventricular enlargement. Systolic function is normal.    Left Atrium: Left atrium is moderately dilated.    Right Atrium: Right atrium is mildly dilated.    Tricuspid Valve: There is trace regurgitation. The estimated PA systolic pressure is at least 21 mmHg.    IVC/SVC: Patient is ventilated, cannot use IVC diameter to estimate right atrial pressure.  . Continue Furosemide and monitor clinical status closely. Monitor on telemetry. Patient is off CHF pathway.  Monitor strict Is&Os and daily weights.  Place on fluid restriction of 1.5 L. Cardiology has been consulted. Continue to stress to patient importance of self efficacy and  on diet for CHF.     -Diuresing well   - transition to PO lasix  - BP is not appropriate for BB/entresto/aldactone currently

## 2024-01-01 NOTE — PROGRESS NOTES
[facility] Medicine  Progress Note    Patient Name: Alexsander Tafoya  MRN: [id number]  Patient Class: IP- Inpatient   Admission Date: 12/23/2023  Length of Stay: 9 days  Attending Physician: Jennifer Méndez MD  Primary Care Provider: Helio Farr MD        Subjective:     Principal Problem:Acute hypoxic respiratory failure        HPI:  Mr Tafoya is a 73-year-old male being admitted for acute hypoxic respiratory failure.  His medical history significant for BPH, dyslipidemia, chronic CVA with resultant aphasia, and right-sided hemiparesis.  During my evaluation the patient is in severe respiratory distress, on BiPAP and unable to provide any meaningful detailed history.  Seems that he came from detention due to worsening dyspnea, and EMS noted low oxygen saturations. in the ER, he was noted to have Tmax of 103.1 °F, persistently tachycardic in 130s, hypotensive 84/52, and was initially on nonrebreather and subsequently placed on BiPAP.  His CBC shows normal white count, microcytic anemia 10.5 and normal platelets.  INR 1.4.  CBC with metabolic acidosis bicarb 16, creatinine 1.9 (appears to have previous creatinine of 1.09 in September 2022 with baseline of about 1.1-1.3,), hypomagnesemia 1.5, transaminitis AST: ALT-192: 152, lactic acidosis 3.1, elevated procalcitonin 0.26.  His influenza a swab is positive.  His chest x-ray suggests moderate right-sided pleural effusion with compressive atelectasis and/or lower lobe consolidation.    After evaluating the patient, discussed with ER physician that the patient may need to be intubated given severe respiratory distress tachycardia, respiratory rate in 50s and inability to protect airway with previous stroke.  Patient will be placed on mechanical ventilation.  He has so far received breathing treatments, Rocephin and Zithromax along with Tamiflu.  Admission has been requested for further evaluation and treatment.    Overview/Hospital Course:  Mr Alexsander HARRELL  Lottie was admitted with acute hypoxic respiratory failure and septic shock due to R sided pneumonia and influenza. Suspect also CHF contributing- new EF 25-30%. Intubated in ED. Started antibiotics, diuretics, tamiflu. He also had shock liver on admission. Developed episodes of VT and started on lidocaine gtt. Started lactulose for elevated ammonia associated with shock liver. He did improve and was extubated on 12/28. Stepped down to floor. Palliative following- DNR code status. He is at functional baseline- bedbound with aphasia.     Interval History: Coughing spasm this AM. Otherwise at baseline    Review of Systems   Unable to perform ROS: Other   Difficult to understand due to aphasia  Objective:     Vital Signs (Most Recent):  Temp: 98.2 °F (36.8 °C) (01/01/24 0804)  Pulse: 94 (01/01/24 0804)  Resp: 19 (01/01/24 0804)  BP: 92/62 (01/01/24 0804)  SpO2: 95 % (01/01/24 0804) Vital Signs (24h Range):  Temp:  [97.4 °F (36.3 °C)-98.2 °F (36.8 °C)] 98.2 °F (36.8 °C)  Pulse:  [] 94  Resp:  [18-22] 19  SpO2:  [95 %-100 %] 95 %  BP: ()/(62-71) 92/62     Weight: 98 kg (216 lb 0.8 oz)  Body mass index is 33.84 kg/m².    Intake/Output Summary (Last 24 hours) at 1/1/2024 1129  Last data filed at 12/31/2023 1738  Gross per 24 hour   Intake 120 ml   Output 175 ml   Net -55 ml           Physical Exam  Vitals and nursing note reviewed.   Constitutional:       General: He is not in acute distress.     Appearance: He is not ill-appearing.   HENT:      Head: Normocephalic and atraumatic.   Cardiovascular:      Rate and Rhythm: Normal rate and regular rhythm.   Pulmonary:      Effort: Pulmonary effort is normal.      Breath sounds: Normal breath sounds.      Comments: 1L NC  Abdominal:      General: Bowel sounds are normal. There is no distension.      Palpations: Abdomen is soft.   Musculoskeletal:      Right lower leg: No edema.      Left lower leg: No edema.   Skin:     General: Skin is warm and dry.   Neurological:       Mental Status: He is disoriented.             Significant Labs: All pertinent labs within the past 24 hours have been reviewed.    Significant Imaging: I have reviewed all pertinent imaging results/findings within the past 24 hours.    Assessment/Plan:      * Acute hypoxic respiratory failure  Patient with Hypoxic Respiratory failure which is Acute.  he is not on home oxygen. Supplemental oxygen was provided and noted- Oxygen Concentration (%):  [25] 25. Signs/symptoms of respiratory failure include- tachypnea, increased work of breathing, respiratory distress, use of accessory muscles, wheezing, and stridor. Contributing diagnoses includes - Pleural effusion and Pneumonia Labs and images were reviewed. Patient Has not had a recent ABG. Will treat underlying causes and adjust management of respiratory failure as follows- Treat underlying etiologies of pneumonia and evaluate pleural effusion.  - Intubated in ER.  Pulmonary consulted.  - Respiratory failure secondary to flu/pneumonia/pleural effusion.  - Extubated on 12/28.  Currently doing well on NC.  - wean to room air    Acute on chronic combined systolic and diastolic congestive heart failure  Patient is identified as having Systolic (HFrEF) heart failure that is Acute on chronic. CHF is currently controlled. Latest ECHO performed and demonstrates- Results for orders placed during the hospital encounter of 12/23/23    Echo Saline Bubble? No    Interpretation Summary    Left Ventricle: The left ventricle is normal in size. There is concentric hypertrophy. There is severely reduced systolic function with a visually estimated ejection fraction of 25 - 30%.    Right Ventricle: Mild right ventricular enlargement. Systolic function is normal.    Left Atrium: Left atrium is moderately dilated.    Right Atrium: Right atrium is mildly dilated.    Tricuspid Valve: There is trace regurgitation. The estimated PA systolic pressure is at least 21 mmHg.    IVC/SVC: Patient  is ventilated, cannot use IVC diameter to estimate right atrial pressure.  . Continue Furosemide and monitor clinical status closely. Monitor on telemetry. Patient is off CHF pathway.  Monitor strict Is&Os and daily weights.  Place on fluid restriction of 1.5 L. Cardiology has been consulted. Continue to stress to patient importance of self efficacy and  on diet for CHF.     -Diuresing well   - transition to PO lasix  - BP is not appropriate for BB/entresto/aldactone currently     NSVT (nonsustained ventricular tachycardia)  Started on Lidocaine.  No amio secondary to liver failure.  Now off Lidocaine.  - continue to monitor on telemetry   - will start BB when BP allows       Dilated cardiomyopathy  See CHF      Goals of care, counseling/discussion  Advance Care Planning  Palliative Care consulted.  Goals of care discussed with family and no reintubation after extubation.  Patient is DNR        Pleural effusion  -Moderate effusion on x-ray most likely secondary to CHF  - Treated with diuresis.      Influenza A  -Treated with Tamiflu.      ATN (acute tubular necrosis)  Patient with acute kidney injury/acute renal failure likely due to acute tubular necrosis caused by septic shock  SHAYY is currently worsening. Baseline creatinine  ranging from 1 to 1.7  - Labs reviewed- Renal function/electrolytes with Estimated Creatinine Clearance: 52.4 mL/min (based on SCr of 1.4 mg/dL). according to latest data. Monitor urine output and serial BMP and adjust therapy as needed. Avoid nephrotoxins and renally dose meds for GFR listed above.  - due to septic shock   - improving   - CMP in AM    NSTEMI (non-ST elevated myocardial infarction)  Significantly elevated Troponin consistent with NSTEMI.  Probably demand ischemia from shock.  On ASA and Plavix.  Hold Statin with shock liver.  Cardiology consulted.  - recommend to treat conservatively       Septic shock  This patient does have evidence of infective focus. My overall  impression was septic shock due to MAP < 65. Source: Respiratory- pneumonia from influenza A. All cultures NGTD.   - shock has resolved but BP is borderline low. WBC normalized   - no longer on antibiotics  - influenza treated with tamiflu  - sepsis is resolved.     Transaminitis  Significant increase in LFT's, consistent with shock liver.  - slowly improving.  - ammonia elevated, started lactulose  - continue to hold statin  - monitor CMP daily       Microcytic anemia  Patient's anemia is currently controlled. Has not received any PRBCs to date. Etiology likely d/t Iron deficiency noted previously in 2019, no repeat studies. No active bleeding noted.     Current CBC reviewed-   Lab Results   Component Value Date    HGB 13.4 (L) 01/01/2024    HCT 46.2 01/01/2024     Monitor serial CBC and transfuse if patient becomes hemodynamically unstable, symptomatic or H/H drops below 7/21.    BPH (benign prostatic hyperplasia)  -Continue Proscar.  - voiding trial successful    Hyperlipidemia  Hold Statin with shock liver.  - resume when AST/ALT normalize    Essential hypertension  Antihypertensives initially on hold due to shock.  - BP is slowly improving  - continue to hold BP meds    Aphasia as late effect of cerebrovascular accident  Per notes.      Hemiplegia affecting right side in right-dominant patient as late effect of cerebrovascular disease  - Chronic CVA with previously noted resultant right-sided hemiparesis and aphasia.  - Bed bound at baseline  - PT OT signed off as he is at his functional baseline  - Speech consulted- on puree diet       VTE Risk Mitigation (From admission, onward)           Ordered     enoxaparin injection 40 mg  Daily         12/23/23 2143     IP VTE HIGH RISK PATIENT  Once         12/23/23 2143     Place sequential compression device  Until discontinued         12/23/23 2143                    Discharge Planning   THAIS: 1/2/2024     Code Status: DNR   Is the patient medically ready for  discharge?:     Reason for patient still in hospital (select all that apply): Pending disposition  Discharge Plan A: Assisted Living   Discharge Delays: None known at this time        Updated sister by phone. Anticipate DC to assisted living tomorrow.       Jennifer Méndez MD  Department of Hospital Medicine   [facility] Surg

## 2024-01-01 NOTE — PT/OT/SLP PROGRESS
Speech Language Pathology      Alexsander Tafoya  MRN: [id number]    Patient not seen today secondary to BIPAP. ST will follow-up.       Addie Shaver, LORNA-SLP

## 2024-01-01 NOTE — PLAN OF CARE
Wound care to coccyx cleanse with soap and water then  reapplied foam dressing  Problem: Skin Injury Risk Increased  Goal: Skin Health and Integrity  Outcome: Ongoing, Progressing  Intervention: Optimize Skin Protection  Flowsheets (Taken 1/1/2024 1605)  Pressure Reduction Techniques:   heels elevated off bed   positioned off wounds  Pressure Reduction Devices: foam padding utilized  Skin Protection:   incontinence pads utilized   tubing/devices free from skin contact  Head of Bed (HOB) Positioning: HOB at 30-45 degrees  Intervention: Promote and Optimize Oral Intake  Flowsheets (Taken 1/1/2024 1605)  Oral Nutrition Promotion: safe use of adaptive equipment encouraged

## 2024-01-02 LAB
ALBUMIN SERPL BCP-MCNC: 3.1 G/DL (ref 3.5–5.2)
ALP SERPL-CCNC: 131 U/L (ref 55–135)
ALT SERPL W/O P-5'-P-CCNC: 286 U/L (ref 10–44)
ANION GAP SERPL CALC-SCNC: 14 MMOL/L (ref 8–16)
AST SERPL-CCNC: 123 U/L (ref 10–40)
BILIRUB SERPL-MCNC: 0.9 MG/DL (ref 0.1–1)
BUN SERPL-MCNC: 25 MG/DL (ref 8–23)
CALCIUM SERPL-MCNC: 9 MG/DL (ref 8.7–10.5)
CHLORIDE SERPL-SCNC: 103 MMOL/L (ref 95–110)
CO2 SERPL-SCNC: 23 MMOL/L (ref 23–29)
CREAT SERPL-MCNC: 1.2 MG/DL (ref 0.5–1.4)
EST. GFR  (NO RACE VARIABLE): >60 ML/MIN/1.73 M^2
GLUCOSE SERPL-MCNC: 102 MG/DL (ref 70–110)
POTASSIUM SERPL-SCNC: 4.1 MMOL/L (ref 3.5–5.1)
PROT SERPL-MCNC: 8 G/DL (ref 6–8.4)
SODIUM SERPL-SCNC: 140 MMOL/L (ref 136–145)

## 2024-01-02 PROCEDURE — 27100171 HC OXYGEN HIGH FLOW UP TO 24 HOURS

## 2024-01-02 PROCEDURE — 36415 COLL VENOUS BLD VENIPUNCTURE: CPT | Performed by: HOSPITALIST

## 2024-01-02 PROCEDURE — 99900035 HC TECH TIME PER 15 MIN (STAT)

## 2024-01-02 PROCEDURE — 94660 CPAP INITIATION&MGMT: CPT

## 2024-01-02 PROCEDURE — 11000001 HC ACUTE MED/SURG PRIVATE ROOM

## 2024-01-02 PROCEDURE — 25000003 PHARM REV CODE 250: Performed by: HOSPITALIST

## 2024-01-02 PROCEDURE — 94640 AIRWAY INHALATION TREATMENT: CPT

## 2024-01-02 PROCEDURE — 80053 COMPREHEN METABOLIC PANEL: CPT | Performed by: HOSPITALIST

## 2024-01-02 PROCEDURE — 27000221 HC OXYGEN, UP TO 24 HOURS

## 2024-01-02 PROCEDURE — 94761 N-INVAS EAR/PLS OXIMETRY MLT: CPT

## 2024-01-02 PROCEDURE — 63600175 PHARM REV CODE 636 W HCPCS: Performed by: HOSPITALIST

## 2024-01-02 PROCEDURE — 94799 UNLISTED PULMONARY SVC/PX: CPT

## 2024-01-02 PROCEDURE — 25000242 PHARM REV CODE 250 ALT 637 W/ HCPCS: Performed by: HOSPITALIST

## 2024-01-02 RX ORDER — IPRATROPIUM BROMIDE AND ALBUTEROL SULFATE 2.5; .5 MG/3ML; MG/3ML
3 SOLUTION RESPIRATORY (INHALATION)
Qty: 75 ML | Refills: 0 | Status: SHIPPED | OUTPATIENT
Start: 2024-01-02 | End: 2025-01-01

## 2024-01-02 RX ADMIN — IPRATROPIUM BROMIDE AND ALBUTEROL SULFATE 3 ML: 2.5; .5 SOLUTION RESPIRATORY (INHALATION) at 08:01

## 2024-01-02 RX ADMIN — ASPIRIN 81 MG CHEWABLE TABLET 81 MG: 81 TABLET CHEWABLE at 09:01

## 2024-01-02 RX ADMIN — IPRATROPIUM BROMIDE AND ALBUTEROL SULFATE 3 ML: 2.5; .5 SOLUTION RESPIRATORY (INHALATION) at 02:01

## 2024-01-02 RX ADMIN — FINASTERIDE 5 MG: 5 TABLET, FILM COATED ORAL at 09:01

## 2024-01-02 RX ADMIN — FUROSEMIDE 40 MG: 40 TABLET ORAL at 05:01

## 2024-01-02 RX ADMIN — IPRATROPIUM BROMIDE AND ALBUTEROL SULFATE 3 ML: 2.5; .5 SOLUTION RESPIRATORY (INHALATION) at 07:01

## 2024-01-02 RX ADMIN — CLOPIDOGREL BISULFATE 75 MG: 75 TABLET ORAL at 09:01

## 2024-01-02 RX ADMIN — ENOXAPARIN SODIUM 40 MG: 40 INJECTION SUBCUTANEOUS at 04:01

## 2024-01-02 NOTE — ASSESSMENT & PLAN NOTE
Advance Care Planning   Palliative Care consulted.  Goals of care discussed with family and no reintubation after extubation.  Patient is DNR     Currently not a previous baseline and family states his assisted living will not be able to care for him. Working on NH placement.

## 2024-01-02 NOTE — NURSING
[facility]  Nurses Note -- 4 Eyes      1/2/2024       Skin assessed on: Q Shift      [] No Pressure Injuries Present    []Prevention Measures Documented    [x] Yes LDA  for Pressure Injury Previously documented     [] Yes New Pressure Injury Discovered   [] LDA for New Pressure Injury Added      Attending RN:  Ivy Stinson RN     Second RN:  DAVE Malik

## 2024-01-02 NOTE — PLAN OF CARE
Spoke with patient's sister regarding discharge planning,stated she does not feel patient is at prior level of functioning and is unsafe to return to the [facility] [place] assisted living. Pt's sister would like for NH placement with preferences for [facility]. CM explained placement is based on medical acceptance and bed availability. Also, that SNF will require financials and banking statements.     Referrals sent via care port for review.     1:20pm Received call from Joanne with [facility], stated pt medically accepted and will reach out to patient's family.     Per [facility], willing to accept patient. CM to continue to follow for dc needs.    01/02/24 1228   Discharge Reassessment   Assessment Type Discharge Planning Reassessment   Did the patient's condition or plan change since previous assessment? Yes   Discharge Plan discussed with: Sibling   Communicated THAIS with patient/caregiver Date not available/Unable to determine   Discharge Plan A New Nursing Home placement - residential care facility   Discharge Plan B New Nursing Home placement - residential care facility   DME Needed Upon Discharge  none   Transition of Care Barriers None   Why the patient remains in the hospital Requires continued medical care   Post-Acute Status   Post-Acute Authorization Placement   Post-Acute Placement Status Referrals Sent   Discharge Delays (!) Post-Acute Set-up

## 2024-01-02 NOTE — PLAN OF CARE
Recommendations  Recommendation:   1. Continue Pureed diet w/ nectar thick liquids   2. Monitor need for ONS   3. Monitor weight and labs    Goals: Pt will consume 50-75% of meals by RD follow up    Nutrition Goal Status: new  Communication of RD Recs: other (comment) (POC)

## 2024-01-02 NOTE — ASSESSMENT & PLAN NOTE
Patient is identified as having Systolic (HFrEF) heart failure that is Acute on chronic. CHF is currently controlled. Latest ECHO performed and demonstrates- Results for orders placed during the hospital encounter of 12/23/23    Echo Saline Bubble? No    Interpretation Summary    Left Ventricle: The left ventricle is normal in size. There is concentric hypertrophy. There is severely reduced systolic function with a visually estimated ejection fraction of 25 - 30%.    Right Ventricle: Mild right ventricular enlargement. Systolic function is normal.    Left Atrium: Left atrium is moderately dilated.    Right Atrium: Right atrium is mildly dilated.    Tricuspid Valve: There is trace regurgitation. The estimated PA systolic pressure is at least 21 mmHg.    IVC/SVC: Patient is ventilated, cannot use IVC diameter to estimate right atrial pressure.  . Continue Furosemide and monitor clinical status closely. Monitor on telemetry. Patient is off CHF pathway.  Monitor strict Is&Os and daily weights.  Place on fluid restriction of 1.5 L. Cardiology has been consulted. Continue to stress to patient importance of self efficacy and  on diet for CHF.     -Diuresing well   - transition to PO lasix  - BP is not appropriate for BB/entresto/aldactone currently

## 2024-01-02 NOTE — PLAN OF CARE
Problem: Adult Inpatient Plan of Care  Goal: Plan of Care Review  Outcome: Ongoing, Progressing  Flowsheets (Taken 1/2/2024 0552)  Plan of Care Reviewed With: patient     Problem: Fall Injury Risk  Goal: Absence of Fall and Fall-Related Injury  Outcome: Ongoing, Progressing  Intervention: Identify and Manage Contributors  Flowsheets (Taken 1/2/2024 0552)  Self-Care Promotion: meal set-up provided     Problem: Skin Injury Risk Increased  Goal: Skin Health and Integrity  Outcome: Ongoing, Progressing  Intervention: Optimize Skin Protection  Flowsheets (Taken 1/2/2024 0552)  Pressure Reduction Techniques:   frequent weight shift encouraged   positioned off wounds   heels elevated off bed  Pressure Reduction Devices:   elbow protectors utilized   positioning supports utilized  Head of Bed (HOB) Positioning: HOB elevated

## 2024-01-02 NOTE — PROGRESS NOTES
"[facility] - Med Surg  Adult Nutrition  Progress Note    SUMMARY       Recommendations  Recommendation:   1. Continue Pureed diet w/ nectar thick liquids   2. Monitor need for ONS   3. Monitor weight and labs    Goals: Pt will consume 50-75% of meals by RD follow up    Nutrition Goal Status: new  Communication of RD Recs: other (comment) (POC)    Assessment and Plan  Nutrition Problem  Inadequate energy intake    Related to (etiology):   Acute hypoxic respiratory failure    Signs and Symptoms (as evidenced by):   S/p extubation     Interventions:  Collaboration with other providers    Nutrition Diagnosis Status:   Improving    Reason for Assessment  Reason For Assessment: RD follow-up  Diagnosis: pulmonary disease (Acute hypoxic respiratory failure)  Relevant Medical History: HTN, HLD, stroke, BPD, Dysarthria, chronic CVA  Interdisciplinary Rounds: did not attend  General Information Comments: Pt is currently on a pureed diet w/ nectar thick liquids. Pt is currently on isolation d/t the flu. Noted 25% meal intake per chart review. Noted family at bedside feedings 12/31. Carlos- 9/skyla-rectal.  Nutrition Discharge Planning: d/c on pureed diet with nectar thick liquids    Nutrition Risk Screen  Nutrition Risk Screen: difficulty chewing/swallowing    Nutrition/Diet History  Patient Reported Diet/Restrictions/Preferences: pureed  Spiritual, Cultural Beliefs, Adventist Practices, Values that Affect Care:  (unable to assess at this time)  Factors Affecting Nutritional Intake: None identified at this time    Anthropometrics  Temp: 98.1 °F (36.7 °C)  Height Method: Stated  Height: 5' 7" (170.2 cm)  Height (inches): 67 in  Weight Method: Bed Scale  Weight: 98 kg (216 lb 0.8 oz)  Weight (lb): 216.05 lb  Ideal Body Weight (IBW), Male: 148 lb  % Ideal Body Weight, Male (lb): 145.98 %  BMI (Calculated): 33.8  BMI Grade: 30 - 34.9- obesity - grade I  Usual Body Weight (UBW), kg:  (No noted weight history in 2023)   "   Lab/Procedures/Meds  Pertinent Labs Reviewed: reviewed  Pertinent Labs Comments: hgb 13.4L, BUN 25H  Pertinent Medications Reviewed: reviewed  Pertinent Medications Comments: enoxaprin, furosemide    Estimated/Assessed Needs  Weight Used For Calorie Calculations: 67.3 kg (148 lb 5.9 oz) (IBW)  Energy Calorie Requirements (kcal): 1870-2721 kcals (30-35kcals/kg)  Energy Need Method: Kcal/kg  Protein Requirements: 67g (1g/kg)  Weight Used For Protein Calculations: 67.3 kg (148 lb 5.9 oz)     Estimated Fluid Requirement Method: RDA Method  RDA Method (mL): 2019     Nutrition Prescription Ordered  Current Diet Order: Pureed diet w/ nectar thick liquids    Evaluation of Received Nutrient/Fluid Intake  Energy Calories Required: not meeting needs  Protein Required: not meeting needs  Fluid Required: not meeting needs  Comments: LBM- 1/1  Tolerance: not tolerating  % Intake of Estimated Energy Needs: 25 - 50 %  % Meal Intake: 25 - 50 %    Nutrition Risk  Level of Risk/Frequency of Follow-up: moderate (2x/weekly)     Monitor and Evaluation  Food and Nutrient Intake: energy intake, food and beverage intake  Food and Nutrient Adminstration: diet order  Anthropometric Measurements: weight, weight change, body mass index  Biochemical Data, Medical Tests and Procedures: electrolyte and renal panel, gastrointestinal profile, glucose/endocrine profile, lipid profile     Nutrition Follow-Up  RD Follow-up?: Yes

## 2024-01-02 NOTE — PLAN OF CARE
[facility]      HOME HEALTH ORDERS  FACE TO FACE ENCOUNTER    Patient Name: Alexsander Tafoya  YOB: 1950    PCP: Helio Farr MD   PCP Address: [address] [place] [address] [address] / [place] [address]  PCP Phone Number: [phone]  PCP Fax: [phone]    Encounter Date: 12/23/23    Admit to Home Health    Diagnoses:  Active Hospital Problems    Diagnosis  POA    *Acute hypoxic respiratory failure [J96.01]  Yes    NSVT (nonsustained ventricular tachycardia) [I47.29]  No    Acute on chronic combined systolic and diastolic congestive heart failure [I50.43]  Yes           Goals of care, counseling/discussion [Z71.89]  Not Applicable           Dilated cardiomyopathy [I42.0]  Yes    Influenza A [J10.1]  Yes    Pleural effusion [J90]  Yes    ATN (acute tubular necrosis) [N17.0]  Yes    Septic shock [A41.9, R65.21]  Yes    NSTEMI (non-ST elevated myocardial infarction) [I21.4]  Yes    BPH (benign prostatic hyperplasia) [N40.0]  Yes    Transaminitis [R74.01]  Yes    Microcytic anemia [D50.9]  Yes    Hemiplegia affecting right side in right-dominant patient as late effect of cerebrovascular disease [I69.951]  Not Applicable    Aphasia as late effect of cerebrovascular accident [I69.320]  Not Applicable    Essential hypertension [I10]  Yes    Hyperlipidemia [E78.5]  Yes      Resolved Hospital Problems    Diagnosis Date Resolved POA    Lactic acidosis [E87.20] 12/28/2023 Yes     Shock + liver injury.  Will repeat.        NSTEMI (non-ST elevated myocardial infarction) [I21.4] 12/30/2023 Yes       Follow Up Appointments:  No future appointments.    Allergies:Review of patient's allergies indicates:  No Known Allergies    Medications: Review discharge medications with patient and family and provide education.      I have seen and examined this patient within the last 30 days. My clinical findings that support the need for the home health skilled services and home bound status are the  following:no   Weakness/numbness causing balance and gait disturbance due to Infection making it taxing to leave home.  Medical restrictions requiring assistance of another human to leave home due to  Unstable ambulation.     Diet:   pureed diet  thin    Labs:  CMP and CBC weekly r 2 weeks to be sent to PCP     Referrals/ Consults   to evaluate for community resources/long-range planning.  Aide to provide assistance with personal care, ADLs, and vital signs.    Activities:   activity as tolerated    Nursing:   Agency to admit patient within 24 hours of hospital discharge unless specified on physician order or at patient request    SN to complete comprehensive assessment including routine vital signs. Instruct on disease process and s/s of complications to report to MD. Review/verify medication list sent home with the patient at time of discharge  and instruct patient/caregiver as needed. Frequency may be adjusted depending on start of care date.     Skilled nurse to perform up to 3 visits PRN for symptoms related to diagnosis    Notify MD if SBP > 160 or < 90; DBP > 90 or < 50; HR > 120 or < 50; Temp > 101; O2 < 88%; Other:       Ok to schedule additional visits based on staff availability and patient request on consecutive days within the home health episode.    Miscellaneous       Home Health Aide:  Nursing , Medical Social Work , and Home Health Aide     Wound Care Orders  no    I certify that this patient is confined to his home and needs intermittent skilled nursing care.

## 2024-01-02 NOTE — PLAN OF CARE
01/02/24 1103   Medicare Message   Important Message from Medicare regarding Discharge Appeal Rights Given to patient/caregiver;Explained to patient/caregiver;Other (comments)  (CM spoke with Saurabh Tafoya  Son  [phone] via phone, verbalized understanding. Copy mailed certified to address i chart. [phone])   Date IMM was signed 01/02/24   Time IMM was signed 1100

## 2024-01-02 NOTE — ASSESSMENT & PLAN NOTE
Patient with acute kidney injury/acute renal failure likely due to acute tubular necrosis caused by septic shock  SHAYY is currently worsening. Baseline creatinine  ranging from 1 to 1.7  - Labs reviewed- Renal function/electrolytes with Estimated Creatinine Clearance: 61.2 mL/min (based on SCr of 1.2 mg/dL). according to latest data. Monitor urine output and serial BMP and adjust therapy as needed. Avoid nephrotoxins and renally dose meds for GFR listed above.  - due to septic shock   - improving   - CMP in AM

## 2024-01-02 NOTE — NURSING
Report received and care assumed. Discussed plan of care and safety with patient . Reviewed call system. [facility]  Nurses Note -- 4 Eyes      1/2/2024       Skin assessed on: Q Shift      [] No Pressure Injuries Present    [x]Prevention Measures Documented    [x] Yes LDA  for Pressure Injury Previously documented     [] Yes New Pressure Injury Discovered   [] LDA for New Pressure Injury Added      Attending RN:  Helena Luna RN     Second RN:  Ivy Stinson RN

## 2024-01-02 NOTE — SUBJECTIVE & OBJECTIVE
Interval History: no acute issues, weaned off oxygen. Sister states he is not back to baseline and will not be able to go to assisted living. Working on NH placement.    Review of Systems   Unable to perform ROS: Other   Difficult to understand due to aphasia  Objective:     Vital Signs (Most Recent):  Temp: 98.1 °F (36.7 °C) (01/02/24 1205)  Pulse: 109 (01/02/24 1205)  Resp: 19 (01/02/24 1205)  BP: 110/67 (01/02/24 1205)  SpO2: 96 % (01/02/24 1205) Vital Signs (24h Range):  Temp:  [97.6 °F (36.4 °C)-98.6 °F (37 °C)] 98.1 °F (36.7 °C)  Pulse:  [] 109  Resp:  [17-27] 19  SpO2:  [96 %-100 %] 96 %  BP: ()/(52-72) 110/67     Weight: 98 kg (216 lb 0.8 oz)  Body mass index is 33.84 kg/m².    Intake/Output Summary (Last 24 hours) at 1/2/2024 1214  Last data filed at 1/2/2024 0814  Gross per 24 hour   Intake 480 ml   Output 550 ml   Net -70 ml           Physical Exam  Vitals and nursing note reviewed.   Constitutional:       General: He is not in acute distress.     Appearance: He is not ill-appearing.   HENT:      Head: Normocephalic and atraumatic.   Cardiovascular:      Rate and Rhythm: Normal rate and regular rhythm.   Pulmonary:      Effort: Pulmonary effort is normal.      Breath sounds: Normal breath sounds.      Comments: Room air  Abdominal:      General: Bowel sounds are normal. There is no distension.      Palpations: Abdomen is soft.   Musculoskeletal:      Right lower leg: No edema.      Left lower leg: No edema.   Skin:     General: Skin is warm and dry.   Neurological:      Mental Status: He is disoriented.             Significant Labs: All pertinent labs within the past 24 hours have been reviewed.    Significant Imaging: I have reviewed all pertinent imaging results/findings within the past 24 hours.

## 2024-01-02 NOTE — PROGRESS NOTES
[facility]  Progress Note    Patient Name: Alexsander Tafoya  MRN: [id number]  Patient Class: IP- Inpatient   Admission Date: 12/23/2023  Length of Stay: 10 days  Attending Physician: Isaac Aguilar MD  Primary Care Provider: Helio Farr MD        Subjective:     Principal Problem:Acute hypoxic respiratory failure        HPI:  Mr Tafoya is a 73-year-old male being admitted for acute hypoxic respiratory failure.  His medical history significant for BPH, dyslipidemia, chronic CVA with resultant aphasia, and right-sided hemiparesis.  During my evaluation the patient is in severe respiratory distress, on BiPAP and unable to provide any meaningful detailed history.  Seems that he came from [facility] due to worsening dyspnea, and EMS noted low oxygen saturations. in the ER, he was noted to have Tmax of 103.1 °F, persistently tachycardic in 130s, hypotensive 84/52, and was initially on nonrebreather and subsequently placed on BiPAP.  His CBC shows normal white count, microcytic anemia 10.5 and normal platelets.  INR 1.4.  CBC with metabolic acidosis bicarb 16, creatinine 1.9 (appears to have previous creatinine of 1.09 in September 2022 with baseline of about 1.1-1.3,), hypomagnesemia 1.5, transaminitis AST: ALT-192: 152, lactic acidosis 3.1, elevated procalcitonin 0.26.  His influenza a swab is positive.  His chest x-ray suggests moderate right-sided pleural effusion with compressive atelectasis and/or lower lobe consolidation.    After evaluating the patient, discussed with ER physician that the patient may need to be intubated given severe respiratory distress tachycardia, respiratory rate in 50s and inability to protect airway with previous stroke.  Patient will be placed on mechanical ventilation.  He has so far received breathing treatments, Rocephin and Zithromax along with Tamiflu.  Admission has been requested for further evaluation and treatment.    Overview/Hospital Course:  Mr Alexsander HARRELL  Lottie was admitted with acute hypoxic respiratory failure and septic shock due to R sided pneumonia and influenza. Suspect also CHF contributing- new EF 25-30%. Intubated in ED. Started antibiotics, diuretics, tamiflu. He also had shock liver on admission. Developed episodes of VT and started on lidocaine gtt. Started lactulose for elevated ammonia associated with shock liver. He did improve and was extubated on 12/28. Stepped down to floor. Palliative following- DNR code status. He is at functional baseline- bedbound with aphasia but sister states he is not at his baseline and his assisted living would not be able to care for him. Working on NH placement.    Interval History: no acute issues, weaned off oxygen. Sister states he is not back to baseline and will not be able to go to assisted living. Working on NH placement.    Review of Systems   Unable to perform ROS: Other   Difficult to understand due to aphasia  Objective:     Vital Signs (Most Recent):  Temp: 98.1 °F (36.7 °C) (01/02/24 1205)  Pulse: 109 (01/02/24 1205)  Resp: 19 (01/02/24 1205)  BP: 110/67 (01/02/24 1205)  SpO2: 96 % (01/02/24 1205) Vital Signs (24h Range):  Temp:  [97.6 °F (36.4 °C)-98.6 °F (37 °C)] 98.1 °F (36.7 °C)  Pulse:  [] 109  Resp:  [17-27] 19  SpO2:  [96 %-100 %] 96 %  BP: ()/(52-72) 110/67     Weight: 98 kg (216 lb 0.8 oz)  Body mass index is 33.84 kg/m².    Intake/Output Summary (Last 24 hours) at 1/2/2024 1214  Last data filed at 1/2/2024 0814  Gross per 24 hour   Intake 480 ml   Output 550 ml   Net -70 ml           Physical Exam  Vitals and nursing note reviewed.   Constitutional:       General: He is not in acute distress.     Appearance: He is not ill-appearing.   HENT:      Head: Normocephalic and atraumatic.   Cardiovascular:      Rate and Rhythm: Normal rate and regular rhythm.   Pulmonary:      Effort: Pulmonary effort is normal.      Breath sounds: Normal breath sounds.      Comments: Room air  Abdominal:       General: Bowel sounds are normal. There is no distension.      Palpations: Abdomen is soft.   Musculoskeletal:      Right lower leg: No edema.      Left lower leg: No edema.   Skin:     General: Skin is warm and dry.   Neurological:      Mental Status: He is disoriented.             Significant Labs: All pertinent labs within the past 24 hours have been reviewed.    Significant Imaging: I have reviewed all pertinent imaging results/findings within the past 24 hours.    Assessment/Plan:      * Acute hypoxic respiratory failure  Patient with Hypoxic Respiratory failure which is Acute.  he is not on home oxygen. Supplemental oxygen was provided and noted- Oxygen Concentration (%):  [25] 25. Signs/symptoms of respiratory failure include- tachypnea, increased work of breathing, respiratory distress, use of accessory muscles, wheezing, and stridor. Contributing diagnoses includes - Pleural effusion and Pneumonia Labs and images were reviewed. Patient Has not had a recent ABG. Will treat underlying causes and adjust management of respiratory failure as follows- Treat underlying etiologies of pneumonia and evaluate pleural effusion.  - Intubated in ER.  Pulmonary consulted.  - Respiratory failure secondary to flu/pneumonia/pleural effusion.  - Extubated on 12/28.  Currently doing well on NC.  - wean to room air    Acute on chronic combined systolic and diastolic congestive heart failure  Patient is identified as having Systolic (HFrEF) heart failure that is Acute on chronic. CHF is currently controlled. Latest ECHO performed and demonstrates- Results for orders placed during the hospital encounter of 12/23/23    Echo Saline Bubble? No    Interpretation Summary    Left Ventricle: The left ventricle is normal in size. There is concentric hypertrophy. There is severely reduced systolic function with a visually estimated ejection fraction of 25 - 30%.    Right Ventricle: Mild right ventricular enlargement. Systolic function is  normal.    Left Atrium: Left atrium is moderately dilated.    Right Atrium: Right atrium is mildly dilated.    Tricuspid Valve: There is trace regurgitation. The estimated PA systolic pressure is at least 21 mmHg.    IVC/SVC: Patient is ventilated, cannot use IVC diameter to estimate right atrial pressure.  . Continue Furosemide and monitor clinical status closely. Monitor on telemetry. Patient is off CHF pathway.  Monitor strict Is&Os and daily weights.  Place on fluid restriction of 1.5 L. Cardiology has been consulted. Continue to stress to patient importance of self efficacy and  on diet for CHF.     -Diuresing well   - transition to PO lasix  - BP is not appropriate for BB/entresto/aldactone currently     NSVT (nonsustained ventricular tachycardia)  Started on Lidocaine.  No amio secondary to liver failure.  Now off Lidocaine.  - continue to monitor on telemetry   - will start BB when BP allows       Dilated cardiomyopathy  See CHF      Goals of care, counseling/discussion  Advance Care Planning  Palliative Care consulted.  Goals of care discussed with family and no reintubation after extubation.  Patient is DNR     Currently not a previous baseline and family states his assisted living will not be able to care for him. Working on NH placement.    Pleural effusion  -Moderate effusion on x-ray most likely secondary to CHF  - Treated with diuresis.      Influenza A  -Treated with Tamiflu.      ATN (acute tubular necrosis)  Patient with acute kidney injury/acute renal failure likely due to acute tubular necrosis caused by septic shock  SHAYY is currently worsening. Baseline creatinine  ranging from 1 to 1.7  - Labs reviewed- Renal function/electrolytes with Estimated Creatinine Clearance: 61.2 mL/min (based on SCr of 1.2 mg/dL). according to latest data. Monitor urine output and serial BMP and adjust therapy as needed. Avoid nephrotoxins and renally dose meds for GFR listed above.  - due to septic shock   -  improving   - CMP in AM    NSTEMI (non-ST elevated myocardial infarction)  Significantly elevated Troponin consistent with NSTEMI.  Probably demand ischemia from shock.  On ASA and Plavix.  Hold Statin with shock liver.  Cardiology consulted.  - recommend to treat conservatively       Septic shock  This patient does have evidence of infective focus. My overall impression was septic shock due to MAP < 65. Source: Respiratory- pneumonia from influenza A. All cultures NGTD.   - shock has resolved but BP is borderline low. WBC normalized   - no longer on antibiotics  - influenza treated with tamiflu  - sepsis is resolved.     Transaminitis  Significant increase in LFT's, consistent with shock liver.  - slowly improving.  - ammonia elevated, started lactulose  - continue to hold statin  - monitor CMP daily       Microcytic anemia  Patient's anemia is currently controlled. Has not received any PRBCs to date. Etiology likely d/t Iron deficiency noted previously in 2019, no repeat studies. No active bleeding noted.     Current CBC reviewed-   Lab Results   Component Value Date    HGB 13.4 (L) 01/01/2024    HCT 46.2 01/01/2024     Monitor serial CBC and transfuse if patient becomes hemodynamically unstable, symptomatic or H/H drops below 7/21.    BPH (benign prostatic hyperplasia)  -Continue Proscar.  - voiding trial successful    Hyperlipidemia  Hold Statin with shock liver.  - resume when AST/ALT normalize    Essential hypertension  Antihypertensives initially on hold due to shock.  - BP is slowly improving  - continue to hold BP meds    Aphasia as late effect of cerebrovascular accident  Per notes.      Hemiplegia affecting right side in right-dominant patient as late effect of cerebrovascular disease  - Chronic CVA with previously noted resultant right-sided hemiparesis and aphasia.  - Bed bound at baseline  - PT OT signed off as he is at his functional baseline  - Speech consulted- on puree diet       VTE Risk  Mitigation (From admission, onward)           Ordered     enoxaparin injection 40 mg  Daily         12/23/23 2143     IP VTE HIGH RISK PATIENT  Once         12/23/23 2143     Place sequential compression device  Until discontinued         12/23/23 2143                    Discharge Planning   THAIS: 1/2/2024     Code Status: DNR   Is the patient medically ready for discharge?:     Reason for patient still in hospital (select all that apply): Treatment and Pending disposition  Discharge Plan A: New Nursing Home placement - [facility] care facility   Discharge Delays: (!) Post-Acute Set-up              Isaac Aguilar MD  Department of Hospital Medicine   [facility]

## 2024-01-03 LAB
ALBUMIN SERPL BCP-MCNC: 3.1 G/DL (ref 3.5–5.2)
ALP SERPL-CCNC: 132 U/L (ref 55–135)
ALT SERPL W/O P-5'-P-CCNC: 226 U/L (ref 10–44)
ANION GAP SERPL CALC-SCNC: 12 MMOL/L (ref 8–16)
AST SERPL-CCNC: 102 U/L (ref 10–40)
BILIRUB SERPL-MCNC: 0.9 MG/DL (ref 0.1–1)
BUN SERPL-MCNC: 22 MG/DL (ref 8–23)
CALCIUM SERPL-MCNC: 9.3 MG/DL (ref 8.7–10.5)
CHLORIDE SERPL-SCNC: 103 MMOL/L (ref 95–110)
CO2 SERPL-SCNC: 26 MMOL/L (ref 23–29)
CREAT SERPL-MCNC: 1.2 MG/DL (ref 0.5–1.4)
EST. GFR  (NO RACE VARIABLE): >60 ML/MIN/1.73 M^2
GLUCOSE SERPL-MCNC: 95 MG/DL (ref 70–110)
POTASSIUM SERPL-SCNC: 2.9 MMOL/L (ref 3.5–5.1)
PROT SERPL-MCNC: 7.9 G/DL (ref 6–8.4)
SODIUM SERPL-SCNC: 141 MMOL/L (ref 136–145)

## 2024-01-03 PROCEDURE — 36415 COLL VENOUS BLD VENIPUNCTURE: CPT | Performed by: HOSPITALIST

## 2024-01-03 PROCEDURE — 11000001 HC ACUTE MED/SURG PRIVATE ROOM

## 2024-01-03 PROCEDURE — 99900035 HC TECH TIME PER 15 MIN (STAT)

## 2024-01-03 PROCEDURE — 80053 COMPREHEN METABOLIC PANEL: CPT | Performed by: HOSPITALIST

## 2024-01-03 PROCEDURE — 97535 SELF CARE MNGMENT TRAINING: CPT

## 2024-01-03 PROCEDURE — 25000003 PHARM REV CODE 250: Performed by: HOSPITALIST

## 2024-01-03 PROCEDURE — 94640 AIRWAY INHALATION TREATMENT: CPT

## 2024-01-03 PROCEDURE — 94761 N-INVAS EAR/PLS OXIMETRY MLT: CPT

## 2024-01-03 PROCEDURE — 63600175 PHARM REV CODE 636 W HCPCS: Performed by: HOSPITALIST

## 2024-01-03 PROCEDURE — 94660 CPAP INITIATION&MGMT: CPT

## 2024-01-03 PROCEDURE — 92526 ORAL FUNCTION THERAPY: CPT

## 2024-01-03 PROCEDURE — 25000003 PHARM REV CODE 250: Performed by: STUDENT IN AN ORGANIZED HEALTH CARE EDUCATION/TRAINING PROGRAM

## 2024-01-03 PROCEDURE — 25000242 PHARM REV CODE 250 ALT 637 W/ HCPCS: Performed by: HOSPITALIST

## 2024-01-03 RX ORDER — METOPROLOL SUCCINATE 25 MG/1
25 TABLET, EXTENDED RELEASE ORAL DAILY
Status: DISCONTINUED | OUTPATIENT
Start: 2024-01-04 | End: 2024-01-04 | Stop reason: HOSPADM

## 2024-01-03 RX ADMIN — IPRATROPIUM BROMIDE AND ALBUTEROL SULFATE 3 ML: 2.5; .5 SOLUTION RESPIRATORY (INHALATION) at 01:01

## 2024-01-03 RX ADMIN — IPRATROPIUM BROMIDE AND ALBUTEROL SULFATE 3 ML: 2.5; .5 SOLUTION RESPIRATORY (INHALATION) at 08:01

## 2024-01-03 RX ADMIN — CLOPIDOGREL BISULFATE 75 MG: 75 TABLET ORAL at 10:01

## 2024-01-03 RX ADMIN — FINASTERIDE 5 MG: 5 TABLET, FILM COATED ORAL at 10:01

## 2024-01-03 RX ADMIN — POTASSIUM PHOSPHATE, MONOBASIC 1000 MG: 500 TABLET, SOLUBLE ORAL at 09:01

## 2024-01-03 RX ADMIN — FUROSEMIDE 40 MG: 40 TABLET ORAL at 10:01

## 2024-01-03 RX ADMIN — ASPIRIN 81 MG CHEWABLE TABLET 81 MG: 81 TABLET CHEWABLE at 10:01

## 2024-01-03 RX ADMIN — FUROSEMIDE 40 MG: 40 TABLET ORAL at 06:01

## 2024-01-03 RX ADMIN — POTASSIUM PHOSPHATE, MONOBASIC 1000 MG: 500 TABLET, SOLUBLE ORAL at 10:01

## 2024-01-03 RX ADMIN — ENOXAPARIN SODIUM 40 MG: 40 INJECTION SUBCUTANEOUS at 06:01

## 2024-01-03 RX ADMIN — IPRATROPIUM BROMIDE AND ALBUTEROL SULFATE 3 ML: 2.5; .5 SOLUTION RESPIRATORY (INHALATION) at 09:01

## 2024-01-03 NOTE — PLAN OF CARE
Problem: Adult Inpatient Plan of Care  Goal: Plan of Care Review  Outcome: Ongoing, Progressing  Flowsheets (Taken 1/3/2024 0625)  Plan of Care Reviewed With: patient  Goal: Absence of Hospital-Acquired Illness or Injury  Outcome: Ongoing, Progressing  Goal: Optimal Comfort and Wellbeing  Outcome: Ongoing, Progressing  Intervention: Monitor Pain and Promote Comfort  Flowsheets (Taken 1/3/2024 0625)  Pain Management Interventions:   pillow support provided   quiet environment facilitated   position adjusted     Problem: Infection  Goal: Absence of Infection Signs and Symptoms  Outcome: Ongoing, Progressing     Problem: Bleeding (Sepsis/Septic Shock)  Goal: Absence of Bleeding  Outcome: Ongoing, Progressing     Problem: Infection Progression (Sepsis/Septic Shock)  Goal: Absence of Infection Signs and Symptoms  Outcome: Ongoing, Progressing     Problem: Fluid and Electrolyte Imbalance (Acute Kidney Injury/Impairment)  Goal: Fluid and Electrolyte Balance  Outcome: Ongoing, Progressing

## 2024-01-03 NOTE — ASSESSMENT & PLAN NOTE
Patient is identified as having Systolic (HFrEF) heart failure that is Acute on chronic. CHF is currently controlled. Latest ECHO performed and demonstrates- Results for orders placed during the hospital encounter of 12/23/23    Echo Saline Bubble? No    Interpretation Summary    Left Ventricle: The left ventricle is normal in size. There is concentric hypertrophy. There is severely reduced systolic function with a visually estimated ejection fraction of 25 - 30%.    Right Ventricle: Mild right ventricular enlargement. Systolic function is normal.    Left Atrium: Left atrium is moderately dilated.    Right Atrium: Right atrium is mildly dilated.    Tricuspid Valve: There is trace regurgitation. The estimated PA systolic pressure is at least 21 mmHg.    IVC/SVC: Patient is ventilated, cannot use IVC diameter to estimate right atrial pressure.  . Continue Furosemide and monitor clinical status closely. Monitor on telemetry. Patient is off CHF pathway.  Monitor strict Is&Os and daily weights.  Place on fluid restriction of 1.5 L. Cardiology has been consulted. Continue to stress to patient importance of self efficacy and  on diet for CHF.     -Diuresing well   - transition to PO lasix  - BP on lower end but will trial of Metoprolol succ 25 mg PO daily and monitor  - if tolerates, will try to see if able to add entresto

## 2024-01-03 NOTE — PLAN OF CARE
Problem: Oral Intake Inadequate (Acute Kidney Injury/Impairment)  Goal: Optimal Nutrition Intake  Intervention: Promote and Optimize Nutrition  Flowsheets (Taken 1/2/2024 1819)  Oral Nutrition Promotion: safe use of adaptive equipment encouraged     Problem: Renal Function Impairment (Acute Kidney Injury/Impairment)  Goal: Effective Renal Function  Intervention: Monitor and Support Renal Function  Flowsheets (Taken 1/2/2024 1819)  Medication Review/Management: high-risk medications identified     Problem: Infection (Pneumonia)  Goal: Resolution of Infection Signs and Symptoms  Intervention: Prevent Infection Progression  Flowsheets (Taken 1/2/2024 1819)  Fever Reduction/Comfort Measures: fluid intake increased  Infection Management: aseptic technique maintained  Isolation Precautions:   precautions maintained   droplet

## 2024-01-03 NOTE — ASSESSMENT & PLAN NOTE
Patient with Hypoxic Respiratory failure which is Acute.  he is not on home oxygen. Supplemental oxygen was provided and noted-  . Signs/symptoms of respiratory failure include- tachypnea, increased work of breathing, respiratory distress, use of accessory muscles, wheezing, and stridor. Contributing diagnoses includes - Pleural effusion and Pneumonia Labs and images were reviewed. Patient Has not had a recent ABG. Will treat underlying causes and adjust management of respiratory failure as follows- Treat underlying etiologies of pneumonia and evaluate pleural effusion.  - Intubated in ER.  Pulmonary consulted.  - Respiratory failure secondary to flu/pneumonia/pleural effusion.  - Extubated on 12/28.  Currently doing well on NC.  - wean to room air  - resolved

## 2024-01-03 NOTE — NURSING
[facility], [facility]  Nurses Note -- 4 Eyes      1/2/2024       Skin assessed on: Q Shift      [] No Pressure Injuries Present    []Prevention Measures Documented    [x] Yes LDA  for Pressure Injury Previously documented     [] Yes New Pressure Injury Discovered   [] LDA for New Pressure Injury Added      Attending RN:  John Tovar RN     Second RN:  Helena Luna RN

## 2024-01-03 NOTE — PLAN OF CARE
Plan for discharge: new half-way NH placement. [facility] called, pasrr faxed to [facility], awaiting 142. Pt medically accepted to [facility] and [facility] unable to meet pt needs. CM to follow up with family for preference.     Spoke with patient's sister Idalia, informed of accepting facilities, stated she would like to tour the facilities prior to deciding on preference.     Placed call to [facility] with DANIEL, left detailed voice message.     Placed call to [facility] with [facility], left detailed voice message.     Received return call from [facility] with [facility], stated facility current has covid cases, pt's spouse declined to tour facility.     3:20pm Placed call to [facility] with OLOW, left detailed voice message.     Placed call to [facility] left detailed voice message.     Spoke with pt's sister to confirm preferred facility. Pt's sister stated [facility] or [facility] would be preferred. CM explained pt is medically ready for discharge and will need financials/banking statements to be provided to NH facility by tomorrow, therefore, will need confirmation on first choice. Pt's sister agreeable for [facility]'s as first choice and stated she will obtain statements tomorrow morning. CM explained plan will be for pt discharge on tomorrow either to [facility] or will need to return to the [facility] and continue to work on placement from there.       142/pasrr received and attached in care port. CM to continue to follow up.    01/03/24 0843   Post-Acute Status   Post-Acute Authorization Placement   Post-Acute Placement Status Pending payor review/awaiting authorization (if required)   Discharge Delays (!) Post-Acute Set-up   Discharge Plan   Discharge Plan A New Nursing Home placement - half-way care facility   Discharge Plan B New Nursing Home placement - half-way care facility

## 2024-01-03 NOTE — NURSING
Vtach showed on monitor. Tachycardic between 105-107. No chest pain, SOB noted. EKG done, notified Dr Urbina. No orders yet. Will continue to monitor.

## 2024-01-03 NOTE — PROGRESS NOTES
[facility] Medicine  Progress Note    Patient Name: Alexsander Tafoya  MRN: [id number]  Patient Class: IP- Inpatient   Admission Date: 12/23/2023  Length of Stay: 11 days  Attending Physician: Isaac Aguilar MD  Primary Care Provider: Helio Farr MD        Subjective:     Principal Problem:Acute hypoxic respiratory failure        HPI:  Mr Tafoya is a 73-year-old male being admitted for acute hypoxic respiratory failure.  His medical history significant for BPH, dyslipidemia, chronic CVA with resultant aphasia, and right-sided hemiparesis.  During my evaluation the patient is in severe respiratory distress, on BiPAP and unable to provide any meaningful detailed history.  Seems that he came from [facility] due to worsening dyspnea, and EMS noted low oxygen saturations. in the ER, he was noted to have Tmax of 103.1 °F, persistently tachycardic in 130s, hypotensive 84/52, and was initially on nonrebreather and subsequently placed on BiPAP.  His CBC shows normal white count, microcytic anemia 10.5 and normal platelets.  INR 1.4.  CBC with metabolic acidosis bicarb 16, creatinine 1.9 (appears to have previous creatinine of 1.09 in September 2022 with baseline of about 1.1-1.3,), hypomagnesemia 1.5, transaminitis AST: ALT-192: 152, lactic acidosis 3.1, elevated procalcitonin 0.26.  His influenza a swab is positive.  His chest x-ray suggests moderate right-sided pleural effusion with compressive atelectasis and/or lower lobe consolidation.    After evaluating the patient, discussed with ER physician that the patient may need to be intubated given severe respiratory distress tachycardia, respiratory rate in 50s and inability to protect airway with previous stroke.  Patient will be placed on mechanical ventilation.  He has so far received breathing treatments, Rocephin and Zithromax along with Tamiflu.  Admission has been requested for further evaluation and treatment.    Overview/Hospital Course:  Mr Alexsander HARRELL  Lottie was admitted with acute hypoxic respiratory failure and septic shock due to R sided pneumonia and influenza. Suspect also CHF contributing- new EF 25-30%. Intubated in ED. Started antibiotics, diuretics, tamiflu. He also had shock liver on admission. Developed episodes of VT and started on lidocaine gtt. Started lactulose for elevated ammonia associated with shock liver. He did improve and was extubated on 12/28. Stepped down to floor. Palliative following- DNR code status. He is at functional baseline- bedbound with aphasia but sister states he is not at his baseline and his assisted living would not be able to care for him. Working on NH placement.    Interval History: no new issues, he wants to leave and go home    Review of Systems  Objective:     Vital Signs (Most Recent):  Temp: 97.4 °F (36.3 °C) (01/03/24 1205)  Pulse: 110 (01/03/24 1349)  Resp: 18 (01/03/24 1349)  BP: 107/68 (01/03/24 1205)  SpO2: 97 % (01/03/24 1349) Vital Signs (24h Range):  Temp:  [97.3 °F (36.3 °C)-98.9 °F (37.2 °C)] 97.4 °F (36.3 °C)  Pulse:  [] 110  Resp:  [18-24] 18  SpO2:  [94 %-100 %] 97 %  BP: ()/(59-73) 107/68     Weight: 98 kg (216 lb 0.8 oz)  Body mass index is 33.84 kg/m².    Intake/Output Summary (Last 24 hours) at 1/3/2024 1545  Last data filed at 1/3/2024 0830  Gross per 24 hour   Intake 360 ml   Output 650 ml   Net -290 ml           Physical Exam  Vitals and nursing note reviewed.   Constitutional:       General: He is not in acute distress.     Appearance: He is not ill-appearing.   HENT:      Head: Normocephalic and atraumatic.   Cardiovascular:      Rate and Rhythm: Normal rate and regular rhythm.   Pulmonary:      Effort: Pulmonary effort is normal.      Breath sounds: Normal breath sounds.      Comments: Room air  Abdominal:      General: Bowel sounds are normal. There is no distension.      Palpations: Abdomen is soft.   Musculoskeletal:      Right lower leg: No edema.      Left lower leg: No edema.    Skin:     General: Skin is warm and dry.   Neurological:      Comments: At baseline, aphasia             Significant Labs: All pertinent labs within the past 24 hours have been reviewed.    Significant Imaging: I have reviewed all pertinent imaging results/findings within the past 24 hours.    Assessment/Plan:      * Acute hypoxic respiratory failure  Patient with Hypoxic Respiratory failure which is Acute.  he is not on home oxygen. Supplemental oxygen was provided and noted-  . Signs/symptoms of respiratory failure include- tachypnea, increased work of breathing, respiratory distress, use of accessory muscles, wheezing, and stridor. Contributing diagnoses includes - Pleural effusion and Pneumonia Labs and images were reviewed. Patient Has not had a recent ABG. Will treat underlying causes and adjust management of respiratory failure as follows- Treat underlying etiologies of pneumonia and evaluate pleural effusion.  - Intubated in ER.  Pulmonary consulted.  - Respiratory failure secondary to flu/pneumonia/pleural effusion.  - Extubated on 12/28.  Currently doing well on NC.  - wean to room air  - resolved    Acute on chronic combined systolic and diastolic congestive heart failure  Patient is identified as having Systolic (HFrEF) heart failure that is Acute on chronic. CHF is currently controlled. Latest ECHO performed and demonstrates- Results for orders placed during the hospital encounter of 12/23/23    Echo Saline Bubble? No    Interpretation Summary    Left Ventricle: The left ventricle is normal in size. There is concentric hypertrophy. There is severely reduced systolic function with a visually estimated ejection fraction of 25 - 30%.    Right Ventricle: Mild right ventricular enlargement. Systolic function is normal.    Left Atrium: Left atrium is moderately dilated.    Right Atrium: Right atrium is mildly dilated.    Tricuspid Valve: There is trace regurgitation. The estimated PA systolic pressure is at  least 21 mmHg.    IVC/SVC: Patient is ventilated, cannot use IVC diameter to estimate right atrial pressure.  . Continue Furosemide and monitor clinical status closely. Monitor on telemetry. Patient is off CHF pathway.  Monitor strict Is&Os and daily weights.  Place on fluid restriction of 1.5 L. Cardiology has been consulted. Continue to stress to patient importance of self efficacy and  on diet for CHF.     -Diuresing well   - transition to PO lasix  - BP on lower end but will trial of Metoprolol succ 25 mg PO daily and monitor  - if tolerates, will try to see if able to add entresto    NSVT (nonsustained ventricular tachycardia)  Started on Lidocaine.  No amio secondary to liver failure.  Now off Lidocaine.  - continue to monitor on telemetry   - will start BB when BP allows       Dilated cardiomyopathy  See CHF      Goals of care, counseling/discussion  Advance Care Planning  Palliative Care consulted.  Goals of care discussed with family and no reintubation after extubation.  Patient is DNR     Currently not a previous baseline and family states his assisted living will not be able to care for him. Working on NH placement.    Pleural effusion  -Moderate effusion on x-ray most likely secondary to CHF  - Treated with diuresis.      Influenza A  -Treated with Tamiflu.      ATN (acute tubular necrosis)  Patient with acute kidney injury/acute renal failure likely due to acute tubular necrosis caused by septic shock  SHAYY is currently worsening. Baseline creatinine  ranging from 1 to 1.7  - Labs reviewed- Renal function/electrolytes with Estimated Creatinine Clearance: 61.2 mL/min (based on SCr of 1.2 mg/dL). according to latest data. Monitor urine output and serial BMP and adjust therapy as needed. Avoid nephrotoxins and renally dose meds for GFR listed above.  - due to septic shock   - improving   - CMP in AM    NSTEMI (non-ST elevated myocardial infarction)  Significantly elevated Troponin consistent with  NSTEMI.  Probably demand ischemia from shock.  On ASA and Plavix.  Hold Statin with shock liver.  Cardiology consulted.  - recommend to treat conservatively       Septic shock  This patient does have evidence of infective focus. My overall impression was septic shock due to MAP < 65. Source: Respiratory- pneumonia from influenza A. All cultures NGTD.   - shock has resolved but BP is borderline low. WBC normalized   - no longer on antibiotics  - influenza treated with tamiflu  - sepsis is resolved.     Transaminitis  Significant increase in LFT's, consistent with shock liver.  - slowly improving.  - ammonia elevated, started lactulose  - continue to hold statin  - monitor CMP daily       Microcytic anemia  Patient's anemia is currently controlled. Has not received any PRBCs to date. Etiology likely d/t Iron deficiency noted previously in 2019, no repeat studies. No active bleeding noted.     Current CBC reviewed-   Lab Results   Component Value Date    HGB 13.4 (L) 01/01/2024    HCT 46.2 01/01/2024     Monitor serial CBC and transfuse if patient becomes hemodynamically unstable, symptomatic or H/H drops below 7/21.    BPH (benign prostatic hyperplasia)  -Continue Proscar.  - voiding trial successful    Hyperlipidemia  Hold Statin with shock liver.  - resume when AST/ALT normalize    Essential hypertension  Antihypertensives initially on hold due to shock.  - BP is slowly improving  - continue to hold BP meds    Aphasia as late effect of cerebrovascular accident  Per notes.      Hemiplegia affecting right side in right-dominant patient as late effect of cerebrovascular disease  - Chronic CVA with previously noted resultant right-sided hemiparesis and aphasia.  - Bed bound at baseline  - PT OT signed off as he is at his functional baseline  - Speech consulted- on puree diet       VTE Risk Mitigation (From admission, onward)           Ordered     enoxaparin injection 40 mg  Daily         12/23/23 2143     IP VTE HIGH  RISK PATIENT  Once         12/23/23 2143     Place sequential compression device  Until discontinued         12/23/23 2143                    Discharge Planning   THAIS: 1/4/2024     Code Status: DNR   Is the patient medically ready for discharge?:     Reason for patient still in hospital (select all that apply): Treatment and Pending disposition  Discharge Plan A: New Nursing Home placement - [facility] care facility   Discharge Delays: (!) Post-Acute Set-up        Pending nursing home placement      Isaac Aguilar MD  Department of Hospital Medicine   [facility]

## 2024-01-03 NOTE — SUBJECTIVE & OBJECTIVE
Interval History: no new issues, he wants to leave and go home    Review of Systems  Objective:     Vital Signs (Most Recent):  Temp: 97.4 °F (36.3 °C) (01/03/24 1205)  Pulse: 110 (01/03/24 1349)  Resp: 18 (01/03/24 1349)  BP: 107/68 (01/03/24 1205)  SpO2: 97 % (01/03/24 1349) Vital Signs (24h Range):  Temp:  [97.3 °F (36.3 °C)-98.9 °F (37.2 °C)] 97.4 °F (36.3 °C)  Pulse:  [] 110  Resp:  [18-24] 18  SpO2:  [94 %-100 %] 97 %  BP: ()/(59-73) 107/68     Weight: 98 kg (216 lb 0.8 oz)  Body mass index is 33.84 kg/m².    Intake/Output Summary (Last 24 hours) at 1/3/2024 1545  Last data filed at 1/3/2024 0830  Gross per 24 hour   Intake 360 ml   Output 650 ml   Net -290 ml           Physical Exam  Vitals and nursing note reviewed.   Constitutional:       General: He is not in acute distress.     Appearance: He is not ill-appearing.   HENT:      Head: Normocephalic and atraumatic.   Cardiovascular:      Rate and Rhythm: Normal rate and regular rhythm.   Pulmonary:      Effort: Pulmonary effort is normal.      Breath sounds: Normal breath sounds.      Comments: Room air  Abdominal:      General: Bowel sounds are normal. There is no distension.      Palpations: Abdomen is soft.   Musculoskeletal:      Right lower leg: No edema.      Left lower leg: No edema.   Skin:     General: Skin is warm and dry.   Neurological:      Comments: At baseline, aphasia             Significant Labs: All pertinent labs within the past 24 hours have been reviewed.    Significant Imaging: I have reviewed all pertinent imaging results/findings within the past 24 hours.

## 2024-01-03 NOTE — NURSING
Patient on room air. SPO2 99%. No acute distress noted. Turned every 2 hours and prn. No complains of pain. Totally dependent on staff to meet ADLs. Poor appetite noted. Encouragement needed with feedings

## 2024-01-03 NOTE — PLAN OF CARE
Problem: SLP  Goal: SLP Goal  Description: ST. Pt will tolerate PO diet of pureed consistency with nectar thick liquids without overt s/s of aspiration. -Met /3  2. Pt will tolerate PO diet of minced and moist consistency with nectar thick liquids without overt s/s of aspiration. -ongoing    Outcome: Ongoing, Progressing     Pt safely consumed straw sips of Nectar Thick Liquids (Mildly thick IDDSI level 2), bites of pudding, and bites of alfred crackers dipped in water with no overt s/s of aspiration. ST to follow up for diet safety/upgrade as able.

## 2024-01-03 NOTE — PT/OT/SLP PROGRESS
"Speech Language Pathology Treatment    Patient Name:  Alexsander Tafoya   MRN:  [id number]  Admitting Diagnosis: Acute hypoxic respiratory failure    Recommendations:                 General Recommendations:  Dysphagia therapy and Speech/language therapy  Diet recommendations:  Minced & Moist Diet - IDDSI Level 5, Liquid Diet Level: Mildly thick/Nectar thick liquids - IDDSI Level 2   Aspiration Precautions: 1 bite/sip at a time, Alternating bites/sips, Assistance with meals and Assistance with thickening liquids, Feed only when awake/alert, Meds crushed in puree, Remain upright 30 minutes post meal, and Small bites/sips   General Precautions: Standard, droplet, fall  Communication strategies:  none    Assessment:   Alexsander Tafoya is a 73 y.o. male with a dx of Acute hypoxic respiratory failure. Pt presents with oropharyngeal dysphagia negatively impacted by variable GISSELL and previous hx of dysphagia.  ST will follow.     Subjective     Pt in bed awake and alert upon ST entry. Pt with reduced speech intelligibility (~70%). Pt agreeable to participate in session. RN reported that pt was overtly coughing on thin liquids this am.   Patient goals: "When can I go home?"     Pain/Comfort:  Pain Rating 1: 0/10    Respiratory Status:  Nasal cannula    Objective:     Has the patient been evaluated by SLP for swallowing?   Yes  Keep patient NPO? No   Current Respiratory Status:        Swallowing: Pt safely consumed tsp sips of nectar thick liquids x2, straw sips of NTL x5, bites of pudding, and bites of alfred crackers dipped in water x2 with no overt s/s of aspiration. Pt is safe to be upgraded to minced and moist diet. ST educated pt on thickening liquids; pt agreed and confirmed understanding. ST spoke to RN and confirmed If dinner FT is too difficult to downgrade pt back to pureed diet. ST to follow for diet safety.    Goals:   Multidisciplinary Problems       SLP Goals          Problem: SLP    Goal Priority Disciplines Outcome "   SLP Goal     SLP Ongoing, Progressing   Description: ST. Pt will tolerate PO diet of pureed consistency with nectar thick liquids without overt s/s of aspiration. -Met /3  2. Pt will tolerate PO diet of minced and moist consistency with nectar thick liquids without overt s/s of aspiration. -ongoing                         Plan:     Patient to be seen:  3 x/week, 4 x/week   Plan of Care expires:  24  Plan of Care reviewed with:  patient   SLP Follow-Up:  Yes       Discharge recommendations:  Moderate Intensity Therapy   Barriers to Discharge:  Level of Skilled Assistance Needed      Time Tracking:     SLP Treatment Date:   24  Speech Start Time:  1600  Speech Stop Time:  1625     Speech Total Time (min):  25 min    Billable Minutes: Treatment Swallowing Dysfunction 15 and Self Care/Home Management Training 10    2024

## 2024-01-03 NOTE — NURSING
Report received from off going nurse, DAVE Lopez. Patient resting with eyes closed. Rise and fall of chest noted. No signs of distress noted. Call light in reach. Bed low and locked. Will continue plan of care.

## 2024-01-04 VITALS
BODY MASS INDEX: 33.91 KG/M2 | HEART RATE: 107 BPM | SYSTOLIC BLOOD PRESSURE: 98 MMHG | HEIGHT: 67 IN | DIASTOLIC BLOOD PRESSURE: 66 MMHG | TEMPERATURE: 99 F | RESPIRATION RATE: 18 BRPM | WEIGHT: 216.06 LBS | OXYGEN SATURATION: 97 %

## 2024-01-04 LAB
ALBUMIN SERPL BCP-MCNC: 3.1 G/DL (ref 3.5–5.2)
ALP SERPL-CCNC: 136 U/L (ref 55–135)
ALT SERPL W/O P-5'-P-CCNC: 186 U/L (ref 10–44)
ANION GAP SERPL CALC-SCNC: 12 MMOL/L (ref 8–16)
AST SERPL-CCNC: 93 U/L (ref 10–40)
BILIRUB SERPL-MCNC: 1 MG/DL (ref 0.1–1)
BUN SERPL-MCNC: 22 MG/DL (ref 8–23)
CALCIUM SERPL-MCNC: 9.1 MG/DL (ref 8.7–10.5)
CHLORIDE SERPL-SCNC: 105 MMOL/L (ref 95–110)
CO2 SERPL-SCNC: 23 MMOL/L (ref 23–29)
CREAT SERPL-MCNC: 1.2 MG/DL (ref 0.5–1.4)
EST. GFR  (NO RACE VARIABLE): >60 ML/MIN/1.73 M^2
GLUCOSE SERPL-MCNC: 112 MG/DL (ref 70–110)
MAGNESIUM SERPL-MCNC: 2 MG/DL (ref 1.6–2.6)
POTASSIUM SERPL-SCNC: 4.5 MMOL/L (ref 3.5–5.1)
PROT SERPL-MCNC: 7.7 G/DL (ref 6–8.4)
SARS-COV-2 RDRP RESP QL NAA+PROBE: NEGATIVE
SODIUM SERPL-SCNC: 140 MMOL/L (ref 136–145)

## 2024-01-04 PROCEDURE — 86580 TB INTRADERMAL TEST: CPT | Performed by: STUDENT IN AN ORGANIZED HEALTH CARE EDUCATION/TRAINING PROGRAM

## 2024-01-04 PROCEDURE — U0002 COVID-19 LAB TEST NON-CDC: HCPCS | Performed by: STUDENT IN AN ORGANIZED HEALTH CARE EDUCATION/TRAINING PROGRAM

## 2024-01-04 PROCEDURE — 36415 COLL VENOUS BLD VENIPUNCTURE: CPT | Performed by: HOSPITALIST

## 2024-01-04 PROCEDURE — 99497 ADVNCD CARE PLAN 30 MIN: CPT | Mod: ,,, | Performed by: INTERNAL MEDICINE

## 2024-01-04 PROCEDURE — 25000003 PHARM REV CODE 250: Performed by: HOSPITALIST

## 2024-01-04 PROCEDURE — 30200315 PPD INTRADERMAL TEST REV CODE 302: Performed by: STUDENT IN AN ORGANIZED HEALTH CARE EDUCATION/TRAINING PROGRAM

## 2024-01-04 PROCEDURE — 80053 COMPREHEN METABOLIC PANEL: CPT | Performed by: HOSPITALIST

## 2024-01-04 PROCEDURE — 25000003 PHARM REV CODE 250: Performed by: STUDENT IN AN ORGANIZED HEALTH CARE EDUCATION/TRAINING PROGRAM

## 2024-01-04 PROCEDURE — 83735 ASSAY OF MAGNESIUM: CPT | Performed by: HOSPITALIST

## 2024-01-04 PROCEDURE — 99233 SBSQ HOSP IP/OBS HIGH 50: CPT | Mod: ,,, | Performed by: INTERNAL MEDICINE

## 2024-01-04 RX ORDER — IPRATROPIUM BROMIDE AND ALBUTEROL SULFATE 2.5; .5 MG/3ML; MG/3ML
3 SOLUTION RESPIRATORY (INHALATION) EVERY 4 HOURS PRN
Status: DISCONTINUED | OUTPATIENT
Start: 2024-01-04 | End: 2024-01-04 | Stop reason: HOSPADM

## 2024-01-04 RX ADMIN — METOPROLOL SUCCINATE 25 MG: 25 TABLET, EXTENDED RELEASE ORAL at 08:01

## 2024-01-04 RX ADMIN — FINASTERIDE 5 MG: 5 TABLET, FILM COATED ORAL at 08:01

## 2024-01-04 RX ADMIN — TUBERCULIN PURIFIED PROTEIN DERIVATIVE 5 UNITS: 5 INJECTION, SOLUTION INTRADERMAL at 12:01

## 2024-01-04 RX ADMIN — CLOPIDOGREL BISULFATE 75 MG: 75 TABLET ORAL at 08:01

## 2024-01-04 RX ADMIN — ASPIRIN 81 MG CHEWABLE TABLET 81 MG: 81 TABLET CHEWABLE at 08:01

## 2024-01-04 NOTE — PLAN OF CARE
01/04/24 1039   Medicare Message   Important Message from Medicare regarding Discharge Appeal Rights Given to patient/caregiver;Explained to patient/caregiver;Other (comments)  (CM spoke with Idalia Cornell (Sister) [phone] (Mobile) via phone, verbalized understanding. Copy mailed certified to address in chart. [phone] [id number] 2023)   Date IMM was signed 01/04/24   Time IMM was signed 0928

## 2024-01-04 NOTE — NURSING
12 more of beats of vtach now as per monitor tech. Dr. Urbina notified. Add Mg on am labs as ordered. Will continue to monitor.

## 2024-01-04 NOTE — CONSULTS
[facility] S[facility]  Wound Care  [facility]    Patient Name:  Alexsander Tafoya   MRN:  [id number]  Date: 1/4/2024  Diagnosis: Acute hypoxic respiratory failure    History:     Past Medical History:   Diagnosis Date    BPH (benign prostatic hyperplasia)     Dysarthria     HLD (hyperlipidemia)     Hypertension     Other and unspecified hyperlipidemia     Stroke        Social History     Socioeconomic History    Marital status:    Tobacco Use    Smoking status: Former    Smokeless tobacco: Never   Substance and Sexual Activity    Alcohol use: Yes     Comment: rare, once a week or less    Drug use: No    Sexual activity: Never       Precautions:     Allergies as of 12/23/2023    (No Known Allergies)       [employer] [employer] Assessment Details/Treatment     Active Problem List with Overview Notes    Diagnosis Date Noted    NSVT (nonsustained ventricular tachycardia) 12/25/2023    Acute on chronic combined systolic and diastolic congestive heart failure 12/25/2023           Advance care planning 12/24/2023           Dilated cardiomyopathy 12/24/2023    Acute hypoxic respiratory failure 12/23/2023    Influenza A 12/23/2023    Pleural effusion 12/23/2023    ATN (acute tubular necrosis) 05/10/2022    Septic shock 09/01/2019    NSTEMI (non-ST elevated myocardial infarction) 09/01/2019    BPH (benign prostatic hyperplasia) 08/05/2019    Microcytic anemia 08/05/2019    Transaminitis 08/05/2019    Asymptomatic microscopic hematuria 08/05/2019    History of recent fall 08/05/2019    Hemiplegia affecting right side in right-dominant patient as late effect of cerebrovascular disease 07/17/2017    Aphasia as late effect of cerebrovascular accident 07/17/2017    Essential hypertension 07/17/2017    Hyperlipidemia 07/17/2017      Nursing consult altered skin integrity coccyx  A 73 year old male admitted 12/23/23 from [facility] with complaint of cough that started day before presentation. Breathing labored and grunting with each breath.    Influenza swab positive.  1/1 WBC 4.63 Hgb 13.4 Hct 46.2   1/4 Alb 3.1 Weight 216 lbs   On Isoflex mattress; Carlos score 11; wearing brief over male external catheter device to suction; EHOB boots; maximum assistance with repositioning  12/24 12:30 am 4 Eyes Skin Assessment on Admit- Pressure Injury previously documented  Written Order Guidelines for Stage 2 pressure injury instituted 12/28  Plan: Admit to NH today- [facility]   Assessment:  Photodocumentation    Sacrum- Stage 3 pressure injury with scant amount slough in base of opening 2 cm circular area.   Below sacrum- Moisture associated skin damage in gluteal cleft. Macerated 1.5 cm area in gluteal cleft.   Currently has Mepilex dressing over sacral/gluteal cleft wounds.  Treatment/Plan:  Treatment at [facility]  Recommend continued Pressure Injury Prevention Interventions with moisture management. If wound unable to improve with foam dressing, recommend using hydrocolloid to provide stability to wound edges and control moisture at site of wound.     01/04/2024

## 2024-01-04 NOTE — SUBJECTIVE & OBJECTIVE
Interval History: Awaiting transfer to [facility]; alert and comfortable during visit.     Medications:  Continuous Infusions:  Scheduled Meds:   aspirin  81 mg Oral Daily    clopidogreL  75 mg Oral Daily    enoxparin  40 mg Subcutaneous Daily    finasteride  5 mg Oral Daily    furosemide  40 mg Oral BID    metoprolol succinate  25 mg Oral Daily     PRN Meds:albuterol-ipratropium, ondansetron    Objective:     Vital Signs (Most Recent):  Temp: 98.6 °F (37 °C) (01/04/24 1121)  Pulse: 102 (01/04/24 1121)  Resp: 18 (01/04/24 1121)  BP: 94/63 (01/04/24 1121)  SpO2: 98 % (01/04/24 1121) Vital Signs (24h Range):  Temp:  [98 °F (36.7 °C)-98.8 °F (37.1 °C)] 98.6 °F (37 °C)  Pulse:  [] 102  Resp:  [17-20] 18  SpO2:  [93 %-99 %] 98 %  BP: ()/(59-69) 94/63     Weight: 98 kg (216 lb 0.8 oz)  Body mass index is 33.84 kg/m².       Physical Exam  Constitutional:       General: He is not in acute distress.  HENT:      Head: Normocephalic.   Neurological:      Mental Status: He is alert.      Comments: Speaks in short phrases, though has expressive aphasia        Significant Labs: All pertinent labs within the past 24 hours have been reviewed.  CBC:   Recent Labs   Lab 01/01/24  0607   WBC 4.63   HGB 13.4*   HCT 46.2   MCV 75*        BMP:  Recent Labs   Lab 01/04/24  0507   *      K 4.5      CO2 23   BUN 22   CREATININE 1.2   CALCIUM 9.1   MG 2.0     LFT:  Lab Results   Component Value Date    AST 93 (H) 01/04/2024    GGT 42 08/05/2019    ALKPHOS 136 (H) 01/04/2024    BILITOT 1.0 01/04/2024     Albumin:   Albumin   Date Value Ref Range Status   01/04/2024 3.1 (L) 3.5 - 5.2 g/dL Final     Protein:   Total Protein   Date Value Ref Range Status   01/04/2024 7.7 6.0 - 8.4 g/dL Final     Lactic acid:   Lab Results   Component Value Date    LACTATE 1.8 12/24/2023    LACTATE 3.0 (H) 12/24/2023       Significant Imaging: I have reviewed all pertinent imaging results/findings within the past 24 hours.

## 2024-01-04 NOTE — PLAN OF CARE
Problem: Respiratory Compromise (Pneumonia)  Goal: Effective Oxygenation and Ventilation  Outcome: Ongoing, Progressing     Problem: Respiratory Compromise (Pneumonia)  Goal: Effective Oxygenation and Ventilation  Outcome: Ongoing, Progressing     Problem: Fall Injury Risk  Goal: Absence of Fall and Fall-Related Injury  Outcome: Ongoing, Progressing     Problem: Skin Injury Risk Increased  Goal: Skin Health and Integrity  Outcome: Ongoing, Progressing     Problem: Coping Ineffective  Goal: Effective Coping  Outcome: Ongoing, Progressing

## 2024-01-04 NOTE — PLAN OF CARE
Problem: Adult Inpatient Plan of Care  Goal: Plan of Care Review  Outcome: Ongoing, Progressing  Flowsheets (Taken 1/4/2024 0616)  Plan of Care Reviewed With: patient  Goal: Absence of Hospital-Acquired Illness or Injury  Outcome: Ongoing, Progressing  Goal: Optimal Comfort and Wellbeing  Outcome: Ongoing, Progressing  Intervention: Monitor Pain and Promote Comfort  Flowsheets (Taken 1/4/2024 0616)  Pain Management Interventions:   pillow support provided   position adjusted   quiet environment facilitated     Problem: Infection  Goal: Absence of Infection Signs and Symptoms  Outcome: Ongoing, Progressing     Problem: Bleeding (Sepsis/Septic Shock)  Goal: Absence of Bleeding  Outcome: Ongoing, Progressing     Problem: Glycemic Control Impaired (Sepsis/Septic Shock)  Goal: Blood Glucose Level Within Desired Range  Outcome: Ongoing, Progressing     Problem: Nutrition Impaired (Sepsis/Septic Shock)  Goal: Optimal Nutrition Intake  Outcome: Ongoing, Progressing     Problem: Renal Function Impairment (Acute Kidney Injury/Impairment)  Goal: Effective Renal Function  Outcome: Ongoing, Progressing     Problem: Fluid Imbalance (Pneumonia)  Goal: Fluid Balance  Outcome: Ongoing, Progressing

## 2024-01-04 NOTE — PLAN OF CARE
[facility]     Department of Hospital Medicine     [address] [place] [address]     [phone] after hours  [phone] fax       NURSING HOME ORDERS    01/04/2024    Admit to Nursing Home:  Regular Bed       Diagnoses:  Active Hospital Problems    Diagnosis  POA    *Acute hypoxic respiratory failure [J96.01]  Yes    NSVT (nonsustained ventricular tachycardia) [I47.29]  No    Acute on chronic combined systolic and diastolic congestive heart failure [I50.43]  Yes           Goals of care, counseling/discussion [Z71.89]  Not Applicable           Dilated cardiomyopathy [I42.0]  Yes    Influenza A [J10.1]  Yes    Pleural effusion [J90]  Yes    ATN (acute tubular necrosis) [N17.0]  Yes    Septic shock [A41.9, R65.21]  Yes    NSTEMI (non-ST elevated myocardial infarction) [I21.4]  Yes    BPH (benign prostatic hyperplasia) [N40.0]  Yes    Transaminitis [R74.01]  Yes    Microcytic anemia [D50.9]  Yes    Hemiplegia affecting right side in right-dominant patient as late effect of cerebrovascular disease [I69.951]  Not Applicable    Aphasia as late effect of cerebrovascular accident [I69.320]  Not Applicable    Essential hypertension [I10]  Yes    Hyperlipidemia [E78.5]  Yes      Resolved Hospital Problems    Diagnosis Date Resolved POA    Lactic acidosis [E87.20] 12/28/2023 Yes     Shock + liver injury.  Will repeat.        NSTEMI (non-ST elevated myocardial infarction) [I21.4] 12/30/2023 Yes       Patient is homebound due to:  Acute hypoxic respiratory failure    Allergies:Review of patient's allergies indicates:  No Known Allergies    Vitals:       Routine, once monthly      Diet: Minced and moist with nectar thick fluids    Acitivities:      - Up in a chair each morning as tolerated  - Max dependent on all ADLs        LABS:  Per facility protocol      Nursing Precautions:     - Aspiration precautions:             - Total assistance with meals            -  Upright 90  degrees befor during and after meals             -  Suction at bedside          - Fall precautions per nursing home protocol     - Decubitus precautions:        -  for positioning   - Pressure reducing foam mattress   - Turn patient every two hours. Use wedge pillows to anchor patient    CONSULTS:           Speech Therapy  to evaluate and treat     Nutrition to evaluate and recommend diet     Consult to Nursing home based Palliative Care    MISCELLANEOUS CARE:                Routine Skin for Bedridden Patients:  Apply moisture barrier cream to all    skin folds and wet areas in perineal area daily and after baths and                           all bowel movements.                      Medications: Discontinue all previous medication orders, if any. See new list below.        Medication List        START taking these medications      albuterol-ipratropium 2.5 mg-0.5 mg/3 mL nebulizer solution  Commonly known as: DUO-NEB  Take 3 mLs by nebulization every 6 (six) hours while awake. Rescue            CONTINUE taking these medications      aspirin 81 MG Chew  Take 1 tablet (81 mg total) by mouth once daily.     clopidogreL 75 mg tablet  Commonly known as: PLAVIX  Take 1 tablet (75 mg total) by mouth once daily.     finasteride 5 mg tablet  Commonly known as: PROSCAR  Take 5 mg by mouth once daily.     fish oil-omega-3 fatty acids 300-1,000 mg capsule  Take 1 capsule by mouth once daily.     furosemide 40 MG tablet  Commonly known as: LASIX  Take 1 tablet (40 mg total) by mouth once daily.     multivitamin per tablet  Commonly known as: THERAGRAN  Take 1 tablet by mouth once daily.            STOP taking these medications      enalapril 5 MG tablet  Commonly known as: VASOTEC     metoprolol tartrate 50 MG tablet  Commonly known as: LOPRESSOR     potassium chloride 10 MEQ Cpsr  Commonly known as: MICRO-K     rosuvastatin 20 MG tablet  Commonly known as: CRESTOR                     _________________________________  Isaac Aguilar MD  01/04/2024

## 2024-01-04 NOTE — ASSESSMENT & PLAN NOTE
- Noted at baseline, though he is able to participate in discussion (typically with yes and no questions)

## 2024-01-04 NOTE — NURSING
[facility], [facility]  Nurses Note -- 4 Eyes      1/4/2024       Skin assessed on: Q Shift      [] No Pressure Injuries Present    []Prevention Measures Documented    [x] Yes LDA  for Pressure Injury Previously documented     [] Yes New Pressure Injury Discovered   [] LDA for New Pressure Injury Added      Attending RN:  Meghan Farah RN     Second RN:  DAVE Lopez

## 2024-01-04 NOTE — PLAN OF CARE
ADT 30 order placed for Stretcher Transportation.  Requested  time: 4:30pm  If transportation does not arrive at ETA time nurse will be instructed to follow protocol for transportation below:  How can I get in touch directly with dispatch, if needed?                 Non-emergent dispatch: [phone] opt 6    +++NURSING:  If stretcher does not arrive at requested time please call the above Non Emergent Dispatcher.  If issue not resolved please escalate to your charge nurse for further instructions.    Transport to [facility]  [address] [place] [address]

## 2024-01-04 NOTE — NURSING
[facility]  Nurses Note -- 4 Eyes      1/3/2024       Skin assessed on: Q Shift      [] No Pressure Injuries Present    []Prevention Measures Documented    [x] Yes LDA  for Pressure Injury Previously documented     [] Yes New Pressure Injury Discovered   [] LDA for New Pressure Injury Added      Attending RN:  John Tovar RN     Second RN:  Anne Kurtz RN

## 2024-01-04 NOTE — NURSING
Monitor tech informed about episode of AV to V tach of the pt. ECG done. Pt is awake.  HR of 102. Dr. Urbina notified. Will continue to monitor.

## 2024-01-04 NOTE — PLAN OF CARE
Plan for discharge: new long-term NH placement. Placed call to Joanne with [facility], left detailed voice message. Placed call to [facility], left detailed voice message.     Received return call from [facility], stated pt medically accepted and will reach out to pt's sister to set time to meet for paperwork today.     9:28am Spoke with pt's sister Idalia, stated she plans to meet at [facility] this morning at 11:15am.     Plan of care orders and 142/pasrr sent to [facility] via care port. Pending rapid covid and ppd.     12:40pm Received call from Kathleen with [facility], stated pt's sister is completing paperwork and goal for admit today. Awaiting final confirmation on pharmacy benefits. Rapid covid and ppd sent via care port.     2:10pm Placed call to [facility], left detailed voice message.     2:50pm Received call from Kathleen with [facility], stated [facility] admitting physician at the facility is at max capacity. Awaiting confirmation if the second physician or NP will be able to admit. Unable to accept until confirmation is received.     3:06pm Spoke with Dodie with [facility]. Confirmed pt's prior level of function. Pt was 2 person assist not requiring 24 hour one on one care. Pt was able to stand  with assistance and was able to assist with transfer. Per OT note, recommendation is now OT rec grupo lift with continued 24 hour care at d/c. Pt is a level 1 progressive mobility; pt is at his baseline.     3:21pm [facility] pt to [facility] today, pt's nurse to call report. Transportation requested. CM to continue to follow up.    01/04/24 0825   Post-Acute Status   Post-Acute Authorization Placement   Post-Acute Placement Status Pending post-acute provider review/more information requested   Discharge Delays (!) Post-Acute Set-up   Discharge Plan   Discharge Plan A New Nursing Home placement - long-term care facility    Discharge Plan B New Nursing Home placement - senior care care facility

## 2024-01-04 NOTE — PROGRESS NOTES
"[facility]  Palliative Medicine  Progress Note    Patient Name: Alexsander Tafoya  MRN: [id number]  Admission Date: 12/23/2023  Hospital Length of Stay: 12 days  Code Status: DNR   Attending Provider: Isaac Aguilar MD  Consulting Provider: Alaina Aguilar MD  Primary Care Physician: Helio Farr MD  Principal Problem:Acute hypoxic respiratory failure    Assessment/Plan:     Advance Care Planning    1/4/24  - Chart and interval history reviewed; discussed pt in person with primary staff.   - Visited with pt at bedside; he was significantly more alert than when I had previously seen him, appeared very comfortable, and was able to participate in basic discussion. When I asked him if he's watched any good TV lately, he said "no!" - I told him that I heard he likes watching sports, and that the [employer] football game was going to be playing on Monday. I asked him if he knew that he was leaving the hospital soon, and he nodded and said "nursing home" - I told him that is correct, and that we are hopeful he has good support and care at his home. I let him know that I would call his sister or son to check in, to which he was agreeable.   - Along with Dr Roosevelt Aguilar, initially attempted to call and speak with pt's son - no answer. Following this, we spoke to pt's very supportive sister, Idalia. Medical update provided; she agrees that pt has had significant improvement over this hospital stay, and is relieved that he will be discharged soon. We discussed the possibility of additional support at the NH; she is agreeable to NH-based palliative care follow up, and is aware that pt can be transitioned to hospice care if goals become comfort-focused in future. I encouraged hospice care if he becomes more symptomatic, is sleeping more during the day, or they prefer to avoid further hospitalization/frequent hospital stays.   - LaPOST already completed by Dr Méndez indicating DNR/DNI/selective care  - Let Idalia " know that while we are hopeful that pt is able to stay out of the hospital for a good while, our team will check in on him during any future hospital stays    See ACP notes on file for details of previous discussions.     Neuro  Aphasia as late effect of cerebrovascular accident  - Noted at baseline, though he is able to participate in discussion (typically with yes and no questions)     Hemiplegia affecting right side in right-dominant patient as late effect of cerebrovascular disease  - At baseline requires assistance for all ADLs    ID  Septic shock  - Resolved; required ICU care and intubation for acute respiratory, septic shock, shock liver, NSTEMI, and SHAYY, all of which have improved    I will follow-up with patient. Please contact us if you have any additional questions.    NATALIIA Solorio  Palliative Medicine Staff   [phone]    Total visit time: 51 minutes    > 50% of 35 min visit spent in chart review, face to face discussion of symptom assessment, coordination of care with other specialists, and discharge planning.    16 min ACP time spent: goals of care, emotional support, formulating and communicating prognosis, exploring burden/ benefit of various approaches of treatment.      Subjective:     Interval History: Awaiting transfer to new NH; alert and comfortable during visit.     Medications:  Continuous Infusions:  Scheduled Meds:   aspirin  81 mg Oral Daily    clopidogreL  75 mg Oral Daily    enoxparin  40 mg Subcutaneous Daily    finasteride  5 mg Oral Daily    furosemide  40 mg Oral BID    metoprolol succinate  25 mg Oral Daily     PRN Meds:albuterol-ipratropium, ondansetron    Objective:     Vital Signs (Most Recent):  Temp: 98.6 °F (37 °C) (01/04/24 1121)  Pulse: 102 (01/04/24 1121)  Resp: 18 (01/04/24 1121)  BP: 94/63 (01/04/24 1121)  SpO2: 98 % (01/04/24 1121) Vital Signs (24h Range):  Temp:  [98 °F (36.7 °C)-98.8 °F (37.1 °C)] 98.6 °F (37 °C)  Pulse:  [] 102  Resp:  [17-20]  18  SpO2:  [93 %-99 %] 98 %  BP: ()/(59-69) 94/63     Weight: 98 kg (216 lb 0.8 oz)  Body mass index is 33.84 kg/m².       Physical Exam  Constitutional:       General: He is not in acute distress.  HENT:      Head: Normocephalic.   Neurological:      Mental Status: He is alert.      Comments: Speaks in short phrases, though has expressive aphasia        Significant Labs: All pertinent labs within the past 24 hours have been reviewed.  CBC:   Recent Labs   Lab 01/01/24  0607   WBC 4.63   HGB 13.4*   HCT 46.2   MCV 75*        BMP:  Recent Labs   Lab 01/04/24  0507   *      K 4.5      CO2 23   BUN 22   CREATININE 1.2   CALCIUM 9.1   MG 2.0     LFT:  Lab Results   Component Value Date    AST 93 (H) 01/04/2024    GGT 42 08/05/2019    ALKPHOS 136 (H) 01/04/2024    BILITOT 1.0 01/04/2024     Albumin:   Albumin   Date Value Ref Range Status   01/04/2024 3.1 (L) 3.5 - 5.2 g/dL Final     Protein:   Total Protein   Date Value Ref Range Status   01/04/2024 7.7 6.0 - 8.4 g/dL Final     Lactic acid:   Lab Results   Component Value Date    LACTATE 1.8 12/24/2023    LACTATE 3.0 (H) 12/24/2023       Significant Imaging: I have reviewed all pertinent imaging results/findings within the past 24 hours.

## 2024-01-04 NOTE — ASSESSMENT & PLAN NOTE
"1/4/23  - Chart and interval history reviewed; discussed pt in person with primary staff.   - Visited with pt at bedside; he was significantly more alert than when I had previously seen him, appeared very comfortable, and was able to participate in basic discussion. When I asked him if he's watched any good TV lately, he said "no!" - I told him that I heard he likes watching sports, and that the [employer] football game was going to be playing on Monday. I asked him if he knew that he was leaving the hospital soon, and he nodded and said "nursing home" - I told him that is correct, and that we are hopeful he has good support and care at his home. I let him know that I would call his sister or son to check in, to which he was agreeable.   - Along with Dr Roosevelt Aguilar, initially attempted to call and speak with pt's son - no answer. Following this, we spoke to pt's very supportive sister, Idalia. Medical update provided; she agrees that pt has had significant improvement over this hospital stay, and is relieved that he will be discharged soon. We discussed the possibility of additional support at the NH; she is agreeable to NH-based palliative care follow up, and is aware that pt can be transitioned to hospice care if goals become comfort-focused in future. I encouraged hospice care if he becomes more symptomatic, is sleeping more during the day, or they prefer to avoid further hospitalization/frequent hospital stays.   - LaPOST already completed by Dr Méndez indicating DNR/DNI/selective care  - Let Idalia know that while we are hopeful that pt is able to stay out of the hospital for a good while, our team will check in on him during any future hospital stays    See ACP notes on file for details of previous discussions.   "

## 2024-01-04 NOTE — PLAN OF CARE
Case Management Final Discharge Note      Discharge Disposition: [facility] senior care NH     New DME ordered / company name: none    Relevant SDOH / Transition of Care Barriers:  none     Primary Caretaker and contact information: Idalia Cornell (Sister)  [phone]     Scheduled followup appointment: none    Referrals placed: none    Transportation: ADT 30 placed, stretcher requested.    Patient and family educated on discharge services and updated on DC plan. Bedside RN notified, patient clear to discharge from Case Management Perspective.      01/04/24 1529   Final Note   Assessment Type Final Discharge Note   Hospital Resources/Appts/Education Provided Provided patient/caregiver with written discharge plan information   Post-Acute Status   Post-Acute Authorization Placement   Post-Acute Placement Status Set-up Complete/Auth obtained   Discharge Delays None known at this time

## 2024-01-04 NOTE — ASSESSMENT & PLAN NOTE
- Resolved; required ICU care and intubation for acute respiratory, septic shock, shock liver, NSTEMI, and SHAYY, all of which have improved

## 2024-01-05 ENCOUNTER — PES CALL (OUTPATIENT)
Dept: HOME HEALTH SERVICES | Facility: CLINIC | Age: 74
End: 2024-01-05
Payer: MEDICARE

## 2024-01-05 NOTE — DISCHARGE SUMMARY
[facility] Medicine  Discharge Summary      Patient Name: Alexsander Tafoya  MRN: [id number]  [facility] [place]: [id number]  Patient Class: IP- Inpatient  Admission Date: 12/23/2023  Hospital Length of Stay: 12 days  Discharge Date and Time: 1/4/2024  5:15 PM  Attending Physician: No att. providers found   Discharging Provider: Isaac Aguilar MD  Primary Care Provider: Helio Farr MD    Primary Care Team: Networked reference to record PCT     HPI:   Mr Tafoya is a 73-year-old male being admitted for acute hypoxic respiratory failure.  His medical history significant for BPH, dyslipidemia, chronic CVA with resultant aphasia, and right-sided hemiparesis.  During my evaluation the patient is in severe respiratory distress, on BiPAP and unable to provide any meaningful detailed history.  Seems that he came from [facility] due to worsening dyspnea, and EMS noted low oxygen saturations. in the ER, he was noted to have Tmax of 103.1 °F, persistently tachycardic in 130s, hypotensive 84/52, and was initially on nonrebreather and subsequently placed on BiPAP.  His CBC shows normal white count, microcytic anemia 10.5 and normal platelets.  INR 1.4.  CBC with metabolic acidosis bicarb 16, creatinine 1.9 (appears to have previous creatinine of 1.09 in September 2022 with baseline of about 1.1-1.3,), hypomagnesemia 1.5, transaminitis AST: ALT-192: 152, lactic acidosis 3.1, elevated procalcitonin 0.26.  His influenza a swab is positive.  His chest x-ray suggests moderate right-sided pleural effusion with compressive atelectasis and/or lower lobe consolidation.    After evaluating the patient, discussed with ER physician that the patient may need to be intubated given severe respiratory distress tachycardia, respiratory rate in 50s and inability to protect airway with previous stroke.  Patient will be placed on mechanical ventilation.  He has so far received breathing treatments, Rocephin and Zithromax along with Tamiflu.   Admission has been requested for further evaluation and treatment.    * No surgery found *      Hospital Course:   Mr Alexsander Tafoya was admitted with acute hypoxic respiratory failure and septic shock due to R sided pneumonia and influenza. Suspect also CHF contributing- new EF 25-30%. Intubated in ED. Started antibiotics, diuretics, tamiflu. He also had shock liver on admission. Developed episodes of VT and started on lidocaine gtt. Started lactulose for elevated ammonia associated with shock liver. He did improve and was extubated on 12/28. Stepped down to floor. Able to start metoprolol prior to d/c but not other GDMT meds. Palliative following- DNR code status. He is at functional baseline- bedbound with aphasia but sister states he is not at his baseline and his assisted living would not be able to care for him. Working on NH placement. He was accepted to nursing home and consult to palliative care placed. Patient discharged in stable condition. Needs follow up with Cardiology and PCP.      Goals of Care Treatment Preferences:  Code Status: DNR    Living Will: Yes     What is most important right now is to focus on symptom/pain control, quality of life, even if it means sacrificing a little time, comfort and QOL .  Accordingly, we have decided that the best plan to meet the patient's goals includes continuing with treatment.      Consults:   Consults (From admission, onward)          Status Ordering Provider     Inpatient consult to Social Work  Once        Provider:  (Not yet assigned)    Completed ANG SIMPSON     Inpatient consult to Palliative Care  Once        Provider:  Emory Noriega MD    Completed MARIELLE RAMIREZ     Inpatient consult to Cardiology  Once        Provider:  Hilario Morgan MD    Completed MARIELLE RAMIREZ     Inpatient consult to Pulmonology  Once        Provider:  Brijesh Edward MD    Completed MARIELLE RAMIREZ.            No new Assessment & Plan notes have been filed  under this hospital service since the last note was generated.  Service: Hospital Medicine    Final Active Diagnoses:    Diagnosis Date Noted POA    PRINCIPAL PROBLEM:  Acute hypoxic respiratory failure [J96.01] 12/23/2023 Yes    NSVT (nonsustained ventricular tachycardia) [I47.29] 12/25/2023 No    Acute on chronic combined systolic and diastolic congestive heart failure [I50.43] 12/25/2023 Yes    Advance care planning [Z71.89] 12/24/2023 Not Applicable    Dilated cardiomyopathy [I42.0] 12/24/2023 Yes    Influenza A [J10.1] 12/23/2023 Yes    Pleural effusion [J90] 12/23/2023 Yes    ATN (acute tubular necrosis) [N17.0] 05/10/2022 Yes    Septic shock [A41.9, R65.21] 09/01/2019 Yes    NSTEMI (non-ST elevated myocardial infarction) [I21.4] 09/01/2019 Yes    BPH (benign prostatic hyperplasia) [N40.0] 08/05/2019 Yes    Transaminitis [R74.01] 08/05/2019 Yes    Microcytic anemia [D50.9] 08/05/2019 Yes    Hemiplegia affecting right side in right-dominant patient as late effect of cerebrovascular disease [I69.951] 07/17/2017 Not Applicable    Aphasia as late effect of cerebrovascular accident [I69.320] 07/17/2017 Not Applicable    Essential hypertension [I10] 07/17/2017 Yes    Hyperlipidemia [E78.5] 07/17/2017 Yes      Problems Resolved During this Admission:    Diagnosis Date Noted Date Resolved POA    Lactic acidosis [E87.20] 12/24/2023 12/28/2023 Yes    NSTEMI (non-ST elevated myocardial infarction) [I21.4] 05/11/2022 12/30/2023 Yes       Discharged Condition: stable    Disposition: Home or Self Care    Follow Up:   Follow-up Information       Helio Farr MD. Go on 1/16/2024.    Specialty: Internal Medicine  Contact information:  [address] [place] [address]  [address]  [place] [address]  [phone]               [facility] Follow up.    Specialties: Home Health Services, Home Therapy Services, Home Living Aide Services  Contact information:  [address]  [facility]  "[id number]  [phone]             [facility], [facility] Follow up.    Specialty: SANTO Provider  Why: Nebulizer for home  Contact information:  [address]  [facility] [address]  [facility] [address]  [phone]                           Patient Instructions:      NEBULIZER FOR HOME USE     Order Specific Question Answer Comments   Height: 5' 7" (1.702 m)    Weight: 98 kg (216 lb 0.8 oz)    Does patient have medical equipment at home? wheelchair    Length of need (1-99 months): 99      Ambulatory referral/consult to [facility]   Standing Status: Future   Referral Priority: Routine Referral Type: Consultation   Referral Reason: Specialty Services Required   Number of Visits Requested: 1     Diet Dysphagia Pureed     Notify your health care provider if you experience any of the following:  temperature >100.4     Notify your health care provider if you experience any of the following:  persistent nausea and vomiting or diarrhea     Notify your health care provider if you experience any of the following:  severe uncontrolled pain     Notify your health care provider if you experience any of the following:  difficulty breathing or increased cough     Notify your health care provider if you experience any of the following:  severe persistent headache     Notify your health care provider if you experience any of the following:  worsening rash     Notify your health care provider if you experience any of the following:  persistent dizziness, light-headedness, or visual disturbances     Notify your health care provider if you experience any of the following:  increased confusion or weakness     Activity as tolerated       Significant Diagnostic Studies: Labs: All labs within the past 24 hours have been reviewed    Pending Diagnostic Studies:       None           Medications:  Reconciled Home Medications:      Medication List        START taking these medications      albuterol-ipratropium 2.5 mg-0.5 mg/3 mL " nebulizer solution  Commonly known as: DUO-NEB  Take 3 mLs by nebulization every 6 (six) hours while awake. Rescue            CONTINUE taking these medications      aspirin 81 MG Chew  Take 1 tablet (81 mg total) by mouth once daily.     clopidogreL 75 mg tablet  Commonly known as: PLAVIX  Take 1 tablet (75 mg total) by mouth once daily.     finasteride 5 mg tablet  Commonly known as: PROSCAR  Take 5 mg by mouth once daily.     fish oil-omega-3 fatty acids 300-1,000 mg capsule  Take 1 capsule by mouth once daily.     furosemide 40 MG tablet  Commonly known as: LASIX  Take 1 tablet (40 mg total) by mouth once daily.     multivitamin per tablet  Commonly known as: THERAGRAN  Take 1 tablet by mouth once daily.            STOP taking these medications      enalapril 5 MG tablet  Commonly known as: VASOTEC     metoprolol tartrate 50 MG tablet  Commonly known as: LOPRESSOR     potassium chloride 10 MEQ Cpsr  Commonly known as: MICRO-K     rosuvastatin 20 MG tablet  Commonly known as: CRESTOR              Indwelling Lines/Drains at time of discharge:   Lines/Drains/Airways       None                   Time spent on the discharge of patient: >35 minutes         Isaac Aguilar MD  Department of Hospital Medicine  [facility]

## 2024-03-25 PROBLEM — A41.9 SEPTIC SHOCK: Status: RESOLVED | Noted: 2019-09-01 | Resolved: 2024-03-25

## 2024-03-25 PROBLEM — R65.21 SEPTIC SHOCK: Status: RESOLVED | Noted: 2019-09-01 | Resolved: 2024-03-25

## 2024-03-25 PROBLEM — J96.01 ACUTE HYPOXIC RESPIRATORY FAILURE: Status: RESOLVED | Noted: 2023-12-23 | Resolved: 2024-03-25

## 2024-04-01 PROBLEM — I21.4 NSTEMI (NON-ST ELEVATED MYOCARDIAL INFARCTION): Status: RESOLVED | Noted: 2019-09-01 | Resolved: 2024-04-01

## 2024-04-23 ENCOUNTER — HOSPITAL ENCOUNTER (INPATIENT)
Facility: HOSPITAL | Age: 74
LOS: 6 days | Discharge: HOSPICE | DRG: 291 | End: 2024-04-29
Attending: STUDENT IN AN ORGANIZED HEALTH CARE EDUCATION/TRAINING PROGRAM | Admitting: INTERNAL MEDICINE
Payer: MEDICARE

## 2024-04-23 DIAGNOSIS — Z71.89 ADVANCE CARE PLANNING: ICD-10-CM

## 2024-04-23 DIAGNOSIS — Z51.5 PALLIATIVE CARE ENCOUNTER: Primary | ICD-10-CM

## 2024-04-23 DIAGNOSIS — R53.81 DEBILITY: ICD-10-CM

## 2024-04-23 DIAGNOSIS — I50.20 SYSTOLIC HEART FAILURE: ICD-10-CM

## 2024-04-23 DIAGNOSIS — R07.9 CHEST PAIN: ICD-10-CM

## 2024-04-23 DIAGNOSIS — R07.89 CHEST DISCOMFORT: ICD-10-CM

## 2024-04-23 DIAGNOSIS — Z71.89 GOALS OF CARE, COUNSELING/DISCUSSION: ICD-10-CM

## 2024-04-23 PROBLEM — N30.00 ACUTE CYSTITIS WITHOUT HEMATURIA: Status: ACTIVE | Noted: 2024-04-23

## 2024-04-23 PROBLEM — J69.0 ASPIRATION PNEUMONIA: Status: ACTIVE | Noted: 2024-04-23

## 2024-04-23 LAB
ALBUMIN SERPL BCP-MCNC: 2.9 G/DL (ref 3.5–5.2)
ALP SERPL-CCNC: 157 U/L (ref 55–135)
ALT SERPL W/O P-5'-P-CCNC: 36 U/L (ref 10–44)
ANION GAP SERPL CALC-SCNC: 13 MMOL/L (ref 8–16)
AST SERPL-CCNC: 38 U/L (ref 10–40)
BACTERIA #/AREA URNS AUTO: ABNORMAL /HPF
BASOPHILS # BLD AUTO: 0.04 K/UL (ref 0–0.2)
BASOPHILS NFR BLD: 0.7 % (ref 0–1.9)
BILIRUB SERPL-MCNC: 0.8 MG/DL (ref 0.1–1)
BILIRUB UR QL STRIP: NEGATIVE
BNP SERPL-MCNC: 965 PG/ML (ref 0–99)
BUN SERPL-MCNC: 21 MG/DL (ref 8–23)
CALCIUM SERPL-MCNC: 9.8 MG/DL (ref 8.7–10.5)
CHLORIDE SERPL-SCNC: 106 MMOL/L (ref 95–110)
CHOLEST SERPL-MCNC: 145 MG/DL (ref 120–199)
CHOLEST/HDLC SERPL: 5.4 {RATIO} (ref 2–5)
CLARITY UR REFRACT.AUTO: CLEAR
CO2 SERPL-SCNC: 21 MMOL/L (ref 23–29)
COLOR UR AUTO: YELLOW
CREAT SERPL-MCNC: 1.2 MG/DL (ref 0.5–1.4)
DIFFERENTIAL METHOD BLD: ABNORMAL
EOSINOPHIL # BLD AUTO: 0 K/UL (ref 0–0.5)
EOSINOPHIL NFR BLD: 0.7 % (ref 0–8)
ERYTHROCYTE [DISTWIDTH] IN BLOOD BY AUTOMATED COUNT: 17.2 % (ref 11.5–14.5)
EST. GFR  (NO RACE VARIABLE): >60 ML/MIN/1.73 M^2
ESTIMATED AVG GLUCOSE: 151 MG/DL (ref 68–131)
GLUCOSE SERPL-MCNC: 131 MG/DL (ref 70–110)
GLUCOSE UR QL STRIP: NEGATIVE
HBA1C MFR BLD: 6.9 % (ref 4–5.6)
HCT VFR BLD AUTO: 37.9 % (ref 40–54)
HCV AB SERPL QL IA: NORMAL
HDLC SERPL-MCNC: 27 MG/DL (ref 40–75)
HDLC SERPL: 18.6 % (ref 20–50)
HGB BLD-MCNC: 12 G/DL (ref 14–18)
HGB UR QL STRIP: NEGATIVE
HIV 1+2 AB+HIV1 P24 AG SERPL QL IA: NORMAL
HYALINE CASTS UR QL AUTO: 3 /LPF
IMM GRANULOCYTES # BLD AUTO: 0.02 K/UL (ref 0–0.04)
IMM GRANULOCYTES NFR BLD AUTO: 0.3 % (ref 0–0.5)
INFLUENZA A, MOLECULAR: NOT DETECTED
INFLUENZA B, MOLECULAR: NOT DETECTED
KETONES UR QL STRIP: NEGATIVE
LACTATE SERPL-SCNC: 2.2 MMOL/L (ref 0.5–2.2)
LDLC SERPL CALC-MCNC: 95.6 MG/DL (ref 63–159)
LEUKOCYTE ESTERASE UR QL STRIP: ABNORMAL
LYMPHOCYTES # BLD AUTO: 1 K/UL (ref 1–4.8)
LYMPHOCYTES NFR BLD: 16.8 % (ref 18–48)
MAGNESIUM SERPL-MCNC: 2 MG/DL (ref 1.6–2.6)
MCH RBC QN AUTO: 25.8 PG (ref 27–31)
MCHC RBC AUTO-ENTMCNC: 31.7 G/DL (ref 32–36)
MCV RBC AUTO: 81 FL (ref 82–98)
MICROSCOPIC COMMENT: ABNORMAL
MONOCYTES # BLD AUTO: 0.5 K/UL (ref 0.3–1)
MONOCYTES NFR BLD: 7.9 % (ref 4–15)
NEUTROPHILS # BLD AUTO: 4.5 K/UL (ref 1.8–7.7)
NEUTROPHILS NFR BLD: 73.6 % (ref 38–73)
NITRITE UR QL STRIP: POSITIVE
NONHDLC SERPL-MCNC: 118 MG/DL
NRBC BLD-RTO: 0 /100 WBC
PH UR STRIP: 6 [PH] (ref 5–8)
PLATELET # BLD AUTO: 363 K/UL (ref 150–450)
PMV BLD AUTO: 12.8 FL (ref 9.2–12.9)
POTASSIUM SERPL-SCNC: 5.3 MMOL/L (ref 3.5–5.1)
PROCALCITONIN SERPL IA-MCNC: 0.05 NG/ML
PROT SERPL-MCNC: 8.3 G/DL (ref 6–8.4)
PROT UR QL STRIP: NEGATIVE
RBC # BLD AUTO: 4.66 M/UL (ref 4.6–6.2)
RBC #/AREA URNS AUTO: 1 /HPF (ref 0–4)
RSV AG BY MOLECULAR METHOD: NOT DETECTED
SARS-COV-2 RNA RESP QL NAA+PROBE: NOT DETECTED
SODIUM SERPL-SCNC: 140 MMOL/L (ref 136–145)
SP GR UR STRIP: 1.02 (ref 1–1.03)
SQUAMOUS #/AREA URNS AUTO: 1 /HPF
TRIGL SERPL-MCNC: 112 MG/DL (ref 30–150)
TROPONIN I SERPL DL<=0.01 NG/ML-MCNC: 0.3 NG/ML (ref 0–0.03)
TROPONIN I SERPL DL<=0.01 NG/ML-MCNC: 0.35 NG/ML (ref 0–0.03)
URN SPEC COLLECT METH UR: ABNORMAL
WBC # BLD AUTO: 6.06 K/UL (ref 3.9–12.7)
WBC #/AREA URNS AUTO: 7 /HPF (ref 0–5)

## 2024-04-23 PROCEDURE — 80053 COMPREHEN METABOLIC PANEL: CPT

## 2024-04-23 PROCEDURE — 96375 TX/PRO/DX INJ NEW DRUG ADDON: CPT

## 2024-04-23 PROCEDURE — 25000003 PHARM REV CODE 250

## 2024-04-23 PROCEDURE — 81001 URINALYSIS AUTO W/SCOPE: CPT | Performed by: STUDENT IN AN ORGANIZED HEALTH CARE EDUCATION/TRAINING PROGRAM

## 2024-04-23 PROCEDURE — 25000003 PHARM REV CODE 250: Performed by: STUDENT IN AN ORGANIZED HEALTH CARE EDUCATION/TRAINING PROGRAM

## 2024-04-23 PROCEDURE — 85025 COMPLETE CBC W/AUTO DIFF WBC: CPT

## 2024-04-23 PROCEDURE — 96365 THER/PROPH/DIAG IV INF INIT: CPT

## 2024-04-23 PROCEDURE — 93005 ELECTROCARDIOGRAM TRACING: CPT

## 2024-04-23 PROCEDURE — 63600175 PHARM REV CODE 636 W HCPCS: Performed by: STUDENT IN AN ORGANIZED HEALTH CARE EDUCATION/TRAINING PROGRAM

## 2024-04-23 PROCEDURE — 0241U SARS-COV2 (COVID) WITH FLU/RSV BY PCR: CPT

## 2024-04-23 PROCEDURE — 87081 CULTURE SCREEN ONLY: CPT

## 2024-04-23 PROCEDURE — 83735 ASSAY OF MAGNESIUM: CPT | Performed by: STUDENT IN AN ORGANIZED HEALTH CARE EDUCATION/TRAINING PROGRAM

## 2024-04-23 PROCEDURE — 63600175 PHARM REV CODE 636 W HCPCS: Performed by: INTERNAL MEDICINE

## 2024-04-23 PROCEDURE — 84484 ASSAY OF TROPONIN QUANT: CPT

## 2024-04-23 PROCEDURE — 96361 HYDRATE IV INFUSION ADD-ON: CPT

## 2024-04-23 PROCEDURE — 25000003 PHARM REV CODE 250: Performed by: INTERNAL MEDICINE

## 2024-04-23 PROCEDURE — 87389 HIV-1 AG W/HIV-1&-2 AB AG IA: CPT | Performed by: PHYSICIAN ASSISTANT

## 2024-04-23 PROCEDURE — 93010 ELECTROCARDIOGRAM REPORT: CPT | Mod: ,,, | Performed by: INTERNAL MEDICINE

## 2024-04-23 PROCEDURE — 83605 ASSAY OF LACTIC ACID: CPT

## 2024-04-23 PROCEDURE — 84145 PROCALCITONIN (PCT): CPT | Performed by: INTERNAL MEDICINE

## 2024-04-23 PROCEDURE — 99285 EMERGENCY DEPT VISIT HI MDM: CPT | Mod: 25

## 2024-04-23 PROCEDURE — 12000002 HC ACUTE/MED SURGE SEMI-PRIVATE ROOM

## 2024-04-23 PROCEDURE — 83605 ASSAY OF LACTIC ACID: CPT | Mod: 91

## 2024-04-23 PROCEDURE — 84484 ASSAY OF TROPONIN QUANT: CPT | Mod: 91 | Performed by: INTERNAL MEDICINE

## 2024-04-23 PROCEDURE — 83880 ASSAY OF NATRIURETIC PEPTIDE: CPT

## 2024-04-23 PROCEDURE — 86803 HEPATITIS C AB TEST: CPT | Performed by: PHYSICIAN ASSISTANT

## 2024-04-23 PROCEDURE — 83036 HEMOGLOBIN GLYCOSYLATED A1C: CPT | Performed by: INTERNAL MEDICINE

## 2024-04-23 PROCEDURE — 80061 LIPID PANEL: CPT | Performed by: INTERNAL MEDICINE

## 2024-04-23 PROCEDURE — 63600175 PHARM REV CODE 636 W HCPCS

## 2024-04-23 RX ORDER — ACETAMINOPHEN 325 MG/1
650 TABLET ORAL EVERY 4 HOURS PRN
Status: DISCONTINUED | OUTPATIENT
Start: 2024-04-23 | End: 2024-04-29 | Stop reason: HOSPADM

## 2024-04-23 RX ORDER — FINASTERIDE 5 MG/1
5 TABLET, FILM COATED ORAL DAILY
Status: DISCONTINUED | OUTPATIENT
Start: 2024-04-24 | End: 2024-04-29 | Stop reason: HOSPADM

## 2024-04-23 RX ORDER — SIMETHICONE 80 MG
1 TABLET,CHEWABLE ORAL 4 TIMES DAILY PRN
Status: DISCONTINUED | OUTPATIENT
Start: 2024-04-23 | End: 2024-04-29 | Stop reason: HOSPADM

## 2024-04-23 RX ORDER — FUROSEMIDE 10 MG/ML
40 INJECTION INTRAMUSCULAR; INTRAVENOUS EVERY 12 HOURS
Status: DISCONTINUED | OUTPATIENT
Start: 2024-04-23 | End: 2024-04-23

## 2024-04-23 RX ORDER — AZITHROMYCIN 250 MG/1
500 TABLET, FILM COATED ORAL DAILY
Status: DISCONTINUED | OUTPATIENT
Start: 2024-04-24 | End: 2024-04-25

## 2024-04-23 RX ORDER — FUROSEMIDE 10 MG/ML
80 INJECTION INTRAMUSCULAR; INTRAVENOUS
Status: COMPLETED | OUTPATIENT
Start: 2024-04-23 | End: 2024-04-23

## 2024-04-23 RX ORDER — SODIUM CHLORIDE 0.9 % (FLUSH) 0.9 %
10 SYRINGE (ML) INJECTION EVERY 12 HOURS PRN
Status: DISCONTINUED | OUTPATIENT
Start: 2024-04-23 | End: 2024-04-29 | Stop reason: HOSPADM

## 2024-04-23 RX ORDER — SODIUM CHLORIDE 0.9 % (FLUSH) 0.9 %
10 SYRINGE (ML) INJECTION
Status: DISCONTINUED | OUTPATIENT
Start: 2024-04-23 | End: 2024-04-29 | Stop reason: HOSPADM

## 2024-04-23 RX ORDER — PSEUDOEPHEDRINE/ACETAMINOPHEN 30MG-500MG
100 TABLET ORAL
Status: COMPLETED | OUTPATIENT
Start: 2024-04-23 | End: 2024-04-23

## 2024-04-23 RX ORDER — NAPROXEN SODIUM 220 MG/1
81 TABLET, FILM COATED ORAL DAILY
Status: DISCONTINUED | OUTPATIENT
Start: 2024-04-24 | End: 2024-04-29 | Stop reason: HOSPADM

## 2024-04-23 RX ORDER — GLUCAGON 1 MG
1 KIT INJECTION
Status: DISCONTINUED | OUTPATIENT
Start: 2024-04-23 | End: 2024-04-29 | Stop reason: HOSPADM

## 2024-04-23 RX ORDER — IBUPROFEN 200 MG
16 TABLET ORAL
Status: DISCONTINUED | OUTPATIENT
Start: 2024-04-23 | End: 2024-04-29 | Stop reason: HOSPADM

## 2024-04-23 RX ORDER — NALOXONE HCL 0.4 MG/ML
0.02 VIAL (ML) INJECTION
Status: DISCONTINUED | OUTPATIENT
Start: 2024-04-23 | End: 2024-04-29 | Stop reason: HOSPADM

## 2024-04-23 RX ORDER — IPRATROPIUM BROMIDE AND ALBUTEROL SULFATE 2.5; .5 MG/3ML; MG/3ML
3 SOLUTION RESPIRATORY (INHALATION) EVERY 6 HOURS PRN
Status: DISCONTINUED | OUTPATIENT
Start: 2024-04-23 | End: 2024-04-29 | Stop reason: HOSPADM

## 2024-04-23 RX ORDER — AMOXICILLIN 250 MG
1 CAPSULE ORAL 2 TIMES DAILY
Status: DISCONTINUED | OUTPATIENT
Start: 2024-04-23 | End: 2024-04-29 | Stop reason: HOSPADM

## 2024-04-23 RX ORDER — TALC
6 POWDER (GRAM) TOPICAL NIGHTLY PRN
Status: DISCONTINUED | OUTPATIENT
Start: 2024-04-23 | End: 2024-04-29 | Stop reason: HOSPADM

## 2024-04-23 RX ORDER — ONDANSETRON HYDROCHLORIDE 2 MG/ML
4 INJECTION, SOLUTION INTRAVENOUS EVERY 8 HOURS PRN
Status: DISCONTINUED | OUTPATIENT
Start: 2024-04-23 | End: 2024-04-29 | Stop reason: HOSPADM

## 2024-04-23 RX ORDER — CLOPIDOGREL BISULFATE 75 MG/1
75 TABLET ORAL DAILY
Status: DISCONTINUED | OUTPATIENT
Start: 2024-04-24 | End: 2024-04-29 | Stop reason: HOSPADM

## 2024-04-23 RX ORDER — ALUMINUM HYDROXIDE, MAGNESIUM HYDROXIDE, AND SIMETHICONE 1200; 120; 1200 MG/30ML; MG/30ML; MG/30ML
30 SUSPENSION ORAL 4 TIMES DAILY PRN
Status: DISCONTINUED | OUTPATIENT
Start: 2024-04-23 | End: 2024-04-29 | Stop reason: HOSPADM

## 2024-04-23 RX ORDER — SYRING-NEEDL,DISP,INSUL,0.3 ML 29 G X1/2"
296 SYRINGE, EMPTY DISPOSABLE MISCELLANEOUS
Status: COMPLETED | OUTPATIENT
Start: 2024-04-23 | End: 2024-04-23

## 2024-04-23 RX ORDER — IBUPROFEN 200 MG
24 TABLET ORAL
Status: DISCONTINUED | OUTPATIENT
Start: 2024-04-23 | End: 2024-04-29 | Stop reason: HOSPADM

## 2024-04-23 RX ORDER — POLYETHYLENE GLYCOL 3350 17 G/17G
17 POWDER, FOR SOLUTION ORAL DAILY
Status: DISCONTINUED | OUTPATIENT
Start: 2024-04-24 | End: 2024-04-29 | Stop reason: HOSPADM

## 2024-04-23 RX ORDER — ENOXAPARIN SODIUM 100 MG/ML
40 INJECTION SUBCUTANEOUS EVERY 24 HOURS
Status: DISCONTINUED | OUTPATIENT
Start: 2024-04-23 | End: 2024-04-29 | Stop reason: HOSPADM

## 2024-04-23 RX ADMIN — SODIUM CHLORIDE, POTASSIUM CHLORIDE, SODIUM LACTATE AND CALCIUM CHLORIDE 250 ML: 600; 310; 30; 20 INJECTION, SOLUTION INTRAVENOUS at 07:04

## 2024-04-23 RX ADMIN — CEFTRIAXONE 1 G: 1 INJECTION, POWDER, FOR SOLUTION INTRAMUSCULAR; INTRAVENOUS at 04:04

## 2024-04-23 RX ADMIN — ENOXAPARIN SODIUM 40 MG: 40 INJECTION SUBCUTANEOUS at 06:04

## 2024-04-23 RX ADMIN — SODIUM CHLORIDE 500 ML: 9 INJECTION, SOLUTION INTRAVENOUS at 05:04

## 2024-04-23 RX ADMIN — FUROSEMIDE 80 MG: 10 INJECTION, SOLUTION INTRAVENOUS at 04:04

## 2024-04-23 RX ADMIN — MAGNESIUM CITRATE 296 ML: 1.75 LIQUID ORAL at 05:04

## 2024-04-23 RX ADMIN — AZITHROMYCIN MONOHYDRATE 500 MG: 500 INJECTION, POWDER, LYOPHILIZED, FOR SOLUTION INTRAVENOUS at 05:04

## 2024-04-23 RX ADMIN — SODIUM CHLORIDE, POTASSIUM CHLORIDE, SODIUM LACTATE AND CALCIUM CHLORIDE 250 ML: 600; 310; 30; 20 INJECTION, SOLUTION INTRAVENOUS at 02:04

## 2024-04-23 RX ADMIN — PIPERACILLIN SODIUM AND TAZOBACTAM SODIUM 4.5 G: 4; .5 INJECTION, POWDER, FOR SOLUTION INTRAVENOUS at 07:04

## 2024-04-23 RX ADMIN — Medication 100 ML: at 05:04

## 2024-04-23 RX ADMIN — SODIUM CHLORIDE 250 ML: 9 INJECTION, SOLUTION INTRAVENOUS at 09:04

## 2024-04-23 NOTE — ED NOTES
Nurses Note -- 4 Eyes      4/23/2024   2:45 PM      Skin assessed during: Daily Assessment      [] No Altered Skin Integrity Present    []Prevention Measures Documented      [x] Yes- Altered Skin Integrity Present or Discovered   [x] LDA Added if Not in Epic (Describe Wound)   [x] New Altered Skin Integrity was Present on Admit and Documented in LDA   [x] Wound Image Taken    Wound Care Consulted? Yes    Attending Nurse:  DAVE Echeverria    Second RN/Staff Member:   DAVE Hernandez

## 2024-04-23 NOTE — ED PROVIDER NOTES
Encounter Date: 4/23/2024       History     Chief Complaint   Patient presents with    Cough     Arrived from [facility] for chest congestion and productive cough x1 day. Denies chest pain or SOB. Hx of CVA      73-year-old male patient with past medical history of CVA with residual aphasia, HFrEF ejection fraction of 25-30%, CKD stage 3 presents with 1 week of cough and congestion.  Patient is nonverbal, history taken by sister at bedside.  Patient is dependent on full-time care for ADLs and lives in a nursing home.  She reports that he has been having coughing and audible congestion, she also notes that he has been complaining of left ear pain for the past month.  They are unsure if cough is productive.  They deny the patient having any fever or chills, nausea or vomiting.  Did report 1 episode of diarrhea yesterday night.  He is bed-bound and has a stage III sacral ulcer.  Vitals stable in the ED, /70, temp 97.8°, tachycardic in the room at 109 beats per minute., saturating 100% on room air.         Review of patient's allergies indicates:  No Known Allergies  Past Medical History:   Diagnosis Date    BPH (benign prostatic hyperplasia)     Dysarthria     HLD (hyperlipidemia)     Hypertension     Other and unspecified hyperlipidemia     Stroke      Past Surgical History:   Procedure Laterality Date    CATARACT EXTRACTION W/ INTRAOCULAR LENS  IMPLANT, BILATERAL       Family History   Problem Relation Name Age of Onset    Hypertension Mother      Seizures Mother      Stroke Mother      Hypertension Father      Dementia Father       Social History     Tobacco Use    Smoking status: Former    Smokeless tobacco: Never   Substance Use Topics    Alcohol use: Yes     Comment: rare, once a week or less    Drug use: No     Review of Systems   Unable to perform ROS: Patient nonverbal       Physical Exam     Initial Vitals [04/23/24 1333]   BP Pulse Resp Temp SpO2   100/70 80 18 97.8 °F (36.6 °C) 97 %      MAP        --         Physical Exam    Constitutional: He appears well-developed and well-nourished. He is not diaphoretic. No distress.   HENT:   Nose: Nose normal.   Mouth/Throat: Oropharynx is clear and moist.   Eyes: Conjunctivae and EOM are normal. Pupils are equal, round, and reactive to light. No scleral icterus.   Neck: No JVD present.   Cardiovascular:  Normal rate and regular rhythm.           Pulmonary/Chest: No respiratory distress. He has no wheezes.   Crackles heard on right lower lung base   Abdominal: Abdomen is soft. He exhibits no distension. There is no rebound and no guarding.   Musculoskeletal:         General: No tenderness or edema.     Neurological: He is alert.   Aphasic   Skin: Skin is warm and dry.         ED Course   Procedures  Labs Reviewed   CULTURE, METHICILLIN-RESISTANT STAPHYLOCOCCUS AUREUS   HIV 1 / 2 ANTIBODY   HEPATITIS C ANTIBODY   TROPONIN I   CBC W/ AUTO DIFFERENTIAL   COMPREHENSIVE METABOLIC PANEL   LACTIC ACID, PLASMA   B-TYPE NATRIURETIC PEPTIDE   SARS-COV2 (COVID) WITH FLU/RSV BY PCR   MAGNESIUM          Imaging Results    None          Medications   lactated ringers bolus 250 mL (has no administration in time range)     Medical Decision Making  73-year-old male patient presenting from nursing home with 1 week of cough.  Differentials include URI, pneumonia, CHF exacerbation, pulmonary embolism.     COVID negative, CBC stable, CMP with a potassium of 5.3, creatinine stable at 1.2.  Saturating 100% on room air.  Bedside ultrasound done, no pericardial effusion noted.  Ejection fraction appears to be the same around 30%.  BNP elevated in the 900s, troponin elevated at 0.303.  EKG with first-degree AV block.  CT with fecal impaction, and what appears to be a compression fracture versus lesion on vertebra.  UA also suggestive of UTI. 80 mg IV Lasix ordered. We will admit to hospital medicine for fluid overload, community-acquired pneumonia and UTI.    Amount and/or Complexity of Data  Reviewed  Labs: ordered. Decision-making details documented in ED Course.  Radiology: ordered.    Risk  OTC drugs.  Prescription drug management.               ED Course as of 04/23/24 1634   Tue Apr 23, 2024   1537 Troponin I(!): 0.303 [LF]   1603 EKG independently interpreted by me with sinus tachycardia rate of 105, no STEMI, no T-wave inversions, low voltage QRS, , no signs of right heart strain, normal axis [LF]      ED Course User Index  [LF] Vikki Cortez MD                           Clinical Impression:  Final diagnoses:  [R07.9] Chest pain                 Brittany Ding MD  Resident  04/23/24 1642

## 2024-04-23 NOTE — ED TRIAGE NOTES
Alexsander Tafoya, a 73 y.o. male presents to the ED w/ complaint of cough and congestion. Patient coming from [facility]. Patient is nonverbal. Patient sister at bedside states that patient has been having productive cough for about a week now. Sister also states that patient has been complaining of let ear pain. Denies fever or chills. Denies SOB or chest pain.     Triage note:  Chief Complaint   Patient presents with    Cough     Arrived from [facility] for chest congestion and productive cough x1 day. Denies chest pain or SOB. Hx of CVA      Review of patient's allergies indicates:  No Known Allergies  Past Medical History:   Diagnosis Date    BPH (benign prostatic hyperplasia)     Dysarthria     HLD (hyperlipidemia)     Hypertension     Other and unspecified hyperlipidemia     Stroke

## 2024-04-24 ENCOUNTER — DOCUMENTATION ONLY (OUTPATIENT)
Dept: CARDIOLOGY | Facility: CLINIC | Age: 74
End: 2024-04-24
Payer: MEDICARE

## 2024-04-24 PROBLEM — R53.81 DEBILITY: Status: ACTIVE | Noted: 2024-04-24

## 2024-04-24 PROBLEM — Z71.89 GOALS OF CARE, COUNSELING/DISCUSSION: Status: ACTIVE | Noted: 2024-04-24

## 2024-04-24 PROBLEM — Z51.5 PALLIATIVE CARE ENCOUNTER: Status: ACTIVE | Noted: 2024-04-24

## 2024-04-24 PROBLEM — R79.89 ELEVATED TROPONIN: Status: ACTIVE | Noted: 2024-04-24

## 2024-04-24 PROBLEM — K59.00 CONSTIPATION: Status: ACTIVE | Noted: 2024-04-24

## 2024-04-24 LAB
ANION GAP SERPL CALC-SCNC: 12 MMOL/L (ref 8–16)
AV INDEX (PROSTH): 1.02
AV MEAN GRADIENT: 1 MMHG
AV PEAK GRADIENT: 1 MMHG
AV VALVE AREA BY VELOCITY RATIO: 2.74 CM²
AV VALVE AREA: 3.09 CM²
AV VELOCITY RATIO: 0.9
BASOPHILS # BLD AUTO: 0.04 K/UL (ref 0–0.2)
BASOPHILS NFR BLD: 0.6 % (ref 0–1.9)
BSA FOR ECHO PROCEDURE: 2.15 M2
BUN SERPL-MCNC: 22 MG/DL (ref 8–23)
CALCIUM SERPL-MCNC: 9.4 MG/DL (ref 8.7–10.5)
CHLORIDE SERPL-SCNC: 105 MMOL/L (ref 95–110)
CO2 SERPL-SCNC: 23 MMOL/L (ref 23–29)
CREAT SERPL-MCNC: 1.1 MG/DL (ref 0.5–1.4)
CV ECHO LV RWT: 0.46 CM
DIFFERENTIAL METHOD BLD: ABNORMAL
DOP CALC AO PEAK VEL: 0.5 M/S
DOP CALC AO VTI: 7.16 CM
DOP CALC LVOT AREA: 3 CM2
DOP CALC LVOT DIAMETER: 1.97 CM
DOP CALC LVOT PEAK VEL: 0.45 M/S
DOP CALC LVOT STROKE VOLUME: 22.15 CM3
DOP CALCLVOT PEAK VEL VTI: 7.27 CM
E/E' RATIO: 18.86 M/S
ECHO LV POSTERIOR WALL: 1.07 CM (ref 0.6–1.1)
EJECTION FRACTION: 10 %
EOSINOPHIL # BLD AUTO: 0.1 K/UL (ref 0–0.5)
EOSINOPHIL NFR BLD: 0.8 % (ref 0–8)
ERYTHROCYTE [DISTWIDTH] IN BLOOD BY AUTOMATED COUNT: 16.7 % (ref 11.5–14.5)
EST. GFR  (NO RACE VARIABLE): >60 ML/MIN/1.73 M^2
FRACTIONAL SHORTENING: 6 % (ref 28–44)
GLUCOSE SERPL-MCNC: 95 MG/DL (ref 70–110)
HCT VFR BLD AUTO: 35.6 % (ref 40–54)
HGB BLD-MCNC: 11.2 G/DL (ref 14–18)
IMM GRANULOCYTES # BLD AUTO: 0.02 K/UL (ref 0–0.04)
IMM GRANULOCYTES NFR BLD AUTO: 0.3 % (ref 0–0.5)
INTERVENTRICULAR SEPTUM: 1.1 CM (ref 0.6–1.1)
LA MAJOR: 6.2 CM
LA MINOR: 6.2 CM
LA WIDTH: 5.3 CM
LACTATE SERPL-SCNC: 2 MMOL/L (ref 0.5–2.2)
LEFT ATRIUM SIZE: 3.79 CM
LEFT ATRIUM VOLUME INDEX MOD: 44 ML/M2
LEFT ATRIUM VOLUME INDEX: 50.6 ML/M2
LEFT ATRIUM VOLUME MOD: 91.96 CM3
LEFT ATRIUM VOLUME: 105.86 CM3
LEFT INTERNAL DIMENSION IN SYSTOLE: 4.4 CM (ref 2.1–4)
LEFT VENTRICLE DIASTOLIC VOLUME INDEX: 42.51 ML/M2
LEFT VENTRICLE DIASTOLIC VOLUME: 88.84 ML
LEFT VENTRICLE MASS INDEX: 88 G/M2
LEFT VENTRICLE SYSTOLIC VOLUME INDEX: 33.9 ML/M2
LEFT VENTRICLE SYSTOLIC VOLUME: 70.95 ML
LEFT VENTRICULAR INTERNAL DIMENSION IN DIASTOLE: 4.7 CM (ref 3.5–6)
LEFT VENTRICULAR MASS: 183.99 G
LV LATERAL E/E' RATIO: 16.5 M/S
LV SEPTAL E/E' RATIO: 22 M/S
LYMPHOCYTES # BLD AUTO: 1.6 K/UL (ref 1–4.8)
LYMPHOCYTES NFR BLD: 24.3 % (ref 18–48)
MAGNESIUM SERPL-MCNC: 2 MG/DL (ref 1.6–2.6)
MCH RBC QN AUTO: 25.7 PG (ref 27–31)
MCHC RBC AUTO-ENTMCNC: 31.5 G/DL (ref 32–36)
MCV RBC AUTO: 82 FL (ref 82–98)
MONOCYTES # BLD AUTO: 1.1 K/UL (ref 0.3–1)
MONOCYTES NFR BLD: 16.4 % (ref 4–15)
MV PEAK E VEL: 0.66 M/S
NEUTROPHILS # BLD AUTO: 3.7 K/UL (ref 1.8–7.7)
NEUTROPHILS NFR BLD: 57.6 % (ref 38–73)
NRBC BLD-RTO: 0 /100 WBC
OHS CV RV/LV RATIO: 0.66 CM
OHS QRS DURATION: 84 MS
OHS QTC CALCULATION: 399 MS
PHOSPHATE SERPL-MCNC: 3.3 MG/DL (ref 2.7–4.5)
PISA TR MAX VEL: 2.19 M/S
PLATELET # BLD AUTO: 327 K/UL (ref 150–450)
PMV BLD AUTO: 12.1 FL (ref 9.2–12.9)
POTASSIUM SERPL-SCNC: 3.9 MMOL/L (ref 3.5–5.1)
RA MAJOR: 5 CM
RA PRESSURE ESTIMATED: 8 MMHG
RA WIDTH: 4.2 CM
RBC # BLD AUTO: 4.36 M/UL (ref 4.6–6.2)
RIGHT VENTRICULAR END-DIASTOLIC DIMENSION: 3.11 CM
RV TB RVSP: 10 MMHG
SINUS: 3.07 CM
SODIUM SERPL-SCNC: 140 MMOL/L (ref 136–145)
TDI LATERAL: 0.04 M/S
TDI SEPTAL: 0.03 M/S
TDI: 0.04 M/S
TR MAX PG: 19 MMHG
TRICUSPID ANNULAR PLANE SYSTOLIC EXCURSION: 0.79 CM
TROPONIN I SERPL DL<=0.01 NG/ML-MCNC: 0.35 NG/ML (ref 0–0.03)
TROPONIN I SERPL DL<=0.01 NG/ML-MCNC: 0.47 NG/ML (ref 0–0.03)
TV REST PULMONARY ARTERY PRESSURE: 27 MMHG
WBC # BLD AUTO: 6.42 K/UL (ref 3.9–12.7)
Z-SCORE OF LEFT VENTRICULAR DIMENSION IN END DIASTOLE: -3.16
Z-SCORE OF LEFT VENTRICULAR DIMENSION IN END SYSTOLE: 0.87

## 2024-04-24 PROCEDURE — 99223 1ST HOSP IP/OBS HIGH 75: CPT | Mod: ,,, | Performed by: INTERNAL MEDICINE

## 2024-04-24 PROCEDURE — 99497 ADVNCD CARE PLAN 30 MIN: CPT | Mod: 25,,, | Performed by: INTERNAL MEDICINE

## 2024-04-24 PROCEDURE — 97535 SELF CARE MNGMENT TRAINING: CPT

## 2024-04-24 PROCEDURE — 83735 ASSAY OF MAGNESIUM: CPT | Performed by: INTERNAL MEDICINE

## 2024-04-24 PROCEDURE — 84100 ASSAY OF PHOSPHORUS: CPT | Performed by: INTERNAL MEDICINE

## 2024-04-24 PROCEDURE — 25000003 PHARM REV CODE 250

## 2024-04-24 PROCEDURE — 84484 ASSAY OF TROPONIN QUANT: CPT | Performed by: INTERNAL MEDICINE

## 2024-04-24 PROCEDURE — 25500020 PHARM REV CODE 255: Performed by: INTERNAL MEDICINE

## 2024-04-24 PROCEDURE — 20600001 HC STEP DOWN PRIVATE ROOM

## 2024-04-24 PROCEDURE — 92610 EVALUATE SWALLOWING FUNCTION: CPT

## 2024-04-24 PROCEDURE — 80048 BASIC METABOLIC PNL TOTAL CA: CPT | Performed by: INTERNAL MEDICINE

## 2024-04-24 PROCEDURE — 63600175 PHARM REV CODE 636 W HCPCS: Performed by: INTERNAL MEDICINE

## 2024-04-24 PROCEDURE — 25000003 PHARM REV CODE 250: Performed by: INTERNAL MEDICINE

## 2024-04-24 PROCEDURE — 85025 COMPLETE CBC W/AUTO DIFF WBC: CPT | Performed by: INTERNAL MEDICINE

## 2024-04-24 PROCEDURE — 36415 COLL VENOUS BLD VENIPUNCTURE: CPT | Performed by: INTERNAL MEDICINE

## 2024-04-24 RX ORDER — DICLOFENAC SODIUM 10 MG/G
2 GEL TOPICAL 2 TIMES DAILY
COMMUNITY

## 2024-04-24 RX ORDER — MIDODRINE HYDROCHLORIDE 5 MG/1
5 TABLET ORAL ONCE
Status: COMPLETED | OUTPATIENT
Start: 2024-04-24 | End: 2024-04-24

## 2024-04-24 RX ORDER — BISACODYL 10 MG/1
10 SUPPOSITORY RECTAL DAILY PRN
Status: DISCONTINUED | OUTPATIENT
Start: 2024-04-24 | End: 2024-04-29 | Stop reason: HOSPADM

## 2024-04-24 RX ORDER — POTASSIUM CHLORIDE 750 MG/1
10 TABLET, EXTENDED RELEASE ORAL DAILY
COMMUNITY

## 2024-04-24 RX ORDER — DEXTROMETHORPHAN HYDROBROMIDE, GUAIFENESIN 5; 100 MG/5ML; MG/5ML
650 LIQUID ORAL 3 TIMES DAILY PRN
COMMUNITY

## 2024-04-24 RX ORDER — SENNOSIDES 8.6 MG/1
1 TABLET ORAL
COMMUNITY

## 2024-04-24 RX ADMIN — PIPERACILLIN SODIUM AND TAZOBACTAM SODIUM 4.5 G: 4; .5 INJECTION, POWDER, FOR SOLUTION INTRAVENOUS at 08:04

## 2024-04-24 RX ADMIN — PIPERACILLIN SODIUM AND TAZOBACTAM SODIUM 4.5 G: 4; .5 INJECTION, POWDER, FOR SOLUTION INTRAVENOUS at 12:04

## 2024-04-24 RX ADMIN — MIDODRINE HYDROCHLORIDE 5 MG: 5 TABLET ORAL at 01:04

## 2024-04-24 RX ADMIN — HUMAN ALBUMIN MICROSPHERES AND PERFLUTREN 0.66 MG: 10; .22 INJECTION, SOLUTION INTRAVENOUS at 11:04

## 2024-04-24 RX ADMIN — PIPERACILLIN SODIUM AND TAZOBACTAM SODIUM 4.5 G: 4; .5 INJECTION, POWDER, FOR SOLUTION INTRAVENOUS at 05:04

## 2024-04-24 RX ADMIN — DOCUSATE SODIUM AND SENNOSIDES 1 TABLET: 8.6; 5 TABLET, FILM COATED ORAL at 08:04

## 2024-04-24 RX ADMIN — ENOXAPARIN SODIUM 40 MG: 40 INJECTION SUBCUTANEOUS at 05:04

## 2024-04-24 NOTE — ASSESSMENT & PLAN NOTE
Advance Care Planning     Date: 04/23/2024    Code Status: DNR - Confirmed with family at bedside.    [facility]  I engaged the family in a voluntary conversation about advance care planning and we specifically addressed what the goals of care would be moving forward, in light of the patient's change in clinical status, specifically poor mental/functional status .  We did specifically address the patient's likely prognosis, which is poor.  We explored the patient's values and preferences for future care.  The family endorses that what is most important right now is to focus on improvement in condition but with limits to invasive therapies    Accordingly, we have decided that the best plan to meet the patient's goals includes continuing with treatment    I did explain the role for hospice care at this stage of the patient's illness, including its ability to help the patient live with the best quality of life possible.  We will not be making a hospice referral.    I spent a total of 15 minutes engaging the patient in this advance care planning discussion.          I explained to sister at bedside that patient has extremely poor quality of life with poor prognosis and would benefit from a palliative approach. She understands and is likely amenable to hospice, but would like to discuss with patient's son before making any decisions.

## 2024-04-24 NOTE — HPI
73M NH resident with PMH of 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 Patient with significant aphasia, confusion, and bedbound status at baseline, therefore HPI taken from sister at bedside. Sister states the NH reported a productive cough of clear sputum and sinus congestion for one week. Denies fever, chills, nausea, vomiting, abdominal pain, diarrhea, dysuria, hematuria. He is at baseline mental/functional status and is dependent on full-time care for ADLs. Workup in ED remarkable for VS: T 97.8, HR 80, /70, O2 sat 97% on RA. Hb 12.0, MCV 81, K 5.3, CO2 21, BUN 21, Cr 1.2, , Trop 0.303, LA 2.2, HCV Ab neg, HIV neg, Flu/Covid neg, UA: + nit, 2+ leuk, 7 WBC. CXR: Coarse interstitial attenuation, possibly reflecting edema or other pneumonitis, no large focal consolidation. CT abd/pelvis: Right pleural effusion.  There is a rounded focus of atelectasis or consolidation within the right lower lobe. Large amount of stool in the colon suggests constipation. He received dose of azithromycin/ceftriaxone, lasix 80mg IVP, and enema in ED and will be admitted to hospital medicine service for further management.

## 2024-04-24 NOTE — ASSESSMENT & PLAN NOTE
Advance Care Planning     Date: 04/23/2024    Code Status: DNR - Confirmed with family at bedside.    [facility]  I engaged the family in a voluntary conversation about advance care planning and we specifically addressed what the goals of care would be moving forward, in light of the patient's change in clinical status, specifically poor mental/functional status .  We did specifically address the patient's likely prognosis, which is poor.  We explored the patient's values and preferences for future care.  The family endorses that what is most important right now is to focus on improvement in condition but with limits to invasive therapies    Accordingly, we have decided that the best plan to meet the patient's goals includes hospice. Palliative care and SW consulted     I did explain the role for hospice care at this stage of the patient's illness, including its ability to help the patient live with the best quality of life possible.      I spent a total of 30 minutes engaging the patient in this advance care planning discussion.          -I explained to sister at bedside that patient has extremely poor quality of life with poor prognosis and would benefit from a palliative approach. She understands and is amenable to hospice.  -Family requesting no invasive procedures including PEG tube.

## 2024-04-24 NOTE — ASSESSMENT & PLAN NOTE
Patient is identified as having Systolic (HFrEF) heart failure that is Acute on chronic. CHF is currently uncontrolled due to Rales/crackles on pulmonary exam and Pulmonary edema/pleural effusion on CXR. Latest ECHO performed and demonstrates- Results for orders placed during the hospital encounter of 12/23/23    Echo Saline Bubble? No    Interpretation Summary    Left Ventricle: The left ventricle is normal in size. There is concentric hypertrophy. There is severely reduced systolic function with a visually estimated ejection fraction of 25 - 30%.    Right Ventricle: Mild right ventricular enlargement. Systolic function is normal.    Left Atrium: Left atrium is moderately dilated.    Right Atrium: Right atrium is mildly dilated.    Tricuspid Valve: There is trace regurgitation. The estimated PA systolic pressure is at least 21 mmHg.    IVC/SVC: Patient is ventilated, cannot use IVC diameter to estimate right atrial pressure.  . Continue Furosemide and monitor clinical status closely. Monitor on telemetry. Patient is on CHF pathway.  Monitor strict Is&Os and daily weights.  Place on fluid restriction of 1.5 L. Cardiology has not been consulted. Continue to stress to patient importance of self efficacy and  on diet for CHF. Last BNP reviewed- and noted below   Recent Labs   Lab 04/23/24  1416   *

## 2024-04-24 NOTE — ASSESSMENT & PLAN NOTE
Likely demand ischemia  Trend troponins  TTE: Combined systolic/diastolic dysfunction with EF 10%  Family does not want invasive measures. Will hold off on cardiology consult at this time.

## 2024-04-24 NOTE — H&P
[facility] - Emergency Dept  Hospital Medicine  History & Physical    Patient Name: Alexsander Tafoya  MRN: [id number]  Patient Class: IP- Inpatient  Admission Date: 4/23/2024  Attending Physician: Jai John MD   Primary Care Provider: Helio Farr MD         Patient information was obtained from relative(s) and ER records.     Subjective:     Principal Problem:Acute on chronic combined systolic and diastolic congestive heart failure    Chief Complaint:   Chief Complaint   Patient presents with    Cough     Arrived from [facility] for chest congestion and productive cough x1 day. Denies chest pain or SOB. Hx of CVA         HPI: 73M NH resident with PMH of 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 Patient with significant aphasia, confusion, and bedbound status at baseline, therefore HPI taken from sister at bedside. Sister states the NH reported a productive cough of clear sputum and sinus congestion for one week. Denies fever, chills, nausea, vomiting, abdominal pain, diarrhea, dysuria, hematuria. He is at baseline mental/functional status and is dependent on full-time care for ADLs. Workup in ED remarkable for VS: T 97.8, HR 80, /70, O2 sat 97% on RA. Hb 12.0, MCV 81, K 5.3, CO2 21, BUN 21, Cr 1.2, , Trop 0.303, LA 2.2, HCV Ab neg, HIV neg, Flu/Covid neg, UA: + nit, 2+ leuk, 7 WBC. CXR: Coarse interstitial attenuation, possibly reflecting edema or other pneumonitis, no large focal consolidation. CT abd/pelvis: Right pleural effusion.  There is a rounded focus of atelectasis or consolidation within the right lower lobe. Large amount of stool in the colon suggests constipation. He received dose of azithromycin/ceftriaxone, lasix 80mg IVP, and enema in ED and will be admitted to hospital medicine service for further management.    Past Medical History:   Diagnosis Date    BPH  (benign prostatic hyperplasia)     Dysarthria     HLD (hyperlipidemia)     Hypertension     Other and unspecified hyperlipidemia     Stroke        Past Surgical History:   Procedure Laterality Date    CATARACT EXTRACTION W/ INTRAOCULAR LENS  IMPLANT, BILATERAL         Review of patient's allergies indicates:  No Known Allergies    Current Facility-Administered Medications   Medication Dose Route Frequency Provider Last Rate Last Admin    acetaminophen tablet 650 mg  650 mg Oral Q4H PRN Hand, Jai ARCE MD        albuterol-ipratropium 2.5 mg-0.5 mg/3 mL nebulizer solution 3 mL  3 mL Nebulization Q6H PRN Hand, Jai ARCE MD        aluminum-magnesium hydroxide-simethicone 200-200-20 mg/5 mL suspension 30 mL  30 mL Oral QID PRN Hand, Jai ARCE MD        [START ON 4/24/2024] aspirin chewable tablet 81 mg  81 mg Oral Daily Hand, Jai ARCE MD        [START ON 4/24/2024] azithromycin tablet 500 mg  500 mg Oral Daily Hand, Jai ARCE MD        [START ON 4/24/2024] clopidogreL tablet 75 mg  75 mg Oral Daily Hand, Jai ARCE MD        dextrose 10% bolus 125 mL 125 mL  12.5 g Intravenous PRN Hand, Jai ARCE MD        dextrose 10% bolus 250 mL 250 mL  25 g Intravenous PRN Hand, Jai ARCE MD        enoxaparin injection 40 mg  40 mg Subcutaneous Daily Hand, Jai ARCE MD        [START ON 4/24/2024] finasteride tablet 5 mg  5 mg Oral Daily Hand, Jai ARCE MD        furosemide injection 40 mg  40 mg Intravenous Q12H Hand, Jai ARCE MD        glucagon (human recombinant) injection 1 mg  1 mg Intramuscular PRN Hand, Jai ARCE MD        glucose chewable tablet 16 g  16 g Oral PRN Hand, Jai ARCE MD        glucose chewable tablet 24 g  24 g Oral PRN Hand, Jai ARCE MD        melatonin tablet 6 mg  6 mg Oral Nightly PRN Hand, Jai ARCE MD        [START ON 4/24/2024] multivitamin tablet  1 tablet Oral Daily Hand, Jai ARCE MD        naloxone 0.4 mg/mL injection 0.02 mg  0.02 mg Intravenous PRN Hand, Jai ARCE  MD        ondansetron injection 4 mg  4 mg Intravenous Q8H PRN Jai John MD        piperacillin-tazobactam (ZOSYN) 4.5 g in dextrose 5 % in water (D5W) 100 mL IVPB (MB+)  4.5 g Intravenous Q8H Jai John MD        [START ON 4/24/2024] polyethylene glycol packet 17 g  17 g Oral Daily HandJai MD        senna-docusate 8.6-50 mg per tablet 1 tablet  1 tablet Oral BID HandJai MD        simethicone chewable tablet 80 mg  1 tablet Oral QID PRN Jai John MD        sodium chloride 0.9% flush 10 mL  10 mL Intravenous Q12H PRN Jai John MD        sodium chloride 0.9% flush 10 mL  10 mL Intravenous PRN Jai John MD         Current Outpatient Medications   Medication Sig Dispense Refill    albuterol-ipratropium (DUO-NEB) 2.5 mg-0.5 mg/3 mL nebulizer solution Take 3 mLs by nebulization every 6 (six) hours while awake. Rescue 75 mL 0    aspirin 81 MG Chew Take 1 tablet (81 mg total) by mouth once daily.  0    clopidogrel (PLAVIX) 75 mg tablet Take 1 tablet (75 mg total) by mouth once daily. 30 tablet 11    finasteride (PROSCAR) 5 mg tablet Take 5 mg by mouth once daily.       fish oil-omega-3 fatty acids 300-1,000 mg capsule Take 1 capsule by mouth once daily.      furosemide (LASIX) 40 MG tablet Take 1 tablet (40 mg total) by mouth once daily.      multivitamin (THERAGRAN) per tablet Take 1 tablet by mouth once daily.       Family History       Problem Relation (Age of Onset)    Dementia Father    Hypertension Mother, Father    Seizures Mother    Stroke Mother          Tobacco Use    Smoking status: Former    Smokeless tobacco: Never   Substance and Sexual Activity    Alcohol use: Yes     Comment: rare, once a week or less    Drug use: No    Sexual activity: Never     Review of Systems   Unable to perform ROS: Dementia   Objective:     Vital Signs (Most Recent):  Temp: 97.6 °F (36.4 °C) (04/23/24 1430)  Pulse: 103 (04/23/24 1630)  Resp: 20 (04/23/24 1630)  BP: 95/71  (04/23/24 1630)  SpO2: 95 % (04/23/24 1630) Vital Signs (24h Range):  Temp:  [97.6 °F (36.4 °C)-97.8 °F (36.6 °C)] 97.6 °F (36.4 °C)  Pulse:  [] 103  Resp:  [18-20] 20  SpO2:  [95 %-100 %] 95 %  BP: ()/(66-72) 95/71        Body mass index is 33.84 kg/m².     Physical Exam  Constitutional:       General: He is not in acute distress.     Appearance: He is ill-appearing (chronic). He is not toxic-appearing.   HENT:      Head: Normocephalic and atraumatic.   Eyes:      Conjunctiva/sclera: Conjunctivae normal.      Pupils: Pupils are equal, round, and reactive to light.   Cardiovascular:      Rate and Rhythm: Normal rate and regular rhythm.      Heart sounds: Normal heart sounds.   Pulmonary:      Effort: Pulmonary effort is normal. No respiratory distress.      Breath sounds: Rales present. No wheezing.   Abdominal:      General: Bowel sounds are normal. There is no distension.      Palpations: Abdomen is soft.      Tenderness: There is no abdominal tenderness. There is no guarding.   Musculoskeletal:         General: Normal range of motion.      Cervical back: Normal range of motion and neck supple.      Right lower leg: No edema.      Left lower leg: No edema.   Skin:     General: Skin is warm and dry.      Coloration: Skin is not jaundiced.   Neurological:      Mental Status: He is alert. Mental status is at baseline. He is disoriented.      Comments: Dysarthria and R sided hemiparesis (chronic)          CRANIAL NERVES     CN III, IV, VI   Pupils are equal, round, and reactive to light.     Significant Labs: All pertinent labs within the past 24 hours have been reviewed.    Significant Imaging: I have reviewed all pertinent imaging results/findings within the past 24 hours.  Assessment/Plan:     * Acute on chronic combined systolic and diastolic congestive heart failure  Patient is identified as having Systolic (HFrEF) heart failure that is Acute on chronic. CHF is currently uncontrolled due to  Rales/crackles on pulmonary exam and Pulmonary edema/pleural effusion on CXR. Latest ECHO performed and demonstrates- Results for orders placed during the hospital encounter of 12/23/23    Echo Saline Bubble? No    Interpretation Summary    Left Ventricle: The left ventricle is normal in size. There is concentric hypertrophy. There is severely reduced systolic function with a visually estimated ejection fraction of 25 - 30%.    Right Ventricle: Mild right ventricular enlargement. Systolic function is normal.    Left Atrium: Left atrium is moderately dilated.    Right Atrium: Right atrium is mildly dilated.    Tricuspid Valve: There is trace regurgitation. The estimated PA systolic pressure is at least 21 mmHg.    IVC/SVC: Patient is ventilated, cannot use IVC diameter to estimate right atrial pressure.  . Continue Furosemide and monitor clinical status closely. Monitor on telemetry. Patient is on CHF pathway.  Monitor strict Is&Os and daily weights.  Place on fluid restriction of 1.5 L. Cardiology has not been consulted. Continue to stress to patient importance of self efficacy and  on diet for CHF. Last BNP reviewed- and noted below   Recent Labs   Lab 04/23/24  1416   *       Aspiration pneumonia  Received dose of ceftriaxone/azithromycin in ED  Switch ceftriaxone to zosyn  Sputum culture  NPO  SLP evaluation    Acute cystitis without hematuria  Start zosyn  Follow culture      Advance care planning  Advance Care Planning    Date: 04/23/2024    Code Status: DNR - Confirmed with family at bedside.    [facility]  I engaged the family in a voluntary conversation about advance care planning and we specifically addressed what the goals of care would be moving forward, in light of the patient's change in clinical status, specifically poor mental/functional status .  We did specifically address the patient's likely prognosis, which is poor.  We explored the patient's values and preferences for future care.  The  family endorses that what is most important right now is to focus on improvement in condition but with limits to invasive therapies    Accordingly, we have decided that the best plan to meet the patient's goals includes continuing with treatment    I did explain the role for hospice care at this stage of the patient's illness, including its ability to help the patient live with the best quality of life possible.  We will not be making a hospice referral.    I spent a total of 15 minutes engaging the patient in this advance care planning discussion.          I explained to sister at bedside that patient has extremely poor quality of life with poor prognosis and would benefit from a palliative approach. She understands and is likely amenable to hospice, but would like to discuss with patient's son before making any decisions.     Pleural effusion  IV diuresis    Microcytic anemia  Patient's anemia is currently controlled. Has not received any PRBCs to date. Etiology likely d/t Iron deficiency  Current CBC reviewed-   Lab Results   Component Value Date    HGB 12.0 (L) 04/23/2024    HCT 37.9 (L) 04/23/2024     Monitor serial CBC and transfuse if patient becomes hemodynamically unstable, symptomatic or H/H drops below 7/21.    BPH (benign prostatic hyperplasia)  Continue proscar  Monitor for retention      Hyperlipidemia  Continue statin    Essential hypertension  No home HTN meds  Can resume PRN    Aphasia as late effect of cerebrovascular accident        Hemiplegia affecting right side in right-dominant patient as late effect of cerebrovascular disease   This patient has Chronic right hemiplegia due to stroke. Physical therapy services has not been scheduled. Continue all standard measures for pressure injury prevention and consult wound care for any wounds (chronic or acute).    - Chronic CVA with previously noted resultant right-sided hemiparesis and aphasia.  - Bed bound at baseline  - At his functional baseline  -  SLP consulted due to concern for aspiration      VTE Risk Mitigation (From admission, onward)           Ordered     enoxaparin injection 40 mg  Daily         04/23/24 1702     IP VTE HIGH RISK PATIENT  Once         04/23/24 1702     Place sequential compression device  Until discontinued         04/23/24 1702                                    Jai John MD  Department of Hospital Medicine  [facility] - Emergency Dept

## 2024-04-24 NOTE — ASSESSMENT & PLAN NOTE
Patient's anemia is currently controlled. Has not received any PRBCs to date. Etiology likely d/t Iron deficiency  Current CBC reviewed-   Lab Results   Component Value Date    HGB 11.2 (L) 04/24/2024    HCT 35.6 (L) 04/24/2024     Monitor serial CBC and transfuse if patient becomes hemodynamically unstable, symptomatic or H/H drops below 7/21.

## 2024-04-24 NOTE — ED NOTES
Telemetry Verification   Patient placed on Telemetry Box  Verified with War Room  Tech    Box # [id number]   Rate 105   Rhythm ST

## 2024-04-24 NOTE — PLAN OF CARE
04/24/24 1435   Post-Acute Status   Post-Acute Authorization Placement   Post-Acute Placement Status Referrals Sent   Coverage Medicare   Patient choice form signed by patient/caregiver List from System Post-Acute Care   Discharge Delays (!) Change in Medical Condition   Discharge Plan   Discharge Plan A Return to nursing home   Discharge Plan B Return to Nursing Home     Met with Pt's sister, Idalia, to review discharge recommendation of returning to his nursing home and they are agreeable to plan    Patient/family provided list of facilities in-network with patient's payor plan. Providers that are owned, operated, or affiliated with [facility] are included on the list.     Notified that referral sent to below listed facilities from in-network list based on proximity to home/family support:   [facility]    Patient/family instructed to identify preference.    Preferred Facility: (if more than 1, listed in order of descending preference)  [facility]    If an additional preferred facility not listed above is identified, additional referral to be sent. If above facilities unable to accept, will send additional referrals to in-network providers.     KIMBERLY Huggins LMSW  [facility]  [id number]

## 2024-04-24 NOTE — PROGRESS NOTES
[facility] Tele[facility] Medicine  Progress Note    Patient Name: Alexsander Tafoya  MRN: [id number]  Patient Class: IP- Inpatient   Admission Date: 4/23/2024  Length of Stay: 1 days  Attending Physician: Jai John MD  Primary Care Provider: Helio Farr MD        Subjective:     Principal Problem:Acute on chronic combined systolic and diastolic congestive heart failure        HPI:  73M NH resident with PMH of 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 Patient with significant aphasia, confusion, and bedbound status at baseline, therefore HPI taken from sister at bedside. Sister states the NH reported a productive cough of clear sputum and sinus congestion for one week. Denies fever, chills, nausea, vomiting, abdominal pain, diarrhea, dysuria, hematuria. He is at baseline mental/functional status and is dependent on full-time care for ADLs. Workup in ED remarkable for VS: T 97.8, HR 80, /70, O2 sat 97% on RA. Hb 12.0, MCV 81, K 5.3, CO2 21, BUN 21, Cr 1.2, , Trop 0.303, LA 2.2, HCV Ab neg, HIV neg, Flu/Covid neg, UA: + nit, 2+ leuk, 7 WBC. CXR: Coarse interstitial attenuation, possibly reflecting edema or other pneumonitis, no large focal consolidation. CT abd/pelvis: Right pleural effusion.  There is a rounded focus of atelectasis or consolidation within the right lower lobe. Large amount of stool in the colon suggests constipation. He received dose of azithromycin/ceftriaxone, lasix 80mg IVP, and enema in ED and will be admitted to hospital medicine service for further management.    Overview/Hospital Course:  No notes on file    Interval History: Pt hypotensive overnight after receiving lasix which has since been held. Remains alert, but disoriented with severe aphasia. Resting comfortably on room air. TTE with combined systolic/diastolic dysfunction with EF 10%. I had  C discussion with family at bedside regarding his multiple comorbidities and poor functional/mental status with poor prognosis. The family understands that further aggressive mgmt will not benefit patient and likely only prolong suffering. They do not want invasive procedures including cardiac cath and may want to consider comfort care, but would like to first discuss with family.     Review of Systems   Unable to perform ROS: Dementia     Objective:     Vital Signs (Most Recent):  Temp: 97.7 °F (36.5 °C) (04/24/24 0216)  Pulse: 87 (04/24/24 1141)  Resp: 19 (04/24/24 1141)  BP: 93/65 (04/24/24 1141)  SpO2: (!) 90 % (04/24/24 1141) Vital Signs (24h Range):  Temp:  [97.6 °F (36.4 °C)-98.4 °F (36.9 °C)] 97.7 °F (36.5 °C)  Pulse:  [] 87  Resp:  [18-20] 19  SpO2:  [90 %-100 %] 90 %  BP: ()/(50-72) 93/65     Weight: 98 kg (216 lb 0.8 oz)  Body mass index is 33.84 kg/m².    Intake/Output Summary (Last 24 hours) at 4/24/2024 1416  Last data filed at 4/24/2024 0014  Gross per 24 hour   Intake 600 ml   Output 1500 ml   Net -900 ml         Physical Exam  Constitutional:       General: He is not in acute distress.     Appearance: He is ill-appearing (chronic). He is not toxic-appearing.   HENT:      Head: Normocephalic and atraumatic.   Eyes:      Conjunctiva/sclera: Conjunctivae normal.      Pupils: Pupils are equal, round, and reactive to light.   Cardiovascular:      Rate and Rhythm: Normal rate and regular rhythm.      Heart sounds: Normal heart sounds.   Pulmonary:      Effort: Pulmonary effort is normal. No respiratory distress.      Breath sounds: Rales present. No wheezing.   Abdominal:      General: Bowel sounds are normal. There is no distension.      Palpations: Abdomen is soft.      Tenderness: There is no abdominal tenderness. There is no guarding.   Musculoskeletal:         General: Normal range of motion.      Cervical back: Normal range of motion and neck supple.      Right lower leg: No edema.       Left lower leg: No edema.   Skin:     General: Skin is warm and dry.      Coloration: Skin is not jaundiced.   Neurological:      Mental Status: He is alert. Mental status is at baseline. He is disoriented.      Comments: Dysarthria and R sided hemiparesis (chronic)             Significant Labs: All pertinent labs within the past 24 hours have been reviewed.    Significant Imaging: I have reviewed all pertinent imaging results/findings within the past 24 hours.    Assessment/Plan:      * Acute on chronic combined systolic and diastolic congestive heart failure  Patient is identified as having Systolic (HFrEF) heart failure that is Acute on chronic. CHF is currently uncontrolled due to Rales/crackles on pulmonary exam and Pulmonary edema/pleural effusion on CXR. Latest ECHO performed and demonstrates- Results for orders placed during the hospital encounter of 12/23/23    Echo Saline Bubble? No    Interpretation Summary    Left Ventricle: The left ventricle is normal in size. There is concentric hypertrophy. There is severely reduced systolic function with a visually estimated ejection fraction of 25 - 30%.    Right Ventricle: Mild right ventricular enlargement. Systolic function is normal.    Left Atrium: Left atrium is moderately dilated.    Right Atrium: Right atrium is mildly dilated.    Tricuspid Valve: There is trace regurgitation. The estimated PA systolic pressure is at least 21 mmHg.    IVC/SVC: Patient is ventilated, cannot use IVC diameter to estimate right atrial pressure.  . Continue Furosemide and monitor clinical status closely. Monitor on telemetry. Patient is on CHF pathway.  Monitor strict Is&Os and daily weights.  Place on fluid restriction of 1.5 L. Cardiology has not been consulted. Continue to stress to patient importance of self efficacy and  on diet for CHF. Last BNP reviewed- and noted below   Recent Labs   Lab 04/23/24  1416   *     TTE 4/24 showing combined systolic/diastolic  dysfunction with EF 10%  Start GDMT as able to tolerate   Lasix held due to hypotension, will resume when able  Doesn't appear overtly fluid overloaded  Daily GOC discussions    Elevated troponin  Likely demand ischemia  Trend troponins  TTE: Combined systolic/diastolic dysfunction with EF 10%  Family does not want invasive measures. Will hold off on cardiology consult at this time.      Constipation  Received enema in ED  Bowel regimen  PRN bisacodyl      Goals of care, counseling/discussion        Palliative care encounter        Aspiration pneumonia  Received dose of ceftriaxone/azithromycin in ED  Switch ceftriaxone to zosyn  Sputum culture  NPO  SLP evaluation    Acute cystitis without hematuria  Start zosyn  Follow culture      Advance care planning  Advance Care Planning    Date: 04/23/2024    Code Status: DNR - Confirmed with family at bedside.    [facility]  I engaged the family in a voluntary conversation about advance care planning and we specifically addressed what the goals of care would be moving forward, in light of the patient's change in clinical status, specifically poor mental/functional status .  We did specifically address the patient's likely prognosis, which is poor.  We explored the patient's values and preferences for future care.  The family endorses that what is most important right now is to focus on improvement in condition but with limits to invasive therapies    Accordingly, we have decided that the best plan to meet the patient's goals includes continuing with treatment    I did explain the role for hospice care at this stage of the patient's illness, including its ability to help the patient live with the best quality of life possible.  We will not be making a hospice referral.    I spent a total of 15 minutes engaging the patient in this advance care planning discussion.          -I explained to sister at bedside that patient has extremely poor quality of life with poor prognosis and would  benefit from a palliative approach. She understands and is likely amenable to hospice, but would like to discuss with patient's son before making any decisions.   -Family requesting no invasive procedures.     Pleural effusion  IV diuresis     Microcytic anemia  Patient's anemia is currently controlled. Has not received any PRBCs to date. Etiology likely d/t Iron deficiency  Current CBC reviewed-   Lab Results   Component Value Date    HGB 11.2 (L) 04/24/2024    HCT 35.6 (L) 04/24/2024     Monitor serial CBC and transfuse if patient becomes hemodynamically unstable, symptomatic or H/H drops below 7/21.    BPH (benign prostatic hyperplasia)  Continue proscar  Monitor for retention      Hyperlipidemia  Continue statin    Essential hypertension  No home HTN meds  Can resume PRN    Aphasia as late effect of cerebrovascular accident        Hemiplegia affecting right side in right-dominant patient as late effect of cerebrovascular disease   This patient has Chronic right hemiplegia due to stroke. Physical therapy services has not been scheduled. Continue all standard measures for pressure injury prevention and consult wound care for any wounds (chronic or acute).    - Chronic CVA with previously noted resultant right-sided hemiparesis and aphasia.  - Bed bound at baseline  - At his functional baseline  - SLP consulted due to concern for aspiration      VTE Risk Mitigation (From admission, onward)           Ordered     enoxaparin injection 40 mg  Daily         04/23/24 1702     IP VTE HIGH RISK PATIENT  Once         04/23/24 1702     Place sequential compression device  Until discontinued         04/23/24 1702                    Discharge Planning   THAIS:      Code Status: DNR   Is the patient medically ready for discharge?:     Reason for patient still in hospital (select all that apply): Treatment                     Jai John MD  Department of Hospital Medicine   [facility] - Telemetry Stepdown

## 2024-04-24 NOTE — PLAN OF CARE
Problem: SLP  Goal: SLP Goal  Description: Speech Language Pathology Goals  Goals expected to be met by 4/30/24    1. Pt will participate in ongoing assessment of swallow function to determine safest, least restrictive means of nutrition/hydration  2. Educate Pt and family on aspiration precautions and SLP POC    Outcome: Progressing     SLP Bedside Swallow Evaluation initiated. PO trials limited dye to Patient with R anterior loss of secretions and decreased labial/lingual strength and coordination.  REC: continue NPO with alternative means medications. Should Pt/family wish to continue PO for pleasure, aware of aspiration risk, safest textures would be puree and nectar-thickened liquids via single bites/sips and strict aspiration precautions to reduce aspiration risk; however risk of aspiration cannot be eliminated at this time.     4/24/2024

## 2024-04-24 NOTE — ASSESSMENT & PLAN NOTE
Received dose of ceftriaxone/azithromycin in ED  Switch ceftriaxone to zosyn  Sputum culture  NPO  SLP evaluation

## 2024-04-24 NOTE — SUBJECTIVE & OBJECTIVE
Interval History: Family discussing hospice care at NH.     Past Medical History:   Diagnosis Date    BPH (benign prostatic hyperplasia)     Dysarthria     HLD (hyperlipidemia)     Hypertension     Other and unspecified hyperlipidemia     Stroke        Past Surgical History:   Procedure Laterality Date    CATARACT EXTRACTION W/ INTRAOCULAR LENS  IMPLANT, BILATERAL         Review of patient's allergies indicates:  No Known Allergies    Medications:  Continuous Infusions:  Current Facility-Administered Medications   Medication Dose Route Frequency Last Rate Last Admin     Scheduled Meds:  Current Facility-Administered Medications   Medication Dose Route Frequency    aspirin  81 mg Oral Daily    azithromycin  500 mg Oral Daily    clopidogreL  75 mg Oral Daily    enoxparin  40 mg Subcutaneous Daily    finasteride  5 mg Oral Daily    multivitamin  1 tablet Oral Daily    piperacillin-tazobactam (Zosyn) IV (PEDS and ADULTS) (extended infusion is not appropriate)  4.5 g Intravenous Q8H    polyethylene glycol  17 g Oral Daily    senna-docusate 8.6-50 mg  1 tablet Oral BID     PRN Meds:  Current Facility-Administered Medications:     acetaminophen, 650 mg, Oral, Q4H PRN    albuterol-ipratropium, 3 mL, Nebulization, Q6H PRN    aluminum-magnesium hydroxide-simethicone, 30 mL, Oral, QID PRN    dextrose 10%, 12.5 g, Intravenous, PRN    dextrose 10%, 25 g, Intravenous, PRN    glucagon (human recombinant), 1 mg, Intramuscular, PRN    glucose, 16 g, Oral, PRN    glucose, 24 g, Oral, PRN    melatonin, 6 mg, Oral, Nightly PRN    naloxone, 0.02 mg, Intravenous, PRN    ondansetron, 4 mg, Intravenous, Q8H PRN    simethicone, 1 tablet, Oral, QID PRN    sodium chloride 0.9%, 10 mL, Intravenous, Q12H PRN    sodium chloride 0.9%, 10 mL, Intravenous, PRN    Family History       Problem Relation (Age of Onset)    Dementia Father    Hypertension Mother, Father    Seizures Mother    Stroke Mother          Tobacco Use    Smoking status: Former     Smokeless tobacco: Never   Substance and Sexual Activity    Alcohol use: Yes     Comment: rare, once a week or less    Drug use: No    Sexual activity: Never       Review of Systems   Constitutional:  Positive for activity change.   HENT:  Positive for hearing loss and trouble swallowing.    Neurological:  Positive for weakness.   Psychiatric/Behavioral:  Positive for confusion.      Objective:     Vital Signs (Most Recent):  Temp: 97.7 °F (36.5 °C) (04/24/24 0216)  Pulse: 87 (04/24/24 1141)  Resp: 19 (04/24/24 1141)  BP: 93/65 (04/24/24 1141)  SpO2: (!) 90 % (04/24/24 1141) Vital Signs (24h Range):  Temp:  [97.6 °F (36.4 °C)-98.4 °F (36.9 °C)] 97.7 °F (36.5 °C)  Pulse:  [] 87  Resp:  [18-20] 19  SpO2:  [90 %-100 %] 90 %  BP: ()/(50-72) 93/65     Weight: 98 kg (216 lb 0.8 oz)  Body mass index is 33.84 kg/m².       Physical Exam  HENT:      Head: Normocephalic and atraumatic.      Ears:      Comments: Habematolel left ear  Pulmonary:      Effort: Pulmonary effort is normal.   Musculoskeletal:      Comments: Weakness noted.    Skin:     Comments: Wounds to sacral area.    Neurological:      Mental Status: He is alert.   Psychiatric:      Comments: Speech slurred            Review of Symptoms      Symptom Assessment (ESAS 0-10 Scale)  Unable to complete assessment due to Acuity of condition         Pain Assessment in Advanced Demential Scale (PAINAD)   Breathing - Independent of vocalization:  0  Negative vocalization:  0  Facial expression:  0  Body language:  0  Consolability:  0  Total:  0    Performance Status:  20    Living Arrangements:  Lives in nursing home    Psychosocial/Cultural:   See Palliative Psychosocial Note: Yes  Pt lives in [facility]. Pt has one son, Saurabh who lives in [place], Pt close to his sister, Idalia who lives locally.   **Primary  to Follow**  Palliative Care  Consult: Yes        Advance Care Planning   Advance Directives:   Living Will: Yes         Copy on chart: Yes    [place]: Yes    Agent's Name:  Pt's son, Saurabh is NOK    Decision Making:  Family answered questions and Patient unable to communicate due to disease severity/cognitive impairment  Goals of Care: What is most important right now is to focus on improvement in condition but with limits to invasive therapies. Accordingly, we have decided that the best plan to meet the patient's goals includes continuing with treatment.         Significant Labs: All pertinent labs within the past 24 hours have been reviewed.  CBC:   Recent Labs   Lab 04/24/24  0750   WBC 6.42   HGB 11.2*   HCT 35.6*   MCV 82        BMP:  Recent Labs   Lab 04/24/24  0750   GLU 95      K 3.9      CO2 23   BUN 22   CREATININE 1.1   CALCIUM 9.4   MG 2.0     LFT:  Lab Results   Component Value Date    AST 38 04/23/2024    GGT 42 08/05/2019    ALKPHOS 157 (H) 04/23/2024    BILITOT 0.8 04/23/2024     Albumin:   Albumin   Date Value Ref Range Status   04/23/2024 2.9 (L) 3.5 - 5.2 g/dL Final     Protein:   Total Protein   Date Value Ref Range Status   04/23/2024 8.3 6.0 - 8.4 g/dL Final     Lactic acid:   Lab Results   Component Value Date    LACTATE 2.0 04/23/2024    LACTATE 2.2 04/23/2024       Significant Imaging: I have reviewed all pertinent imaging results/findings within the past 24 hours.

## 2024-04-24 NOTE — CONSULTS
Nutrition consult received regarding Low Na diet education.  Pt not appropriate 2/2 aphasia, disorientation, lives in NH.    Pt currently NPO, awaiting SLP evaluation.    Noted palliative care consulted/pt DNR.    If unable to advance diet & aggressive nutrition warranted (such as TFs), please re-consult.    Thanks!  MS Tonja, RD, LDN

## 2024-04-24 NOTE — PT/OT/SLP EVAL
Speech Language Pathology Evaluation  Bedside Swallow    Patient Name:  Alexsander Tafoya   MRN:  [id number]  Admitting Diagnosis: Acute on chronic combined systolic and diastolic congestive heart failure    Recommendations:                 General Recommendations:   ST to continue to follow for ongoing swallow assessment   Diet recommendations: Continue NPO with alternative means medications, Should Patient/family wish to continue nutrition PO for quality of life/ pleasure purposes, despite risk of aspiration, safest texture would be puree and nectar-thickened liquids via single (teaspoon sized) bites/sips to reduce aspiration risk; however, risk of aspiration remains due to secretions, decreased endurance and dependent feeding.   Aspiration Precautions:  Only when awake/alert/attentive and upright, 1:1 assistance with all PO, single bites/sips,  and 1 bite/sip at a time, monitor for pocketing/oral holding and remain upright at least 30 minutes following PO Continue to monitor for signs and symptoms of aspiration and discontinue oral feeding should you notice any of the following: watery eyes, reddened facial area, wet vocal quality, increased work of breathing, change in respiratory status, increased congestion, coughing, fever and/or change in level of alertness  General Precautions: Standard, aspiration, fall  Communication strategies:  provide increased time to answer, go to room if call light pushed, and attempt yes/no questions to clarify    Assessment:     Alexsander Tafoya is a 73 y.o. male with an SLP diagnosis of Dysphagia.  PO trials limited due to R anterior loss of secretions and waxing/waning alertness.     History:     Past Medical History:   Diagnosis Date    BPH (benign prostatic hyperplasia)     Dysarthria     HLD (hyperlipidemia)     Hypertension     Other and unspecified hyperlipidemia     Stroke        Past Surgical History:   Procedure Laterality Date    CATARACT EXTRACTION W/ INTRAOCULAR LENS   "IMPLANT, BILATERAL         Social History: Patient lives in NH    Prior Intubation HX:  none this admission    Modified Barium Swallow: Pt with prior MBS at outside hospital 3/17/22     Chest X-Rays: 4/23/24: 1. Coarse interstitial attenuation, possibly reflecting edema or other pneumonitis.  No large focal consolidation.    Prior diet: Patient's sister explained Patient on pureed diet with nectar-thickened liquids at NH; however, family signed waiver to be able to bring Pt outside foods due to Patient with minimal acceptance of pureed textures at NH.     Subjective     SLP reviewed Pt with RN, RN cleared for tx  Pt presents lethargic  Pt with some spontaneous moans and groans  His Sister explains, "He drinks with a straw"     Pain/Comfort:  Pain Rating 1: other (see comments) (difficult to assess 2/2 underlying Aphasia and decreased alertness)  Pain Addressed 1: Nurse notified    Respiratory Status: Room air    Objective:     Oral Musculature Evaluation  Oral Musculature: unable to assess due to poor participation/comprehension  Dentition: lower dentures  Secretion Management: problems swallowing secretions, right corner drooling  Mucosal Quality: adequate  Mandibular Strength and Mobility: impaired  Oral Labial Strength and Mobility: impaired seal, impaired coordination  Lingual Strength and Mobility: other (see comments) (difficult to assess 2/2 decreased command following)  Volitional Cough: present  Volitional Swallow: elicited  Voice Prior to PO Intake: strained, low intensity    Bedside Swallow Eval:   Consistencies Assessed:  Nectar thick liquids : tsp sips x3, straw sips x2  Puree tsp bites x3      Oral Phase:   Decreased closure around utensil  Drooling  Inconsistent oral transit time, at times prolonged     Pharyngeal Phase:   Pt with delayed initiation of swallow '  No overt S/S aspiration with po trials   SLP unable to r/o silent aspiration at the bedside     Compensatory Strategies  1:1 assistance, " single bites/sips    Treatment: Pt unavailable upon earlier attempts (MD beach.) Upon later attempt, Patient found asleep in bed with Siblings at the bedside. He woke per min verbal cues from his sibling. HOB elevated. PO trials limited due to decreased oral phase and waxing/waning alertness. SLP Educated Pt and family on SLP role, aspiration precautions, S/S aspiration and SLP POC. SLP further educated Pt Should Patient wish to continue nutrition PO for quality of life/ pleasure purposes, despite risk of aspiration, safest texture would be puree and nectar-thickened liquids via single, teaspoon sized bites/sips to reduce aspiration risk; however, risk of aspiration remains due to secretions, decreased endurance, waxing/waning alertness and dependent feeding. His Sibling asked questions about diet types and option for trials of advanced textures. SLP educated Pt's sibling on ongoing SLP POC, deferment of advanced textures this service day due to secretions, decreased oral phase and decreased alertness. No additional questions.  Pt did not demonstrate understanding. Family verbalized partial understanding. Pt remained upright in bed with family in room upon SLP exit.       Goals:   Multidisciplinary Problems       SLP Goals          Problem: SLP    Goal Priority Disciplines Outcome   SLP Goal     SLP Progressing   Description: Speech Language Pathology Goals  Goals expected to be met by 4/30/24    1. Pt will participate in ongoing assessment of swallow function to determine safest, least restrictive means of nutrition/hydration  2. Educate Pt and family on aspiration precautions and SLP POC                         Plan:     Patient to be seen:  4 x/week   Plan of Care expires:  05/24/24  Plan of Care reviewed with:  patient   SLP Follow-Up:  Yes       Discharge recommendations:  Moderate Intensity Therapy   Barriers to Discharge:   NPO    Time Tracking:     SLP Treatment Date:   04/24/24  Speech Start Time:   1514  Speech Stop Time:  1554     Speech Total Time (min):  40 min    Billable Minutes: Eval Swallow and Oral Function 9 and Self Care/Home Management Training 30    04/24/2024

## 2024-04-24 NOTE — SUBJECTIVE & OBJECTIVE
Past Medical History:   Diagnosis Date    BPH (benign prostatic hyperplasia)     Dysarthria     HLD (hyperlipidemia)     Hypertension     Other and unspecified hyperlipidemia     Stroke        Past Surgical History:   Procedure Laterality Date    CATARACT EXTRACTION W/ INTRAOCULAR LENS  IMPLANT, BILATERAL         Review of patient's allergies indicates:  No Known Allergies    Current Facility-Administered Medications   Medication Dose Route Frequency Provider Last Rate Last Admin    acetaminophen tablet 650 mg  650 mg Oral Q4H PRN HandJai MD        albuterol-ipratropium 2.5 mg-0.5 mg/3 mL nebulizer solution 3 mL  3 mL Nebulization Q6H PRN Hand, Jai ARCE MD        aluminum-magnesium hydroxide-simethicone 200-200-20 mg/5 mL suspension 30 mL  30 mL Oral QID PRN Hand, Jai ARCE MD        [START ON 4/24/2024] aspirin chewable tablet 81 mg  81 mg Oral Daily Hand, Jai ARCE MD        [START ON 4/24/2024] azithromycin tablet 500 mg  500 mg Oral Daily Hand, Jai ARCE MD        [START ON 4/24/2024] clopidogreL tablet 75 mg  75 mg Oral Daily Hand, Jai ARCE MD        dextrose 10% bolus 125 mL 125 mL  12.5 g Intravenous PRN Hand, Jai ARCE MD        dextrose 10% bolus 250 mL 250 mL  25 g Intravenous PRN Hand, Jai ARCE MD        enoxaparin injection 40 mg  40 mg Subcutaneous Daily Hand, Jai ARCE MD        [START ON 4/24/2024] finasteride tablet 5 mg  5 mg Oral Daily Hand, Jai ARCE MD        furosemide injection 40 mg  40 mg Intravenous Q12H Hand, Jai ARCE MD        glucagon (human recombinant) injection 1 mg  1 mg Intramuscular PRN Hand, Jai ARCE MD        glucose chewable tablet 16 g  16 g Oral PRN Hand, Jai ARCE MD        glucose chewable tablet 24 g  24 g Oral PRN Hand, Jai ARCE MD        melatonin tablet 6 mg  6 mg Oral Nightly PRN Hand, Jai ARCE MD        [START ON 4/24/2024] multivitamin tablet  1 tablet Oral Daily Hand, Jai ARCE MD        naloxone 0.4 mg/mL injection 0.02  mg  0.02 mg Intravenous PRN HandJai MD        ondansetron injection 4 mg  4 mg Intravenous Q8H PRN Jai John MD        piperacillin-tazobactam (ZOSYN) 4.5 g in dextrose 5 % in water (D5W) 100 mL IVPB (MB+)  4.5 g Intravenous Q8H Jai John MD        [START ON 4/24/2024] polyethylene glycol packet 17 g  17 g Oral Daily HandJai MD        senna-docusate 8.6-50 mg per tablet 1 tablet  1 tablet Oral BID Jai John MD        simethicone chewable tablet 80 mg  1 tablet Oral QID PRN Jai John MD        sodium chloride 0.9% flush 10 mL  10 mL Intravenous Q12H PRN Jai John MD        sodium chloride 0.9% flush 10 mL  10 mL Intravenous PRN Jai John MD         Current Outpatient Medications   Medication Sig Dispense Refill    albuterol-ipratropium (DUO-NEB) 2.5 mg-0.5 mg/3 mL nebulizer solution Take 3 mLs by nebulization every 6 (six) hours while awake. Rescue 75 mL 0    aspirin 81 MG Chew Take 1 tablet (81 mg total) by mouth once daily.  0    clopidogrel (PLAVIX) 75 mg tablet Take 1 tablet (75 mg total) by mouth once daily. 30 tablet 11    finasteride (PROSCAR) 5 mg tablet Take 5 mg by mouth once daily.       fish oil-omega-3 fatty acids 300-1,000 mg capsule Take 1 capsule by mouth once daily.      furosemide (LASIX) 40 MG tablet Take 1 tablet (40 mg total) by mouth once daily.      multivitamin (THERAGRAN) per tablet Take 1 tablet by mouth once daily.       Family History       Problem Relation (Age of Onset)    Dementia Father    Hypertension Mother, Father    Seizures Mother    Stroke Mother          Tobacco Use    Smoking status: Former    Smokeless tobacco: Never   Substance and Sexual Activity    Alcohol use: Yes     Comment: rare, once a week or less    Drug use: No    Sexual activity: Never     Review of Systems   Unable to perform ROS: Dementia   Objective:     Vital Signs (Most Recent):  Temp: 97.6 °F (36.4 °C) (04/23/24 1430)  Pulse: 103 (04/23/24  1630)  Resp: 20 (04/23/24 1630)  BP: 95/71 (04/23/24 1630)  SpO2: 95 % (04/23/24 1630) Vital Signs (24h Range):  Temp:  [97.6 °F (36.4 °C)-97.8 °F (36.6 °C)] 97.6 °F (36.4 °C)  Pulse:  [] 103  Resp:  [18-20] 20  SpO2:  [95 %-100 %] 95 %  BP: ()/(66-72) 95/71        Body mass index is 33.84 kg/m².     Physical Exam  Constitutional:       General: He is not in acute distress.     Appearance: He is ill-appearing (chronic). He is not toxic-appearing.   HENT:      Head: Normocephalic and atraumatic.   Eyes:      Conjunctiva/sclera: Conjunctivae normal.      Pupils: Pupils are equal, round, and reactive to light.   Cardiovascular:      Rate and Rhythm: Normal rate and regular rhythm.      Heart sounds: Normal heart sounds.   Pulmonary:      Effort: Pulmonary effort is normal. No respiratory distress.      Breath sounds: Rales present. No wheezing.   Abdominal:      General: Bowel sounds are normal. There is no distension.      Palpations: Abdomen is soft.      Tenderness: There is no abdominal tenderness. There is no guarding.   Musculoskeletal:         General: Normal range of motion.      Cervical back: Normal range of motion and neck supple.      Right lower leg: No edema.      Left lower leg: No edema.   Skin:     General: Skin is warm and dry.      Coloration: Skin is not jaundiced.   Neurological:      Mental Status: He is alert. Mental status is at baseline. He is disoriented.      Comments: Dysarthria and R sided hemiparesis (chronic)          CRANIAL NERVES     CN III, IV, VI   Pupils are equal, round, and reactive to light.     Significant Labs: All pertinent labs within the past 24 hours have been reviewed.    Significant Imaging: I have reviewed all pertinent imaging results/findings within the past 24 hours.

## 2024-04-24 NOTE — ASSESSMENT & PLAN NOTE
This patient has Chronic right hemiplegia due to stroke. Physical therapy services has not been scheduled. Continue all standard measures for pressure injury prevention and consult wound care for any wounds (chronic or acute).    - Chronic CVA with previously noted resultant right-sided hemiparesis and aphasia.  - Bed bound at baseline  - At his functional baseline  - SLP consulted due to concern for aspiration

## 2024-04-24 NOTE — ASSESSMENT & PLAN NOTE
Patient is identified as having Systolic (HFrEF) heart failure that is Acute on chronic. CHF is currently uncontrolled due to Rales/crackles on pulmonary exam and Pulmonary edema/pleural effusion on CXR. Latest ECHO performed and demonstrates- Results for orders placed during the hospital encounter of 12/23/23    Echo Saline Bubble? No    Interpretation Summary    Left Ventricle: The left ventricle is normal in size. There is concentric hypertrophy. There is severely reduced systolic function with a visually estimated ejection fraction of 25 - 30%.    Right Ventricle: Mild right ventricular enlargement. Systolic function is normal.    Left Atrium: Left atrium is moderately dilated.    Right Atrium: Right atrium is mildly dilated.    Tricuspid Valve: There is trace regurgitation. The estimated PA systolic pressure is at least 21 mmHg.    IVC/SVC: Patient is ventilated, cannot use IVC diameter to estimate right atrial pressure.  . Continue Furosemide and monitor clinical status closely. Monitor on telemetry. Patient is on CHF pathway.  Monitor strict Is&Os and daily weights.  Place on fluid restriction of 1.5 L. Cardiology has not been consulted. Continue to stress to patient importance of self efficacy and  on diet for CHF. Last BNP reviewed- and noted below   Recent Labs   Lab 04/23/24  1416   *     TTE 4/24 showing combined systolic/diastolic dysfunction with EF 10%  Start GDMT as able to tolerate   Lasix held due to hypotension, will resume when able  Doesn't appear overtly fluid overloaded  Daily GOC discussions

## 2024-04-24 NOTE — ASSESSMENT & PLAN NOTE
Impression: Pt is a 74 y/o male with advanced heart failure, aspiration pneumonia, and hemiplegia from previous stroke. Pt lives at [facility]. Pt is bed-bound. Total assist with ADLs.  Pt's speech is very garbled and slurred. Pt is alert. Pt is DNR.  No distress noted.     Reason for consult: ACP/GOC Communicated with Dr. John.     Goals of care/ACP:    Met with pt's sister, Idalia and brother-in-law at bedside. They are aware of pt's medical issues, poor prognosis. Hospice care has been discussed as an option. Sister to speak to pt's son, Saurabh who lives in . Hospice education done with sister and brother-in-law who have no previous encounters with hospice care. They verbalized understanding. They are aware pt has been declining.     MPOA: Pt's son, Saurabh is NOK  Code status: DNR.     Will f/u with pt's family in morning.     Symptom management:    Debility:   Pt is bed-bound  Pt is total care with ADLs.   Nursing repositioning at least q 2 hrs.    Secretions:   Increased oral secretions.   Would have yanker available with suction when needed.     Constipation:    Pt  on bowel regiment.     Plan:   Will f/u with family/pt.

## 2024-04-24 NOTE — PLAN OF CARE
[facility] - Telemetry Stepdown  Initial Discharge Assessment       Primary Care Provider: Helio Farr MD    Admission Diagnosis: Chest discomfort [R07.89]  Chest pain [R07.9]  Systolic heart failure [I50.20]    Admission Date: 4/23/2024  Expected Discharge Date:     Transition of Care Barriers: (P) None    Payor: MEDICARE / Plan: MEDICARE PART A & B / Product Type: Government /     Extended Emergency Contact Information  Primary Emergency Contact: Idalia Cornell  Address: [facility] [place] [address] [facility]  Home Phone: [phone]  Mobile Phone: [phone]  Relation: Sister  Secondary Emergency Contact: Saurabh Taf[place] [address] [facility]  Home Phone: [phone]  Work Phone: [phone]  Mobile Phone: [phone]  Relation: Son    Discharge Plan A: (P) Return to nursing home  Discharge Plan B: (P) Return to Nursing Home      [id number] [place] [facility] [address] [facility] [address]  [address] [facility] [address]  Phone: [phone] Fax: [phone]      Initial Assessment (most recent)       Adult Discharge Assessment - 04/24/24 1425          Discharge Assessment    Assessment Type Discharge Planning Assessment (P)      Confirmed/corrected address, phone number and insurance Yes (P)      Confirmed Demographics Correct on Facesheet (P)    Address is sister's Pt lives at [facility]    Source of Information family (P)      If unable to respond/provide information was family/caregiver contacted? Yes (P)      Contact Name/Number sister Bansal, [phone] (P)      Communicated THAIS with patient/caregiver Date not available/Unable to determine (P)      Reason For Admission Acute on chronic combined systolic and diastolic congestive heart failure (P)      People in Home facility resident (P)      Facility Arrived From: [facility] (P)      Do you expect to return to  your current living situation? Yes (P)      Do you have help at home or someone to help you manage your care at home? Yes (P)      Who are your caregiver(s) and their phone number(s)? Facility staff (P)      Prior to hospitilization cognitive status: Unable to Assess (P)      Current cognitive status: Unable to Assess (P)      Walking or Climbing Stairs Difficulty yes (P)      Walking or Climbing Stairs ambulation difficulty, requires equipment;ambulation difficulty, assistance 1 person (P)      Mobility Management Wheelchair (P)      Dressing/Bathing Difficulty yes (P)      Dressing/Bathing bathing difficulty, dependent (P)      Do you have any problems with: Needs other help (P)      Specify other help transportation, meals (P)      Home Accessibility wheelchair accessible (P)      Home Layout Able to live on 1st floor (P)      Equipment Currently Used at Home wheelchair (P)      Readmission within 30 days? No (P)      Patient currently being followed by outpatient case management? No (P)      Do you currently have service(s) that help you manage your care at home? Yes (P)      Name and Contact number of agency [facility]'s staff (P)      Is the pt/caregiver preference to resume services with current agency Yes (P)      Do you take prescription medications? Yes (P)      Do you have prescription coverage? Yes (P)      Coverage [employer] (P)      Do you have any problems affording any of your prescribed medications? No (P)      Is the patient taking medications as prescribed? yes (P)      Who is going to help you get home at discharge? Idalia Cornell, sister, [phone] (P)      How do you get to doctors appointments? agency (P)      Are you on dialysis? No (P)      Do you take coumadin? No (P)      Discharge Plan A Return to nursing home (P)      Discharge Plan B Return to Nursing Home (P)      DME Needed Upon Discharge  none (P)      Discharge Plan discussed with: Sibling (P)      Name(s) and Number(s) Idalia  Kraig, , [phone] (P)      Transition of Care Barriers None (P)         Physical Activity    On average, how many days per week do you engage in moderate to strenuous exercise (like a brisk walk)? 0 days (P)      On average, how many minutes do you engage in exercise at this level? 0 min (P)         Financial Resource Strain    How hard is it for you to pay for the very basics like food, housing, medical care, and heating? Not hard at all (P)         Housing Stability    In the last 12 months, was there a time when you were not able to pay the mortgage or rent on time? No (P)      In the past 12 months, how many times have you moved where you were living? 2 (P)    Pt became a resident of [facility] in Jan. 2024    At any time in the past 12 months, were you homeless or living in a shelter (including now)? No (P)         Transportation Needs    In the past 12 months, has lack of transportation kept you from medical appointments or from getting medications? No (P)      In the past 12 months, has lack of transportation kept you from meetings, work, or from getting things needed for daily living? No (P)         Food Insecurity    Within the past 12 months, you worried that your food would run out before you got the money to buy more. Never true (P)      Within the past 12 months, the food you bought just didn't last and you didn't have money to get more. Never true (P)         Stress    Do you feel stress - tense, restless, nervous, or anxious, or unable to sleep at night because your mind is troubled all the time - these days? Patient unable to answer (P)         Social Connections    In a typical week, how many times do you talk on the phone with family, friends, or neighbors? Twice a week (P)      How often do you get together with friends or relatives? Twice a week (P)      How often do you attend Rastafari or Uatsdin services? Never (P)      Do you belong to any clubs or organizations such as Rastafari  groups, unions, fraternal or athletic groups, or school groups? No (P)      How often do you attend meetings of the clubs or organizations you belong to? Never (P)      Are you , , , , never , or living with a partner?  (P)         Alcohol Use    Q1: How often do you have a drink containing alcohol? Monthly or less (P)      Q2: How many drinks containing alcohol do you have on a typical day when you are drinking? 1 or 2 (P)      Q3: How often do you have six or more drinks on one occasion? Never (P)                    Discharge Plan A and Plan B have been determined by review of patient's clinical status, future medical and therapeutic needs, and coverage/benefits for post-acute care in coordination with multidisciplinary team members.    Pt is a residential resident at [facility] Pt dependent for all ADL's. Pt's sister and family visit with him at least twice a week. Pt is not on dialysis or coumadin. Pt will need transport back to his NH. SW will follow for all discharge planning needs.     KIMBERLY Huggins, ST[facility]LEYSW [facility]  [id number]

## 2024-04-24 NOTE — ASSESSMENT & PLAN NOTE
Advance Care Planning     Date: 04/23/2024    Code Status: DNR - Confirmed with family at bedside.    [facility]  I engaged the family in a voluntary conversation about advance care planning and we specifically addressed what the goals of care would be moving forward, in light of the patient's change in clinical status, specifically poor mental/functional status .  We did specifically address the patient's likely prognosis, which is poor.  We explored the patient's values and preferences for future care.  The family endorses that what is most important right now is to focus on improvement in condition but with limits to invasive therapies    Accordingly, we have decided that the best plan to meet the patient's goals includes continuing with treatment    I did explain the role for hospice care at this stage of the patient's illness, including its ability to help the patient live with the best quality of life possible.  We will not be making a hospice referral.    I spent a total of 15 minutes engaging the patient in this advance care planning discussion.          I explained to sister at bedside that patient has extremely poor quality of life with poor prognosis and would benefit from a palliative approach. She understands and is likely amenable to hospice, but would like to discuss with patient's son before making any decisions.

## 2024-04-24 NOTE — ASSESSMENT & PLAN NOTE
Patient's anemia is currently controlled. Has not received any PRBCs to date. Etiology likely d/t Iron deficiency  Current CBC reviewed-   Lab Results   Component Value Date    HGB 12.0 (L) 04/23/2024    HCT 37.9 (L) 04/23/2024     Monitor serial CBC and transfuse if patient becomes hemodynamically unstable, symptomatic or H/H drops below 7/21.

## 2024-04-24 NOTE — PROGRESS NOTES
[facility] (H[facility]) Team notified of pt referral via [facility] (automated inbasket notification) .    Patient screened today 4/24/2024 by provider and RN for enrollment to program.      Pt was deemed not a candidate for enrollment at this time related to  Pt's current residence is at a nursing home .    Pt will require additional follow up planning per primary team.     If pt status, diagnosis, or treatment plan changes , please place AMB referral to [facility] ([id number]) for HFTCC enrollment re-evalution.

## 2024-04-24 NOTE — HPI
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 Patient with significant aphasia, confusion, and bedbound status at baseline, therefore HPI taken from sister at bedside. Sister states the NH reported a productive cough of clear sputum and sinus congestion for one week. Denies fever, chills, nausea, vomiting, abdominal pain, diarrhea, dysuria, hematuria. He is at baseline mental/functional status and is dependent on full-time care for ADLs. Workup in ED remarkable for VS: T 97.8, HR 80, /70, O2 sat 97% on RA. Hb 12.0, MCV 81, K 5.3, CO2 21, BUN 21, Cr 1.2, , Trop 0.303, LA 2.2, HCV Ab neg, HIV neg, Flu/Covid neg, UA: + nit, 2+ leuk, 7 WBC. CXR: Coarse interstitial attenuation, possibly reflecting edema or other pneumonitis, no large focal consolidation. CT abd/pelvis: Right pleural effusion. There is a rounded focus of atelectasis or consolidation within the right lower lobe. Large amount of stool in the colon suggests constipation. He received dose of azithromycin/ceftriaxone, lasix 80mg IVP, and enema in ED and will be admitted to hospital medicine service for further management.     Pt is DNR.

## 2024-04-24 NOTE — PHARMACY MED REC
"  Admission Medication History     The home medication history was taken by Radha Gutierrez.    You may go to "Admission" then "Reconcile Home Medications" tabs to review and/or act upon these items.     The home medication list has been updated by the Pharmacy department.   Please read ALL comments highlighted in yellow.   Please address this information as you see fit.    Feel free to contact us if you have any questions or require assistance.      The medications listed below were removed from the home medication list. Please reorder if appropriate:  Patient reports no longer taking the following medication(s):  doxazosin (CARDURA) 2 MG tablet    Medications listed below were obtained from: [employer]  Current Outpatient Medications on File Prior to Encounter   Medication Sig    acetaminophen (TYLENOL) 650 MG TbSR Take 650 mg by mouth 3 (three) times daily as needed (PAIN).      albuterol-ipratropium (DUO-NEB) 2.5 mg-0.5 mg/3 mL nebulizer solution   Take 3 mLs by nebulization every 6 (six) hours while awake. Rescue    aspirin 81 MG Chew   Take 1 tablet (81 mg total) by mouth once daily.    clopidogrel (PLAVIX) 75 mg tablet   Take 1 tablet (75 mg total) by mouth once daily.    diclofenac sodium (VOLTAREN ARTHRITIS PAIN) 1 % Gel   Apply 2 g topically 2 (two) times daily.    finasteride (PROSCAR) 5 mg tablet   Take 5 mg by mouth once daily.     fish oil-omega-3 fatty acids 300-1,000 mg capsule   Take 1 capsule by mouth once daily.    furosemide (LASIX) 40 MG tablet   Take 1 tablet (40 mg total) by mouth once daily.    multivitamin (THERAGRAN) per tablet   Take 1 tablet by mouth once daily.    potassium chloride (KLOR-CON) 10 MEQ TbSR   Take 10 mEq by mouth once daily.    senna (SENOKOT) 8.6 mg tablet   Take 1 tablet by mouth as needed for Constipation.     Radha Gutierrez  EXT [id number]               .          "

## 2024-04-24 NOTE — SUBJECTIVE & OBJECTIVE
Interval History: Pt hypotensive overnight after receiving lasix which has since been held. Remains alert, but disoriented with severe aphasia. Resting comfortably on room air. TTE with combined systolic/diastolic dysfunction with EF 10%. I had GOC discussion with family at bedside regarding his multiple comorbidities and poor functional/mental status with poor prognosis. The family understands that further aggressive mgmt will not benefit patient and likely only prolong suffering. They may want to consider comfort care, but would like to first discuss with family.     Review of Systems   Unable to perform ROS: Dementia     Objective:     Vital Signs (Most Recent):  Temp: 97.7 °F (36.5 °C) (04/24/24 0216)  Pulse: 87 (04/24/24 1141)  Resp: 19 (04/24/24 1141)  BP: 93/65 (04/24/24 1141)  SpO2: (!) 90 % (04/24/24 1141) Vital Signs (24h Range):  Temp:  [97.6 °F (36.4 °C)-98.4 °F (36.9 °C)] 97.7 °F (36.5 °C)  Pulse:  [] 87  Resp:  [18-20] 19  SpO2:  [90 %-100 %] 90 %  BP: ()/(50-72) 93/65     Weight: 98 kg (216 lb 0.8 oz)  Body mass index is 33.84 kg/m².    Intake/Output Summary (Last 24 hours) at 4/24/2024 1416  Last data filed at 4/24/2024 0014  Gross per 24 hour   Intake 600 ml   Output 1500 ml   Net -900 ml         Physical Exam  Constitutional:       General: He is not in acute distress.     Appearance: He is ill-appearing (chronic). He is not toxic-appearing.   HENT:      Head: Normocephalic and atraumatic.   Eyes:      Conjunctiva/sclera: Conjunctivae normal.      Pupils: Pupils are equal, round, and reactive to light.   Cardiovascular:      Rate and Rhythm: Normal rate and regular rhythm.      Heart sounds: Normal heart sounds.   Pulmonary:      Effort: Pulmonary effort is normal. No respiratory distress.      Breath sounds: Rales present. No wheezing.   Abdominal:      General: Bowel sounds are normal. There is no distension.      Palpations: Abdomen is soft.      Tenderness: There is no abdominal  tenderness. There is no guarding.   Musculoskeletal:         General: Normal range of motion.      Cervical back: Normal range of motion and neck supple.      Right lower leg: No edema.      Left lower leg: No edema.   Skin:     General: Skin is warm and dry.      Coloration: Skin is not jaundiced.   Neurological:      Mental Status: He is alert. Mental status is at baseline. He is disoriented.      Comments: Dysarthria and R sided hemiparesis (chronic)             Significant Labs: All pertinent labs within the past 24 hours have been reviewed.    Significant Imaging: I have reviewed all pertinent imaging results/findings within the past 24 hours.

## 2024-04-24 NOTE — ED NOTES
Received pt awake, alert, following commands, and in NAD.  Pt breathing E/U on room air.  Call bell in reach with bed rails up x2.  Pt placed on continuous cardiac, O2, and BP monitoring. Pt advised of plan of care to include all test and procedures.

## 2024-04-24 NOTE — CONSULTS
[facility] - Tele[facility] Stepdown  Palliative Medicine  Consult Note    Patient Name: Alexsander Tafoya  MRN: [id number]  Admission Date: 4/23/2024  Hospital Length of Stay: 1 days  Code Status: DNR   Attending Provider: Jai John MD  Consulting Provider: MORALES Wood  Primary Care Physician: Helio Farr MD  Principal Problem:Acute on chronic combined systolic and diastolic congestive heart failure    Patient information was obtained from patient, relative(s), and primary team.      Inpatient consult to Palliative Care  Consult performed by: Camila Lucero CNS  Consult ordered by: Jai John MD        Assessment/Plan:     Palliative Care  Palliative care encounter  Impression: Pt is a 74 y/o male with advanced heart failure, aspiration pneumonia, and hemiplegia from previous stroke. Pt lives at [facility]. Pt is bed-bound. Total assist with ADLs.  Pt's speech is very garbled and slurred. Pt is alert. Pt is DNR.  No distress noted.     Reason for consult: ACP/GOC Communicated with Dr. John.     Goals of care/ACP:    Met with pt's sisterIdalia and brother-in-law at bedside. They are aware of pt's medical issues, poor prognosis. Hospice care has been discussed as an option. Sister to speak to pt's son, Saurabh who lives in . Hospice education done with sister and brother-in-law who have no previous encounters with hospice care. They verbalized understanding. They are aware pt has been declining.     MPOA: Pt's son, Saurabh is NOK  Code status: DNR.     Will f/u with pt's family in morning.     Symptom management:    Debility:   Pt is bed-bound  Pt is total care with ADLs.   Nursing repositioning at least q 2 hrs.    Secretions:   Increased oral secretions.   Would have yanker available with suction when needed.     Constipation:    Pt  on bowel regiment.     Plan:   Will f/u with family/pt.         Thank you for your consult. I will follow-up with patient. Please contact us if you  have any additional questions.    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 Patient with significant aphasia, confusion, and bedbound status at baseline, therefore HPI taken from sister at bedside. Sister states the NH reported a productive cough of clear sputum and sinus congestion for one week. Denies fever, chills, nausea, vomiting, abdominal pain, diarrhea, dysuria, hematuria. He is at baseline mental/functional status and is dependent on full-time care for ADLs. Workup in ED remarkable for VS: T 97.8, HR 80, /70, O2 sat 97% on RA. Hb 12.0, MCV 81, K 5.3, CO2 21, BUN 21, Cr 1.2, , Trop 0.303, LA 2.2, HCV Ab neg, HIV neg, Flu/Covid neg, UA: + nit, 2+ leuk, 7 WBC. CXR: Coarse interstitial attenuation, possibly reflecting edema or other pneumonitis, no large focal consolidation. CT abd/pelvis: Right pleural effusion. There is a rounded focus of atelectasis or consolidation within the right lower lobe. Large amount of stool in the colon suggests constipation. He received dose of azithromycin/ceftriaxone, lasix 80mg IVP, and enema in ED and will be admitted to hospital medicine service for further management.     Pt is DNR.     Hospital Course:  No notes on file    Interval History: Family discussing hospice care at NH.     Past Medical History:   Diagnosis Date    BPH (benign prostatic hyperplasia)     Dysarthria     HLD (hyperlipidemia)     Hypertension     Other and unspecified hyperlipidemia     Stroke        Past Surgical History:   Procedure Laterality Date    CATARACT EXTRACTION W/ INTRAOCULAR LENS  IMPLANT, BILATERAL         Review of patient's allergies indicates:  No Known Allergies    Medications:  Continuous Infusions:  Current Facility-Administered Medications   Medication Dose Route Frequency Last Rate Last Admin      Scheduled Meds:  Current Facility-Administered Medications   Medication Dose Route Frequency    aspirin  81 mg Oral Daily    azithromycin  500 mg Oral Daily    clopidogreL  75 mg Oral Daily    enoxparin  40 mg Subcutaneous Daily    finasteride  5 mg Oral Daily    multivitamin  1 tablet Oral Daily    piperacillin-tazobactam (Zosyn) IV (PEDS and ADULTS) (extended infusion is not appropriate)  4.5 g Intravenous Q8H    polyethylene glycol  17 g Oral Daily    senna-docusate 8.6-50 mg  1 tablet Oral BID     PRN Meds:  Current Facility-Administered Medications:     acetaminophen, 650 mg, Oral, Q4H PRN    albuterol-ipratropium, 3 mL, Nebulization, Q6H PRN    aluminum-magnesium hydroxide-simethicone, 30 mL, Oral, QID PRN    dextrose 10%, 12.5 g, Intravenous, PRN    dextrose 10%, 25 g, Intravenous, PRN    glucagon (human recombinant), 1 mg, Intramuscular, PRN    glucose, 16 g, Oral, PRN    glucose, 24 g, Oral, PRN    melatonin, 6 mg, Oral, Nightly PRN    naloxone, 0.02 mg, Intravenous, PRN    ondansetron, 4 mg, Intravenous, Q8H PRN    simethicone, 1 tablet, Oral, QID PRN    sodium chloride 0.9%, 10 mL, Intravenous, Q12H PRN    sodium chloride 0.9%, 10 mL, Intravenous, PRN    Family History       Problem Relation (Age of Onset)    Dementia Father    Hypertension Mother, Father    Seizures Mother    Stroke Mother          Tobacco Use    Smoking status: Former    Smokeless tobacco: Never   Substance and Sexual Activity    Alcohol use: Yes     Comment: rare, once a week or less    Drug use: No    Sexual activity: Never       Review of Systems   Constitutional:  Positive for activity change.   HENT:  Positive for hearing loss and trouble swallowing.    Neurological:  Positive for weakness.   Psychiatric/Behavioral:  Positive for confusion.      Objective:     Vital Signs (Most Recent):  Temp: 97.7 °F (36.5 °C) (04/24/24 0216)  Pulse: 87 (04/24/24 1141)  Resp: 19 (04/24/24 1141)  BP: 93/65 (04/24/24 1141)  SpO2: (!) 90 %  (04/24/24 1141) Vital Signs (24h Range):  Temp:  [97.6 °F (36.4 °C)-98.4 °F (36.9 °C)] 97.7 °F (36.5 °C)  Pulse:  [] 87  Resp:  [18-20] 19  SpO2:  [90 %-100 %] 90 %  BP: ()/(50-72) 93/65     Weight: 98 kg (216 lb 0.8 oz)  Body mass index is 33.84 kg/m².       Physical Exam  HENT:      Head: Normocephalic and atraumatic.      Ears:      Comments: [place] left ear  Pulmonary:      Effort: Pulmonary effort is normal.   Musculoskeletal:      Comments: Weakness noted.    Skin:     Comments: Wounds to sacral area.    Neurological:      Mental Status: He is alert.   Psychiatric:      Comments: Speech slurred            Review of Symptoms      Symptom Assessment (ESAS 0-10 Scale)  Unable to complete assessment due to Acuity of condition         Pain Assessment in Advanced Demential Scale (PAINAD)   Breathing - Independent of vocalization:  0  Negative vocalization:  0  Facial expression:  0  Body language:  0  Consolability:  0  Total:  0    Performance Status:  20    Living Arrangements:  Lives in nursing home    Psychosocial/Cultural:   See Palliative Psychosocial Note: Yes  Pt lives in [facility]. Pt has one son, Saurabh who lives in [place], Pt close to his sister, Idalia who lives locally.   **Primary  to Follow**  Palliative Care  Consult: Yes        Advance Care Planning  Advance Directives:   Living Will: Yes        Copy on chart: Yes    LaPOST: Yes    Agent's Name:  Pt's sonSaurabh is NOK    Decision Making:  Family answered questions and Patient unable to communicate due to disease severity/cognitive impairment  Goals of Care: What is most important right now is to focus on improvement in condition but with limits to invasive therapies. Accordingly, we have decided that the best plan to meet the patient's goals includes continuing with treatment.         Significant Labs: All pertinent labs within the past 24 hours have been reviewed.  CBC:   Recent Labs   Lab  04/24/24  0750   WBC 6.42   HGB 11.2*   HCT 35.6*   MCV 82        BMP:  Recent Labs   Lab 04/24/24  0750   GLU 95      K 3.9      CO2 23   BUN 22   CREATININE 1.1   CALCIUM 9.4   MG 2.0     LFT:  Lab Results   Component Value Date    AST 38 04/23/2024    GGT 42 08/05/2019    ALKPHOS 157 (H) 04/23/2024    BILITOT 0.8 04/23/2024     Albumin:   Albumin   Date Value Ref Range Status   04/23/2024 2.9 (L) 3.5 - 5.2 g/dL Final     Protein:   Total Protein   Date Value Ref Range Status   04/23/2024 8.3 6.0 - 8.4 g/dL Final     Lactic acid:   Lab Results   Component Value Date    LACTATE 2.0 04/23/2024    LACTATE 2.2 04/23/2024       Significant Imaging: I have reviewed all pertinent imaging results/findings within the past 24 hours.    20 minutes of ACP completed.       Camila Lucero, CNS  Palliative Medicine  [facility] - Telemetry Stepdown

## 2024-04-24 NOTE — NURSING
Nurses Note -- 4 Eyes      4/24/2024   8:40 AM      Skin assessed during: Admit      [] No Altered Skin Integrity Present    []Prevention Measures Documented      [x] Yes- Altered Skin Integrity Present or Discovered   [x] LDA Added if Not in Epic (Describe Wound)   [x] New Altered Skin Integrity was Present on Admit and Documented in LDA   [x] Wound Image Taken    Wound Care Consulted? Yes    Attending Nurse LILIANA Milton Rn     Second RN/Staff Member:   GRANT Kapadia RN

## 2024-04-24 NOTE — ASSESSMENT & PLAN NOTE
Patient is identified as having Systolic (HFrEF) heart failure that is Acute on chronic. CHF is currently uncontrolled due to Rales/crackles on pulmonary exam and Pulmonary edema/pleural effusion on CXR. Latest ECHO performed and demonstrates- Results for orders placed during the hospital encounter of 12/23/23    Echo Saline Bubble? No    Interpretation Summary    Left Ventricle: The left ventricle is normal in size. There is concentric hypertrophy. There is severely reduced systolic function with a visually estimated ejection fraction of 25 - 30%.    Right Ventricle: Mild right ventricular enlargement. Systolic function is normal.    Left Atrium: Left atrium is moderately dilated.    Right Atrium: Right atrium is mildly dilated.    Tricuspid Valve: There is trace regurgitation. The estimated PA systolic pressure is at least 21 mmHg.    IVC/SVC: Patient is ventilated, cannot use IVC diameter to estimate right atrial pressure.  . Continue Furosemide and monitor clinical status closely. Monitor on telemetry. Patient is on CHF pathway.  Monitor strict Is&Os and daily weights.  Place on fluid restriction of 1.5 L. Cardiology has not been consulted. Continue to stress to patient importance of self efficacy and  on diet for CHF. Last BNP reviewed- and noted below   Recent Labs   Lab 04/23/24  1416   *     TTE 4/24 showing combined systolic/diastolic dysfunction with EF 10%  Start GDMT as able to tolerate   Lasix held due to hypotension, will resume when able  Doesn't appear overtly fluid overloaded  Daily GOC discussions

## 2024-04-24 NOTE — PLAN OF CARE
Advance Care Planning   [facility] - Tele[facility] Stepdown  Palliative Care       Patient Name: Alexsander Tafoya  MRN: [id number]  Admission Date: 4/23/2024  Hospital Length of Stay: 1 days  Code Status: DNR   Attending Provider: Jai John MD  Palliative Care Provider:   Primary Care Physician: Helio Farr MD  Principal Problem:Acute on chronic combined systolic and diastolic congestive heart failure     LCSW, and APRN HL, visited with pt, pt's sister Idalia, and her  at bedside for GOC conversation today. Pt alert but disoriented. Discussed pt's multiple comorbidities, poor functional status, and progressive decline. Sister and ARIELLA are realistic and understand that a comfort focused approach would likely be most appropriate for pt at this time, but want to speak with family prior to making any decisions. Educated on hospice and the services that he would receive at the nursing home where he lives. Provided support. No immediate needs identified at this time.     Tonja Daily, [facility]  Palliative Medicine

## 2024-04-25 LAB
ANION GAP SERPL CALC-SCNC: 13 MMOL/L (ref 8–16)
BASOPHILS # BLD AUTO: 0.04 K/UL (ref 0–0.2)
BASOPHILS NFR BLD: 0.5 % (ref 0–1.9)
BUN SERPL-MCNC: 21 MG/DL (ref 8–23)
CALCIUM SERPL-MCNC: 9.3 MG/DL (ref 8.7–10.5)
CHLORIDE SERPL-SCNC: 105 MMOL/L (ref 95–110)
CO2 SERPL-SCNC: 20 MMOL/L (ref 23–29)
CREAT SERPL-MCNC: 1.3 MG/DL (ref 0.5–1.4)
DIFFERENTIAL METHOD BLD: ABNORMAL
EOSINOPHIL # BLD AUTO: 0.1 K/UL (ref 0–0.5)
EOSINOPHIL NFR BLD: 0.8 % (ref 0–8)
ERYTHROCYTE [DISTWIDTH] IN BLOOD BY AUTOMATED COUNT: 16.7 % (ref 11.5–14.5)
EST. GFR  (NO RACE VARIABLE): 58 ML/MIN/1.73 M^2
GLUCOSE SERPL-MCNC: 97 MG/DL (ref 70–110)
HCT VFR BLD AUTO: 38.5 % (ref 40–54)
HGB BLD-MCNC: 11.9 G/DL (ref 14–18)
IMM GRANULOCYTES # BLD AUTO: 0.03 K/UL (ref 0–0.04)
IMM GRANULOCYTES NFR BLD AUTO: 0.4 % (ref 0–0.5)
LYMPHOCYTES # BLD AUTO: 1.4 K/UL (ref 1–4.8)
LYMPHOCYTES NFR BLD: 16.7 % (ref 18–48)
MAGNESIUM SERPL-MCNC: 2.1 MG/DL (ref 1.6–2.6)
MCH RBC QN AUTO: 25.4 PG (ref 27–31)
MCHC RBC AUTO-ENTMCNC: 30.9 G/DL (ref 32–36)
MCV RBC AUTO: 82 FL (ref 82–98)
MONOCYTES # BLD AUTO: 1.8 K/UL (ref 0.3–1)
MONOCYTES NFR BLD: 21.9 % (ref 4–15)
NEUTROPHILS # BLD AUTO: 4.9 K/UL (ref 1.8–7.7)
NEUTROPHILS NFR BLD: 59.7 % (ref 38–73)
NRBC BLD-RTO: 0 /100 WBC
OHS QRS DURATION: 82 MS
OHS QTC CALCULATION: 531 MS
PHOSPHATE SERPL-MCNC: 3.8 MG/DL (ref 2.7–4.5)
PLATELET # BLD AUTO: 356 K/UL (ref 150–450)
PMV BLD AUTO: 12.4 FL (ref 9.2–12.9)
POTASSIUM SERPL-SCNC: 4.1 MMOL/L (ref 3.5–5.1)
RBC # BLD AUTO: 4.69 M/UL (ref 4.6–6.2)
SODIUM SERPL-SCNC: 138 MMOL/L (ref 136–145)
WBC # BLD AUTO: 8.25 K/UL (ref 3.9–12.7)

## 2024-04-25 PROCEDURE — 84100 ASSAY OF PHOSPHORUS: CPT | Performed by: INTERNAL MEDICINE

## 2024-04-25 PROCEDURE — 93005 ELECTROCARDIOGRAM TRACING: CPT

## 2024-04-25 PROCEDURE — 80048 BASIC METABOLIC PNL TOTAL CA: CPT | Performed by: INTERNAL MEDICINE

## 2024-04-25 PROCEDURE — 99233 SBSQ HOSP IP/OBS HIGH 50: CPT | Mod: ,,, | Performed by: CLINICAL NURSE SPECIALIST

## 2024-04-25 PROCEDURE — 83735 ASSAY OF MAGNESIUM: CPT | Performed by: INTERNAL MEDICINE

## 2024-04-25 PROCEDURE — 85025 COMPLETE CBC W/AUTO DIFF WBC: CPT | Performed by: INTERNAL MEDICINE

## 2024-04-25 PROCEDURE — 63600175 PHARM REV CODE 636 W HCPCS: Performed by: INTERNAL MEDICINE

## 2024-04-25 PROCEDURE — 25000003 PHARM REV CODE 250: Performed by: INTERNAL MEDICINE

## 2024-04-25 PROCEDURE — 36415 COLL VENOUS BLD VENIPUNCTURE: CPT | Performed by: INTERNAL MEDICINE

## 2024-04-25 PROCEDURE — 93010 ELECTROCARDIOGRAM REPORT: CPT | Mod: ,,, | Performed by: INTERNAL MEDICINE

## 2024-04-25 PROCEDURE — 20600001 HC STEP DOWN PRIVATE ROOM

## 2024-04-25 RX ORDER — ATORVASTATIN CALCIUM 40 MG/1
40 TABLET, FILM COATED ORAL NIGHTLY
Status: DISCONTINUED | OUTPATIENT
Start: 2024-04-25 | End: 2024-04-25

## 2024-04-25 RX ORDER — METOPROLOL SUCCINATE 25 MG/1
25 TABLET, EXTENDED RELEASE ORAL DAILY
Status: DISCONTINUED | OUTPATIENT
Start: 2024-04-25 | End: 2024-04-25

## 2024-04-25 RX ADMIN — DOCUSATE SODIUM AND SENNOSIDES 1 TABLET: 8.6; 5 TABLET, FILM COATED ORAL at 08:04

## 2024-04-25 RX ADMIN — PIPERACILLIN SODIUM AND TAZOBACTAM SODIUM 4.5 G: 4; .5 INJECTION, POWDER, FOR SOLUTION INTRAVENOUS at 01:04

## 2024-04-25 RX ADMIN — PIPERACILLIN SODIUM AND TAZOBACTAM SODIUM 4.5 G: 4; .5 INJECTION, POWDER, FOR SOLUTION INTRAVENOUS at 08:04

## 2024-04-25 RX ADMIN — PIPERACILLIN SODIUM AND TAZOBACTAM SODIUM 4.5 G: 4; .5 INJECTION, POWDER, FOR SOLUTION INTRAVENOUS at 04:04

## 2024-04-25 NOTE — PLAN OF CARE
3:00pm:TRISH met w/ pt sister Idalia at bedside, after getting an update from pt doctor that family is considering Hospice. SW informed Idalia [phone] and sister Nicole [phone] (via phone, Idalia contacted sister) information that was given by pt doctor, regarding Hospice. SW verified pt is a resident at [facility], sister's verified pt is. TRISH informed she will reach out to [facility] about their process on hospice and inquire if there's a prefer hospice they use and will provide family update. If [facility] inform CM dept that family can choose own hospice agency, sister inquire about a list. SW inform a list will be provided if so.     3:15pm: TIRSH contacted [facility] [phone] admission to inquire about hospice preference x2, no answer, left voicemail. CM dept will attempt again at later time. TRISH contacted pt sister Nicole and provided update on no contact w/ [facility] and inform if family get in contact with them provide update on hospital trying to call to inquire about hospice.    Paola Petersen, EDVORA   [facility]    Case Management Dept  [phone]

## 2024-04-25 NOTE — PROGRESS NOTES
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 Patient with significant aphasia, confusion, and bedbound status at baseline, therefore HPI taken from sister at bedside. Sister states the NH reported a productive cough of clear sputum and sinus congestion for one week. Denies fever, chills, nausea, vomiting, abdominal pain, diarrhea, dysuria, hematuria. He is at baseline mental/functional status and is dependent on full-time care for ADLs. Workup in ED remarkable for VS: T 97.8, HR 80, /70, O2 sat 97% on RA. Hb 12.0, MCV 81, K 5.3, CO2 21, BUN 21, Cr 1.2, , Trop 0.303, LA 2.2, HCV Ab neg, HIV neg, Flu/Covid neg, UA: + nit, 2+ leuk, 7 WBC. CXR: Coarse interstitial attenuation, possibly reflecting edema or other pneumonitis, no large focal consolidation. CT abd/pelvis: Right pleural effusion.  There is a rounded focus of atelectasis or consolidation within the right lower lobe. Large amount of stool in the colon suggests constipation. He received dose of azithromycin/ceftriaxone, lasix 80mg IVP, and enema in ED and will be admitted to hospital medicine service for further management.    Overview/Hospital Course:  No notes on file    Interval History: Remains alert, but disoriented with severe aphasia. Resting comfortably on room air. TTE with combined systolic/diastolic dysfunction with EF 10%. I had GOC discussion with family at bedside regarding his multiple comorbidities  and poor functional/mental status with poor prognosis. The family understands that further aggressive mgmt will not benefit patient and likely only prolong suffering.Family would like hospice for patient.Palliative care and SW informed.Patient's family would like patient to have pleasure feeds accepting the risk of aspiration and related complications.         Review of Systems   Unable to perform ROS: Dementia     Objective:     Vital Signs (Most Recent):  Temp: 97.5 °F (36.4 °C) (04/25/24 1144)  Pulse: 96 (04/25/24 1520)  Resp: 16 (04/25/24 0801)  BP: 134/75 (04/25/24 1144)  SpO2: 100 % (04/25/24 1144) Vital Signs (24h Range):  Temp:  [96.4 °F (35.8 °C)-98 °F (36.7 °C)] 97.5 °F (36.4 °C)  Pulse:  [] 96  Resp:  [16-19] 16  SpO2:  [95 %-100 %] 100 %  BP: ()/(59-78) 134/75     Weight: 98 kg (216 lb 0.8 oz)  Body mass index is 33.84 kg/m².    Intake/Output Summary (Last 24 hours) at 4/25/2024 1521  Last data filed at 4/25/2024 0644  Gross per 24 hour   Intake --   Output 750 ml   Net -750 ml         Physical Exam  Constitutional:       General: He is not in acute distress.     Appearance: He is ill-appearing (chronic). He is not toxic-appearing.   HENT:      Head: Normocephalic and atraumatic.   Eyes:      Conjunctiva/sclera: Conjunctivae normal.      Pupils: Pupils are equal, round, and reactive to light.   Cardiovascular:      Rate and Rhythm: Normal rate and regular rhythm.      Heart sounds: Normal heart sounds.   Pulmonary:      Effort: Pulmonary effort is normal. No respiratory distress.      Breath sounds: Rales present. No wheezing.   Abdominal:      General: Bowel sounds are normal. There is no distension.      Palpations: Abdomen is soft.      Tenderness: There is no abdominal tenderness. There is no guarding.   Musculoskeletal:         General: Normal range of motion.      Cervical back: Normal range of motion and neck supple.      Right lower leg: No edema.      Left lower leg: No edema.    Skin:     General: Skin is warm and dry.      Coloration: Skin is not jaundiced.   Neurological:      Mental Status: He is alert. Mental status is at baseline. He is disoriented.      Comments: Dysarthria and R sided hemiparesis (chronic)             Significant Labs: All pertinent labs within the past 24 hours have been reviewed.    Significant Imaging: I have reviewed all pertinent imaging results/findings within the past 24 hours.    Assessment/Plan:      * Acute on chronic combined systolic and diastolic congestive heart failure  Patient is identified as having Systolic (HFrEF) heart failure that is Acute on chronic. CHF is currently uncontrolled due to Rales/crackles on pulmonary exam and Pulmonary edema/pleural effusion on CXR. Latest ECHO performed and demonstrates- Results for orders placed during the hospital encounter of 12/23/23    Echo Saline Bubble? No    Interpretation Summary    Left Ventricle: The left ventricle is normal in size. There is concentric hypertrophy. There is severely reduced systolic function with a visually estimated ejection fraction of 25 - 30%.    Right Ventricle: Mild right ventricular enlargement. Systolic function is normal.    Left Atrium: Left atrium is moderately dilated.    Right Atrium: Right atrium is mildly dilated.    Tricuspid Valve: There is trace regurgitation. The estimated PA systolic pressure is at least 21 mmHg.    IVC/SVC: Patient is ventilated, cannot use IVC diameter to estimate right atrial pressure.  . Continue Furosemide and monitor clinical status closely. Monitor on telemetry. Patient is on CHF pathway.  Monitor strict Is&Os and daily weights.  Place on fluid restriction of 1.5 L. Cardiology has not been consulted. Continue to stress to patient importance of self efficacy and  on diet for CHF. Last BNP reviewed- and noted below   Recent Labs   Lab 04/23/24  1416   *     TTE 4/24 showing combined systolic/diastolic dysfunction with EF  10%  Start GDMT as able to tolerate   Lasix held due to hypotension, will resume when able  Doesn't appear overtly fluid overloaded  Daily GOC discussions    Elevated troponin  Likely demand ischemia  Trend troponins  TTE: Combined systolic/diastolic dysfunction with EF 10%  Family does not want invasive measures. Will hold off on cardiology consult at this time.      Constipation  Received enema in ED  Bowel regimen  PRN bisacodyl      Aspiration pneumonia  Received dose of ceftriaxone/azithromycin in ED  Switch ceftriaxone to zosyn  Sputum culture  NPO per SLP evaluation  Patient's family would like patient to have pleasure feeds accepting the risk of aspiration and related complications.     Acute cystitis without hematuria   zosyn  Follow culture      Advance care planning  Advance Care Planning    Date: 04/23/2024    Code Status: DNR - Confirmed with family at bedside.    GOC  I engaged the family in a voluntary conversation about advance care planning and we specifically addressed what the goals of care would be moving forward, in light of the patient's change in clinical status, specifically poor mental/functional status .  We did specifically address the patient's likely prognosis, which is poor.  We explored the patient's values and preferences for future care.  The family endorses that what is most important right now is to focus on improvement in condition but with limits to invasive therapies    Accordingly, we have decided that the best plan to meet the patient's goals includes hospice. Palliative care and SW consulted     I did explain the role for hospice care at this stage of the patient's illness, including its ability to help the patient live with the best quality of life possible.      I spent a total of 30 minutes engaging the patient in this advance care planning discussion.          -I explained to sister at bedside that patient has extremely poor quality of life with poor prognosis and would  benefit from a palliative approach. She understands and is amenable to hospice.  -Family requesting no invasive procedures including PEG tube.     Pleural effusion  IV diuresis     Microcytic anemia  Patient's anemia is currently controlled. Has not received any PRBCs to date. Etiology likely d/t Iron deficiency  Current CBC reviewed-   Lab Results   Component Value Date    HGB 11.2 (L) 04/24/2024    HCT 35.6 (L) 04/24/2024     Monitor serial CBC and transfuse if patient becomes hemodynamically unstable, symptomatic or H/H drops below 7/21.    BPH (benign prostatic hyperplasia)  Continue proscar  Monitor for retention      Hyperlipidemia  Continue statin    Essential hypertension  No home HTN meds  Can resume PRN    Aphasia as late effect of cerebrovascular accident        Hemiplegia affecting right side in right-dominant patient as late effect of cerebrovascular disease   This patient has Chronic right hemiplegia due to stroke. Physical therapy services has not been scheduled. Continue all standard measures for pressure injury prevention and consult wound care for any wounds (chronic or acute).    - Chronic CVA with previously noted resultant right-sided hemiparesis and aphasia.  - Bed bound at baseline  - At his functional baseline  - SLP consulted due to concern for aspiration      VTE Risk Mitigation (From admission, onward)           Ordered     enoxaparin injection 40 mg  Daily         04/23/24 1702     IP VTE HIGH RISK PATIENT  Once         04/23/24 1702     Place sequential compression device  Until discontinued         04/23/24 1702                    Discharge Planning   THAIS: 4/26/2024     Code Status: DNR   Is the patient medically ready for discharge?:     Reason for patient still in hospital (select all that apply): Patient trending condition  Discharge Plan A: Return to nursing home   Discharge Delays: (!) Change in Medical Condition              Dacia Ortiz MD  Department of Hospital Medicine    [facility] - Tele[facility] Stepdown

## 2024-04-25 NOTE — PLAN OF CARE
Problem: Adult Inpatient Plan of Care  Goal: Plan of Care Review  Outcome: Progressing  Goal: Patient-Specific Goal (Individualized)  Outcome: Progressing  Goal: Absence of Hospital-Acquired Illness or Injury  Outcome: Progressing  Goal: Optimal Comfort and Wellbeing  Outcome: Progressing  Goal: Readiness for Transition of Care  Outcome: Progressing   Patient in bed. No complaints voiced. NADN at this time. Safety measures in place. Call light in reach.

## 2024-04-25 NOTE — ASSESSMENT & PLAN NOTE
Impression: Pt is a 72 y/o male with advanced heart failure, aspiration pneumonia, and hemiplegia from previous stroke. Pt lives at [facility]. Pt is bed-bound. Total assist with ADLs.  Pt's speech is very garbled and slurred. Pt is alert. Pt is DNR.  No distress noted.     Reason for consult: ACP/GOC Communicated with Dr. John.     Goals of care/ACP:    Today: No family at bedside. Pt appears comfortable. He is alert.   Called and spoke to pt's sisterIdalia. They are considering hospice care at [facility]. Questions answered.     4/24/24  Met with pt's sisterIdalia and brother-in-law at bedside. They are aware of pt's medical issues, poor prognosis. Hospice care has been discussed as an option. Sister to speak to pt's son, Saurabh who lives in . Hospice education done with sister and brother-in-law who have no previous encounters with hospice care. They verbalized understanding. They are aware pt has been declining.     MPOA: Pt's sonSaurabh is NOK  Code status: DNR.     Will f/u with pt's family in morning.     Symptom management:    Debility:   Pt is bed-bound  Pt is total care with ADLs.   Nursing repositioning at least q 2 hrs.    Secretions:   Increased oral secretions.   Would have yanker available with suction when needed.     Constipation:    Pt  on bowel regiment.     Plan:   Will f/u with family/pt.

## 2024-04-25 NOTE — SUBJECTIVE & OBJECTIVE
Interval History: Family discussing hospice care at NH.     Past Medical History:   Diagnosis Date    BPH (benign prostatic hyperplasia)     Dysarthria     HLD (hyperlipidemia)     Hypertension     Other and unspecified hyperlipidemia     Stroke        Past Surgical History:   Procedure Laterality Date    CATARACT EXTRACTION W/ INTRAOCULAR LENS  IMPLANT, BILATERAL         Review of patient's allergies indicates:  No Known Allergies    Medications:  Continuous Infusions:  Current Facility-Administered Medications   Medication Dose Route Frequency Last Rate Last Admin     Scheduled Meds:  Current Facility-Administered Medications   Medication Dose Route Frequency    aspirin  81 mg Oral Daily    clopidogreL  75 mg Oral Daily    enoxparin  40 mg Subcutaneous Daily    finasteride  5 mg Oral Daily    multivitamin  1 tablet Oral Daily    piperacillin-tazobactam (Zosyn) IV (PEDS and ADULTS) (extended infusion is not appropriate)  4.5 g Intravenous Q8H    polyethylene glycol  17 g Oral Daily    senna-docusate 8.6-50 mg  1 tablet Oral BID     PRN Meds:  Current Facility-Administered Medications:     acetaminophen, 650 mg, Oral, Q4H PRN    albuterol-ipratropium, 3 mL, Nebulization, Q6H PRN    aluminum-magnesium hydroxide-simethicone, 30 mL, Oral, QID PRN    bisacodyL, 10 mg, Rectal, Daily PRN    dextrose 10%, 12.5 g, Intravenous, PRN    dextrose 10%, 25 g, Intravenous, PRN    glucagon (human recombinant), 1 mg, Intramuscular, PRN    glucose, 16 g, Oral, PRN    glucose, 24 g, Oral, PRN    melatonin, 6 mg, Oral, Nightly PRN    naloxone, 0.02 mg, Intravenous, PRN    ondansetron, 4 mg, Intravenous, Q8H PRN    simethicone, 1 tablet, Oral, QID PRN    sodium chloride 0.9%, 10 mL, Intravenous, Q12H PRN    sodium chloride 0.9%, 10 mL, Intravenous, PRN    Family History       Problem Relation (Age of Onset)    Dementia Father    Hypertension Mother, Father    Seizures Mother    Stroke Mother          Tobacco Use    Smoking status:  Former    Smokeless tobacco: Never   Substance and Sexual Activity    Alcohol use: Yes     Comment: rare, once a week or less    Drug use: No    Sexual activity: Never       Review of Systems   Constitutional:  Positive for activity change.   HENT:  Positive for hearing loss and trouble swallowing.    Neurological:  Positive for weakness.   Psychiatric/Behavioral:  Positive for confusion.      Objective:     Vital Signs (Most Recent):  Temp: 97.5 °F (36.4 °C) (04/25/24 0801)  Pulse: 99 (04/25/24 0801)  Resp: 16 (04/25/24 0801)  BP: 127/78 (04/25/24 0801)  SpO2: 100 % (04/25/24 0801) Vital Signs (24h Range):  Temp:  [96.4 °F (35.8 °C)-98 °F (36.7 °C)] 97.5 °F (36.4 °C)  Pulse:  [] 99  Resp:  [16-19] 16  SpO2:  [90 %-100 %] 100 %  BP: ()/(56-78) 127/78     Weight: 98 kg (216 lb 0.8 oz)  Body mass index is 33.84 kg/m².       Physical Exam  HENT:      Head: Normocephalic and atraumatic.      Ears:      Comments: [place] left ear  Pulmonary:      Effort: Pulmonary effort is normal.   Musculoskeletal:      Comments: Weakness noted.    Skin:     Comments: Wounds to sacral area.    Neurological:      Mental Status: He is alert.   Psychiatric:      Comments: Speech slurred            Review of Symptoms      Symptom Assessment (ESAS 0-10 Scale)  Unable to complete assessment due to Acuity of condition         Pain Assessment in Advanced Demential Scale (PAINAD)   Breathing - Independent of vocalization:  0  Negative vocalization:  0  Facial expression:  0  Body language:  0  Consolability:  0  Total:  0    Performance Status:  20    Living Arrangements:  Lives in nursing home    Psychosocial/Cultural:   See Palliative Psychosocial Note: Yes  Pt lives in [facility]. Pt has one son, Saurabh who lives in [place], Pt close to his sister, Idalia who lives locally.   **Primary  to Follow**  Palliative Care  Consult: Yes        Advance Care Planning   Advance Directives:   Living Will: Yes         Copy on chart: Yes    LaPOST: Yes    Agent's Name:  Pt's son, Saurabh is NOK    Decision Making:  Family answered questions and Patient unable to communicate due to disease severity/cognitive impairment  Goals of Care: What is most important right now is to focus on improvement in condition but with limits to invasive therapies. Accordingly, we have decided that the best plan to meet the patient's goals includes continuing with treatment.         Significant Labs: All pertinent labs within the past 24 hours have been reviewed.  CBC:   Recent Labs   Lab 04/25/24  0550   WBC 8.25   HGB 11.9*   HCT 38.5*   MCV 82        BMP:  Recent Labs   Lab 04/25/24  0550   GLU 97      K 4.1      CO2 20*   BUN 21   CREATININE 1.3   CALCIUM 9.3   MG 2.1     LFT:  Lab Results   Component Value Date    AST 38 04/23/2024    GGT 42 08/05/2019    ALKPHOS 157 (H) 04/23/2024    BILITOT 0.8 04/23/2024     Albumin:   Albumin   Date Value Ref Range Status   04/23/2024 2.9 (L) 3.5 - 5.2 g/dL Final     Protein:   Total Protein   Date Value Ref Range Status   04/23/2024 8.3 6.0 - 8.4 g/dL Final     Lactic acid:   Lab Results   Component Value Date    LACTATE 2.0 04/23/2024    LACTATE 2.2 04/23/2024       Significant Imaging: I have reviewed all pertinent imaging results/findings within the past 24 hours.

## 2024-04-25 NOTE — PROGRESS NOTES
[facility] - Telemetry Stepdown  Palliative Medicine  Progress Note    Patient Name: Alexsander Tafoya  MRN: [id number]  Admission Date: 4/23/2024  Hospital Length of Stay: 2 days  Code Status: DNR   Attending Provider: Dacia Ortiz MD  Consulting Provider: MORALES Wood  Primary Care Physician: Helio Farr MD  Principal Problem:Acute on chronic combined systolic and diastolic congestive heart failure    Patient information was obtained from relative(s) and primary team.      Assessment/Plan:     Palliative Care  Palliative care encounter  Impression: Pt is a 74 y/o male with advanced heart failure, aspiration pneumonia, and hemiplegia from previous stroke. Pt lives at [facility]. Pt is bed-bound. Total assist with ADLs.  Pt's speech is very garbled and slurred. Pt is alert. Pt is DNR.  No distress noted.     Reason for consult: ACP/GOC Communicated with Dr. John.     Goals of care/ACP:    Today: No family at bedside. Pt appears comfortable. He is alert.   Called and spoke to pt's sisterIdalia. They are considering hospice care at [facility]. Questions answered.     4/24/24  Met with pt's sister, Idalia and brother-in-law at bedside. They are aware of pt's medical issues, poor prognosis. Hospice care has been discussed as an option. Sister to speak to pt's son, Saurabh who lives in . Hospice education done with sister and brother-in-law who have no previous encounters with hospice care. They verbalized understanding. They are aware pt has been declining.     MPOA: Pt's son, Saurabh is NOK  Code status: DNR.     Will f/u with pt's family in morning.     Symptom management:    Debility:   Pt is bed-bound  Pt is total care with ADLs.   Nursing repositioning at least q 2 hrs.    Secretions:   Increased oral secretions.   Would have yanker available with suction when needed.     Constipation:    Pt  on bowel regiment.     Plan:   Will f/u with family/pt.         I will follow-up with patient.  Please contact us if you have any additional questions.    Subjective:     Chief Complaint:   Chief Complaint   Patient presents with    Cough     Arrived from [facility] for chest congestion and productive cough x1 day. Denies chest pain or SOB. 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 Patient with significant aphasia, confusion, and bedbound status at baseline, therefore HPI taken from sister at bedside. Sister states the NH reported a productive cough of clear sputum and sinus congestion for one week. Denies fever, chills, nausea, vomiting, abdominal pain, diarrhea, dysuria, hematuria. He is at baseline mental/functional status and is dependent on full-time care for ADLs. Workup in ED remarkable for VS: T 97.8, HR 80, /70, O2 sat 97% on RA. Hb 12.0, MCV 81, K 5.3, CO2 21, BUN 21, Cr 1.2, , Trop 0.303, LA 2.2, HCV Ab neg, HIV neg, Flu/Covid neg, UA: + nit, 2+ leuk, 7 WBC. CXR: Coarse interstitial attenuation, possibly reflecting edema or other pneumonitis, no large focal consolidation. CT abd/pelvis: Right pleural effusion. There is a rounded focus of atelectasis or consolidation within the right lower lobe. Large amount of stool in the colon suggests constipation. He received dose of azithromycin/ceftriaxone, lasix 80mg IVP, and enema in ED and will be admitted to hospital medicine service for further management.     Pt is DNR.     Hospital Course:  No notes on file    Interval History: Family discussing hospice care at NH.     Past Medical History:   Diagnosis Date    BPH (benign prostatic hyperplasia)     Dysarthria     HLD (hyperlipidemia)     Hypertension     Other and unspecified hyperlipidemia     Stroke        Past Surgical History:   Procedure Laterality Date    CATARACT EXTRACTION W/ INTRAOCULAR LENS   IMPLANT, BILATERAL         Review of patient's allergies indicates:  No Known Allergies    Medications:  Continuous Infusions:  Current Facility-Administered Medications   Medication Dose Route Frequency Last Rate Last Admin     Scheduled Meds:  Current Facility-Administered Medications   Medication Dose Route Frequency    aspirin  81 mg Oral Daily    clopidogreL  75 mg Oral Daily    enoxparin  40 mg Subcutaneous Daily    finasteride  5 mg Oral Daily    multivitamin  1 tablet Oral Daily    piperacillin-tazobactam (Zosyn) IV (PEDS and ADULTS) (extended infusion is not appropriate)  4.5 g Intravenous Q8H    polyethylene glycol  17 g Oral Daily    senna-docusate 8.6-50 mg  1 tablet Oral BID     PRN Meds:  Current Facility-Administered Medications:     acetaminophen, 650 mg, Oral, Q4H PRN    albuterol-ipratropium, 3 mL, Nebulization, Q6H PRN    aluminum-magnesium hydroxide-simethicone, 30 mL, Oral, QID PRN    bisacodyL, 10 mg, Rectal, Daily PRN    dextrose 10%, 12.5 g, Intravenous, PRN    dextrose 10%, 25 g, Intravenous, PRN    glucagon (human recombinant), 1 mg, Intramuscular, PRN    glucose, 16 g, Oral, PRN    glucose, 24 g, Oral, PRN    melatonin, 6 mg, Oral, Nightly PRN    naloxone, 0.02 mg, Intravenous, PRN    ondansetron, 4 mg, Intravenous, Q8H PRN    simethicone, 1 tablet, Oral, QID PRN    sodium chloride 0.9%, 10 mL, Intravenous, Q12H PRN    sodium chloride 0.9%, 10 mL, Intravenous, PRN    Family History       Problem Relation (Age of Onset)    Dementia Father    Hypertension Mother, Father    Seizures Mother    Stroke Mother          Tobacco Use    Smoking status: Former    Smokeless tobacco: Never   Substance and Sexual Activity    Alcohol use: Yes     Comment: rare, once a week or less    Drug use: No    Sexual activity: Never       Review of Systems   Constitutional:  Positive for activity change.   HENT:  Positive for hearing loss and trouble swallowing.    Neurological:  Positive for weakness.    Psychiatric/Behavioral:  Positive for confusion.      Objective:     Vital Signs (Most Recent):  Temp: 97.5 °F (36.4 °C) (04/25/24 0801)  Pulse: 99 (04/25/24 0801)  Resp: 16 (04/25/24 0801)  BP: 127/78 (04/25/24 0801)  SpO2: 100 % (04/25/24 0801) Vital Signs (24h Range):  Temp:  [96.4 °F (35.8 °C)-98 °F (36.7 °C)] 97.5 °F (36.4 °C)  Pulse:  [] 99  Resp:  [16-19] 16  SpO2:  [90 %-100 %] 100 %  BP: ()/(56-78) 127/78     Weight: 98 kg (216 lb 0.8 oz)  Body mass index is 33.84 kg/m².       Physical Exam  HENT:      Head: Normocephalic and atraumatic.      Ears:      Comments: [place] left ear  Pulmonary:      Effort: Pulmonary effort is normal.   Musculoskeletal:      Comments: Weakness noted.    Skin:     Comments: Wounds to sacral area.    Neurological:      Mental Status: He is alert.   Psychiatric:      Comments: Speech slurred            Review of Symptoms      Symptom Assessment (ESAS 0-10 Scale)  Unable to complete assessment due to Acuity of condition         Pain Assessment in Advanced Demential Scale (PAINAD)   Breathing - Independent of vocalization:  0  Negative vocalization:  0  Facial expression:  0  Body language:  0  Consolability:  0  Total:  0    Performance Status:  20    Living Arrangements:  Lives in nursing home    Psychosocial/Cultural:   See Palliative Psychosocial Note: Yes  Pt lives in [facility]. Pt has one son, Saurabh who lives in [place], Pt close to his sister, Idalia who lives locally.   **Primary  to Follow**  Palliative Care  Consult: Yes        Advance Care Planning  Advance Directives:   Living Will: Yes        Copy on chart: Yes    LaPOST: Yes    Agent's Name:  Pt's sonSaurabh is NOK    Decision Making:  Family answered questions and Patient unable to communicate due to disease severity/cognitive impairment  Goals of Care: What is most important right now is to focus on improvement in condition but with limits to invasive therapies.  Accordingly, we have decided that the best plan to meet the patient's goals includes continuing with treatment.         Significant Labs: All pertinent labs within the past 24 hours have been reviewed.  CBC:   Recent Labs   Lab 04/25/24  0550   WBC 8.25   HGB 11.9*   HCT 38.5*   MCV 82        BMP:  Recent Labs   Lab 04/25/24  0550   GLU 97      K 4.1      CO2 20*   BUN 21   CREATININE 1.3   CALCIUM 9.3   MG 2.1     LFT:  Lab Results   Component Value Date    AST 38 04/23/2024    GGT 42 08/05/2019    ALKPHOS 157 (H) 04/23/2024    BILITOT 0.8 04/23/2024     Albumin:   Albumin   Date Value Ref Range Status   04/23/2024 2.9 (L) 3.5 - 5.2 g/dL Final     Protein:   Total Protein   Date Value Ref Range Status   04/23/2024 8.3 6.0 - 8.4 g/dL Final     Lactic acid:   Lab Results   Component Value Date    LACTATE 2.0 04/23/2024    LACTATE 2.2 04/23/2024       Significant Imaging: I have reviewed all pertinent imaging results/findings within the past 24 hours.    > 50% of  75 min visit spent in chart review, face to face discussion of goals of care,  symptom assessment, coordination of care and emotional support     Camila Lucero, CNS  Palliative Medicine  [facility] - Telemetry Stepdown

## 2024-04-25 NOTE — CONSULTS
[facility] - Telemetry Stepdown  Wound Care    Patient Name:  Alexsander Tafoya   MRN:  [id number]  Date: 4/25/2024  Diagnosis: Acute on chronic combined systolic and diastolic congestive heart failure    History:     Past Medical History:   Diagnosis Date    BPH (benign prostatic hyperplasia)     Dysarthria     HLD (hyperlipidemia)     Hypertension     Other and unspecified hyperlipidemia     Stroke        Social History     Socioeconomic History    Marital status:    Tobacco Use    Smoking status: Former    Smokeless tobacco: Never   Substance and Sexual Activity    Alcohol use: Yes     Comment: rare, once a week or less    Drug use: No    Sexual activity: Never     Social Determinants of Health     Financial Resource Strain: Low Risk  (4/24/2024)    Overall Financial Resource Strain (CARDIA)     Difficulty of Paying Living Expenses: Not hard at all   Food Insecurity: No Food Insecurity (4/24/2024)    Hunger Vital Sign     Worried About Running Out of Food in the Last Year: Never true     Ran Out of Food in the Last Year: Never true   Transportation Needs: No Transportation Needs (4/24/2024)    PRAPARE - Transportation     Lack of Transportation (Medical): No     Lack of Transportation (Non-Medical): No   Physical Activity: Inactive (4/24/2024)    Exercise Vital Sign     Days of Exercise per Week: 0 days     Minutes of Exercise per Session: 0 min   Stress: Patient Unable To Answer (4/24/2024)    [employer] [employer] - Occupational Stress Questionnaire     Feeling of Stress : Patient unable to answer   Social Connections: Socially Isolated (4/24/2024)    Social Connection and Isolation Panel [NHANES]     Frequency of Communication with Friends and Family: Twice a week     Frequency of Social Gatherings with Friends and Family: Twice a week     Attends [employer] Services: Never     Active Member of Clubs or Organizations: No     Attends Club or Organization Meetings: Never     Marital Status:     Housing Stability: High Risk (4/24/2024)    Housing Stability Vital Sign     Unable to Pay for Housing in the Last Year: No     Number of Times Moved in the Last Year: 2     Homeless in the Last Year: No       Precautions:     Allergies as of 04/23/2024    (No Known Allergies)       [facility] Assessment Details/Treatment     Patient seen for wound care consultation.   Reviewed chart for this encounter.   See Flow Sheet for findings.      RECOMMENDATIONS: RN consult for sacrum. Upon assessment and chart review, sacral wound present on admission. Patient is bed bound. Sacral wound noted to have pink, blanchable areas of erythema with evidence of healing. Main sacral wound noted to have tan colored eschar covering wound bed. Cleanse sacral wound with vashe for antimicrobial properties and pat dry. Apply vashe soaked gauze and to sacral wound and pack for antimicrobial properties and debridement. Cover with ABD pad and secure with medipore tape for protection from shear and to maintain skin integrity. Change BID and PRN if soiled. No other issues or concerns at this time. Will follow up 5/2/2024 or sooner if needed. Waffle mattress overlay ordered for pressure redistribution.     Discussed POC with patient and primary nurse.   See EMR for orders & patient education.    Discussed nutrition and the role of protein in wound healing with the patient. Instructed patient to optimize protein for wound healing.    Bedside nursing to continue care & monitoring.  Bedside nursing to maintain pressure injury prevention interventions.            04/25/24 0925   WOCN Assessment   WOCN Total Time (mins) 45   Visit Date 04/25/24   Visit Time 0925   Consult Type New   WOCN Speciality Wound   Intervention assessed;changed;applied;chart review;coordination of care;orders   Teaching on-going        Wound 04/23/24 1444 Ulceration lower Sacral spine #1   Date First Assessed/Time First Assessed: 04/23/24 1444   Present on Original  Admission: Yes  Primary Wound Type: Ulceration  Orientation: lower  Location: Sacral spine  Wound Number: #1   Wound Image    Dressing Appearance Dry   Drainage Amount None   Drainage Characteristics/Odor No odor   Appearance Pink;Tan;White   Care Cleansed with:;Antimicrobial agent   Dressing Applied;Silver;Foam   Dressing Change Due 04/25/24       Orders placed.   Demetris LISAN, RN  04/25/2024

## 2024-04-25 NOTE — SUBJECTIVE & OBJECTIVE
Interval History: Remains alert, but disoriented with severe aphasia. Resting comfortably on room air. TTE with combined systolic/diastolic dysfunction with EF 10%. I had GOC discussion with family at bedside regarding his multiple comorbidities and poor functional/mental status with poor prognosis. The family understands that further aggressive mgmt will not benefit patient and likely only prolong suffering.Family would like hospice for patient.Palliative care and SW informed.       Review of Systems   Unable to perform ROS: Dementia     Objective:     Vital Signs (Most Recent):  Temp: 97.5 °F (36.4 °C) (04/25/24 1144)  Pulse: 96 (04/25/24 1520)  Resp: 16 (04/25/24 0801)  BP: 134/75 (04/25/24 1144)  SpO2: 100 % (04/25/24 1144) Vital Signs (24h Range):  Temp:  [96.4 °F (35.8 °C)-98 °F (36.7 °C)] 97.5 °F (36.4 °C)  Pulse:  [] 96  Resp:  [16-19] 16  SpO2:  [95 %-100 %] 100 %  BP: ()/(59-78) 134/75     Weight: 98 kg (216 lb 0.8 oz)  Body mass index is 33.84 kg/m².    Intake/Output Summary (Last 24 hours) at 4/25/2024 1521  Last data filed at 4/25/2024 0644  Gross per 24 hour   Intake --   Output 750 ml   Net -750 ml         Physical Exam  Constitutional:       General: He is not in acute distress.     Appearance: He is ill-appearing (chronic). He is not toxic-appearing.   HENT:      Head: Normocephalic and atraumatic.   Eyes:      Conjunctiva/sclera: Conjunctivae normal.      Pupils: Pupils are equal, round, and reactive to light.   Cardiovascular:      Rate and Rhythm: Normal rate and regular rhythm.      Heart sounds: Normal heart sounds.   Pulmonary:      Effort: Pulmonary effort is normal. No respiratory distress.      Breath sounds: Rales present. No wheezing.   Abdominal:      General: Bowel sounds are normal. There is no distension.      Palpations: Abdomen is soft.      Tenderness: There is no abdominal tenderness. There is no guarding.   Musculoskeletal:         General: Normal range of motion.       Cervical back: Normal range of motion and neck supple.      Right lower leg: No edema.      Left lower leg: No edema.   Skin:     General: Skin is warm and dry.      Coloration: Skin is not jaundiced.   Neurological:      Mental Status: He is alert. Mental status is at baseline. He is disoriented.      Comments: Dysarthria and R sided hemiparesis (chronic)             Significant Labs: All pertinent labs within the past 24 hours have been reviewed.    Significant Imaging: I have reviewed all pertinent imaging results/findings within the past 24 hours.

## 2024-04-26 LAB
ANION GAP SERPL CALC-SCNC: 13 MMOL/L (ref 8–16)
BASOPHILS # BLD AUTO: 0.03 K/UL (ref 0–0.2)
BASOPHILS NFR BLD: 0.6 % (ref 0–1.9)
BUN SERPL-MCNC: 18 MG/DL (ref 8–23)
CALCIUM SERPL-MCNC: 9.2 MG/DL (ref 8.7–10.5)
CHLORIDE SERPL-SCNC: 105 MMOL/L (ref 95–110)
CO2 SERPL-SCNC: 22 MMOL/L (ref 23–29)
CREAT SERPL-MCNC: 1.2 MG/DL (ref 0.5–1.4)
DIFFERENTIAL METHOD BLD: ABNORMAL
EOSINOPHIL # BLD AUTO: 0.1 K/UL (ref 0–0.5)
EOSINOPHIL NFR BLD: 1.4 % (ref 0–8)
ERYTHROCYTE [DISTWIDTH] IN BLOOD BY AUTOMATED COUNT: 16.6 % (ref 11.5–14.5)
EST. GFR  (NO RACE VARIABLE): >60 ML/MIN/1.73 M^2
GLUCOSE SERPL-MCNC: 99 MG/DL (ref 70–110)
HCT VFR BLD AUTO: 37.2 % (ref 40–54)
HGB BLD-MCNC: 11.4 G/DL (ref 14–18)
IMM GRANULOCYTES # BLD AUTO: 0.01 K/UL (ref 0–0.04)
IMM GRANULOCYTES NFR BLD AUTO: 0.2 % (ref 0–0.5)
LYMPHOCYTES # BLD AUTO: 1.1 K/UL (ref 1–4.8)
LYMPHOCYTES NFR BLD: 21.8 % (ref 18–48)
MAGNESIUM SERPL-MCNC: 2.1 MG/DL (ref 1.6–2.6)
MCH RBC QN AUTO: 24.9 PG (ref 27–31)
MCHC RBC AUTO-ENTMCNC: 30.6 G/DL (ref 32–36)
MCV RBC AUTO: 81 FL (ref 82–98)
MONOCYTES # BLD AUTO: 0.6 K/UL (ref 0.3–1)
MONOCYTES NFR BLD: 11.8 % (ref 4–15)
MRSA SPEC QL CULT: NORMAL
NEUTROPHILS # BLD AUTO: 3.2 K/UL (ref 1.8–7.7)
NEUTROPHILS NFR BLD: 64.2 % (ref 38–73)
NRBC BLD-RTO: 0 /100 WBC
OHS QRS DURATION: 88 MS
OHS QTC CALCULATION: 487 MS
PHOSPHATE SERPL-MCNC: 3.3 MG/DL (ref 2.7–4.5)
PLATELET # BLD AUTO: 391 K/UL (ref 150–450)
PMV BLD AUTO: 11.8 FL (ref 9.2–12.9)
POTASSIUM SERPL-SCNC: 3.5 MMOL/L (ref 3.5–5.1)
RBC # BLD AUTO: 4.58 M/UL (ref 4.6–6.2)
SODIUM SERPL-SCNC: 140 MMOL/L (ref 136–145)
WBC # BLD AUTO: 5 K/UL (ref 3.9–12.7)

## 2024-04-26 PROCEDURE — 63600175 PHARM REV CODE 636 W HCPCS: Performed by: INTERNAL MEDICINE

## 2024-04-26 PROCEDURE — 84100 ASSAY OF PHOSPHORUS: CPT | Performed by: INTERNAL MEDICINE

## 2024-04-26 PROCEDURE — 25000003 PHARM REV CODE 250: Performed by: INTERNAL MEDICINE

## 2024-04-26 PROCEDURE — 97535 SELF CARE MNGMENT TRAINING: CPT

## 2024-04-26 PROCEDURE — 93005 ELECTROCARDIOGRAM TRACING: CPT

## 2024-04-26 PROCEDURE — 20600001 HC STEP DOWN PRIVATE ROOM

## 2024-04-26 PROCEDURE — 99233 SBSQ HOSP IP/OBS HIGH 50: CPT | Mod: 95,,, | Performed by: CLINICAL NURSE SPECIALIST

## 2024-04-26 PROCEDURE — 36415 COLL VENOUS BLD VENIPUNCTURE: CPT | Performed by: INTERNAL MEDICINE

## 2024-04-26 PROCEDURE — 93010 ELECTROCARDIOGRAM REPORT: CPT | Mod: ,,, | Performed by: INTERNAL MEDICINE

## 2024-04-26 PROCEDURE — 85025 COMPLETE CBC W/AUTO DIFF WBC: CPT | Performed by: INTERNAL MEDICINE

## 2024-04-26 PROCEDURE — 80048 BASIC METABOLIC PNL TOTAL CA: CPT | Performed by: INTERNAL MEDICINE

## 2024-04-26 PROCEDURE — 83735 ASSAY OF MAGNESIUM: CPT | Performed by: INTERNAL MEDICINE

## 2024-04-26 RX ADMIN — PIPERACILLIN SODIUM AND TAZOBACTAM SODIUM 4.5 G: 4; .5 INJECTION, POWDER, FOR SOLUTION INTRAVENOUS at 04:04

## 2024-04-26 RX ADMIN — CLOPIDOGREL BISULFATE 75 MG: 75 TABLET ORAL at 08:04

## 2024-04-26 RX ADMIN — POLYETHYLENE GLYCOL 3350 17 G: 17 POWDER, FOR SOLUTION ORAL at 08:04

## 2024-04-26 RX ADMIN — ENOXAPARIN SODIUM 40 MG: 40 INJECTION SUBCUTANEOUS at 05:04

## 2024-04-26 RX ADMIN — PIPERACILLIN SODIUM AND TAZOBACTAM SODIUM 4.5 G: 4; .5 INJECTION, POWDER, FOR SOLUTION INTRAVENOUS at 08:04

## 2024-04-26 RX ADMIN — DOCUSATE SODIUM AND SENNOSIDES 1 TABLET: 8.6; 5 TABLET, FILM COATED ORAL at 08:04

## 2024-04-26 RX ADMIN — BISACODYL 10 MG: 10 SUPPOSITORY RECTAL at 08:04

## 2024-04-26 RX ADMIN — ASPIRIN 81 MG CHEWABLE TABLET 81 MG: 81 TABLET CHEWABLE at 08:04

## 2024-04-26 RX ADMIN — PIPERACILLIN SODIUM AND TAZOBACTAM SODIUM 4.5 G: 4; .5 INJECTION, POWDER, FOR SOLUTION INTRAVENOUS at 12:04

## 2024-04-26 RX ADMIN — THERA TABS 1 TABLET: TAB at 08:04

## 2024-04-26 NOTE — PLAN OF CARE
[phone] and inquired about if they use any Hospice agencies.SW was told that they have their own in house Hospice company. Will inform family.  Discharge Plan A and Plan B have been determined by review of patient's clinical status, future medical and therapeutic needs, and coverage/benefits for post-acute care in coordination with multidisciplinary team members.  Jovan Sabillon, [facility]    [facility]  [phone]

## 2024-04-26 NOTE — SUBJECTIVE & OBJECTIVE
Interval History: Plan is hospice at NH per MD.     Past Medical History:   Diagnosis Date    BPH (benign prostatic hyperplasia)     Dysarthria     HLD (hyperlipidemia)     Hypertension     Other and unspecified hyperlipidemia     Stroke        Past Surgical History:   Procedure Laterality Date    CATARACT EXTRACTION W/ INTRAOCULAR LENS  IMPLANT, BILATERAL         Review of patient's allergies indicates:  No Known Allergies    Medications:  Continuous Infusions:  Current Facility-Administered Medications   Medication Dose Route Frequency Last Rate Last Admin     Scheduled Meds:  Current Facility-Administered Medications   Medication Dose Route Frequency    aspirin  81 mg Oral Daily    clopidogreL  75 mg Oral Daily    enoxparin  40 mg Subcutaneous Daily    finasteride  5 mg Oral Daily    multivitamin  1 tablet Oral Daily    piperacillin-tazobactam (Zosyn) IV (PEDS and ADULTS) (extended infusion is not appropriate)  4.5 g Intravenous Q8H    polyethylene glycol  17 g Oral Daily    senna-docusate 8.6-50 mg  1 tablet Oral BID     PRN Meds:  Current Facility-Administered Medications:     acetaminophen, 650 mg, Oral, Q4H PRN    albuterol-ipratropium, 3 mL, Nebulization, Q6H PRN    aluminum-magnesium hydroxide-simethicone, 30 mL, Oral, QID PRN    bisacodyL, 10 mg, Rectal, Daily PRN    dextrose 10%, 12.5 g, Intravenous, PRN    dextrose 10%, 25 g, Intravenous, PRN    glucagon (human recombinant), 1 mg, Intramuscular, PRN    glucose, 16 g, Oral, PRN    glucose, 24 g, Oral, PRN    melatonin, 6 mg, Oral, Nightly PRN    naloxone, 0.02 mg, Intravenous, PRN    ondansetron, 4 mg, Intravenous, Q8H PRN    simethicone, 1 tablet, Oral, QID PRN    sodium chloride 0.9%, 10 mL, Intravenous, Q12H PRN    sodium chloride 0.9%, 10 mL, Intravenous, PRN    Family History       Problem Relation (Age of Onset)    Dementia Father    Hypertension Mother, Father    Seizures Mother    Stroke Mother          Tobacco Use    Smoking status: Former     Smokeless tobacco: Never   Substance and Sexual Activity    Alcohol use: Yes     Comment: rare, once a week or less    Drug use: No    Sexual activity: Never       Review of Systems   Constitutional:  Positive for activity change.   HENT:  Positive for hearing loss and trouble swallowing.    Neurological:  Positive for weakness.   Psychiatric/Behavioral:  Positive for confusion.      Objective:     Vital Signs (Most Recent):  Temp: 97.6 °F (36.4 °C) (04/26/24 0822)  Pulse: 96 (04/26/24 0822)  Resp: 16 (04/26/24 0822)  BP: 104/74 (04/26/24 0822)  SpO2: 99 % (04/26/24 0822) Vital Signs (24h Range):  Temp:  [97.4 °F (36.3 °C)-97.9 °F (36.6 °C)] 97.6 °F (36.4 °C)  Pulse:  [] 96  Resp:  [16-18] 16  SpO2:  [97 %-100 %] 99 %  BP: ()/(47-84) 104/74     Weight: 98 kg (216 lb 0.8 oz)  Body mass index is 33.84 kg/m².       Physical Exam  HENT:      Head: Normocephalic and atraumatic.      Ears:      Comments: [place] left ear  Pulmonary:      Effort: Pulmonary effort is normal.   Musculoskeletal:      Comments: Weakness noted.    Skin:     Comments: Wounds to sacral area.    Neurological:      Mental Status: He is alert.   Psychiatric:      Comments: Speech slurred            Review of Symptoms      Symptom Assessment (ESAS 0-10 Scale)  Unable to complete assessment due to Acuity of condition         Pain Assessment in Advanced Demential Scale (PAINAD)   Breathing - Independent of vocalization:  0  Negative vocalization:  0  Facial expression:  0  Body language:  0  Consolability:  0  Total:  0    Performance Status:  20    Living Arrangements:  Lives in nursing home    Psychosocial/Cultural:   See Palliative Psychosocial Note: Yes  Pt lives in [facility]. Pt has one son, Saurabh who lives in [place], Pt close to his sister, Idalia who lives locally.   **Primary  to Follow**  Palliative Care  Consult: Yes        Advance Care Planning   Advance Directives:   Living Will: Yes         Copy on chart: Yes    LaPOST: Yes    Agent's Name:  Pt's son, Saurabh is NOK    Decision Making:  Family answered questions and Patient unable to communicate due to disease severity/cognitive impairment  Goals of Care: What is most important right now is to focus on improvement in condition but with limits to invasive therapies. Accordingly, we have decided that the best plan to meet the patient's goals includes continuing with treatment.         Significant Labs: All pertinent labs within the past 24 hours have been reviewed.  CBC:   Recent Labs   Lab 04/26/24 0549   WBC 5.00   HGB 11.4*   HCT 37.2*   MCV 81*        BMP:  Recent Labs   Lab 04/26/24 0549   GLU 99      K 3.5      CO2 22*   BUN 18   CREATININE 1.2   CALCIUM 9.2   MG 2.1     LFT:  Lab Results   Component Value Date    AST 38 04/23/2024    GGT 42 08/05/2019    ALKPHOS 157 (H) 04/23/2024    BILITOT 0.8 04/23/2024     Albumin:   Albumin   Date Value Ref Range Status   04/23/2024 2.9 (L) 3.5 - 5.2 g/dL Final     Protein:   Total Protein   Date Value Ref Range Status   04/23/2024 8.3 6.0 - 8.4 g/dL Final     Lactic acid:   Lab Results   Component Value Date    LACTATE 2.0 04/23/2024    LACTATE 2.2 04/23/2024       Significant Imaging: I have reviewed all pertinent imaging results/findings within the past 24 hours.

## 2024-04-26 NOTE — PROGRESS NOTES
[facility] - Telemetry Stepdown  Palliative Medicine  Progress Note    Patient Name: Alexsander Tafoya  MRN: [id number]  Admission Date: 4/23/2024  Hospital Length of Stay: 3 days  Code Status: DNR   Attending Provider: Dacia Ortiz MD  Consulting Provider: MORALES Wood  Primary Care Physician: Helio Farr MD  Principal Problem:Acute on chronic combined systolic and diastolic congestive heart failure    Patient information was obtained from patient and primary team.      Assessment/Plan:     Palliative Care  Palliative care encounter  Impression: Pt is a 72 y/o male with advanced heart failure, aspiration pneumonia, and hemiplegia from previous stroke. Pt lives at [facility]. Pt is bed-bound. Total assist with ADLs.  Pt's speech is very garbled and slurred. Pt is alert. Pt is DNR.  No distress noted.     Reason for consult: ACP/GOC Communicated with Dr. John.     Goals of care/ACP:    Today: No family at bedside. Plan is hospice care at [facility].     4/24/24  Met with pt's sisterIdalia and brother-in-law at bedside. They are aware of pt's medical issues, poor prognosis. Hospice care has been discussed as an option. Sister to speak to pt's son, Saurabh who lives in . Hospice education done with sister and brother-in-law who have no previous encounters with hospice care. They verbalized understanding. They are aware pt has been declining.     MPOA: Pt's son, Saurabh is NOK  Code status: DNR.     Will f/u with pt's family in morning.     Symptom management:    Debility:   Pt is bed-bound  Pt is total care with ADLs.   Nursing repositioning at least q 2 hrs.    Secretions:   Secretions improved.   Would have yanker available with suction when needed.     Constipation:    Pt  on bowel regiment.     Plan:  NH with hospice.         I will follow-up with patient. Please contact us if you have any additional questions.    Subjective:     Chief Complaint:   Chief Complaint   Patient presents with     Cough     Arrived from [facility] for chest congestion and productive cough x1 day. Denies chest pain or SOB. 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 Patient with significant aphasia, confusion, and bedbound status at baseline, therefore HPI taken from sister at bedside. Sister states the NH reported a productive cough of clear sputum and sinus congestion for one week. Denies fever, chills, nausea, vomiting, abdominal pain, diarrhea, dysuria, hematuria. He is at baseline mental/functional status and is dependent on full-time care for ADLs. Workup in ED remarkable for VS: T 97.8, HR 80, /70, O2 sat 97% on RA. Hb 12.0, MCV 81, K 5.3, CO2 21, BUN 21, Cr 1.2, , Trop 0.303, LA 2.2, HCV Ab neg, HIV neg, Flu/Covid neg, UA: + nit, 2+ leuk, 7 WBC. CXR: Coarse interstitial attenuation, possibly reflecting edema or other pneumonitis, no large focal consolidation. CT abd/pelvis: Right pleural effusion. There is a rounded focus of atelectasis or consolidation within the right lower lobe. Large amount of stool in the colon suggests constipation. He received dose of azithromycin/ceftriaxone, lasix 80mg IVP, and enema in ED and will be admitted to hospital medicine service for further management.     Pt is DNR.     Hospital Course:  No notes on file    Interval History: Plan is hospice at NH per MD.     Past Medical History:   Diagnosis Date    BPH (benign prostatic hyperplasia)     Dysarthria     HLD (hyperlipidemia)     Hypertension     Other and unspecified hyperlipidemia     Stroke        Past Surgical History:   Procedure Laterality Date    CATARACT EXTRACTION W/ INTRAOCULAR LENS  IMPLANT, BILATERAL         Review of patient's allergies indicates:  No Known Allergies    Medications:  Continuous Infusions:  Current  Facility-Administered Medications   Medication Dose Route Frequency Last Rate Last Admin     Scheduled Meds:  Current Facility-Administered Medications   Medication Dose Route Frequency    aspirin  81 mg Oral Daily    clopidogreL  75 mg Oral Daily    enoxparin  40 mg Subcutaneous Daily    finasteride  5 mg Oral Daily    multivitamin  1 tablet Oral Daily    piperacillin-tazobactam (Zosyn) IV (PEDS and ADULTS) (extended infusion is not appropriate)  4.5 g Intravenous Q8H    polyethylene glycol  17 g Oral Daily    senna-docusate 8.6-50 mg  1 tablet Oral BID     PRN Meds:  Current Facility-Administered Medications:     acetaminophen, 650 mg, Oral, Q4H PRN    albuterol-ipratropium, 3 mL, Nebulization, Q6H PRN    aluminum-magnesium hydroxide-simethicone, 30 mL, Oral, QID PRN    bisacodyL, 10 mg, Rectal, Daily PRN    dextrose 10%, 12.5 g, Intravenous, PRN    dextrose 10%, 25 g, Intravenous, PRN    glucagon (human recombinant), 1 mg, Intramuscular, PRN    glucose, 16 g, Oral, PRN    glucose, 24 g, Oral, PRN    melatonin, 6 mg, Oral, Nightly PRN    naloxone, 0.02 mg, Intravenous, PRN    ondansetron, 4 mg, Intravenous, Q8H PRN    simethicone, 1 tablet, Oral, QID PRN    sodium chloride 0.9%, 10 mL, Intravenous, Q12H PRN    sodium chloride 0.9%, 10 mL, Intravenous, PRN    Family History       Problem Relation (Age of Onset)    Dementia Father    Hypertension Mother, Father    Seizures Mother    Stroke Mother          Tobacco Use    Smoking status: Former    Smokeless tobacco: Never   Substance and Sexual Activity    Alcohol use: Yes     Comment: rare, once a week or less    Drug use: No    Sexual activity: Never       Review of Systems   Constitutional:  Positive for activity change.   HENT:  Positive for hearing loss and trouble swallowing.    Neurological:  Positive for weakness.   Psychiatric/Behavioral:  Positive for confusion.      Objective:     Vital Signs (Most Recent):  Temp: 97.6 °F (36.4 °C) (04/26/24 0822)  Pulse:  96 (04/26/24 0822)  Resp: 16 (04/26/24 0822)  BP: 104/74 (04/26/24 0822)  SpO2: 99 % (04/26/24 0822) Vital Signs (24h Range):  Temp:  [97.4 °F (36.3 °C)-97.9 °F (36.6 °C)] 97.6 °F (36.4 °C)  Pulse:  [] 96  Resp:  [16-18] 16  SpO2:  [97 %-100 %] 99 %  BP: ()/(47-84) 104/74     Weight: 98 kg (216 lb 0.8 oz)  Body mass index is 33.84 kg/m².       Physical Exam  HENT:      Head: Normocephalic and atraumatic.      Ears:      Comments: [place] left ear  Pulmonary:      Effort: Pulmonary effort is normal.   Musculoskeletal:      Comments: Weakness noted.    Skin:     Comments: Wounds to sacral area.    Neurological:      Mental Status: He is alert.   Psychiatric:      Comments: Speech slurred            Review of Symptoms      Symptom Assessment (ESAS 0-10 Scale)  Unable to complete assessment due to Acuity of condition         Pain Assessment in Advanced Demential Scale (PAINAD)   Breathing - Independent of vocalization:  0  Negative vocalization:  0  Facial expression:  0  Body language:  0  Consolability:  0  Total:  0    Performance Status:  20    Living Arrangements:  Lives in nursing home    Psychosocial/Cultural:   See Palliative Psychosocial Note: Yes  Pt lives in [facility]. Pt has one son, Saurabh who lives in [place], Pt close to his sister, Idalia who lives locally.   **Primary  to Follow**  Palliative Care  Consult: Yes        Advance Care Planning  Advance Directives:   Living Will: Yes        Copy on chart: Yes    LaPOST: Yes    Agent's Name:  Pt's son, Saurabh is NOK    Decision Making:  Family answered questions and Patient unable to communicate due to disease severity/cognitive impairment  Goals of Care: What is most important right now is to focus on improvement in condition but with limits to invasive therapies. Accordingly, we have decided that the best plan to meet the patient's goals includes continuing with treatment.         Significant Labs: All pertinent  labs within the past 24 hours have been reviewed.  CBC:   Recent Labs   Lab 04/26/24  0549   WBC 5.00   HGB 11.4*   HCT 37.2*   MCV 81*        BMP:  Recent Labs   Lab 04/26/24  0549   GLU 99      K 3.5      CO2 22*   BUN 18   CREATININE 1.2   CALCIUM 9.2   MG 2.1     LFT:  Lab Results   Component Value Date    AST 38 04/23/2024    GGT 42 08/05/2019    ALKPHOS 157 (H) 04/23/2024    BILITOT 0.8 04/23/2024     Albumin:   Albumin   Date Value Ref Range Status   04/23/2024 2.9 (L) 3.5 - 5.2 g/dL Final     Protein:   Total Protein   Date Value Ref Range Status   04/23/2024 8.3 6.0 - 8.4 g/dL Final     Lactic acid:   Lab Results   Component Value Date    LACTATE 2.0 04/23/2024    LACTATE 2.2 04/23/2024       Significant Imaging: I have reviewed all pertinent imaging results/findings within the past 24 hours.    > 50% of  55 min visit spent in chart review, face to face discussion of goals of care, charting,  symptom assessment, coordination of care and emotional support     Camila Lucero, CNS  Palliative Medicine  [facility] - Telemetry Stepdown

## 2024-04-26 NOTE — PLAN OF CARE
[facility] - Telemetry Stepdown  Discharge Reassessment    Primary Care Provider: Helio Farr MD    Expected Discharge Date: 4/30/2024    Reassessment (most recent)       Discharge Reassessment - 04/26/24 1622          Discharge Reassessment    Assessment Type Discharge Planning Reassessment     Did the patient's condition or plan change since previous assessment? No     Discharge Plan discussed with: Sibling     Communicated THAIS with patient/caregiver Yes     Discharge Plan A Hospice/home     Discharge Plan B Hospice/home     DME Needed Upon Discharge  none     Transition of Care Barriers None     Why the patient remains in the hospital Requires continued medical care        Post-Acute Status    Discharge Delays None known at this time                     Talked with the the patient sister Idalia Cornell - [phone] and family about going to [facility] with Hospice. Answered all their questions. Pt with no other needs at this time. Will continue to follow and offer support as needed.    Discharge Plan A and Plan B have been determined by review of patient's clinical status, future medical and therapeutic needs, and coverage/benefits for post-acute care in coordination with multidisciplinary team members.  Jovan Sabillon, [facility]    [facility]  [phone]

## 2024-04-26 NOTE — ASSESSMENT & PLAN NOTE
Impression: Pt is a 74 y/o male with advanced heart failure, aspiration pneumonia, and hemiplegia from previous stroke. Pt lives at [facility]. Pt is bed-bound. Total assist with ADLs.  Pt's speech is very garbled and slurred. Pt is alert. Pt is DNR.  No distress noted.     Reason for consult: ACP/GOC Communicated with Dr. John.     Goals of care/ACP:    Today: No family at bedside. Plan is hospice care at [facility].     4/24/24  Met with pt's sisterIdalia and brother-in-law at bedside. They are aware of pt's medical issues, poor prognosis. Hospice care has been discussed as an option. Sister to speak to pt's son, Saurabh who lives in . Hospice education done with sister and brother-in-law who have no previous encounters with hospice care. They verbalized understanding. They are aware pt has been declining.     MPOA: Pt's sonSaurabh is NOK  Code status: DNR.     Will f/u with pt's family in morning.     Symptom management:    Debility:   Pt is bed-bound  Pt is total care with ADLs.   Nursing repositioning at least q 2 hrs.    Secretions:   Secretions improved.   Would have yanker available with suction when needed.     Constipation:    Pt  on bowel regiment.     Plan:  [facility] with hospice.

## 2024-04-26 NOTE — PROGRESS NOTES
[facility] - Telemetry [facility] Medicine  Progress Note    Patient Name: Alexsander Tafoya  MRN: [id number]  Patient Class: IP- Inpatient   Admission Date: 4/23/2024  Length of Stay: 3 days  Attending Physician: Dacia Ortiz MD  Primary Care Provider: Helio Farr MD        Subjective:     Principal Problem:Acute on chronic combined systolic and diastolic congestive heart failure        HPI:  73M NH resident with PMH of 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 Patient with significant aphasia, confusion, and bedbound status at baseline, therefore HPI taken from sister at bedside. Sister states the NH reported a productive cough of clear sputum and sinus congestion for one week. Denies fever, chills, nausea, vomiting, abdominal pain, diarrhea, dysuria, hematuria. He is at baseline mental/functional status and is dependent on full-time care for ADLs. Workup in ED remarkable for VS: T 97.8, HR 80, /70, O2 sat 97% on RA. Hb 12.0, MCV 81, K 5.3, CO2 21, BUN 21, Cr 1.2, , Trop 0.303, LA 2.2, HCV Ab neg, HIV neg, Flu/Covid neg, UA: + nit, 2+ leuk, 7 WBC. CXR: Coarse interstitial attenuation, possibly reflecting edema or other pneumonitis, no large focal consolidation. CT abd/pelvis: Right pleural effusion.  There is a rounded focus of atelectasis or consolidation within the right lower lobe. Large amount of stool in the colon suggests constipation. He received dose of azithromycin/ceftriaxone, lasix 80mg IVP, and enema in ED and will be admitted to hospital medicine service for further management.    Overview/Hospital Course:  No notes on file    Interval History: NAEON. Awaiting NH hospice placement. Palliative care and SW/CM on board.       Review of Systems   Unable to perform ROS: Dementia     Objective:     Vital Signs (Most Recent):  Temp: 97.6 °F (36.4 °C) (04/26/24 0822)  Pulse:  96 (04/26/24 0822)  Resp: 16 (04/26/24 0822)  BP: 104/74 (04/26/24 0822)  SpO2: 99 % (04/26/24 0822) Vital Signs (24h Range):  Temp:  [97.4 °F (36.3 °C)-97.9 °F (36.6 °C)] 97.6 °F (36.4 °C)  Pulse:  [] 96  Resp:  [16-18] 16  SpO2:  [97 %-100 %] 99 %  BP: ()/(47-84) 104/74     Weight: 98 kg (216 lb 0.8 oz)  Body mass index is 33.84 kg/m².    Intake/Output Summary (Last 24 hours) at 4/26/2024 1101  Last data filed at 4/26/2024 0932  Gross per 24 hour   Intake 100 ml   Output 550 ml   Net -450 ml         Physical Exam  Constitutional:       General: He is not in acute distress.     Appearance: He is ill-appearing (chronic). He is not toxic-appearing.   HENT:      Head: Normocephalic and atraumatic.   Eyes:      Conjunctiva/sclera: Conjunctivae normal.      Pupils: Pupils are equal, round, and reactive to light.   Cardiovascular:      Rate and Rhythm: Normal rate and regular rhythm.      Heart sounds: Normal heart sounds.   Pulmonary:      Effort: Pulmonary effort is normal. No respiratory distress.      Breath sounds: Rales present. No wheezing.   Abdominal:      General: Bowel sounds are normal. There is no distension.      Palpations: Abdomen is soft.      Tenderness: There is no abdominal tenderness. There is no guarding.   Musculoskeletal:         General: Normal range of motion.      Cervical back: Normal range of motion and neck supple.      Right lower leg: No edema.      Left lower leg: No edema.   Skin:     General: Skin is warm and dry.      Coloration: Skin is not jaundiced.   Neurological:      Mental Status: He is alert. Mental status is at baseline. He is disoriented.      Comments: Dysarthria and R sided hemiparesis (chronic)             Significant Labs: All pertinent labs within the past 24 hours have been reviewed.    Significant Imaging: I have reviewed all pertinent imaging results/findings within the past 24 hours.    Assessment/Plan:      * Acute on chronic combined systolic and  diastolic congestive heart failure  Patient is identified as having Systolic (HFrEF) heart failure that is Acute on chronic. CHF is currently uncontrolled due to Rales/crackles on pulmonary exam and Pulmonary edema/pleural effusion on CXR. Latest ECHO performed and demonstrates- Results for orders placed during the hospital encounter of 12/23/23    Echo Saline Bubble? No    Interpretation Summary    Left Ventricle: The left ventricle is normal in size. There is concentric hypertrophy. There is severely reduced systolic function with a visually estimated ejection fraction of 25 - 30%.    Right Ventricle: Mild right ventricular enlargement. Systolic function is normal.    Left Atrium: Left atrium is moderately dilated.    Right Atrium: Right atrium is mildly dilated.    Tricuspid Valve: There is trace regurgitation. The estimated PA systolic pressure is at least 21 mmHg.    IVC/SVC: Patient is ventilated, cannot use IVC diameter to estimate right atrial pressure.  . Continue Furosemide and monitor clinical status closely. Monitor on telemetry. Patient is on CHF pathway.  Monitor strict Is&Os and daily weights.  Place on fluid restriction of 1.5 L. Cardiology has not been consulted. Continue to stress to patient importance of self efficacy and  on diet for CHF. Last BNP reviewed- and noted below   Recent Labs   Lab 04/23/24  1416   *     TTE 4/24 showing combined systolic/diastolic dysfunction with EF 10%  Start GDMT as able to tolerate   Lasix held due to hypotension, will resume when able  Doesn't appear overtly fluid overloaded  Daily GOC discussions    Elevated troponin  Likely demand ischemia  Trend troponins  TTE: Combined systolic/diastolic dysfunction with EF 10%  Family does not want invasive measures. Will hold off on cardiology consult at this time.      Constipation  Received enema in ED  Bowel regimen  PRN bisacodyl    Aspiration pneumonia  Received dose of ceftriaxone/azithromycin in  ED  Switch ceftriaxone to zosyn  Sputum culture       SLP recommended NPO, However family would like patient to have pleasure feeds as they are persuing hospice and understand the risk of aspiration and related consequences.      Acute cystitis without hematuria   zosyn  Follow culture      Advance care planning  Advance Care Planning    Date: 04/23/2024    Code Status: DNR - Confirmed with family at bedside.    [facility]  I engaged the family in a voluntary conversation about advance care planning and we specifically addressed what the goals of care would be moving forward, in light of the patient's change in clinical status, specifically poor mental/functional status .  We did specifically address the patient's likely prognosis, which is poor.  We explored the patient's values and preferences for future care.  The family endorses that what is most important right now is to focus on improvement in condition but with limits to invasive therapies    Accordingly, we have decided that the best plan to meet the patient's goals includes hospice. Palliative care and SW consulted     I did explain the role for hospice care at this stage of the patient's illness, including its ability to help the patient live with the best quality of life possible.      I spent a total of 30 minutes engaging the patient in this advance care planning discussion.          -I explained to sister at bedside that patient has extremely poor quality of life with poor prognosis and would benefit from a palliative approach. She understands and is amenable to hospice.  -Family requesting no invasive procedures including PEG tube.     Pleural effusion  IV diuresis     Microcytic anemia  Patient's anemia is currently controlled. Has not received any PRBCs to date. Etiology likely d/t Iron deficiency  Current CBC reviewed-   Lab Results   Component Value Date    HGB 11.2 (L) 04/24/2024    HCT 35.6 (L) 04/24/2024     Monitor serial CBC and transfuse if patient  becomes hemodynamically unstable, symptomatic or H/H drops below 7/21.    BPH (benign prostatic hyperplasia)  Continue proscar  Monitor for retention      Hyperlipidemia  Continue statin    Essential hypertension  No home HTN meds  Can resume PRN    Aphasia as late effect of cerebrovascular accident  Hemiplegia affecting right side in right-dominant patient as late effect of cerebrovascular disease   This patient has Chronic right hemiplegia due to stroke. Physical therapy services has not been scheduled. Continue all standard measures for pressure injury prevention and consult wound care for any wounds (chronic or acute).    - Chronic CVA with previously noted resultant right-sided hemiparesis and aphasia.  - Bed bound at baseline  - At his functional baseline  - SLP consulted due to concern for aspiration      VTE Risk Mitigation (From admission, onward)           Ordered     enoxaparin injection 40 mg  Daily         04/23/24 1702     IP VTE HIGH RISK PATIENT  Once         04/23/24 1702     Place sequential compression device  Until discontinued         04/23/24 1702                    Discharge Planning   THAIS: 4/26/2024     Code Status: DNR   Is the patient medically ready for discharge?:     Reason for patient still in hospital (select all that apply): Patient trending condition  Discharge Plan A: Return to nursing home   Discharge Delays: (!) Change in Medical Condition              Dacia Ortiz MD  Department of Hospital Medicine   [facility] - Telemetry Stepdown

## 2024-04-26 NOTE — NURSING
Nurses Note -- 4 Eyes      4/26/2024   6:26 AM      Skin assessed during: Q Shift Change      [] No Altered Skin Integrity Present    []Prevention Measures Documented      [x] Yes- Altered Skin Integrity Present or Discovered   [] LDA Added if Not in Epic (Describe Wound)   [] New Altered Skin Integrity was Present on Admit and Documented in LDA   [x] Wound Image Taken    Wound Care Consulted? Yes    Attending Nurse:  Kati Montoya RN/Staff Member:  Maria Ines

## 2024-04-26 NOTE — SUBJECTIVE & OBJECTIVE
Interval History: NAEON. Awaiting NH hospice placement. Palliative care and SW/CM on board.       Review of Systems   Unable to perform ROS: Dementia     Objective:     Vital Signs (Most Recent):  Temp: 97.6 °F (36.4 °C) (04/26/24 0822)  Pulse: 96 (04/26/24 0822)  Resp: 16 (04/26/24 0822)  BP: 104/74 (04/26/24 0822)  SpO2: 99 % (04/26/24 0822) Vital Signs (24h Range):  Temp:  [97.4 °F (36.3 °C)-97.9 °F (36.6 °C)] 97.6 °F (36.4 °C)  Pulse:  [] 96  Resp:  [16-18] 16  SpO2:  [97 %-100 %] 99 %  BP: ()/(47-84) 104/74     Weight: 98 kg (216 lb 0.8 oz)  Body mass index is 33.84 kg/m².    Intake/Output Summary (Last 24 hours) at 4/26/2024 1101  Last data filed at 4/26/2024 0932  Gross per 24 hour   Intake 100 ml   Output 550 ml   Net -450 ml         Physical Exam  Constitutional:       General: He is not in acute distress.     Appearance: He is ill-appearing (chronic). He is not toxic-appearing.   HENT:      Head: Normocephalic and atraumatic.   Eyes:      Conjunctiva/sclera: Conjunctivae normal.      Pupils: Pupils are equal, round, and reactive to light.   Cardiovascular:      Rate and Rhythm: Normal rate and regular rhythm.      Heart sounds: Normal heart sounds.   Pulmonary:      Effort: Pulmonary effort is normal. No respiratory distress.      Breath sounds: Rales present. No wheezing.   Abdominal:      General: Bowel sounds are normal. There is no distension.      Palpations: Abdomen is soft.      Tenderness: There is no abdominal tenderness. There is no guarding.   Musculoskeletal:         General: Normal range of motion.      Cervical back: Normal range of motion and neck supple.      Right lower leg: No edema.      Left lower leg: No edema.   Skin:     General: Skin is warm and dry.      Coloration: Skin is not jaundiced.   Neurological:      Mental Status: He is alert. Mental status is at baseline. He is disoriented.      Comments: Dysarthria and R sided hemiparesis (chronic)             Significant  Labs: All pertinent labs within the past 24 hours have been reviewed.    Significant Imaging: I have reviewed all pertinent imaging results/findings within the past 24 hours.

## 2024-04-26 NOTE — NURSING
Nurses Note -- 4 Eyes      4/26/2024   8:10 AM      Skin assessed during: Q Shift Change      [] No Altered Skin Integrity Present    []Prevention Measures Documented      [x] Yes- Altered Skin Integrity Present or Discovered   [] LDA Added if Not in Epic (Describe Wound)   [] New Altered Skin Integrity was Present on Admit and Documented in LDA   [] Wound Image Taken    Wound Care Consulted? Yes    Attending Nurse:  Maria Ines Montoya RN/Staff Member:   DAVE Parikh

## 2024-04-26 NOTE — PT/OT/SLP PROGRESS
Speech Language Pathology Treatment & Discharge Summary    Patient Name:  Alexsander Tafoya   MRN:  [id number]  Admitting Diagnosis: Acute on chronic combined systolic and diastolic congestive heart failure    Recommendations:                 General Recommendations:  Follow-up not indicated, Patient to discharge to hospice  Diet recommendations:  Pleasure Feeding. Should Patient/family wish to continue nutrition PO for quality of life/ pleasure purposes, despite risk of aspiration, safest texture would be puree and nectar-thickened liquids via single (teaspoon sized) bites/sips to reduce aspiration risk; however, risk of aspiration remains due to secretions, decreased endurance and dependent feeding.   Aspiration Precautions:  Only when awake/alert/attentive and upright, 1:1 assistance with all PO, single bites/sips,  and 1 bite/sip at a time, monitor for pocketing/oral holding and remain upright at least 30 minutes following PO Continue to monitor for signs and symptoms of aspiration and discontinue oral feeding should you notice any of the following: watery eyes, reddened facial area, wet vocal quality, increased work of breathing, change in respiratory status, increased congestion, coughing, fever and/or change in level of alertness  General Precautions: Standard, aspiration, fall  Communication strategies:  provide increased time to answer, go to room if call light pushed, and attempt yes/no questions to clarify    Assessment:     Alexsander Tafoya is a 73 y.o. male with an SLP diagnosis of Dysphagia.  SLP met with family at the bedside tfor ongoing education.     Subjective     SLP reviewed Pt with RN, RN confirmed family to visit later service day, confirmed Patient tolerated bites of breakfast meal tray for pureed textures and nectar-thickened liquids fed by RN  Per review of Patient with MD Patient, Patient will discharge with hospice and Patient's family would like Patient to have pleasure feeds   Pt presents  "alert  He explains, "Ready to go"     Pain/Comfort:  Pain Rating 1: other (see comments) (difficult to assess due to underlying aphasia)  Pain Addressed 1: Nurse notified    Respiratory Status: Room air    Objective:     Has the patient been evaluated by SLP for swallowing?   Yes    Patient found upright, awake in bed with family in the room. Patient's Sister at the bedside explained Patient tolerated bites of lunch meal tray. She asked questions about safest consistencies and aspiration precautions. SLP reviewed dietary modifications and safe swallow strategies should Patient wish to continue nutrition PO for quality of life/ pleasure purposes, despite risk of aspiration. SLP educated Patient and family (Siblings) safest textures would be smooth pureed textures and nectar-thickened liquids via single (teaspoon sized) bites and sips and additional safe swallow strategies to reduce risk of aspiration and that at this time risk of aspiration cannot be eliminated.  SLP further educated family on S/S aspiration for ongoing monitoring. Patient did not demonstrate understanding. Family verbalized understanding. CM in room to meet with Pt as SLP discontinued session.     Goals:   Multidisciplinary Problems       SLP Goals          Problem: SLP    Goal Priority Disciplines Outcome   SLP Goal     SLP Progressing   Description: Speech Language Pathology Goals  Goals expected to be met by 4/30/24    1. Pt will participate in ongoing assessment of swallow function to determine safest, least restrictive means of nutrition/hydration  2. Educate Pt and family on aspiration precautions and SLP POC                         Plan:     Patient to be seen:  4 x/week   Plan of Care expires:  05/24/24  Plan of Care reviewed with:  patient, family   SLP Follow-Up:  No (Patient to discharge to hospice)       Discharge recommendations: no additional dysphagia therapy warranted at this time, Patient to discharge with hospice      Time " Tracking:     SLP Treatment Date:   04/26/24  Speech Start Time:  0328  Speech Stop Time:  0338     Speech Total Time (min):  10 min    Billable Minutes: Self Care/Home Management Training 9    04/26/2024

## 2024-04-26 NOTE — PLAN OF CARE
Problem: Adult Inpatient Plan of Care  Goal: Plan of Care Review  4/26/2024 1810 by Maria Ines Cagle RN  Outcome: Progressing  4/26/2024 0811 by Maria Ines Cagle RN  Outcome: Progressing  Goal: Patient-Specific Goal (Individualized)  4/26/2024 1810 by Maria Ines Cagle RN  Outcome: Progressing  4/26/2024 0811 by Maria Ines Cagle RN  Outcome: Progressing  Goal: Absence of Hospital-Acquired Illness or Injury  4/26/2024 1810 by Maria Ines Cagle RN  Outcome: Progressing  4/26/2024 0811 by Maria Ines Cagle RN  Outcome: Progressing  Goal: Optimal Comfort and Wellbeing  4/26/2024 1810 by Maria Ines Cagle RN  Outcome: Progressing  4/26/2024 0811 by Maria Ines Cagle RN  Outcome: Progressing  Goal: Readiness for Transition of Care  4/26/2024 1810 by Maria Ines Cagle RN  Outcome: Progressing  4/26/2024 0811 by Maria Ines Cagle RN  Outcome: Progressing

## 2024-04-27 LAB
OHS QRS DURATION: 94 MS
OHS QTC CALCULATION: 510 MS

## 2024-04-27 PROCEDURE — 25000003 PHARM REV CODE 250: Performed by: INTERNAL MEDICINE

## 2024-04-27 PROCEDURE — 93005 ELECTROCARDIOGRAM TRACING: CPT

## 2024-04-27 PROCEDURE — 20600001 HC STEP DOWN PRIVATE ROOM

## 2024-04-27 PROCEDURE — 63600175 PHARM REV CODE 636 W HCPCS: Performed by: INTERNAL MEDICINE

## 2024-04-27 PROCEDURE — 93010 ELECTROCARDIOGRAM REPORT: CPT | Mod: ,,, | Performed by: INTERNAL MEDICINE

## 2024-04-27 RX ADMIN — ASPIRIN 81 MG CHEWABLE TABLET 81 MG: 81 TABLET CHEWABLE at 09:04

## 2024-04-27 RX ADMIN — THERA TABS 1 TABLET: TAB at 09:04

## 2024-04-27 RX ADMIN — CLOPIDOGREL BISULFATE 75 MG: 75 TABLET ORAL at 09:04

## 2024-04-27 RX ADMIN — POLYETHYLENE GLYCOL 3350 17 G: 17 POWDER, FOR SOLUTION ORAL at 09:04

## 2024-04-27 RX ADMIN — ENOXAPARIN SODIUM 40 MG: 40 INJECTION SUBCUTANEOUS at 05:04

## 2024-04-27 RX ADMIN — FINASTERIDE 5 MG: 5 TABLET, FILM COATED ORAL at 09:04

## 2024-04-27 RX ADMIN — DOCUSATE SODIUM AND SENNOSIDES 1 TABLET: 8.6; 5 TABLET, FILM COATED ORAL at 08:04

## 2024-04-27 RX ADMIN — PIPERACILLIN SODIUM AND TAZOBACTAM SODIUM 4.5 G: 4; .5 INJECTION, POWDER, FOR SOLUTION INTRAVENOUS at 12:04

## 2024-04-27 RX ADMIN — PIPERACILLIN SODIUM AND TAZOBACTAM SODIUM 4.5 G: 4; .5 INJECTION, POWDER, FOR SOLUTION INTRAVENOUS at 04:04

## 2024-04-27 RX ADMIN — DOCUSATE SODIUM AND SENNOSIDES 1 TABLET: 8.6; 5 TABLET, FILM COATED ORAL at 09:04

## 2024-04-27 RX ADMIN — ACETAMINOPHEN 650 MG: 325 TABLET ORAL at 01:04

## 2024-04-27 RX ADMIN — PIPERACILLIN SODIUM AND TAZOBACTAM SODIUM 4.5 G: 4; .5 INJECTION, POWDER, FOR SOLUTION INTRAVENOUS at 08:04

## 2024-04-27 NOTE — PROGRESS NOTES
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 Patient with significant aphasia, confusion, and bedbound status at baseline, therefore HPI taken from sister at bedside. Sister states the NH reported a productive cough of clear sputum and sinus congestion for one week. Denies fever, chills, nausea, vomiting, abdominal pain, diarrhea, dysuria, hematuria. He is at baseline mental/functional status and is dependent on full-time care for ADLs. Workup in ED remarkable for VS: T 97.8, HR 80, /70, O2 sat 97% on RA. Hb 12.0, MCV 81, K 5.3, CO2 21, BUN 21, Cr 1.2, , Trop 0.303, LA 2.2, HCV Ab neg, HIV neg, Flu/Covid neg, UA: + nit, 2+ leuk, 7 WBC. CXR: Coarse interstitial attenuation, possibly reflecting edema or other pneumonitis, no large focal consolidation. CT abd/pelvis: Right pleural effusion.  There is a rounded focus of atelectasis or consolidation within the right lower lobe. Large amount of stool in the colon suggests constipation. He received dose of azithromycin/ceftriaxone, lasix 80mg IVP, and enema in ED and will be admitted to hospital medicine service for further management.    Overview/Hospital Course:  No notes on file    Interval History: NAEON.   Awaiting NH hospice placement. Palliative care and SW/CM on board.       Review of Systems   Unable to perform ROS: Dementia     Objective:     Vital Signs (Most Recent):  Temp: 98.6 °F (37 °C) (04/27/24 1131)  Pulse:  108 (04/27/24 1131)  Resp: 19 (04/27/24 1131)  BP: (!) 92/56 (04/27/24 1131)  SpO2: 98 % (04/27/24 1131) Vital Signs (24h Range):  Temp:  [97.5 °F (36.4 °C)-98.6 °F (37 °C)] 98.6 °F (37 °C)  Pulse:  [] 108  Resp:  [18-19] 19  SpO2:  [97 %-100 %] 98 %  BP: ()/(56-75) 92/56     Weight: 98 kg (216 lb 0.8 oz)  Body mass index is 33.84 kg/m².    Intake/Output Summary (Last 24 hours) at 4/27/2024 1231  Last data filed at 4/27/2024 0539  Gross per 24 hour   Intake --   Output 400 ml   Net -400 ml         Physical Exam  Constitutional:       General: He is not in acute distress.     Appearance: He is ill-appearing (chronic). He is not toxic-appearing.   HENT:      Head: Normocephalic and atraumatic.   Eyes:      Conjunctiva/sclera: Conjunctivae normal.      Pupils: Pupils are equal, round, and reactive to light.   Cardiovascular:      Rate and Rhythm: Normal rate and regular rhythm.      Heart sounds: Normal heart sounds.   Pulmonary:      Effort: Pulmonary effort is normal. No respiratory distress.      Breath sounds: Rales present. No wheezing.   Abdominal:      General: Bowel sounds are normal. There is no distension.      Palpations: Abdomen is soft.      Tenderness: There is no abdominal tenderness. There is no guarding.   Musculoskeletal:         General: Normal range of motion.      Cervical back: Normal range of motion and neck supple.      Right lower leg: No edema.      Left lower leg: No edema.   Skin:     General: Skin is warm and dry.      Coloration: Skin is not jaundiced.   Neurological:      Mental Status: He is alert. Mental status is at baseline. He is disoriented.      Comments: Dysarthria and R sided hemiparesis (chronic)             Significant Labs: All pertinent labs within the past 24 hours have been reviewed.    Significant Imaging: I have reviewed all pertinent imaging results/findings within the past 24 hours.    Assessment/Plan:      * Acute on chronic combined systolic and diastolic  congestive heart failure  Patient is identified as having Systolic (HFrEF) heart failure that is Acute on chronic. CHF is currently uncontrolled due to Rales/crackles on pulmonary exam and Pulmonary edema/pleural effusion on CXR. Latest ECHO performed and demonstrates- Results for orders placed during the hospital encounter of 12/23/23    Echo Saline Bubble? No    Interpretation Summary    Left Ventricle: The left ventricle is normal in size. There is concentric hypertrophy. There is severely reduced systolic function with a visually estimated ejection fraction of 25 - 30%.    Right Ventricle: Mild right ventricular enlargement. Systolic function is normal.    Left Atrium: Left atrium is moderately dilated.    Right Atrium: Right atrium is mildly dilated.    Tricuspid Valve: There is trace regurgitation. The estimated PA systolic pressure is at least 21 mmHg.    IVC/SVC: Patient is ventilated, cannot use IVC diameter to estimate right atrial pressure.  . Continue Furosemide and monitor clinical status closely. Monitor on telemetry. Patient is on CHF pathway.  Monitor strict Is&Os and daily weights.  Place on fluid restriction of 1.5 L. Cardiology has not been consulted. Continue to stress to patient importance of self efficacy and  on diet for CHF. Last BNP reviewed- and noted below   Recent Labs   Lab 04/23/24  1416   *     TTE 4/24 showing combined systolic/diastolic dysfunction with EF 10%  Start GDMT as able to tolerate   Lasix held due to hypotension, will resume when able  Doesn't appear overtly fluid overloaded  Daily GOC discussions    Elevated troponin  Likely demand ischemia  Trend troponins  TTE: Combined systolic/diastolic dysfunction with EF 10%  Family does not want invasive measures. Will hold off on cardiology consult at this time.      Constipation  Received enema in ED  Bowel regimen  PRN bisacodyl      Goals of care, counseling/discussion        Palliative care  encounter        Aspiration pneumonia  Received dose of ceftriaxone/azithromycin in ED  Switch ceftriaxone to zosyn  Sputum culture  NPO  SLP evaluation    Acute cystitis without hematuria   zosyn  Follow culture      Advance care planning  Advance Care Planning    Date: 04/23/2024    Code Status: DNR - Confirmed with family at bedside.    [facility]  I engaged the family in a voluntary conversation about advance care planning and we specifically addressed what the goals of care would be moving forward, in light of the patient's change in clinical status, specifically poor mental/functional status .  We did specifically address the patient's likely prognosis, which is poor.  We explored the patient's values and preferences for future care.  The family endorses that what is most important right now is to focus on improvement in condition but with limits to invasive therapies    Accordingly, we have decided that the best plan to meet the patient's goals includes hospice. Palliative care and SW consulted     I did explain the role for hospice care at this stage of the patient's illness, including its ability to help the patient live with the best quality of life possible.      I spent a total of 30 minutes engaging the patient in this advance care planning discussion.          -I explained to sister at bedside that patient has extremely poor quality of life with poor prognosis and would benefit from a palliative approach. She understands and is amenable to hospice.  -Family requesting no invasive procedures including PEG tube.     Pleural effusion  IV diuresis     Microcytic anemia  Patient's anemia is currently controlled. Has not received any PRBCs to date. Etiology likely d/t Iron deficiency  Current CBC reviewed-   Lab Results   Component Value Date    HGB 11.2 (L) 04/24/2024    HCT 35.6 (L) 04/24/2024     Monitor serial CBC and transfuse if patient becomes hemodynamically unstable, symptomatic or H/H drops below  7/21.    BPH (benign prostatic hyperplasia)  Continue proscar  Monitor for retention      Hyperlipidemia  Continue statin    Essential hypertension  No home HTN meds  Can resume PRN    Aphasia as late effect of cerebrovascular accident        Hemiplegia affecting right side in right-dominant patient as late effect of cerebrovascular disease   This patient has Chronic right hemiplegia due to stroke. Physical therapy services has not been scheduled. Continue all standard measures for pressure injury prevention and consult wound care for any wounds (chronic or acute).    - Chronic CVA with previously noted resultant right-sided hemiparesis and aphasia.  - Bed bound at baseline  - At his functional baseline  - SLP consulted due to concern for aspiration      VTE Risk Mitigation (From admission, onward)           Ordered     enoxaparin injection 40 mg  Daily         04/23/24 1702     IP VTE HIGH RISK PATIENT  Once         04/23/24 1702     Place sequential compression device  Until discontinued         04/23/24 1702                    Discharge Planning   THAIS: 4/30/2024     Code Status: DNR   Is the patient medically ready for discharge?:     Reason for patient still in hospital (select all that apply): Patient trending condition  Discharge Plan A: Hospice/home   Discharge Delays: None known at this time              Dacia Ortiz MD  Department of Hospital Medicine   [facility] - Telemetry Stepdown

## 2024-04-27 NOTE — SUBJECTIVE & OBJECTIVE
Interval History: NAEON.   Awaiting NH hospice placement. Palliative care and SW/CM on board.       Review of Systems   Unable to perform ROS: Dementia     Objective:     Vital Signs (Most Recent):  Temp: 98.6 °F (37 °C) (04/27/24 1131)  Pulse: 108 (04/27/24 1131)  Resp: 19 (04/27/24 1131)  BP: (!) 92/56 (04/27/24 1131)  SpO2: 98 % (04/27/24 1131) Vital Signs (24h Range):  Temp:  [97.5 °F (36.4 °C)-98.6 °F (37 °C)] 98.6 °F (37 °C)  Pulse:  [] 108  Resp:  [18-19] 19  SpO2:  [97 %-100 %] 98 %  BP: ()/(56-75) 92/56     Weight: 98 kg (216 lb 0.8 oz)  Body mass index is 33.84 kg/m².    Intake/Output Summary (Last 24 hours) at 4/27/2024 1231  Last data filed at 4/27/2024 0539  Gross per 24 hour   Intake --   Output 400 ml   Net -400 ml         Physical Exam  Constitutional:       General: He is not in acute distress.     Appearance: He is ill-appearing (chronic). He is not toxic-appearing.   HENT:      Head: Normocephalic and atraumatic.   Eyes:      Conjunctiva/sclera: Conjunctivae normal.      Pupils: Pupils are equal, round, and reactive to light.   Cardiovascular:      Rate and Rhythm: Normal rate and regular rhythm.      Heart sounds: Normal heart sounds.   Pulmonary:      Effort: Pulmonary effort is normal. No respiratory distress.      Breath sounds: Rales present. No wheezing.   Abdominal:      General: Bowel sounds are normal. There is no distension.      Palpations: Abdomen is soft.      Tenderness: There is no abdominal tenderness. There is no guarding.   Musculoskeletal:         General: Normal range of motion.      Cervical back: Normal range of motion and neck supple.      Right lower leg: No edema.      Left lower leg: No edema.   Skin:     General: Skin is warm and dry.      Coloration: Skin is not jaundiced.   Neurological:      Mental Status: He is alert. Mental status is at baseline. He is disoriented.      Comments: Dysarthria and R sided hemiparesis (chronic)             Significant Labs:  All pertinent labs within the past 24 hours have been reviewed.    Significant Imaging: I have reviewed all pertinent imaging results/findings within the past 24 hours.

## 2024-04-27 NOTE — PLAN OF CARE
Patient in bed. Wound care complete. No complaints voiced. NADN at this time. Safety measures in place. Call light in reach.

## 2024-04-27 NOTE — NURSING
Nurses Note -- 4 Eyes      4/27/2024   715 AM      Skin assessed during: Q Shift Change      [] No Altered Skin Integrity Present    []Prevention Measures Documented      [x] Yes- Altered Skin Integrity Present or Discovered   [] LDA Added if Not in Epic (Describe Wound)   [] New Altered Skin Integrity was Present on Admit and Documented in LDA   [] Wound Image Taken    Wound Care Consulted? No    Attending Nurse:  Naty ACOSTA    Second RN/Staff Member:   Kati ACOSTA

## 2024-04-28 LAB
OHS QRS DURATION: 90 MS
OHS QTC CALCULATION: 490 MS

## 2024-04-28 PROCEDURE — 63600175 PHARM REV CODE 636 W HCPCS: Performed by: STUDENT IN AN ORGANIZED HEALTH CARE EDUCATION/TRAINING PROGRAM

## 2024-04-28 PROCEDURE — 93010 ELECTROCARDIOGRAM REPORT: CPT | Mod: ,,, | Performed by: INTERNAL MEDICINE

## 2024-04-28 PROCEDURE — 25000003 PHARM REV CODE 250: Performed by: INTERNAL MEDICINE

## 2024-04-28 PROCEDURE — 93005 ELECTROCARDIOGRAM TRACING: CPT

## 2024-04-28 PROCEDURE — 63600175 PHARM REV CODE 636 W HCPCS: Performed by: INTERNAL MEDICINE

## 2024-04-28 PROCEDURE — 20600001 HC STEP DOWN PRIVATE ROOM

## 2024-04-28 PROCEDURE — 25000003 PHARM REV CODE 250: Performed by: STUDENT IN AN ORGANIZED HEALTH CARE EDUCATION/TRAINING PROGRAM

## 2024-04-28 RX ORDER — SERTRALINE HYDROCHLORIDE 50 MG/1
50 TABLET, FILM COATED ORAL NIGHTLY
Status: DISCONTINUED | OUTPATIENT
Start: 2024-04-29 | End: 2024-04-29 | Stop reason: HOSPADM

## 2024-04-28 RX ORDER — SERTRALINE HYDROCHLORIDE 50 MG/1
50 TABLET, FILM COATED ORAL DAILY
Status: DISCONTINUED | OUTPATIENT
Start: 2024-04-28 | End: 2024-04-28

## 2024-04-28 RX ADMIN — ENOXAPARIN SODIUM 40 MG: 40 INJECTION SUBCUTANEOUS at 05:04

## 2024-04-28 RX ADMIN — FINASTERIDE 5 MG: 5 TABLET, FILM COATED ORAL at 09:04

## 2024-04-28 RX ADMIN — PIPERACILLIN SODIUM AND TAZOBACTAM SODIUM 4.5 G: 4; .5 INJECTION, POWDER, FOR SOLUTION INTRAVENOUS at 05:04

## 2024-04-28 RX ADMIN — DOCUSATE SODIUM AND SENNOSIDES 1 TABLET: 8.6; 5 TABLET, FILM COATED ORAL at 09:04

## 2024-04-28 RX ADMIN — CLOPIDOGREL BISULFATE 75 MG: 75 TABLET ORAL at 09:04

## 2024-04-28 RX ADMIN — Medication 6 MG: at 09:04

## 2024-04-28 RX ADMIN — ASPIRIN 81 MG CHEWABLE TABLET 81 MG: 81 TABLET CHEWABLE at 09:04

## 2024-04-28 RX ADMIN — POLYETHYLENE GLYCOL 3350 17 G: 17 POWDER, FOR SOLUTION ORAL at 09:04

## 2024-04-28 RX ADMIN — PIPERACILLIN SODIUM AND TAZOBACTAM SODIUM 4.5 G: 4; .5 INJECTION, POWDER, FOR SOLUTION INTRAVENOUS at 09:04

## 2024-04-28 RX ADMIN — THERA TABS 1 TABLET: TAB at 09:04

## 2024-04-28 RX ADMIN — PIPERACILLIN SODIUM AND TAZOBACTAM SODIUM 4.5 G: 4; .5 INJECTION, POWDER, FOR SOLUTION INTRAVENOUS at 12:04

## 2024-04-28 NOTE — SUBJECTIVE & OBJECTIVE
Interval History:   Awaiting NH hospice placement. Palliative care and SW/CM on board.       Review of Systems   Unable to perform ROS: Dementia     Objective:     Vital Signs (Most Recent):  Temp: 98 °F (36.7 °C) (04/28/24 1105)  Pulse: 91 (04/28/24 1124)  Resp: 19 (04/28/24 1105)  BP: (!) 81/65 (04/28/24 1124)  SpO2: 99 % (04/28/24 1105) Vital Signs (24h Range):  Temp:  [97.6 °F (36.4 °C)-98.6 °F (37 °C)] 98 °F (36.7 °C)  Pulse:  [] 91  Resp:  [17-19] 19  SpO2:  [95 %-100 %] 99 %  BP: (79-96)/(57-68) 81/65     Weight: 98 kg (216 lb 0.8 oz)  Body mass index is 33.84 kg/m².    Intake/Output Summary (Last 24 hours) at 4/28/2024 1151  Last data filed at 4/27/2024 1846  Gross per 24 hour   Intake 300 ml   Output 400 ml   Net -100 ml         Physical Exam  Constitutional:       General: He is not in acute distress.     Appearance: He is ill-appearing (chronic). He is not toxic-appearing.   HENT:      Head: Normocephalic and atraumatic.   Eyes:      Conjunctiva/sclera: Conjunctivae normal.      Pupils: Pupils are equal, round, and reactive to light.   Cardiovascular:      Rate and Rhythm: Normal rate and regular rhythm.      Heart sounds: Normal heart sounds.   Pulmonary:      Effort: Pulmonary effort is normal. No respiratory distress.      Breath sounds: Rales present. No wheezing.   Abdominal:      General: Bowel sounds are normal. There is no distension.      Palpations: Abdomen is soft.      Tenderness: There is no abdominal tenderness. There is no guarding.   Musculoskeletal:         General: Normal range of motion.      Cervical back: Normal range of motion and neck supple.      Right lower leg: No edema.      Left lower leg: No edema.   Skin:     General: Skin is warm and dry.      Coloration: Skin is not jaundiced.   Neurological:      Mental Status: He is alert. Mental status is at baseline. He is disoriented.      Comments: Dysarthria and R sided hemiparesis (chronic)             Significant Labs: All  pertinent labs within the past 24 hours have been reviewed.    Significant Imaging: I have reviewed all pertinent imaging results/findings within the past 24 hours.

## 2024-04-28 NOTE — PLAN OF CARE
Problem: Adult Inpatient Plan of Care  Goal: Plan of Care Review  Outcome: Progressing  Flowsheets (Taken 4/28/2024 0625)  Plan of Care Reviewed With: patient  Goal: Optimal Comfort and Wellbeing  Outcome: Progressing  Intervention: Provide Person-Centered Care  Flowsheets (Taken 4/28/2024 0625)  Trust Relationship/Rapport:   care explained   questions encouraged   choices provided   reassurance provided   emotional support provided   thoughts/feelings acknowledged   empathic listening provided   questions answered     Problem: Wound  Goal: Optimal Coping  Outcome: Progressing     Problem: Skin Injury Risk Increased  Goal: Skin Health and Integrity  Outcome: Progressing  Intervention: Optimize Skin Protection  Flowsheets (Taken 4/28/2024 0625)  Pressure Reduction Techniques:   positioned off wounds   frequent weight shift encouraged   weight shift assistance provided  Skin Protection: incontinence pads utilized     Problem: Fall Injury Risk  Goal: Absence of Fall and Fall-Related Injury  Outcome: Progressing  Intervention: Promote Injury-Free Environment  Flowsheets (Taken 4/28/2024 0625)  Safety Promotion/Fall Prevention:   assistive device/personal item within reach   bed alarm set   Fall Risk reviewed with patient/family   lighting adjusted   side rails raised x 2   instructed to call staff for mobility

## 2024-04-28 NOTE — PLAN OF CARE
Problem: Adult Inpatient Plan of Care  Goal: Absence of Hospital-Acquired Illness or Injury  Outcome: Progressing  Intervention: Identify and Manage Fall Risk  Flowsheets (Taken 4/28/2024 160)  Safety Promotion/Fall Prevention:   assistive device/personal item within reach   side rails raised x 2   lighting adjusted   medications reviewed  Plan of care was reviewed with the pt. AAOx1. VSS. Cardiac monitoring was done. Pt was turned  Q2. Intake and output was documented. No major changes throughout the day. Safety precautions were in placed.

## 2024-04-29 VITALS
DIASTOLIC BLOOD PRESSURE: 54 MMHG | WEIGHT: 216.06 LBS | OXYGEN SATURATION: 94 % | HEIGHT: 67 IN | BODY MASS INDEX: 33.91 KG/M2 | RESPIRATION RATE: 18 BRPM | TEMPERATURE: 99 F | HEART RATE: 100 BPM | SYSTOLIC BLOOD PRESSURE: 82 MMHG

## 2024-04-29 LAB
OHS QRS DURATION: 82 MS
OHS QTC CALCULATION: 499 MS

## 2024-04-29 PROCEDURE — 93010 ELECTROCARDIOGRAM REPORT: CPT | Mod: ,,, | Performed by: INTERNAL MEDICINE

## 2024-04-29 PROCEDURE — 25000003 PHARM REV CODE 250: Performed by: STUDENT IN AN ORGANIZED HEALTH CARE EDUCATION/TRAINING PROGRAM

## 2024-04-29 PROCEDURE — 63600175 PHARM REV CODE 636 W HCPCS: Performed by: INTERNAL MEDICINE

## 2024-04-29 PROCEDURE — 63600175 PHARM REV CODE 636 W HCPCS: Performed by: STUDENT IN AN ORGANIZED HEALTH CARE EDUCATION/TRAINING PROGRAM

## 2024-04-29 PROCEDURE — 25000003 PHARM REV CODE 250: Performed by: INTERNAL MEDICINE

## 2024-04-29 PROCEDURE — 93005 ELECTROCARDIOGRAM TRACING: CPT

## 2024-04-29 RX ORDER — SERTRALINE HYDROCHLORIDE 50 MG/1
50 TABLET, FILM COATED ORAL NIGHTLY
Start: 2024-04-29 | End: 2025-04-29

## 2024-04-29 RX ADMIN — ENOXAPARIN SODIUM 40 MG: 40 INJECTION SUBCUTANEOUS at 04:04

## 2024-04-29 RX ADMIN — CLOPIDOGREL BISULFATE 75 MG: 75 TABLET ORAL at 08:04

## 2024-04-29 RX ADMIN — DOCUSATE SODIUM AND SENNOSIDES 1 TABLET: 8.6; 5 TABLET, FILM COATED ORAL at 08:04

## 2024-04-29 RX ADMIN — FINASTERIDE 5 MG: 5 TABLET, FILM COATED ORAL at 08:04

## 2024-04-29 RX ADMIN — PIPERACILLIN SODIUM AND TAZOBACTAM SODIUM 4.5 G: 4; .5 INJECTION, POWDER, FOR SOLUTION INTRAVENOUS at 09:04

## 2024-04-29 RX ADMIN — POLYETHYLENE GLYCOL 3350 17 G: 17 POWDER, FOR SOLUTION ORAL at 08:04

## 2024-04-29 RX ADMIN — ASPIRIN 81 MG CHEWABLE TABLET 81 MG: 81 TABLET CHEWABLE at 08:04

## 2024-04-29 RX ADMIN — THERA TABS 1 TABLET: TAB at 08:04

## 2024-04-29 NOTE — PLAN OF CARE
Problem: Adult Inpatient Plan of Care  Goal: Plan of Care Review  Outcome: Progressing  Flowsheets (Taken 4/29/2024 0422)  Plan of Care Reviewed With: patient  Goal: Optimal Comfort and Wellbeing  Outcome: Progressing  Intervention: Provide Person-Centered Care  Flowsheets (Taken 4/29/2024 0422)  Trust Relationship/Rapport:   care explained   questions encouraged   choices provided   reassurance provided   emotional support provided   thoughts/feelings acknowledged   empathic listening provided   questions answered     Problem: Wound  Goal: Absence of Infection Signs and Symptoms  Outcome: Progressing  Goal: Improved Oral Intake  Outcome: Progressing  Goal: Skin Health and Integrity  Outcome: Progressing  Intervention: Optimize Skin Protection  Flowsheets (Taken 4/29/2024 0422)  Pressure Reduction Techniques:   frequent weight shift encouraged   weight shift assistance provided  Skin Protection: incontinence pads utilized  Head of Bed (HOB) Positioning: HOB elevated     Problem: Skin Injury Risk Increased  Goal: Skin Health and Integrity  Outcome: Progressing     Problem: Fall Injury Risk  Goal: Absence of Fall and Fall-Related Injury  Outcome: Progressing  Intervention: Promote Injury-Free Environment  Flowsheets (Taken 4/29/2024 0422)  Safety Promotion/Fall Prevention:   assistive device/personal item within reach   bed alarm set   Fall Risk reviewed with patient/family   lighting adjusted   side rails raised x 2   instructed to call staff for mobility

## 2024-04-29 NOTE — PLAN OF CARE
Nurse can call report to [phone]  - [address] [facility] [address]    Jovan Sabillon LMSW    [facility]  [phone]

## 2024-04-29 NOTE — DISCHARGE SUMMARY
[facility]  Discharge Summary      Patient Name: Alexsander Tafoya  MRN: [id number]  RADHA: [id number]  Patient Class: IP- Inpatient  Admission Date: 4/23/2024  Hospital Length of Stay: 6 days  Discharge Date and Time:  04/29/2024 3:23 PM  Attending Physician: Dacia Ortiz MD   Discharging Provider: Dacia Ortiz MD  Primary Care Provider: Helio Farr MD  Hospital Medicine Team: [facility] [place] HOSP MED W Dacia Ortiz MD  Primary Care Team: [facility] [place] [place] HOSP MED W    HPI:   73M NH resident with PMH of 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 Patient with significant aphasia, confusion, and bedbound status at baseline, therefore HPI taken from sister at bedside. Sister states the NH reported a productive cough of clear sputum and sinus congestion for one week. Denies fever, chills, nausea, vomiting, abdominal pain, diarrhea, dysuria, hematuria. He is at baseline mental/functional status and is dependent on full-time care for ADLs. Workup in ED remarkable for VS: T 97.8, HR 80, /70, O2 sat 97% on RA. Hb 12.0, MCV 81, K 5.3, CO2 21, BUN 21, Cr 1.2, , Trop 0.303, LA 2.2, HCV Ab neg, HIV neg, Flu/Covid neg, UA: + nit, 2+ leuk, 7 WBC. CXR: Coarse interstitial attenuation, possibly reflecting edema or other pneumonitis, no large focal consolidation. CT abd/pelvis: Right pleural effusion.  There is a rounded focus of atelectasis or consolidation within the right lower lobe. Large amount of stool in the colon suggests constipation. He received dose of azithromycin/ceftriaxone, lasix 80mg IVP, and enema in ED and will be admitted to hospital medicine service for further management.    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 Started on zosyn and lasix. Pt with severe aphasia and bedbound with extremely poor quality of life and poor prognosis. TTE 4/24 showing combined systolic/diastolic dysfunction with EF 10%. SLP recommend NPO. Patient and family do not want PEG tube. Discussed with sister at bedside that patient has extremely poor quality of life with poor prognosis and would benefit from a palliative approach. Family wants pleasure feeds for patient accepting the risk of aspiration and its consequences. Palliative care consulted. Family requesting no invasive procedures for patient and transition to hospice at [facility]. CM on board. Patient is being d/c to hospice at [facility]. Plan of care discussed with patient's family, verbalized understanding. All questions were answered.         Goals of Care Treatment Preferences:  Code Status: DNR    Health care agent: Pt's sonSaurabh is NOK  Health care agent number: No value filed.    Living Will: Yes  LaPOST: Yes  What is most important right now is to focus on improvement in condition but with limits to invasive therapies.  Accordingly, we have decided that the best plan to meet the patient's goals includes continuing with treatment.      Consults:   Consults (From admission, onward)          Status Ordering Provider     Inpatient consult to PICC team (NIAS)  Once        Provider:  (Not yet assigned)    Completed JENNIFER, BEATRICE     Inpatient consult to Palliative Care  Once        Provider:  (Not yet assigned)    Completed MARCY MONTEZ     Inpatient consult to Registered Dietitian/Nutritionist  Once        Provider:  (Not yet assigned)    Completed MARCY MONTEZ     Inpatient consult to Social Work/Case Management  Once        Provider:  (Not yet assigned)    Acknowledged MARCY MONTEZ     Inpatient consult to Registered Dietitian/Nutritionist  Once        Provider:  (Not yet assigned)    Completed MARCY MONTEZ            No new Assessment & Plan  notes have been filed under this hospital service since the last note was generated.  Service: Hospital Medicine    Final Active Diagnoses:    Diagnosis Date Noted POA    PRINCIPAL PROBLEM:  Acute on chronic combined systolic and diastolic congestive heart failure [I50.43] 12/25/2023 Yes    Palliative care encounter [Z51.5] 04/24/2024 Not Applicable    Debility [R53.81] 04/24/2024 Unknown    Goals of care, counseling/discussion [Z71.89] 04/24/2024 Not Applicable    Constipation [K59.00] 04/24/2024 Yes    Elevated troponin [R79.89] 04/24/2024 Unknown    Acute cystitis without hematuria [N30.00] 04/23/2024 Yes    Aspiration pneumonia [J69.0] 04/23/2024 Yes    Advance care planning [Z71.89] 12/24/2023 Not Applicable    Pleural effusion [J90] 12/23/2023 Yes    BPH (benign prostatic hyperplasia) [N40.0] 08/05/2019 Yes    Microcytic anemia [D50.9] 08/05/2019 Yes    Hemiplegia affecting right side in right-dominant patient as late effect of cerebrovascular disease [I69.951] 07/17/2017 Not Applicable    Aphasia as late effect of cerebrovascular accident [I69.320] 07/17/2017 Not Applicable    Essential hypertension [I10] 07/17/2017 Yes    Hyperlipidemia [E78.5] 07/17/2017 Yes      Problems Resolved During this Admission:       Discharged Condition: stable    Disposition: Hospice/Medical Facility    Follow Up:   Follow-up Information       Helio Farr MD Follow up in 3 day(s).    Specialty: Internal Medicine  Contact information:  [address] [place] [address]  [id number]  [place] [address]  [phone]                           Patient Instructions:      Ambulatory referral/consult to [facility]   Standing Status: Future   Referral Priority: Routine Referral Type: Consultation   Referral Reason: Specialty Services Required   Requested Specialty: Cardiology   Number of Visits Requested: 1     Call MD for:  temperature >100.4     Call MD for:  persistent nausea and vomiting or diarrhea     Call MD  for:  severe uncontrolled pain     Call MD for:  redness, tenderness, or signs of infection (pain, swelling, redness, odor or green/yellow discharge around incision site)     Call MD for:  difficulty breathing or increased cough     Call MD for:  severe persistent headache     Call MD for:  worsening rash     Call MD for:  persistent dizziness, light-headedness, or visual disturbances     Call MD for:  increased confusion or weakness       Significant Diagnostic Studies: N/A    Pending Diagnostic Studies:       Procedure Component Value Units Date/Time    EKG 12-lead [[id number]]     Order Status: Sent Lab Status: No result            Medications:  Reconciled Home Medications:      Medication List        START taking these medications      sertraline 50 MG tablet  Commonly known as: ZOLOFT  Take 1 tablet (50 mg total) by mouth every evening.            CONTINUE taking these medications      acetaminophen 650 MG Tbsr  Commonly known as: TYLENOL  Take 650 mg by mouth 3 (three) times daily as needed (PAIN).     albuterol-ipratropium 2.5 mg-0.5 mg/3 mL nebulizer solution  Commonly known as: DUO-NEB  Take 3 mLs by nebulization every 6 (six) hours while awake. Rescue     aspirin 81 MG Chew  Take 1 tablet (81 mg total) by mouth once daily.     clopidogreL 75 mg tablet  Commonly known as: PLAVIX  Take 1 tablet (75 mg total) by mouth once daily.     finasteride 5 mg tablet  Commonly known as: PROSCAR  Take 5 mg by mouth once daily.     fish oil-omega-3 fatty acids 300-1,000 mg capsule  Take 1 capsule by mouth once daily.     furosemide 40 MG tablet  Commonly known as: LASIX  Take 1 tablet (40 mg total) by mouth once daily.     multivitamin per tablet  Commonly known as: THERAGRAN  Take 1 tablet by mouth once daily.     potassium chloride 10 MEQ Tbsr  Commonly known as: KLOR-CON  Take 10 mEq by mouth once daily.     senna 8.6 mg tablet  Commonly known as: SENOKOT  Take 1 tablet by mouth as needed for Constipation.      VOLTAREN ARTHRITIS PAIN 1 % Gel  Generic drug: diclofenac sodium  Apply 2 g topically 2 (two) times daily. To right knee              Indwelling Lines/Drains at time of discharge:   Lines/Drains/Airways       Drain  Duration             Male External Urinary Catheter 04/23/24 1648 Medium 5 days                    Time spent on the discharge of patient: 35 minutes         Dacia Ortiz MD  Department of Hospital Medicine  [facility] - Tele[facility] Stepdown

## 2024-04-29 NOTE — PLAN OF CARE
[facility]  Department of Hospital Medicine  [address] [place] [address]  [phone] after hours  [phone] fax    HOSPICE  ORDERS    04/29/2024    Admit to [facility]     Diagnoses:   Active Hospital Problems    Diagnosis  POA    *Acute on chronic combined systolic and diastolic congestive heart failure [I50.43]  Yes           Palliative care encounter [Z51.5]  Not Applicable    Debility [R53.81]  Unknown    Goals of care, counseling/discussion [Z71.89]  Not Applicable    Constipation [K59.00]  Yes    Elevated troponin [R79.89]  Unknown    Acute cystitis without hematuria [N30.00]  Yes    Aspiration pneumonia [J69.0]  Yes    Advance care planning [Z71.89]  Not Applicable           Pleural effusion [J90]  Yes    BPH (benign prostatic hyperplasia) [N40.0]  Yes    Microcytic anemia [D50.9]  Yes    Hemiplegia affecting right side in right-dominant patient as late effect of cerebrovascular disease [I69.951]  Not Applicable    Aphasia as late effect of cerebrovascular accident [I69.320]  Not Applicable    Essential hypertension [I10]  Yes    Hyperlipidemia [E78.5]  Yes      Resolved Hospital Problems   No resolved problems to display.       Hospice Qualifying Diagnoses:        Patient has a life expectancy < 6 months due to:  Primary Hospice Diagnosis:  CVA with residual aphasia and R sided hemiparesis     Comorbid Conditions Contributing to Decline:HFrEF (EF 25-30%), CKD stage III, BPH, HLD, dysphagia,aspiration Pneumonia      Vital Signs: Routine per Hospice Protocol.    Code Status: DNR    Allergies: Review of patient's allergies indicates:  No Known Allergies    Diet: Pleasure feeds with puree nectar thick     Activities: As tolerated    Goals of Care Treatment Preferences:  Code Status: DNR    Health care agent: Pt's sonSaurabh is NOK  Health care agent number: No value filed.    Living Will: Yes  LaPOST: Yes  What is most important right now is to  focus on improvement in condition but with limits to invasive therapies.  Accordingly, we have decided that the best plan to meet the patient's goals includes continuing with treatment.      Nursing: Per Hospice Routine.    Routine Skin for Bedridden Patients: Apply moisture barrier cream to all skin folds and   wet areas in perineal area daily and after baths and all bowel movements.    Medications:          Medication List        START taking these medications      sertraline 50 MG tablet  Commonly known as: ZOLOFT  Take 1 tablet (50 mg total) by mouth every evening.            CONTINUE taking these medications      acetaminophen 650 MG Tbsr  Commonly known as: TYLENOL  Take 650 mg by mouth 3 (three) times daily as needed (PAIN).     albuterol-ipratropium 2.5 mg-0.5 mg/3 mL nebulizer solution  Commonly known as: DUO-NEB  Take 3 mLs by nebulization every 6 (six) hours while awake. Rescue     aspirin 81 MG Chew  Take 1 tablet (81 mg total) by mouth once daily.     clopidogreL 75 mg tablet  Commonly known as: PLAVIX  Take 1 tablet (75 mg total) by mouth once daily.     finasteride 5 mg tablet  Commonly known as: PROSCAR  Take 5 mg by mouth once daily.     fish oil-omega-3 fatty acids 300-1,000 mg capsule  Take 1 capsule by mouth once daily.     furosemide 40 MG tablet  Commonly known as: LASIX  Take 1 tablet (40 mg total) by mouth once daily.     multivitamin per tablet  Commonly known as: THERAGRAN  Take 1 tablet by mouth once daily.     potassium chloride 10 MEQ Tbsr  Commonly known as: KLOR-CON  Take 10 mEq by mouth once daily.     senna 8.6 mg tablet  Commonly known as: SENOKOT  Take 1 tablet by mouth as needed for Constipation.     VOLTAREN ARTHRITIS PAIN 1 % Gel  Generic drug: diclofenac sodium  Apply 2 g topically 2 (two) times daily. To right knee                    Future Orders:  Hospice Medical Director may dictate new orders for comfortable care measures & sign death  certificate.        _________________________________  Dacia Ortiz MD  04/29/2024

## 2024-04-29 NOTE — HOSPITAL COURSE
73M NH resident with PMH of CVA with residual aphasia and R sided hemiparesis, HFrEF (EF 25-30%), CKD stage III, BPH, HLD admitted for acute CHF and aspiration pneumonia. Started on zosyn and lasix. Pt with severe aphasia and bedbound with extremely poor quality of life and poor prognosis. TTE 4/24 showing combined systolic/diastolic dysfunction with EF 10%. SLP recommend NPO. Patient and family do not want PEG tube. Discussed with sister at bedside that patient has extremely poor quality of life with poor prognosis and would benefit from a palliative approach. Family wants pleasure feeds for patient accepting the risk of aspiration and its consequences. Palliative care consulted. Family requesting no invasive procedures for patient and transition to hospice at [facility]. CM on board. Patient is being d/c to hospice at [facility]. Plan of care discussed with patient's family, verbalized understanding. All questions were answered.

## 2024-04-30 NOTE — PLAN OF CARE
[facility] - Telemetry Stepdown  Discharge Final Note    Primary Care Provider: Helio Farr MD    Expected Discharge Date: 4/29/2024    Final Discharge Note (most recent)       Final Note - 04/30/24 0947          Final Note    Assessment Type Final Discharge Note     Anticipated Discharge Disposition Hospice/Home        Post-Acute Status    Post-Acute Authorization Placement;Hospice     Post-Acute Placement Status Set-up Complete/Auth obtained     Hospice Status Set-up Complete/Auth obtained     Coverage Medicare     Discharge Delays None known at this time                     Important Message from Medicare             Contact Info       Helio Farr MD   Specialty: Internal Medicine   Relationship: PCP - [address]  [id number]  [place] [address]   Phone: [phone]       Next Steps: Follow up in 3 day(s)          Pt was D/C back to [facility] with Hospice. Family by bedside. Pt setup by ambulance to go back to the NH. Pt bedridden. No other needs noted upon admission.   Discharge Plan A and Plan B have been determined by review of patient's clinical status, future medical and therapeutic needs, and coverage/benefits for post-acute care in coordination with multidisciplinary team members.  Jovan Sabillon TRISH    [facility]  [phone]